# Patient Record
Sex: MALE | Race: BLACK OR AFRICAN AMERICAN | Employment: OTHER | URBAN - METROPOLITAN AREA
[De-identification: names, ages, dates, MRNs, and addresses within clinical notes are randomized per-mention and may not be internally consistent; named-entity substitution may affect disease eponyms.]

---

## 2017-01-05 ENCOUNTER — TELEPHONE ANTICOAG (OUTPATIENT)
Dept: CARDIOLOGY CLINIC | Age: 62
End: 2017-01-05

## 2017-01-05 DIAGNOSIS — I48.0 PAROXYSMAL ATRIAL FIBRILLATION (HCC): ICD-10-CM

## 2017-01-05 LAB — INR, EXTERNAL: 2.6

## 2017-01-12 LAB — INR, EXTERNAL: 2.28

## 2017-01-13 ENCOUNTER — TELEPHONE (OUTPATIENT)
Dept: CARDIOLOGY CLINIC | Age: 62
End: 2017-01-13

## 2017-01-13 ENCOUNTER — TELEPHONE ANTICOAG (OUTPATIENT)
Dept: CARDIOLOGY CLINIC | Age: 62
End: 2017-01-13

## 2017-01-13 DIAGNOSIS — I48.0 PAROXYSMAL ATRIAL FIBRILLATION (HCC): Primary | ICD-10-CM

## 2017-01-13 DIAGNOSIS — I48.0 PAROXYSMAL ATRIAL FIBRILLATION (HCC): ICD-10-CM

## 2017-01-18 DIAGNOSIS — I50.32 CHRONIC DIASTOLIC HEART FAILURE (HCC): Primary | ICD-10-CM

## 2017-01-19 RX ORDER — FUROSEMIDE 40 MG/1
40 TABLET ORAL 2 TIMES DAILY
Qty: 60 TAB | Refills: 6 | Status: SHIPPED | OUTPATIENT
Start: 2017-01-19 | End: 2017-12-26 | Stop reason: SDUPTHER

## 2017-02-03 ENCOUNTER — TELEPHONE (OUTPATIENT)
Dept: CARDIOLOGY CLINIC | Age: 62
End: 2017-02-03

## 2017-02-03 ENCOUNTER — TELEPHONE ANTICOAG (OUTPATIENT)
Dept: CARDIOLOGY CLINIC | Age: 62
End: 2017-02-03

## 2017-02-03 DIAGNOSIS — I05.9 MITRAL VALVE DISORDER: ICD-10-CM

## 2017-02-03 DIAGNOSIS — I48.0 PAROXYSMAL ATRIAL FIBRILLATION (HCC): Primary | ICD-10-CM

## 2017-02-03 DIAGNOSIS — I48.91 ATRIAL FIBRILLATION, UNSPECIFIED TYPE (HCC): ICD-10-CM

## 2017-02-03 LAB — INR, EXTERNAL: 1.93

## 2017-02-03 NOTE — TELEPHONE ENCOUNTER
Per patient called to inform us he needed a new ongoing standing order    Order will be faxed to [facility]

## 2017-02-03 NOTE — PATIENT INSTRUCTIONS
Instructed patient to continue current dose of coumadin and recheck INR in 3 weeks on 2/24/17. He voices understanding and acceptance of this advice and will call back if any further questions or concerns.

## 2017-02-08 ENCOUNTER — OFFICE VISIT (OUTPATIENT)
Dept: CARDIOLOGY CLINIC | Age: 62
End: 2017-02-08

## 2017-02-08 VITALS
BODY MASS INDEX: 36.31 KG/M2 | WEIGHT: 292 LBS | SYSTOLIC BLOOD PRESSURE: 139 MMHG | DIASTOLIC BLOOD PRESSURE: 69 MMHG | HEIGHT: 75 IN | HEART RATE: 60 BPM

## 2017-02-08 DIAGNOSIS — I10 ESSENTIAL HYPERTENSION, BENIGN: ICD-10-CM

## 2017-02-08 DIAGNOSIS — I48.20 CHRONIC ATRIAL FIBRILLATION (HCC): Primary | ICD-10-CM

## 2017-02-08 DIAGNOSIS — I05.9 MITRAL VALVE DISORDER: ICD-10-CM

## 2017-02-08 DIAGNOSIS — Z79.01 ANTICOAGULANT LONG-TERM USE: ICD-10-CM

## 2017-02-08 DIAGNOSIS — I50.32 CHRONIC DIASTOLIC HEART FAILURE (HCC): ICD-10-CM

## 2017-02-08 DIAGNOSIS — I27.20 PULMONARY HTN (HCC): ICD-10-CM

## 2017-02-08 NOTE — PROGRESS NOTES
1. Have you been to the ER, urgent care clinic since your last visit? Hospitalized since your last visit? No    2. Have you seen or consulted any other health care providers outside of the [address] [place] [address] since your last visit? Include any pap smears or colon screening. Yes Where: PCP Routine     3. Since your last visit, have you had any of the following symptoms?      swelling in legs/arms. 4.  Have you had any blood work, X-rays or cardiac testing? Yes Where: [facility]     Requested: NO     In [facility]: YES    5. Where do you normally have your labs drawn? [facility]    6. Do you need any refills today?    NO

## 2017-02-08 NOTE — MR AVS SNAPSHOT
Visit Information Date & Time Provider Department Dept. Phone Encounter #  
 2/8/2017  9:00 AM Libby Black MD [facility] [address] [place] [address] [id number] Follow-up Instructions Return in about 6 months (around 8/8/2017). Your Appointments 8/23/2017  9:00 AM  
ESTABLISHED PATIENT with Libby Black MD  
[facility]) Appt Note: 6 months [address] [facility], [address] [place] [address]  
[address], [address] [place] [address] Upcoming Health Maintenance Date Due Hepatitis C Screening 1955 Pneumococcal 19-64 Medium Risk (1 of 1 - PPSV23) 5/18/1972 DTaP/Tdap/Td series (1 - Tdap) 5/18/1974 FOBT Q 1 YEAR AGE 50-75 5/18/2003 ZOSTER VACCINE AGE 60> 5/18/2013 INFLUENZA AGE 9 TO ADULT 8/1/2016 Allergies as of 2/8/2017  Review Complete On: 2/8/2017 By: Libby Black MD  
 No Known Allergies Current Immunizations  Never Reviewed No immunizations on file. Not reviewed this visit You Were Diagnosed With   
  
 Codes Comments Chronic atrial fibrillation (HCC)    -  Primary ICD-10-CM: R04.9 ICD-9-CM: 427.31 stable 
on anticoag Mitral valve disorder     ICD-10-CM: I05.9 ICD-9-CM: 424.0 mr stable 
asymptomatic Chronic diastolic heart failure (HCC)     ICD-10-CM: I50.32 
ICD-9-CM: 428.32 stable 
occasional edema Essential hypertension, benign     ICD-10-CM: I10 
ICD-9-CM: 401.1 controlled Pulmonary HTN ([address]     ICD-10-CM: I27.2 ICD-9-CM: 416.8 stable Vitals BP Pulse Height(growth percentile) Weight(growth percentile) BMI Smoking Status 139/69 60 6' 3\" (1.905 m) 292 lb (132.5 kg) 36.5 kg/m2 Never Smoker Vitals History BMI and BSA Data Body Mass Index Body Surface Area  
 36.5 kg/m 2 2.65 m 2 Preferred Pharmacy Pharmacy Name Phone [facility]  [facility], [address] [place] [address] [phone] Your Updated Medication List  
  
   
This list is accurate as of: 2/8/17  9:32 AM.  Always use your most recent med list.  
  
  
  
  
 COREG 25 mg tablet Generic drug:  carvedilol Take 25 mg by mouth two (2) times daily (with meals). COUMADIN 5 mg tablet Generic drug:  warfarin Take 5 mg by mouth daily. furosemide 40 mg tablet Commonly known as:  LASIX Take 1 Tab by mouth two (2) times a day. LANOXIN 0.125 mg tablet Generic drug:  digoxin Take 0.125 mcg by mouth daily. nisoldipine SR 17 mg Tb24 tablet Commonly known as:  Peder Dickey Take 1 Tab by mouth daily. ramipril 10 mg capsule Commonly known as:  ALTACE Take 10 mg by mouth daily. Follow-up Instructions Return in about 6 months (around 8/8/2017). Introducing [facility]! Leta Robbins introduces [employer] patient portal. Now you can access parts of your medical record, email your doctor's office, and request medication refills online. 1. In your internet browser, go to [email or web address] 2. Click on the First Time User? Click Here link in the Sign In box. You will see the New Member Sign Up page. 3. Enter your Songfor Access Code exactly as it appears below. You will not need to use this code after youve completed the sign-up process. If you do not sign up before the expiration date, you must request a new code. · Song[employer] Access Code: [email or web address] Expires: 3/20/2017  9:24 AM 
 
4. Enter the last four digits of your Social Security Number (xxxx) and Date of Birth (mm/dd/yyyy) as indicated and click Submit. You will be taken to the next sign-up page. 5. Create a [employer] ID. This will be your Songfor login ID and cannot be changed, so think of one that is secure and easy to remember. 6. Create a [employer] password. You can change your password at any time. 7. Enter your Password Reset Question and Answer. This can be used at a later time if you forget your password. 8. Enter your e-mail address. You will receive e-mail notification when new information is available in [address] 9. Click Sign Up. You can now view and download portions of your medical record. 10. Click the Download Summary menu link to download a portable copy of your medical information. If you have questions, please visit the Frequently Asked Questions section of the [employer] website. Remember, [employer] is NOT to be used for urgent needs. For medical emergencies, dial 911. Now available from your iPhone and Android! Please provide this summary of care documentation to your next provider. Your primary care clinician is listed as Shira Anderson. If you have any questions after today's visit, please call [phone].

## 2017-02-08 NOTE — PROGRESS NOTES
HISTORY OF PRESENT ILLNESS  Jorge Howard is a 59 y.o. male. HPI Comments: Patient with a fib,chf,pulmonary htn,mr. On follow up patient denies any chest pains,sob, palpitation or other significant symptoms. Valvular Heart Disease   The history is provided by the patient. This is a chronic problem. The problem occurs constantly. The problem has not changed since onset. Pertinent negatives include no chest pain, no abdominal pain, no headaches and no shortness of breath. CHF   The history is provided by the patient. This is a chronic problem. The problem occurs constantly. The problem has not changed since onset. Pertinent negatives include no chest pain, no abdominal pain, no headaches and no shortness of breath. Palpitations    The history is provided by the patient. This is a chronic problem. The problem has not changed since onset. Associated symptoms include lower extremity edema. Pertinent negatives include no fever, no chest pain, no claudication, no orthopnea, no PND, no abdominal pain, no nausea, no vomiting, no headaches, no dizziness, no weakness, no cough, no hemoptysis, no shortness of breath and no sputum production. His past medical history is significant for hypertension. Hypertension   Pertinent negatives include no chest pain, no abdominal pain, no headaches and no shortness of breath. Leg Swelling   The history is provided by the patient. This is a chronic problem. The problem occurs daily. The problem has not changed since onset. Pertinent negatives include no chest pain, no abdominal pain, no headaches and no shortness of breath. Nothing aggravates the symptoms. Review of Systems   Constitutional: Negative for chills and fever. HENT: Negative for nosebleeds. Eyes: Negative for blurred vision and double vision. Respiratory: Negative for cough, hemoptysis, sputum production, shortness of breath and wheezing. Cardiovascular: Positive for leg swelling.  Negative for chest pain, palpitations, orthopnea, claudication and PND. Gastrointestinal: Negative for abdominal pain, heartburn, nausea and vomiting. Musculoskeletal: Negative for myalgias. Skin: Negative for rash. Neurological: Negative for dizziness, weakness and headaches. Endo/Heme/Allergies: Does not bruise/bleed easily. Family History   Problem Relation Age of Onset    Diabetes Neg Hx     Hypertension Neg Hx        Past Medical History   Diagnosis Date    Atrial fibrillation (HCC)     Chronic diastolic heart failure (HCC)     Congestive heart failure (HCC)     Essential hypertension, benign     Hypertension     Mitral valve disorders     Obesity, unspecified     Other chronic pulmonary heart diseases     Other ill-defined conditions(799.89)      Afib       Past Surgical History   Procedure Laterality Date    Hx tonsillectomy      Hx adenoidectomy         No Known Allergies    Current Outpatient Prescriptions   Medication Sig    furosemide (LASIX) 40 mg tablet Take 1 Tab by mouth two (2) times a day.  nisoldipine SR (SULAR) 17 mg Tb24 tablet Take 1 Tab by mouth daily.  warfarin (COUMADIN) 5 mg tablet Take 5 mg by mouth daily.  carvedilol (COREG) 25 mg tablet Take 25 mg by mouth two (2) times daily (with meals).  ramipril (ALTACE) 10 mg capsule Take 10 mg by mouth daily.  digoxin (LANOXIN) 0.125 mg tablet Take 0.125 mcg by mouth daily. No current facility-administered medications for this visit. Visit Vitals    /69    Pulse 60    Ht 6' 3\" (1.905 m)    Wt 132.5 kg (292 lb)    BMI 36.5 kg/m2         Physical Exam   Constitutional: He is oriented to person, place, and time. He appears well-developed and well-nourished. HENT:   Head: Normocephalic and atraumatic. Eyes: Conjunctivae are normal.   Neck: Neck supple. No JVD present. No tracheal deviation present. No thyromegaly present. Cardiovascular: Normal rate and normal heart sounds.   An irregularly irregular rhythm present. Exam reveals no gallop and no friction rub. No murmur heard. Pulmonary/Chest: Breath sounds normal. No respiratory distress. He has no wheezes. He has no rales. He exhibits no tenderness. Abdominal: Soft. There is no tenderness. Musculoskeletal: He exhibits edema. Neurological: He is alert and oriented to person, place, and time. Skin: Skin is warm and dry. Psychiatric: He has a normal mood and affect. Mr. Patsy Crockett has a reminder for a \"due or due soon\" health maintenance. I have asked that he contact his primary care provider for follow-up on this health maintenance. CARDIOLOGY STUDIES 7/24/2013 5/18/2011 12/19/2007 5/9/2006 4/16/2002 7/15/1996   EKG Result - - - - - 7-96   Myocardial Perfusion Scan Result - - - - nl scan -   Echocardiogram - Complete Result normal ef,enlarged la,mild mod mr,mild tr,pap 38 EF 55%,mild MR & TR,PAP 51 Mild to Mod. MR, Normal EF - - -   Doppler US Vascular Result - - - Vascular - -     SUMMARY:echo:5/2015  Left ventricle: Systolic function was normal. Ejection fraction was  estimated in the range of 55 % to 60 %. There were no regional wall motion  abnormalities. Left atrium: The atrium was mildly dilated. Mitral valve: There was mild annular calcification. There was mild diffuse  thickening of the anterior and posterior leaflets. There was mild  regurgitation. Mean transmitral gradient was 1.6 mmHg. Tricuspid valve: Tricuspid regurgitation peak velocity: 3.1 m/sec. Pulmonary artery systolic pressure: 41 mmHg. Assessment       ICD-10-CM ICD-9-CM    1. Chronic atrial fibrillation (HCC) I48.2 427.31     stable  on anticoag   2. Mitral valve disorder I05.9 424.0     mr stable  asymptomatic   3. Chronic diastolic heart failure (HCC) I50.32 428.32     stable  occasional edema   4. Essential hypertension, benign I10 401.1     controlled   5. Pulmonary HTN (HCC) I27.2 416.8     stable   6.  Anticoagulant long-term use Z79.01 V58.61 on coumadin for af       There are no discontinued medications. No orders of the defined types were placed in this encounter. Follow-up Disposition:  Return in about 6 months (around 8/8/2017).

## 2017-02-08 NOTE — LETTER
Mahi Fails 1955 2/8/2017 Dear Samantha Stovall MD 
 
I had the pleasure of evaluating  Mr. Gunner Romeo in office today. Below are the relevant portions of my assessment and plan of care. ICD-10-CM ICD-9-CM 1. Chronic atrial fibrillation (HCC) I48.2 427.31   
 stable 
on anticoag 2. Mitral valve disorder I05.9 424.0   
 mr stable 
asymptomatic 3. Chronic diastolic heart failure (HCC) I50.32 428.32   
 stable 
occasional edema 4. Essential hypertension, benign I10 401.1   
 controlled 5. Pulmonary HTN (HCC) I27.2 416.8   
 stable Current Outpatient Prescriptions Medication Sig Dispense Refill  furosemide (LASIX) 40 mg tablet Take 1 Tab by mouth two (2) times a day. 60 Tab 6  
 nisoldipine SR (SULAR) 17 mg Tb24 tablet Take 1 Tab by mouth daily. 30 Tab 6  warfarin (COUMADIN) 5 mg tablet Take 5 mg by mouth daily.  carvedilol (COREG) 25 mg tablet Take 25 mg by mouth two (2) times daily (with meals).  ramipril (ALTACE) 10 mg capsule Take 10 mg by mouth daily.  digoxin (LANOXIN) 0.125 mg tablet Take 0.125 mcg by mouth daily. No orders of the defined types were placed in this encounter. If you have questions, please do not hesitate to call me. I look forward to following Mr. Gunner Romeo along with you. Sincerely, Michael Howe MD

## 2017-03-03 ENCOUNTER — TELEPHONE ANTICOAG (OUTPATIENT)
Dept: CARDIOLOGY CLINIC | Age: 62
End: 2017-03-03

## 2017-03-03 DIAGNOSIS — I48.0 PAROXYSMAL ATRIAL FIBRILLATION (HCC): ICD-10-CM

## 2017-03-03 LAB — INR, EXTERNAL: 2

## 2017-04-05 LAB — INR, EXTERNAL: 1.7

## 2017-04-06 ENCOUNTER — TELEPHONE ANTICOAG (OUTPATIENT)
Dept: CARDIOLOGY CLINIC | Age: 62
End: 2017-04-06

## 2017-04-06 DIAGNOSIS — I48.0 PAROXYSMAL ATRIAL FIBRILLATION (HCC): ICD-10-CM

## 2017-04-06 NOTE — PATIENT INSTRUCTIONS
Called and advised patient to take 10 mg coumadin today the resume reg dose. Repeat inr 4/10/17, patient states understanding.

## 2017-05-04 DIAGNOSIS — I05.9 MITRAL VALVE DISORDER: ICD-10-CM

## 2017-05-04 DIAGNOSIS — I48.0 PAROXYSMAL ATRIAL FIBRILLATION (HCC): Primary | ICD-10-CM

## 2017-05-04 LAB
HCT VFR BLD AUTO: 42.7 % (ref 39.3–51.6)
HGB BLD-MCNC: 14.4 G/DL (ref 13.1–17.2)
INR PPP: 1.69 (ref 0.89–1.29)
PROTHROMBIN TIME: 17.8 SEC (ref 9–13)

## 2017-05-05 ENCOUNTER — TELEPHONE (OUTPATIENT)
Dept: CARDIOLOGY CLINIC | Age: 62
End: 2017-05-05

## 2017-05-05 ENCOUNTER — TELEPHONE ANTICOAG (OUTPATIENT)
Dept: CARDIOLOGY CLINIC | Age: 62
End: 2017-05-05

## 2017-05-05 DIAGNOSIS — I50.32 CHRONIC DIASTOLIC HEART FAILURE (HCC): ICD-10-CM

## 2017-05-05 DIAGNOSIS — I48.91 ATRIAL FIBRILLATION, UNSPECIFIED TYPE (HCC): ICD-10-CM

## 2017-05-05 DIAGNOSIS — I50.32 DIASTOLIC CHF, CHRONIC (HCC): ICD-10-CM

## 2017-05-05 DIAGNOSIS — I48.20 CHRONIC ATRIAL FIBRILLATION (HCC): ICD-10-CM

## 2017-05-05 LAB — INR, EXTERNAL: 1.7

## 2017-05-05 RX ORDER — DIGOXIN 125 MCG
0.12 TABLET ORAL DAILY
Qty: 30 TAB | Refills: 3 | Status: SHIPPED | OUTPATIENT
Start: 2017-05-05 | End: 2017-07-31 | Stop reason: SDUPTHER

## 2017-05-05 RX ORDER — NISOLDIPINE 17 MG/1
17 TABLET, FILM COATED, EXTENDED RELEASE ORAL DAILY
Qty: 30 TAB | Refills: 3 | Status: SHIPPED | OUTPATIENT
Start: 2017-05-05 | End: 2017-07-31 | Stop reason: SDUPTHER

## 2017-05-05 RX ORDER — CARVEDILOL 25 MG/1
25 TABLET ORAL 2 TIMES DAILY WITH MEALS
Qty: 30 TAB | Refills: 3 | Status: SHIPPED | OUTPATIENT
Start: 2017-05-05 | End: 2017-05-08 | Stop reason: SDUPTHER

## 2017-05-05 RX ORDER — RAMIPRIL 10 MG/1
10 CAPSULE ORAL DAILY
Qty: 30 CAP | Refills: 3 | Status: SHIPPED | OUTPATIENT
Start: 2017-05-05 | End: 2017-05-08 | Stop reason: SDUPTHER

## 2017-05-05 NOTE — TELEPHONE ENCOUNTER
Requested Prescriptions     Pending Prescriptions Disp Refills    carvedilol (COREG) 25 mg tablet 30 Tab 3     Sig: Take 1 Tab by mouth two (2) times daily (with meals).  digoxin (LANOXIN) 0.125 mg tablet 30 Tab 3     Sig: Take 1 Tab by mouth daily.  ramipril (ALTACE) 10 mg capsule 30 Cap 3     Sig: Take 1 Cap by mouth daily.  nisoldipine SR (SULAR) 17 mg Tb24 tablet 30 Tab 3     Sig: Take 1 Tab by mouth daily.

## 2017-05-05 NOTE — PATIENT INSTRUCTIONS
Called and spoke with patient to take take 10 mg tonight and resume current dose of coumadin and recheck INR on Monday. He voices understanding and acceptance of this advice and will call back if any further questions or concerns.

## 2017-05-08 ENCOUNTER — TELEPHONE ANTICOAG (OUTPATIENT)
Dept: CARDIOLOGY CLINIC | Age: 62
End: 2017-05-08

## 2017-05-08 DIAGNOSIS — I48.20 CHRONIC ATRIAL FIBRILLATION (HCC): ICD-10-CM

## 2017-05-08 DIAGNOSIS — I48.91 ATRIAL FIBRILLATION, UNSPECIFIED TYPE (HCC): ICD-10-CM

## 2017-05-08 DIAGNOSIS — I50.32 DIASTOLIC CHF, CHRONIC (HCC): ICD-10-CM

## 2017-05-08 DIAGNOSIS — I50.32 CHRONIC DIASTOLIC HEART FAILURE (HCC): ICD-10-CM

## 2017-05-08 LAB — INR, EXTERNAL: 1.9

## 2017-05-08 RX ORDER — CARVEDILOL 25 MG/1
25 TABLET ORAL 2 TIMES DAILY WITH MEALS
Qty: 60 TAB | Refills: 3 | Status: SHIPPED | OUTPATIENT
Start: 2017-05-08 | End: 2017-07-31 | Stop reason: SDUPTHER

## 2017-05-08 RX ORDER — RAMIPRIL 10 MG/1
10 CAPSULE ORAL DAILY
Qty: 30 CAP | Refills: 3 | Status: SHIPPED | OUTPATIENT
Start: 2017-05-08 | End: 2017-07-31 | Stop reason: SDUPTHER

## 2017-05-08 NOTE — PROGRESS NOTES
Description          5/8/17  7.5 mg daily recheck Friday stat        This has been fully explained to the patient, who indicates understanding.

## 2017-05-12 ENCOUNTER — TELEPHONE ANTICOAG (OUTPATIENT)
Dept: CARDIOLOGY CLINIC | Age: 62
End: 2017-05-12

## 2017-05-12 DIAGNOSIS — I48.20 CHRONIC ATRIAL FIBRILLATION (HCC): ICD-10-CM

## 2017-05-12 LAB — INR, EXTERNAL: 2.5

## 2017-05-19 ENCOUNTER — TELEPHONE ANTICOAG (OUTPATIENT)
Dept: CARDIOLOGY CLINIC | Age: 62
End: 2017-05-19

## 2017-05-19 DIAGNOSIS — I48.20 CHRONIC ATRIAL FIBRILLATION (HCC): ICD-10-CM

## 2017-05-19 LAB — INR, EXTERNAL: 2.8

## 2017-05-30 RX ORDER — WARFARIN SODIUM 5 MG/1
5 TABLET ORAL DAILY
Qty: 45 TAB | Refills: 6 | Status: SHIPPED | OUTPATIENT
Start: 2017-05-30 | End: 2017-06-01 | Stop reason: SDUPTHER

## 2017-06-01 RX ORDER — WARFARIN SODIUM 5 MG/1
5 TABLET ORAL DAILY
Qty: 45 TAB | Refills: 6 | Status: SHIPPED | OUTPATIENT
Start: 2017-06-01 | End: 2017-06-26 | Stop reason: SDUPTHER

## 2017-06-02 ENCOUNTER — TELEPHONE ANTICOAG (OUTPATIENT)
Dept: CARDIOLOGY CLINIC | Age: 62
End: 2017-06-02

## 2017-06-02 DIAGNOSIS — I48.20 CHRONIC ATRIAL FIBRILLATION (HCC): ICD-10-CM

## 2017-06-02 LAB — INR, EXTERNAL: 2.9

## 2017-06-02 NOTE — PROGRESS NOTES
Description          6/2 continue current dose recheck 2 weeks         This has been fully explained to the patient, who indicates understanding.

## 2017-06-16 LAB — INR, EXTERNAL: 2.79

## 2017-06-19 ENCOUNTER — TELEPHONE ANTICOAG (OUTPATIENT)
Dept: CARDIOLOGY CLINIC | Age: 62
End: 2017-06-19

## 2017-06-19 DIAGNOSIS — I48.20 CHRONIC ATRIAL FIBRILLATION (HCC): ICD-10-CM

## 2017-06-27 RX ORDER — WARFARIN 7.5 MG/1
7.5 TABLET ORAL DAILY
Qty: 30 TAB | Refills: 6 | Status: SHIPPED | OUTPATIENT
Start: 2017-06-27 | End: 2018-02-05 | Stop reason: SDUPTHER

## 2017-07-05 ENCOUNTER — TELEPHONE ANTICOAG (OUTPATIENT)
Dept: CARDIOLOGY CLINIC | Age: 62
End: 2017-07-05

## 2017-07-05 DIAGNOSIS — I48.20 CHRONIC ATRIAL FIBRILLATION (HCC): ICD-10-CM

## 2017-07-05 LAB — INR, EXTERNAL: 2.8

## 2017-07-25 LAB — INR, EXTERNAL: 2.51

## 2017-07-26 ENCOUNTER — TELEPHONE ANTICOAG (OUTPATIENT)
Dept: CARDIOLOGY CLINIC | Age: 62
End: 2017-07-26

## 2017-07-26 DIAGNOSIS — I48.20 CHRONIC ATRIAL FIBRILLATION (HCC): ICD-10-CM

## 2017-07-31 ENCOUNTER — TELEPHONE (OUTPATIENT)
Dept: CARDIOLOGY CLINIC | Age: 62
End: 2017-07-31

## 2017-07-31 DIAGNOSIS — I50.32 DIASTOLIC CHF, CHRONIC (HCC): ICD-10-CM

## 2017-07-31 DIAGNOSIS — I48.91 ATRIAL FIBRILLATION, UNSPECIFIED TYPE (HCC): ICD-10-CM

## 2017-07-31 DIAGNOSIS — I50.32 CHRONIC DIASTOLIC HEART FAILURE (HCC): ICD-10-CM

## 2017-07-31 DIAGNOSIS — I10 HYPERTENSION, ESSENTIAL: Primary | ICD-10-CM

## 2017-07-31 RX ORDER — NISOLDIPINE 17 MG/1
17 TABLET, FILM COATED, EXTENDED RELEASE ORAL DAILY
Qty: 30 TAB | Refills: 3 | Status: SHIPPED | OUTPATIENT
Start: 2017-07-31 | End: 2017-11-20 | Stop reason: SDUPTHER

## 2017-07-31 RX ORDER — DIGOXIN 125 MCG
0.12 TABLET ORAL DAILY
Qty: 30 TAB | Refills: 3 | Status: SHIPPED | OUTPATIENT
Start: 2017-07-31 | End: 2017-11-20 | Stop reason: SDUPTHER

## 2017-07-31 RX ORDER — RAMIPRIL 10 MG/1
10 CAPSULE ORAL DAILY
Qty: 30 CAP | Refills: 3 | Status: SHIPPED | OUTPATIENT
Start: 2017-07-31 | End: 2017-08-23 | Stop reason: SDUPTHER

## 2017-07-31 RX ORDER — CARVEDILOL 25 MG/1
25 TABLET ORAL 2 TIMES DAILY WITH MEALS
Qty: 60 TAB | Refills: 6 | Status: SHIPPED | OUTPATIENT
Start: 2017-07-31 | End: 2018-02-05 | Stop reason: SDUPTHER

## 2017-08-01 DIAGNOSIS — I50.32 DIASTOLIC CHF, CHRONIC (HCC): ICD-10-CM

## 2017-08-01 DIAGNOSIS — I48.91 ATRIAL FIBRILLATION, UNSPECIFIED TYPE (HCC): ICD-10-CM

## 2017-08-01 DIAGNOSIS — I50.32 CHRONIC DIASTOLIC HEART FAILURE (HCC): ICD-10-CM

## 2017-08-01 NOTE — TELEPHONE ENCOUNTER
Pharmacy called and stated when the patient came to  his Altace, which according to the notes is once daily, he stated that is incorrect and that it should be twice daily. Could not find any documentation in the chart that states twice daily. Which way should he be taking the Altace ?

## 2017-08-02 DIAGNOSIS — I10 HYPERTENSION, ESSENTIAL: ICD-10-CM

## 2017-08-02 RX ORDER — RAMIPRIL 10 MG/1
10 CAPSULE ORAL 2 TIMES DAILY
Qty: 180 CAP | Refills: 2 | Status: SHIPPED | OUTPATIENT
Start: 2017-08-02 | End: 2017-11-20 | Stop reason: SDUPTHER

## 2017-08-02 NOTE — TELEPHONE ENCOUNTER
Talk to patient about bringing medication to next visit, He voices understanding and acceptance of this advice and will call back if any further questions or concerns. Requested Prescriptions     Pending Prescriptions Disp Refills    ramipril (ALTACE) 10 mg capsule 180 Cap 2     Sig: Take 1 Cap by mouth two (2) times a day.

## 2017-08-14 LAB — INR, EXTERNAL: 2.38

## 2017-08-16 ENCOUNTER — TELEPHONE ANTICOAG (OUTPATIENT)
Dept: CARDIOLOGY CLINIC | Age: 62
End: 2017-08-16

## 2017-08-16 DIAGNOSIS — I48.20 CHRONIC ATRIAL FIBRILLATION (HCC): ICD-10-CM

## 2017-08-23 ENCOUNTER — OFFICE VISIT (OUTPATIENT)
Dept: CARDIOLOGY CLINIC | Age: 62
End: 2017-08-23

## 2017-08-23 VITALS
WEIGHT: 291 LBS | HEIGHT: 75 IN | SYSTOLIC BLOOD PRESSURE: 130 MMHG | BODY MASS INDEX: 36.18 KG/M2 | HEART RATE: 56 BPM | DIASTOLIC BLOOD PRESSURE: 61 MMHG

## 2017-08-23 DIAGNOSIS — I50.32 CHRONIC DIASTOLIC HEART FAILURE (HCC): ICD-10-CM

## 2017-08-23 DIAGNOSIS — I10 ESSENTIAL HYPERTENSION, BENIGN: ICD-10-CM

## 2017-08-23 DIAGNOSIS — Z79.01 ANTICOAGULANT LONG-TERM USE: ICD-10-CM

## 2017-08-23 DIAGNOSIS — I05.9 MITRAL VALVE DISORDER: ICD-10-CM

## 2017-08-23 DIAGNOSIS — I48.20 CHRONIC ATRIAL FIBRILLATION (HCC): Primary | ICD-10-CM

## 2017-08-23 NOTE — MR AVS SNAPSHOT
Visit Information Date & Time Provider Department Dept. Phone Encounter #  
 8/23/2017  9:00 AM Harriet Burk MD [facility] [address] [place] [address] [id number] Follow-up Instructions Return in about 6 months (around 2/23/2018). Your Appointments 2/21/2018  9:00 AM  
Office Visit with Harriet Burk MD  
[facility]) Appt Note: [address] [facility], [address], [address] [place] [address] Upcoming Health Maintenance Date Due Hepatitis C Screening 1955 Pneumococcal 19-64 Medium Risk (1 of 1 - PPSV23) 5/18/1972 DTaP/Tdap/Td series (1 - Tdap) 5/18/1974 FOBT Q 1 YEAR AGE 50-75 5/18/2003 ZOSTER VACCINE AGE 60> 3/18/2013 INFLUENZA AGE 9 TO ADULT 8/1/2017 Allergies as of 8/23/2017  Review Complete On: 8/23/2017 By: Harriet Burk MD  
 No Known Allergies Current Immunizations  Never Reviewed No immunizations on file. Not reviewed this visit You Were Diagnosed With   
  
 Codes Comments Chronic atrial fibrillation (HCC)    -  Primary ICD-10-CM: V56.9 ICD-9-CM: 427.31 stable 
rate controlled Mitral valve disorder     ICD-10-CM: I05.9 ICD-9-CM: 394.9 mr 
stable 
asymptomatic Chronic diastolic heart failure (HCC)     ICD-10-CM: I50.32 
ICD-9-CM: 428.32 stable Essential hypertension, benign     ICD-10-CM: I10 
ICD-9-CM: 401.1 controlled Anticoagulant long-term use     ICD-10-CM: Z79.01 
ICD-9-CM: V58.61 stable Vitals BP Pulse Height(growth percentile) Weight(growth percentile) BMI Smoking Status 130/61 (!) 56 6' 3\" (1.905 m) 291 lb (132 kg) 36.37 kg/m2 Never Smoker Vitals History BMI and BSA Data Body Mass Index Body Surface Area  
 36.37 kg/m 2 2.64 m 2 Preferred Pharmacy Pharmacy Name Phone [facility] [place], [address] [place] [address],[address] [place] [address] [phone] Your Updated Medication List  
  
   
This list is accurate as of: 8/23/17  9:21 AM.  Always use your most recent med list.  
  
  
  
  
 carvedilol 25 mg tablet Commonly known as:  Honorio Eladio Take 1 Tab by mouth two (2) times daily (with meals). digoxin 0.125 mg tablet Commonly known as:  LANOXIN Take 1 Tab by mouth daily. furosemide 40 mg tablet Commonly known as:  LASIX Take 1 Tab by mouth two (2) times a day. nisoldipine SR 17 mg Tb24 tablet Commonly known as:  Wellington Spatz Take 1 Tab by mouth daily. ramipril 10 mg capsule Commonly known as:  ALTACE Take 1 Cap by mouth two (2) times a day. warfarin 7.5 mg tablet Commonly known as:  COUMADIN Take 1 Tab by mouth daily. Follow-up Instructions Return in about 6 months (around 2/23/2018). Introducing [facility]! Jackie Vargas introduces [employer] patient portal. Now you can access parts of your medical record, email your doctor's office, and request medication refills online. 1. In your internet browser, go to [email or web address] 2. Click on the First Time User? Click Here link in the Sign In box. You will see the New Member Sign Up page. 3. Enter your [employer] Access Code exactly as it appears below. You will not need to use this code after youve completed the sign-up process. If you do not sign up before the expiration date, you must request a new code. · [employer] Access Code: [email or web address] Expires: 10/3/2017  2:53 PM 
 
4. Enter the last four digits of your Social Security Number (xxxx) and Date of Birth (mm/dd/yyyy) as indicated and click Submit. You will be taken to the next sign-up page. 5. Create a BBO[employer]t ID. This will be your [employer]iya login ID and cannot be changed, so think of one that is secure and easy to remember. 6. Create a BBO[employer]t password. You can change your password at any time. 7. Enter your Password Reset Question and Answer. This can be used at a later time if you forget your password. 8. Enter your e-mail address. You will receive e-mail notification when new information is available in [address] 9. Click Sign Up. You can now view and download portions of your medical record. 10. Click the Download Summary menu link to download a portable copy of your medical information. If you have questions, please visit the Frequently Asked Questions section of the [employer] website. Remember, [employer] is NOT to be used for urgent needs. For medical emergencies, dial 911. Now available from your iPhone and Android! Please provide this summary of care documentation to your next provider. Your primary care clinician is listed as Patricia Nolasco. If you have any questions after today's visit, please call [phone].

## 2017-08-23 NOTE — PROGRESS NOTES
HISTORY OF PRESENT ILLNESS  Claudia Verdin is a 57 y.o. male. HPI Comments: Patient with a fib,chf,pulmonary htn,mr. On follow up patient denies any chest pains,sob, palpitation or other significant symptoms. Valvular Heart Disease   The history is provided by the patient. This is a chronic problem. The problem occurs constantly. The problem has not changed since onset. Pertinent negatives include no chest pain, no abdominal pain, no headaches and no shortness of breath. CHF   The history is provided by the patient. This is a chronic problem. The problem occurs constantly. The problem has not changed since onset. Pertinent negatives include no chest pain, no abdominal pain, no headaches and no shortness of breath. Palpitations    The history is provided by the patient. This is a chronic problem. The problem has not changed since onset. Associated symptoms include lower extremity edema. Pertinent negatives include no fever, no chest pain, no claudication, no orthopnea, no PND, no abdominal pain, no nausea, no vomiting, no headaches, no dizziness, no weakness, no cough, no hemoptysis, no shortness of breath and no sputum production. His past medical history is significant for hypertension. Hypertension   The history is provided by the patient. This is a chronic problem. The problem occurs constantly. The problem has not changed since onset. Pertinent negatives include no chest pain, no abdominal pain, no headaches and no shortness of breath. Leg Swelling   The history is provided by the patient. This is a chronic problem. The problem occurs daily. The problem has not changed since onset. Pertinent negatives include no chest pain, no abdominal pain, no headaches and no shortness of breath. Nothing aggravates the symptoms. Review of Systems   Constitutional: Negative for chills and fever. HENT: Negative for nosebleeds. Eyes: Negative for blurred vision and double vision.    Respiratory: Negative for cough, hemoptysis, sputum production, shortness of breath and wheezing. Cardiovascular: Positive for leg swelling. Negative for chest pain, palpitations, orthopnea, claudication and PND. Gastrointestinal: Negative for abdominal pain, heartburn, nausea and vomiting. Musculoskeletal: Negative for myalgias. Skin: Negative for rash. Neurological: Negative for dizziness, weakness and headaches. Endo/Heme/Allergies: Does not bruise/bleed easily. Family History   Problem Relation Age of Onset    Diabetes Neg Hx     Hypertension Neg Hx        Past Medical History:   Diagnosis Date    Atrial fibrillation (HCC)     Chronic diastolic heart failure (HCC)     Congestive heart failure (HCC)     Essential hypertension, benign     Hypertension     Mitral valve disorders     Obesity, unspecified     Other chronic pulmonary heart diseases     Other ill-defined conditions     Afib       Past Surgical History:   Procedure Laterality Date    HX ADENOIDECTOMY      HX TONSILLECTOMY         No Known Allergies    Current Outpatient Prescriptions   Medication Sig    ramipril (ALTACE) 10 mg capsule Take 1 Cap by mouth two (2) times a day.  carvedilol (COREG) 25 mg tablet Take 1 Tab by mouth two (2) times daily (with meals).  digoxin (LANOXIN) 0.125 mg tablet Take 1 Tab by mouth daily.  nisoldipine SR (SULAR) 17 mg Tb24 tablet Take 1 Tab by mouth daily.  warfarin (COUMADIN) 7.5 mg tablet Take 1 Tab by mouth daily.  furosemide (LASIX) 40 mg tablet Take 1 Tab by mouth two (2) times a day. No current facility-administered medications for this visit. Visit Vitals    /61    Pulse (!) 56    Ht 6' 3\" (1.905 m)    Wt 132 kg (291 lb)    BMI 36.37 kg/m2         Physical Exam   Constitutional: He is oriented to person, place, and time. He appears well-developed and well-nourished. HENT:   Head: Normocephalic and atraumatic. Eyes: Conjunctivae are normal.   Neck: Neck supple.  No JVD present. No tracheal deviation present. No thyromegaly present. Cardiovascular: Normal rate and normal heart sounds. An irregularly irregular rhythm present. Exam reveals no gallop and no friction rub. No murmur heard. Pulmonary/Chest: Breath sounds normal. No respiratory distress. He has no wheezes. He has no rales. He exhibits no tenderness. Abdominal: Soft. There is no tenderness. Musculoskeletal: He exhibits edema. Neurological: He is alert and oriented to person, place, and time. Skin: Skin is warm and dry. Psychiatric: He has a normal mood and affect. Mr. Josselyn Garrido has a reminder for a \"due or due soon\" health maintenance. I have asked that he contact his primary care provider for follow-up on this health maintenance. CARDIOLOGY STUDIES 7/24/2013 5/18/2011 12/19/2007 5/9/2006 4/16/2002 7/15/1996   EKG Result - - - - - 7-96   Myocardial Perfusion Scan Result - - - - nl scan -   Echocardiogram - Complete Result normal ef,enlarged la,mild mod mr,mild tr,pap 38 EF 55%,mild MR & TR,PAP 51 Mild to Mod. MR, Normal EF - - -   Doppler US Vascular Result - - - Vascular - -   Some recent data might be hidden     SUMMARY:echo:5/2015  Left ventricle: Systolic function was normal. Ejection fraction was  estimated in the range of 55 % to 60 %. There were no regional wall motion  abnormalities. Left atrium: The atrium was mildly dilated. Mitral valve: There was mild annular calcification. There was mild diffuse  thickening of the anterior and posterior leaflets. There was mild  regurgitation. Mean transmitral gradient was 1.6 mmHg. Tricuspid valve: Tricuspid regurgitation peak velocity: 3.1 m/sec. Pulmonary artery systolic pressure: 41 mmHg. I Have personally reviewed recent relevant labs available and discussed with patient  8/2017-dig,bmp  Assessment       ICD-10-CM ICD-9-CM    1. Chronic atrial fibrillation (HCC) I48.2 427.31     stable  rate controlled   2.  Mitral valve disorder I05.9 394.9     mr  stable  asymptomatic   3. Chronic diastolic heart failure (HCC) I50.32 428.32     stable   4. Essential hypertension, benign I10 401.1     controlled   5. Anticoagulant long-term use Z79.01 V58.61     stable         No orders of the defined types were placed in this encounter. Follow-up Disposition:  Return in about 6 months (around 2/23/2018).

## 2017-09-05 ENCOUNTER — OFFICE VISIT (OUTPATIENT)
Dept: FAMILY MEDICINE CLINIC | Age: 62
End: 2017-09-05

## 2017-09-05 VITALS
RESPIRATION RATE: 18 BRPM | DIASTOLIC BLOOD PRESSURE: 80 MMHG | WEIGHT: 290.2 LBS | HEART RATE: 63 BPM | OXYGEN SATURATION: 97 % | HEIGHT: 75 IN | SYSTOLIC BLOOD PRESSURE: 145 MMHG | TEMPERATURE: 98 F | BODY MASS INDEX: 36.08 KG/M2

## 2017-09-05 DIAGNOSIS — Z13.0 SCREENING FOR ENDOCRINE, METABOLIC AND IMMUNITY DISORDER: ICD-10-CM

## 2017-09-05 DIAGNOSIS — I48.20 CHRONIC ATRIAL FIBRILLATION (HCC): ICD-10-CM

## 2017-09-05 DIAGNOSIS — Z13.220 ENCOUNTER FOR LIPID SCREENING FOR CARDIOVASCULAR DISEASE: ICD-10-CM

## 2017-09-05 DIAGNOSIS — Z13.1 SCREENING FOR DIABETES MELLITUS: ICD-10-CM

## 2017-09-05 DIAGNOSIS — Z13.29 SCREENING FOR ENDOCRINE, METABOLIC AND IMMUNITY DISORDER: ICD-10-CM

## 2017-09-05 DIAGNOSIS — Z13.228 SCREENING FOR ENDOCRINE, METABOLIC AND IMMUNITY DISORDER: ICD-10-CM

## 2017-09-05 DIAGNOSIS — R20.2 TINGLING IN EXTREMITIES: ICD-10-CM

## 2017-09-05 DIAGNOSIS — I50.32 CHRONIC DIASTOLIC HEART FAILURE (HCC): ICD-10-CM

## 2017-09-05 DIAGNOSIS — Z12.5 SCREENING FOR PROSTATE CANCER: ICD-10-CM

## 2017-09-05 DIAGNOSIS — I10 ESSENTIAL HYPERTENSION: Primary | ICD-10-CM

## 2017-09-05 DIAGNOSIS — Z13.6 ENCOUNTER FOR LIPID SCREENING FOR CARDIOVASCULAR DISEASE: ICD-10-CM

## 2017-09-05 NOTE — PROGRESS NOTES
HISTORY OF PRESENT ILLNESS  Travon Sadler is a 57 y.o. male. 9/5/2017  10:21 AM    Chief Complaint   Patient presents with    Establish Care     HTN, CHF    Tingling     IN BOTH LEGS ON AND OFF SINCE MAY       HPI: Here today as a new patient, will be establishing care with Dr Mando Huitron in the future. Used to follow with Dr Joshua Fletcher for PCP. No routine labs noted. Follows with cardiology. HTN/Afib/CHF: Following with cardiology- taking medications as prescribed. On ACE, BB, Digoxin, CCB, diuretic, and Coumadin. Anticoagulation monitored by cardiology. ECHO 5/2015 EJF 55-60% with mitral valve thickening and mild regurgitation, as well as tricuspid regurgitation. Complains of bilateral shin/calf tingling that has been happening intermittently since May 2017- no pain. He denies any patterns- not worse with movement or rest. Goes away on its own- lasts about 20 mins. Does not take anything to help with symptoms. Does report a history of a pinched nerve in his low back in 2012 that caused radiation into the left leg. This is different than that. No urinary or bowel incontinence, no saddle paresthesias, and no back pan. Review of Systems   Constitutional: Negative for chills, fever and malaise/fatigue. Eyes: Negative for double vision, photophobia and pain. Respiratory: Negative for cough, shortness of breath and wheezing. Cardiovascular: Negative for chest pain, palpitations and leg swelling. Gastrointestinal: Negative for abdominal pain, constipation, diarrhea, nausea and vomiting. Genitourinary: Negative for dysuria, frequency and urgency. Musculoskeletal: Negative for back pain, joint pain and myalgias. Neurological: Positive for tingling and sensory change. Negative for dizziness, weakness and headaches.         PHQ Screening   PHQ over the last two weeks 2/8/2017   Little interest or pleasure in doing things Not at all   Feeling down, depressed or hopeless Not at all   Total Score PHQ 2 0 History  Past Medical History:   Diagnosis Date    Atrial fibrillation (HCC)     Chronic diastolic heart failure (HCC)     Hypertension     Mitral valve disorders        Past Surgical History:   Procedure Laterality Date    HX ADENOIDECTOMY      HX TONSILLECTOMY         Social History     Social History    Marital status: SINGLE     Spouse name: N/A    Number of children: N/A    Years of education: N/A     Occupational History    Not on file. Social History Main Topics    Smoking status: Never Smoker    Smokeless tobacco: Never Used    Alcohol use No    Drug use: No    Sexual activity: No     Other Topics Concern    Not on file     Social History Narrative       Family History   Problem Relation Age of Onset    Kidney Disease Mother     No Known Problems Sister     No Known Problems Brother     Diabetes Neg Hx     Hypertension Neg Hx        No Known Allergies    Current Outpatient Prescriptions   Medication Sig Dispense Refill    ramipril (ALTACE) 10 mg capsule Take 1 Cap by mouth two (2) times a day. 180 Cap 2    carvedilol (COREG) 25 mg tablet Take 1 Tab by mouth two (2) times daily (with meals). 60 Tab 6    digoxin (LANOXIN) 0.125 mg tablet Take 1 Tab by mouth daily. 30 Tab 3    nisoldipine SR (SULAR) 17 mg Tb24 tablet Take 1 Tab by mouth daily. 30 Tab 3    warfarin (COUMADIN) 7.5 mg tablet Take 1 Tab by mouth daily. 30 Tab 6    furosemide (LASIX) 40 mg tablet Take 1 Tab by mouth two (2) times a day. 60 Tab 6         Advance Care Planning:   Patient was offered the opportunity to discuss advance care planning NO   Does patient have an Advance Directive:  NO   If no, did you provide information on C[facility] Connections? Patient Care Team:  Patient Care Team:  Prudencio Jean MD as PCP - [facility])        LABS:  None new to review    RADIOLOGY:  None new to review      Physical Exam   Constitutional: He is oriented to person, place, and time.  He appears well-developed and well-nourished. No distress. Neck: Normal range of motion. Neck supple. Cardiovascular: Normal rate, regular rhythm and normal heart sounds. No murmur heard. Pulmonary/Chest: Effort normal and breath sounds normal. No respiratory distress. Abdominal: Soft. Bowel sounds are normal. There is no tenderness. Musculoskeletal: He exhibits no edema. Lumbar back: He exhibits normal range of motion, no tenderness and no pain. -bilateral leg and foot strength 5/5   Neurological: He is alert and oriented to person, place, and time. He exhibits normal muscle tone. Coordination normal.   Skin: Skin is warm and dry. Vitals:    09/05/17 1011 09/05/17 1016   BP: 148/82 145/80   Pulse: 63    Resp: 18 18   Temp: 98 °F (36.7 °C)    TempSrc: Oral    SpO2: 95% 97%   Weight: 290 lb 3.2 oz (131.6 kg)    Height: 6' 3\" (1.905 m)    PainSc:   0 - No pain      BP Readings from Last 3 Encounters:   09/05/17 145/80   08/23/17 130/61   02/08/17 139/69       ASSESSMENT and PLAN  Diagnoses and all orders for this visit:    Essential hypertension  *Slightly elevated BP today- controlled during recent cardiology visit. Continue to monitor. Chronic diastolic heart failure (HCC)  *Continue with cardiology. Chronic atrial fibrillation (HCC)  *Continue with cardiology- anticoagulation monitoring done by cardiology- appears that he is due tomorrow- advised him to call to get appointment. Tingling in extremities  *Discussed with patient about possible causes and work up for leg tingling. Advised on EMG, lumbar spine imaging, and labs- he opts to start with labs. He declines pharmacological management reporting symptoms as not that bothersome. Encounter for lipid screening for cardiovascular disease  -     LIPID PANEL; Future    Screening for diabetes mellitus  -     METABOLIC PANEL, COMPREHENSIVE; Future    Screening for endocrine, metabolic and immunity disorder  -     CBC W/O DIFF;  Future  - METABOLIC PANEL, COMPREHENSIVE; Future  -     TSH 3RD GENERATION; Future  -     URINALYSIS W/MICROSCOPIC; Future    Screening for prostate cancer  -     PSA SCREENING (SCREENING); Future      *Plan of care reviewed with patient. Patient in agreement with plan and expresses understanding. All questions answered and patient encouraged to call or RTO if further questions or concerns. Follow-up Disposition:  Return in about 6 months (around 3/5/2018) for chronic disease routine care- 30 min.

## 2017-09-05 NOTE — MR AVS SNAPSHOT
Visit Information Date & Time Provider Department Dept. Phone Encounter #  
 9/5/2017 10:00 AM [facility] [phone] [id number] Follow-up Instructions Return in about 6 months (around 3/5/2018) for chronic disease routine care- 30 min. Your Appointments 2/21/2018  9:00 AM  
Office Visit with Ham Dalton MD  
[facility]) Appt Note: [address] [facility], [address], [address] [place] [address] Upcoming Health Maintenance Date Due Hepatitis C Screening 1955 Pneumococcal 19-64 Medium Risk (1 of 1 - PPSV23) 5/18/1972 DTaP/Tdap/Td series (1 - Tdap) 5/18/1974 FOBT Q 1 YEAR AGE 50-75 5/18/2003 ZOSTER VACCINE AGE 60> 3/18/2013 Allergies as of 9/5/2017  Review Complete On: 9/5/2017 By: Nilam Park NP No Known Allergies Current Immunizations  Never Reviewed No immunizations on file. Not reviewed this visit You Were Diagnosed With   
  
 Codes Comments Essential hypertension    -  Primary ICD-10-CM: I10 
ICD-9-CM: 401.9 Chronic diastolic heart failure (HCC)     ICD-10-CM: I50.32 
ICD-9-CM: 428.32 Chronic atrial fibrillation (HCC)     ICD-10-CM: J06.7 ICD-9-CM: 427.31 Encounter for lipid screening for cardiovascular disease     ICD-10-CM: Z13.220, Z13.6 ICD-9-CM: V77.91, V81.2 Screening for diabetes mellitus     ICD-10-CM: Z13.1 ICD-9-CM: V77.1 Screening for endocrine, metabolic and immunity disorder     ICD-10-CM: Z13.29, Z13.228, Z13.0 ICD-9-CM: V77.99 Screening for prostate cancer     ICD-10-CM: Z12.5 ICD-9-CM: V76.44 Vitals BP Pulse Temp Resp Height(growth percentile) Weight(growth percentile) 145/80 (BP 1 Location: Right arm, BP Patient Position: Sitting) 63 98 °F (36.7 °C) (Oral) 18 6' 3\" (1.905 m) 290 lb 3.2 oz (131.6 kg) SpO2 BMI Smoking Status 97% 36.27 kg/m2 Never Smoker Vitals History BMI and BSA Data Body Mass Index Body Surface Area  
 36.27 kg/m 2 2.64 m 2 Preferred Pharmacy Pharmacy Name [facility] [address] [place] [address],[address] [place] [address] [phone] Your Updated Medication List  
  
   
This list is accurate as of: 9/5/17 10:47 AM.  Always use your most recent med list.  
  
  
  
  
 carvedilol 25 mg tablet Commonly known as:  Mar Jaime Take 1 Tab by mouth two (2) times daily (with meals). digoxin 0.125 mg tablet Commonly known as:  LANOXIN Take 1 Tab by mouth daily. furosemide 40 mg tablet Commonly known as:  LASIX Take 1 Tab by mouth two (2) times a day. nisoldipine SR 17 mg Tb24 tablet Commonly known as:  Annitta Desanctis Take 1 Tab by mouth daily. ramipril 10 mg capsule Commonly known as:  ALTACE Take 1 Cap by mouth two (2) times a day. warfarin 7.5 mg tablet Commonly known as:  COUMADIN Take 1 Tab by mouth daily. Follow-up Instructions Return in about 6 months (around 3/5/2018) for chronic disease routine care- 30 min. To-Do List   
 09/05/2017 Lab:  PSA SCREENING (SCREENING) Around 09/06/2017 Lab:  CBC W/O DIFF Around 09/06/2017 Lab:  LIPID PANEL Around 09/06/2017 Lab:  METABOLIC PANEL, COMPREHENSIVE Around 09/06/2017 Lab:  TSH 3RD GENERATION Around 09/06/2017 Lab:  URINALYSIS W/MICROSCOPIC Introducing [facility]! Elia Hudson introduces V[employer] patient portal. Now you can access parts of your medical record, email your doctor's office, and request medication refills online. 1. In your internet browser, go to https://[employer]. [employer] 2. Click on the First Time User? Click Here link in the Sign In box. You will see the New Member Sign Up page. 3. Enter your [employer] Access Code exactly as it appears below. You will not need to use this code after youve completed the sign-up process. If you do not sign up before the expiration date, you must request a new code. · [employer] Access Code: [email or web address] Expires: 10/3/2017  2:53 PM 
 
4. Enter the last four digits of your Social Security Number (xxxx) and Date of Birth (mm/dd/yyyy) as indicated and click Submit. You will be taken to the next sign-up page. 5. Create a [employer] ID. This will be your CloudOpt login ID and cannot be changed, so think of one that is secure and easy to remember. 6. Create a [employer] password. You can change your password at any time. 7. Enter your Password Reset Question and Answer. This can be used at a later time if you forget your password. 8. Enter your e-mail address. You will receive e-mail notification when new information is available in [address] 9. Click Sign Up. You can now view and download portions of your medical record. 10. Click the Download Summary menu link to download a portable copy of your medical information. If you have questions, please visit the Frequently Asked Questions section of the [employer] website. Remember, CloudOpt is NOT to be used for urgent needs. For medical emergencies, dial 911. Now available from your iPhone and Android! Please provide this summary of care documentation to your next provider. Your primary care clinician is listed as Homer Bazzi. If you have any questions after today's visit, please call [phone].

## 2017-09-06 ENCOUNTER — HOSPITAL ENCOUNTER (OUTPATIENT)
Dept: LAB | Age: 62
Discharge: HOME | End: 2017-09-06
Payer: COMMERCIAL

## 2017-09-06 DIAGNOSIS — Z13.220 ENCOUNTER FOR LIPID SCREENING FOR CARDIOVASCULAR DISEASE: ICD-10-CM

## 2017-09-06 DIAGNOSIS — Z13.228 SCREENING FOR ENDOCRINE, METABOLIC AND IMMUNITY DISORDER: ICD-10-CM

## 2017-09-06 DIAGNOSIS — Z13.29 SCREENING FOR ENDOCRINE, METABOLIC AND IMMUNITY DISORDER: ICD-10-CM

## 2017-09-06 DIAGNOSIS — Z13.1 SCREENING FOR DIABETES MELLITUS: ICD-10-CM

## 2017-09-06 DIAGNOSIS — Z12.5 SCREENING FOR PROSTATE CANCER: ICD-10-CM

## 2017-09-06 DIAGNOSIS — Z13.6 ENCOUNTER FOR LIPID SCREENING FOR CARDIOVASCULAR DISEASE: ICD-10-CM

## 2017-09-06 DIAGNOSIS — Z13.0 SCREENING FOR ENDOCRINE, METABOLIC AND IMMUNITY DISORDER: ICD-10-CM

## 2017-09-06 LAB
ALBUMIN SERPL-MCNC: 3.9 G/DL (ref 3.4–5)
ALBUMIN/GLOB SERPL: 0.8 {RATIO} (ref 0.8–1.7)
ALP SERPL-CCNC: 55 U/L (ref 45–117)
ALT SERPL-CCNC: 21 U/L (ref 16–61)
ANION GAP SERPL CALC-SCNC: 8 MMOL/L (ref 3–18)
AST SERPL-CCNC: 17 U/L (ref 15–37)
BILIRUB SERPL-MCNC: 0.6 MG/DL (ref 0.2–1)
BUN SERPL-MCNC: 14 MG/DL (ref 7–18)
BUN/CREAT SERPL: 12 (ref 12–20)
CALCIUM SERPL-MCNC: 9.1 MG/DL (ref 8.5–10.1)
CHLORIDE SERPL-SCNC: 102 MMOL/L (ref 100–108)
CHOLEST SERPL-MCNC: 172 MG/DL
CO2 SERPL-SCNC: 29 MMOL/L (ref 21–32)
CREAT SERPL-MCNC: 1.16 MG/DL (ref 0.6–1.3)
ERYTHROCYTE [DISTWIDTH] IN BLOOD BY AUTOMATED COUNT: 14.4 % (ref 11.6–14.5)
GLOBULIN SER CALC-MCNC: 5.1 G/DL (ref 2–4)
GLUCOSE SERPL-MCNC: 103 MG/DL (ref 74–99)
HCT VFR BLD AUTO: 43.5 % (ref 36–48)
HDLC SERPL-MCNC: 45 MG/DL (ref 40–60)
HDLC SERPL: 3.8 {RATIO} (ref 0–5)
HGB BLD-MCNC: 15 G/DL (ref 13–16)
LDLC SERPL CALC-MCNC: 112.2 MG/DL (ref 0–100)
LIPID PROFILE,FLP: ABNORMAL
MCH RBC QN AUTO: 30.5 PG (ref 24–34)
MCHC RBC AUTO-ENTMCNC: 34.5 G/DL (ref 31–37)
MCV RBC AUTO: 88.6 FL (ref 74–97)
PLATELET # BLD AUTO: 209 K/UL (ref 135–420)
PMV BLD AUTO: 11.3 FL (ref 9.2–11.8)
POTASSIUM SERPL-SCNC: 4 MMOL/L (ref 3.5–5.5)
PROT SERPL-MCNC: 9 G/DL (ref 6.4–8.2)
PSA SERPL-MCNC: 0.8 NG/ML (ref 0–4)
RBC # BLD AUTO: 4.91 M/UL (ref 4.7–5.5)
SODIUM SERPL-SCNC: 139 MMOL/L (ref 136–145)
TRIGL SERPL-MCNC: 74 MG/DL (ref ?–150)
TSH SERPL DL<=0.05 MIU/L-ACNC: 1.4 UIU/ML (ref 0.36–3.74)
VLDLC SERPL CALC-MCNC: 14.8 MG/DL
WBC # BLD AUTO: 4.3 K/UL (ref 4.6–13.2)

## 2017-09-06 PROCEDURE — 80053 COMPREHEN METABOLIC PANEL: CPT | Performed by: NURSE PRACTITIONER

## 2017-09-06 PROCEDURE — 36415 COLL VENOUS BLD VENIPUNCTURE: CPT | Performed by: NURSE PRACTITIONER

## 2017-09-06 PROCEDURE — 85027 COMPLETE CBC AUTOMATED: CPT | Performed by: NURSE PRACTITIONER

## 2017-09-06 PROCEDURE — 84153 ASSAY OF PSA TOTAL: CPT | Performed by: NURSE PRACTITIONER

## 2017-09-06 PROCEDURE — 84443 ASSAY THYROID STIM HORMONE: CPT | Performed by: NURSE PRACTITIONER

## 2017-09-06 PROCEDURE — 80061 LIPID PANEL: CPT | Performed by: NURSE PRACTITIONER

## 2017-09-11 LAB — INR, EXTERNAL: 2.94

## 2017-09-12 ENCOUNTER — TELEPHONE ANTICOAG (OUTPATIENT)
Dept: CARDIOLOGY CLINIC | Age: 62
End: 2017-09-12

## 2017-09-12 DIAGNOSIS — I48.20 CHRONIC ATRIAL FIBRILLATION (HCC): ICD-10-CM

## 2017-10-06 ENCOUNTER — TELEPHONE ANTICOAG (OUTPATIENT)
Dept: CARDIOLOGY CLINIC | Age: 62
End: 2017-10-06

## 2017-10-06 DIAGNOSIS — I48.20 CHRONIC ATRIAL FIBRILLATION (HCC): ICD-10-CM

## 2017-10-06 LAB — INR, EXTERNAL: 2.8

## 2017-11-07 ENCOUNTER — TELEPHONE (OUTPATIENT)
Dept: CARDIOLOGY CLINIC | Age: 62
End: 2017-11-07

## 2017-11-10 ENCOUNTER — TELEPHONE ANTICOAG (OUTPATIENT)
Dept: CARDIOLOGY CLINIC | Age: 62
End: 2017-11-10

## 2017-11-10 DIAGNOSIS — I48.0 PAROXYSMAL ATRIAL FIBRILLATION (HCC): ICD-10-CM

## 2017-11-10 LAB — INR, EXTERNAL: 3.2

## 2017-11-20 DIAGNOSIS — I50.32 CHRONIC DIASTOLIC HEART FAILURE (HCC): ICD-10-CM

## 2017-11-20 DIAGNOSIS — I50.32 DIASTOLIC CHF, CHRONIC (HCC): ICD-10-CM

## 2017-11-20 DIAGNOSIS — I48.91 ATRIAL FIBRILLATION, UNSPECIFIED TYPE (HCC): ICD-10-CM

## 2017-11-20 RX ORDER — NISOLDIPINE 17 MG/1
17 TABLET, FILM COATED, EXTENDED RELEASE ORAL DAILY
Qty: 30 TAB | Refills: 3 | Status: CANCELLED | OUTPATIENT
Start: 2017-11-20

## 2017-11-20 RX ORDER — DIGOXIN 125 MCG
0.12 TABLET ORAL DAILY
Qty: 30 TAB | Refills: 6 | Status: SHIPPED | OUTPATIENT
Start: 2017-11-20 | End: 2017-12-11 | Stop reason: SDUPTHER

## 2017-11-20 RX ORDER — RAMIPRIL 10 MG/1
10 CAPSULE ORAL 2 TIMES DAILY
Qty: 180 CAP | Refills: 2 | Status: SHIPPED | OUTPATIENT
Start: 2017-11-20 | End: 2018-07-20 | Stop reason: SDUPTHER

## 2017-11-20 RX ORDER — NISOLDIPINE 17 MG/1
17 TABLET, FILM COATED, EXTENDED RELEASE ORAL DAILY
Qty: 30 TAB | Refills: 6 | Status: SHIPPED | OUTPATIENT
Start: 2017-11-20 | End: 2018-07-20 | Stop reason: SDUPTHER

## 2017-11-20 RX ORDER — DIGOXIN 125 MCG
0.12 TABLET ORAL DAILY
Qty: 30 TAB | Refills: 3 | Status: CANCELLED | OUTPATIENT
Start: 2017-11-20

## 2017-11-20 RX ORDER — RAMIPRIL 10 MG/1
10 CAPSULE ORAL 2 TIMES DAILY
Qty: 180 CAP | Refills: 2 | Status: CANCELLED | OUTPATIENT
Start: 2017-11-20

## 2017-11-20 NOTE — TELEPHONE ENCOUNTER
Call made to the pt using two identifiers, name and . Noted his refill request. Pt mad aware that the providers here has not been managing these medication. Pt advised to call Cardiology, who is managing these medications. Pt verbalized understanding.

## 2017-11-20 NOTE — TELEPHONE ENCOUNTER
Requested Prescriptions     Pending Prescriptions Disp Refills    ramipril (ALTACE) 10 mg capsule 180 Cap 2     Sig: Take 1 Cap by mouth two (2) times a day.  nisoldipine SR (SULAR) 17 mg Tb24 tablet 30 Tab 3     Sig: Take 1 Tab by mouth daily.  digoxin (LANOXIN) 0.125 mg tablet 30 Tab 3     Sig: Take 1 Tab by mouth daily.

## 2017-12-11 DIAGNOSIS — I50.32 DIASTOLIC CHF, CHRONIC (HCC): ICD-10-CM

## 2017-12-11 DIAGNOSIS — I48.91 ATRIAL FIBRILLATION, UNSPECIFIED TYPE (HCC): ICD-10-CM

## 2017-12-11 DIAGNOSIS — I50.32 CHRONIC DIASTOLIC HEART FAILURE (HCC): ICD-10-CM

## 2017-12-11 RX ORDER — DIGOXIN 125 MCG
0.12 TABLET ORAL DAILY
Qty: 30 TAB | Refills: 6 | Status: SHIPPED | OUTPATIENT
Start: 2017-12-11 | End: 2018-03-21

## 2017-12-12 LAB — INR, EXTERNAL: 3.76

## 2017-12-13 ENCOUNTER — TELEPHONE ANTICOAG (OUTPATIENT)
Dept: CARDIOLOGY CLINIC | Age: 62
End: 2017-12-13

## 2017-12-13 DIAGNOSIS — I48.0 PAROXYSMAL ATRIAL FIBRILLATION (HCC): ICD-10-CM

## 2017-12-26 ENCOUNTER — OFFICE VISIT (OUTPATIENT)
Dept: FAMILY MEDICINE CLINIC | Age: 62
End: 2017-12-26

## 2017-12-26 VITALS
SYSTOLIC BLOOD PRESSURE: 166 MMHG | HEIGHT: 75 IN | RESPIRATION RATE: 18 BRPM | OXYGEN SATURATION: 97 % | DIASTOLIC BLOOD PRESSURE: 66 MMHG | HEART RATE: 65 BPM | WEIGHT: 311 LBS | BODY MASS INDEX: 38.67 KG/M2 | TEMPERATURE: 98.5 F

## 2017-12-26 DIAGNOSIS — R20.2 TINGLING SENSATION: ICD-10-CM

## 2017-12-26 DIAGNOSIS — M79.89 LEG SWELLING: Primary | ICD-10-CM

## 2017-12-26 DIAGNOSIS — I50.32 CHRONIC DIASTOLIC HEART FAILURE (HCC): ICD-10-CM

## 2017-12-26 RX ORDER — FUROSEMIDE 40 MG/1
40 TABLET ORAL 2 TIMES DAILY
Qty: 60 TAB | Refills: 2 | Status: SHIPPED | OUTPATIENT
Start: 2017-12-26 | End: 2018-04-09 | Stop reason: SDUPTHER

## 2017-12-26 RX ORDER — SPIRONOLACTONE 50 MG/1
50 TABLET, FILM COATED ORAL DAILY
Qty: 30 TAB | Refills: 1 | Status: SHIPPED | OUTPATIENT
Start: 2017-12-26 | End: 2018-09-19

## 2017-12-26 NOTE — PROGRESS NOTES
Leg Swelling (pt. c/o lower extremity swelling (knees, legs, ankle and feet) )        HPI: Elva Spurling is a 57 y.o. male [address]  Here with hx of Afib, HTN and chronic diastolic HF reports worsening bilateral leg swelling, R>L over the last 1 month. He has been using his furosemide 80mg once daily and has even increased it to 120 mg daily with minimal improvement in his leg swelling. He feels that furosemide has not been as effective for him as brand Lasix which he used in the past.  He denies any CP, palpitations, SOB, GUERRA. He denies any increase in salt intake in the last several weeks despite it being the holiday season. Review of his weight shows a 21 pound weight gain since his visit dated 9/5/2017. He also reports a tingling sensation (vibration) of the lower legs since he was first seen in this office in September. He laboratory testing and had normal CBC, TSH, CMP. This sensation comes and goes. It does wake him from sleep at times. Past Medical History:   Diagnosis Date    Atrial fibrillation (HCC)     Chronic diastolic heart failure (HCC)     Hypertension     Mitral valve disorders(424.0)        Current Outpatient Prescriptions   Medication Sig    digoxin (LANOXIN) 0.125 mg tablet Take 1 Tab by mouth daily.  ramipril (ALTACE) 10 mg capsule Take 1 Cap by mouth two (2) times a day.  nisoldipine SR (SULAR) 17 mg Tb24 tablet Take 1 Tab by mouth daily.  carvedilol (COREG) 25 mg tablet Take 1 Tab by mouth two (2) times daily (with meals).  furosemide (LASIX) 40 mg tablet Take 1 Tab by mouth two (2) times a day.  warfarin (COUMADIN) 7.5 mg tablet Take 1 Tab by mouth daily. No current facility-administered medications for this visit.         No Known Allergies    Past Surgical History:   Procedure Laterality Date    HX ADENOIDECTOMY      HX TONSILLECTOMY         Family History   Problem Relation Age of Onset    Kidney Disease Mother     No Known Problems Sister     No Known Problems Brother     Diabetes Neg Hx     Hypertension Neg Hx        Social History     Social History    Marital status: SINGLE     Spouse name: N/A    Number of children: N/A    Years of education: N/A     Occupational History    Not on file. Social History Main Topics    Smoking status: Never Smoker    Smokeless tobacco: Never Used    Alcohol use No    Drug use: No    Sexual activity: No     Other Topics Concern    Not on file     Social History Narrative       Gen- No weight loss, No Malaise, No fatigue  Eyes- No dipoplia, blurry vision  CVS- No Chest pain, no palpitations  Resp- No Cough, SOB, GUERRA, Wheezing  Neuro- No headaches  Skin- No easy bruising, No rashes      Visit Vitals    /66 (BP 1 Location: Left arm, BP Patient Position: Sitting)    Pulse 65    Temp 98.5 °F (36.9 °C) (Oral)    Resp 18    Ht 6' 3\" (1.905 m)    Wt 311 lb (141.1 kg)    SpO2 97%    BMI 38.87 kg/m2       GEN- NAD, AAOx3  CVS- Irregularly, irregular, +S1, +S2, No Murmurs, No JVD  PULM- CTABL, No W/R/R  EXT- 3+b/L pitting edema of both lower legs R > L  NEURO-Normal Gait  PSYCH- Euthymic, normal afffect        Assesment:    ICD-10-CM ICD-9-CM    1. Leg swelling M79.89 729.81 spironolactone (ALDACTONE) 50 mg tablet      furosemide (LASIX) 40 mg tablet   2. Chronic diastolic heart failure (HCC) I50.32 428.32 furosemide (LASIX) 40 mg tablet   3. Tingling sensation R20.2 782.0 VITAMIN T15      METABOLIC PANEL, BASIC      MAGNESIUM     For leg swelling- use Lasix 80mg daily, it would be ok to see if the swelling improves. Use Aldactone 50mg daily if not improving. Call and let me know how its going. Obtain labwork for tingling sensation if not improved after swelling improves.     Follow up in 2 weeks for a BP check      I have discussed the diagnosis with the patient and the intended management  The patient has received an after-visit summary and questions were answered concerning future plans. I have discussed medication usage, side effects and warnings with the patient as well. I have reviewed the plan of care with the patient, accepted their input and they are in agreement with the treatment goals. Follow-up Disposition:  Return in about 2 months (around 2/26/2018) for hypertension.       Carmelita George MD                .

## 2017-12-26 NOTE — PATIENT INSTRUCTIONS
For leg swelling- use Lasix 80mg daily, it would be ok to see if the swelling improves. Use Aldactone 50mg daily if not improving. Call and let me know how its going. Obtain labwork for tingling sensation if not improved after swelling improves.     Follow up in 2 weeks for a BP check

## 2018-01-16 DIAGNOSIS — I48.20 CHRONIC ATRIAL FIBRILLATION (HCC): Primary | ICD-10-CM

## 2018-01-17 ENCOUNTER — TELEPHONE ANTICOAG (OUTPATIENT)
Dept: CARDIOLOGY CLINIC | Age: 63
End: 2018-01-17

## 2018-01-17 DIAGNOSIS — I48.0 PAROXYSMAL ATRIAL FIBRILLATION (HCC): ICD-10-CM

## 2018-01-17 LAB — INR, EXTERNAL: 3

## 2018-01-17 NOTE — PATIENT INSTRUCTIONS
Called patient instructed patient to continue current dose of coumadin. Recheck INR in 2 weeks on 1/30/18. If any questions call office.

## 2018-02-05 DIAGNOSIS — I48.91 ATRIAL FIBRILLATION, UNSPECIFIED TYPE (HCC): ICD-10-CM

## 2018-02-05 DIAGNOSIS — I50.32 DIASTOLIC CHF, CHRONIC (HCC): ICD-10-CM

## 2018-02-05 DIAGNOSIS — I50.32 CHRONIC DIASTOLIC HEART FAILURE (HCC): ICD-10-CM

## 2018-02-05 RX ORDER — CARVEDILOL 25 MG/1
25 TABLET ORAL 2 TIMES DAILY WITH MEALS
Qty: 60 TAB | Refills: 6 | Status: SHIPPED | OUTPATIENT
Start: 2018-02-05 | End: 2018-02-26 | Stop reason: SDUPTHER

## 2018-02-05 RX ORDER — WARFARIN 7.5 MG/1
7.5 TABLET ORAL DAILY
Qty: 30 TAB | Refills: 6 | Status: SHIPPED | OUTPATIENT
Start: 2018-02-05 | End: 2018-10-03 | Stop reason: SDUPTHER

## 2018-02-07 ENCOUNTER — TELEPHONE (OUTPATIENT)
Dept: CARDIOLOGY CLINIC | Age: 63
End: 2018-02-07

## 2018-02-14 LAB — INR, EXTERNAL: 3.87

## 2018-02-15 ENCOUNTER — TELEPHONE ANTICOAG (OUTPATIENT)
Dept: CARDIOLOGY CLINIC | Age: 63
End: 2018-02-15

## 2018-02-15 DIAGNOSIS — I48.20 CHRONIC ATRIAL FIBRILLATION (HCC): ICD-10-CM

## 2018-02-15 NOTE — PATIENT INSTRUCTIONS
Spoke with patient concerning INR, instructed patient to hold coumadin tonight and then resume current dose of coumadin and recheck in 1 week. He voices understanding and acceptance of this advice and will call back if any further questions or concerns.

## 2018-02-16 ENCOUNTER — OFFICE VISIT (OUTPATIENT)
Dept: FAMILY MEDICINE CLINIC | Age: 63
End: 2018-02-16

## 2018-02-16 VITALS
SYSTOLIC BLOOD PRESSURE: 157 MMHG | OXYGEN SATURATION: 98 % | WEIGHT: 302 LBS | HEIGHT: 75 IN | DIASTOLIC BLOOD PRESSURE: 88 MMHG | BODY MASS INDEX: 37.55 KG/M2 | TEMPERATURE: 97.8 F | RESPIRATION RATE: 18 BRPM | HEART RATE: 70 BPM

## 2018-02-16 DIAGNOSIS — M79.89 RIGHT LEG SWELLING: Primary | ICD-10-CM

## 2018-02-16 NOTE — PATIENT INSTRUCTIONS
Leg and Ankle Edema: Care Instructions  Your Care Instructions  Swelling in the legs, ankles, and feet is called edema. It is common after you sit or stand for a while. Long plane flights or car rides often cause swelling in the legs and feet. You may also have swelling if you have to stand for long periods of time at your job. Problems with the veins in the legs (varicose veins) and changes in hormones can also cause swelling. Sometimes the swelling in the ankles and feet is caused by a more serious problem, such as heart failure, infection, blood clots, or liver or kidney disease. Follow-up care is a key part of your treatment and safety. Be sure to make and go to all appointments, and call your doctor if you are having problems. It's also a good idea to know your test results and keep a list of the medicines you take. How can you care for yourself at home? · If your doctor gave you medicine, take it as prescribed. Call your doctor if you think you are having a problem with your medicine. · Whenever you are resting, raise your legs up. Try to keep the swollen area higher than the level of your heart. · Take breaks from standing or sitting in one position. ¨ Walk around to increase the blood flow in your lower legs. ¨ Move your feet and ankles often while you stand, or tighten and relax your leg muscles. · Wear support stockings. Put them on in the morning, before swelling gets worse. · Eat a balanced diet. Lose weight if you need to. · Limit the amount of salt (sodium) in your diet. Salt holds fluid in the body and may increase swelling. When should you call for help? Call 911 anytime you think you may need emergency care. For example, call if:  ? · You have symptoms of a blood clot in your lung (called a pulmonary embolism). These may include:  ¨ Sudden chest pain. ¨ Trouble breathing. ¨ Coughing up blood.    ?Call your doctor now or seek immediate medical care if:  ? · You have signs of a blood clot, such as:  ¨ Pain in your calf, back of the knee, thigh, or groin. ¨ Redness and swelling in your leg or groin. ? · You have symptoms of infection, such as:  ¨ Increased pain, swelling, warmth, or redness. ¨ Red streaks or pus. ¨ A fever. ? Watch closely for changes in your health, and be sure to contact your doctor if:  ? · Your swelling is getting worse. ? · You have new or worsening pain in your legs. ? · You do not get better as expected. Where can you learn more? Go to http://olivier-cliff.info/. Enter A643 in the search box to learn more about \"Leg and Ankle Edema: Care Instructions. \"  Current as of: March 20, 2017  Content Version: 11.4  © 1357-3308 [email or web address]. Care instructions adapted under license by [employer] (which disclaims liability or warranty for this information). If you have questions about a medical condition or this instruction, always ask your healthcare professional. [place] [address] any warranty or liability for your use of this information.

## 2018-02-16 NOTE — PROGRESS NOTES
Today's Date:  2018   Patient:  Lia Nicole  Patient :  1955    Subjective:     Chief Complaint   Patient presents with   Gonzales Mis Swelling       Lia Nicole is a 57 y.o. male who presents for follow up Bilateral Leg Swelling, right greater than left. This is a chronic problem that is getting worse according to him. Has a history of Chronic Diastolic HF. Was seen here this past December by Dr. Milind Johnson who added Spironolactone to the Lasix he was taking. States that he took the new medication for few days then stopped but does not explain why he stopped. States that he is still having the same problem. Also states that the color of the skin of his right lower leg has become darker in the past two weeks but denies erythema prior to that. Denies pain but states that when swelling is bad \"as if it will pop\", he has difficulty walking and raising his leg sometimes help a little. States that he has been unable to wear compression stocking because of the swelling. Has his follow up appointment with Cardiology toward the end of this month. Has no other complaints at this time. Current Outpatient Meds and Allergies     Current Outpatient Prescriptions on File Prior to Visit   Medication Sig Dispense Refill    carvedilol (COREG) 25 mg tablet Take 1 Tab by mouth two (2) times daily (with meals). 60 Tab 6    warfarin (COUMADIN) 7.5 mg tablet Take 1 Tab by mouth daily. 30 Tab 6    spironolactone (ALDACTONE) 50 mg tablet Take 1 Tab by mouth daily. 30 Tab 1    furosemide (LASIX) 40 mg tablet Take 1 Tab by mouth two (2) times a day. 60 Tab 2    digoxin (LANOXIN) 0.125 mg tablet Take 1 Tab by mouth daily. 30 Tab 6    ramipril (ALTACE) 10 mg capsule Take 1 Cap by mouth two (2) times a day. 180 Cap 2    nisoldipine SR (SULAR) 17 mg Tb24 tablet Take 1 Tab by mouth daily. 30 Tab 6     No current facility-administered medications on file prior to visit.       These medications have been reviewed and reconciled with the patient during today's visit. No Known Allergies      ROS:     CONST:   Denies fatigue, weight change, appetite change   NEURO:   Denies headaches, vision changes, dizziness, loss of consciousness  CV:      Denies chest pain, palpitations, orthopnea, PND  PULM:  Denies SOB, wheezing, cough, hemoptysis  MS:      Bilateral leg swelling; Denies muscle/joint pain,   SKIN:        Right lower leg skin changes    Objective:     VS:    Visit Vitals    /88 (BP 1 Location: Right arm, BP Patient Position: Sitting)    Pulse 70    Temp 97.8 °F (36.6 °C) (Oral)    Resp 18    Ht 6' 3\" (1.905 m)    Wt 302 lb (137 kg)    SpO2 98%    BMI 37.75 kg/m2       General:   Well-nourished, well-groomed, pleasant, alert, in no acute distress. Cardiovasc:   Regular rate and rhythm, no murmurs, no rubs, no gallops,   Pulmonary:   Clear breath sounds bilaterally, good air movement, no wheezing, no rales, no rhonchi, normal respiratory effort  Extremities:   4 + edema to right lower leg and 2-3+ edema to left lower leg; large area of hyperpigmentation on right anterior lower leg with an area of indentation. No active wound noted;  no tenderness with palpation of calves, warm and well-perfused' pulse palpable but decreased. Measure of both calf: right (52 cm) and left (46.5 cm). Neuro:   Alert, conversant, appropriate, following commands, no focal deficits. Pertinent diagnostic procedures include:  Telephone Anticoag on 02/15/2018   Component Date Value Ref Range Status    INR, External 02/14/2018 3.87   Final   Telephone Anticoag on 01/17/2018   Component Date Value Ref Range Status    INR, External 01/16/2018 3.0   Corrected   Telephone Anticoag on 12/13/2017   Component Date Value Ref Range Status    INR, External 12/12/2017 3.76   Final       Assessment/Plan:       1.  Right leg swelling  - [facility]  Advised to take Spironolactone as ordered and Follow up with Cardiology and Vascular Disease. I have discussed the diagnosis with the patient and the intended plan as seen in the above orders. The patient has received an after-visit summary along with patient information handout. I have discussed medication side effects and warnings with the patient as well. Pt verbalized understanding. Follow-up Disposition:  Return in about 2 months (around 4/16/2018).   RENATA Bhakta  2/16/2018, 12:58 PM

## 2018-02-16 NOTE — MR AVS SNAPSHOT
[address] [facility] [id number] [phone] Patient: Glynn Bang MRN: [id number] Visit Information Date & Time Provider Department Dept. Phone Encounter #  
 2/16/2018  9:00 AM [address] [facility], [address] [place] [address] [phone] [id number] Follow-up Instructions Return in about 2 months (around 4/16/2018). Your Appointments 2/21/2018  9:00 AM  
Office Visit with Rosalia Williamson MD  
[facility]) Appt Note: [address] [facility], [address] [facility] [place] [address] [address], [address] [address]  
  
    
 3/5/2018 10:30 AM  
ROUTINE CARE with [address] [place] [address], [facility] ([facility]) Appt Note: chronic disease f/u -30 minutes [address] [facility] [place] [address]  
[facility] [id number] Upcoming Health Maintenance Date Due Hepatitis C Screening 1955 Pneumococcal 19-64 Medium Risk (1 of 1 - PPSV23) 5/18/1972 DTaP/Tdap/Td series (1 - Tdap) 5/18/1974 FOBT Q 1 YEAR AGE 50-75 5/18/2003 ZOSTER VACCINE AGE 60> 3/18/2013 Allergies as of 2/16/2018  Review Complete On: 2/16/2018 By: Ivory Blake LPN No Known Allergies Current Immunizations  Never Reviewed No immunizations on file. Not reviewed this visit You Were Diagnosed With   
  
 Codes Comments Right leg swelling    -  Primary ICD-10-CM: M79.89 ICD-9-CM: 729.81 Vitals BP Pulse Temp Resp Height(growth percentile) Weight(growth percentile) 157/88 (BP 1 Location: Right arm, BP Patient Position: Sitting) 70 97.8 °F (36.6 °C) (Oral) 18 6' 3\" (1.905 m) 302 lb (137 kg) SpO2 BMI Smoking Status 98% 37.75 kg/m2 Never Smoker BMI and BSA Data Body Mass Index Body Surface Area  37.75 kg/m 2 2.69 m 2  
  
 Preferred Pharmacy Pharmacy Name Phone [facility] [address] [place] [address] [phone] Your Updated Medication List  
  
   
This list is accurate as of: 2/16/18  9:43 AM.  Always use your most recent med list.  
  
  
  
  
 carvedilol 25 mg tablet Commonly known as:  Clerance Barley Take 1 Tab by mouth two (2) times daily (with meals). digoxin 0.125 mg tablet Commonly known as:  LANOXIN Take 1 Tab by mouth daily. furosemide 40 mg tablet Commonly known as:  LASIX Take 1 Tab by mouth two (2) times a day. nisoldipine SR 17 mg Tb24 tablet Commonly known as:  Kermit Ruddle Take 1 Tab by mouth daily. ramipril 10 mg capsule Commonly known as:  ALTACE Take 1 Cap by mouth two (2) times a day. spironolactone 50 mg tablet Commonly known as:  ALDACTONE Take 1 Tab by mouth daily. warfarin 7.5 mg tablet Commonly known as:  COUMADIN Take 1 Tab by mouth daily. We Performed the Following REFERRAL TO VASCULAR CENTER [NXE686 Custom] Comments:  
 Please evaluate for swelling in the Right Calf and recent Hyperpigmentation of the lower leg. Follow-up Instructions Return in about 2 months (around 4/16/2018). Referral Information Referral ID Referred By Referred To  
  
 [id number] Sharon Bo MD   
   [address] [place] [address]   
   [facility], [address] Phone: [phone] Fax: [phone] Visits Status Start Date End Date 1 New Request 2/16/18 2/16/19 If your referral has a status of pending review or denied, additional information will be sent to support the outcome of this decision. Patient Instructions Leg and Ankle Edema: Care Instructions Your Care Instructions Swelling in the legs, ankles, and feet is called edema.  It is common after you sit or stand for a while. Long plane flights or car rides often cause swelling in the legs and feet. You may also have swelling if you have to stand for long periods of time at your job. Problems with the veins in the legs (varicose veins) and changes in hormones can also cause swelling. Sometimes the swelling in the ankles and feet is caused by a more serious problem, such as heart failure, infection, blood clots, or liver or kidney disease. Follow-up care is a key part of your treatment and safety. Be sure to make and go to all appointments, and call your doctor if you are having problems. It's also a good idea to know your test results and keep a list of the medicines you take. How can you care for yourself at home? · If your doctor gave you medicine, take it as prescribed. Call your doctor if you think you are having a problem with your medicine. · Whenever you are resting, raise your legs up. Try to keep the swollen area higher than the level of your heart. · Take breaks from standing or sitting in one position. ¨ Walk around to increase the blood flow in your lower legs. ¨ Move your feet and ankles often while you stand, or tighten and relax your leg muscles. · Wear support stockings. Put them on in the morning, before swelling gets worse. · Eat a balanced diet. Lose weight if you need to. · Limit the amount of salt (sodium) in your diet. Salt holds fluid in the body and may increase swelling. When should you call for help? Call 911 anytime you think you may need emergency care. For example, call if: 
? · You have symptoms of a blood clot in your lung (called a pulmonary embolism). These may include: 
¨ Sudden chest pain. ¨ Trouble breathing. ¨ Coughing up blood. ?Call your doctor now or seek immediate medical care if: 
? · You have signs of a blood clot, such as: 
¨ Pain in your calf, back of the knee, thigh, or groin. ¨ Redness and swelling in your leg or groin. ? · You have symptoms of infection, such as: 
¨ Increased pain, swelling, warmth, or redness. ¨ Red streaks or pus. ¨ A fever. ? Watch closely for changes in your health, and be sure to contact your doctor if: 
? · Your swelling is getting worse. ? · You have new or worsening pain in your legs. ? · You do not get better as expected. Where can you learn more? Go to http://olivier-cliff.info/. Enter Y793 in the search box to learn more about \"Leg and Ankle Edema: Care Instructions. \" Current as of: March 20, 2017 Content Version: 11.4 © 9227-4697 [employer]. Care instructions adapted under license by [employer] (which disclaims liability or warranty for this information). If you have questions about a medical condition or this instruction, always ask your healthcare professional. [address] any warranty or liability for your use of this information. Please provide this summary of care documentation to your next provider. Your primary care clinician is listed as Lian Stevenson. If you have any questions after today's visit, please call [phone].

## 2018-02-21 ENCOUNTER — OFFICE VISIT (OUTPATIENT)
Dept: CARDIOLOGY CLINIC | Age: 63
End: 2018-02-21

## 2018-02-21 VITALS
WEIGHT: 303 LBS | HEIGHT: 75 IN | DIASTOLIC BLOOD PRESSURE: 58 MMHG | HEART RATE: 62 BPM | SYSTOLIC BLOOD PRESSURE: 132 MMHG | BODY MASS INDEX: 37.67 KG/M2

## 2018-02-21 DIAGNOSIS — I48.20 CHRONIC ATRIAL FIBRILLATION (HCC): Primary | ICD-10-CM

## 2018-02-21 DIAGNOSIS — Z79.01 ANTICOAGULANT LONG-TERM USE: ICD-10-CM

## 2018-02-21 DIAGNOSIS — I50.33 DIASTOLIC CHF, ACUTE ON CHRONIC (HCC): ICD-10-CM

## 2018-02-21 DIAGNOSIS — I10 ESSENTIAL HYPERTENSION: ICD-10-CM

## 2018-02-21 DIAGNOSIS — I05.9 MITRAL VALVE DISORDER: ICD-10-CM

## 2018-02-21 DIAGNOSIS — I27.20 PULMONARY HTN (HCC): ICD-10-CM

## 2018-02-21 RX ORDER — METOLAZONE 10 MG/1
10 TABLET ORAL DAILY
Qty: 30 TAB | Refills: 1 | Status: SHIPPED | OUTPATIENT
Start: 2018-02-21 | End: 2018-09-19

## 2018-02-21 NOTE — MR AVS SNAPSHOT
[address] [facility] [address] 
 
 
 [address] [facility], [address] [facility] [address] 
[phone] Patient: Angi Ayala MRN: [id number] Visit Information Date & Time Provider Department Dept. Phone Encounter #  
 2/21/2018  9:00 AM Eben Ross MD [facility] [address] [place] [address] [id number] Follow-up Instructions Return in about 4 weeks (around 3/21/2018). Your Appointments 3/6/2018 10:30 AM  
Office Visit with Alcus Soulier, PA [facility] ([address] [facility]) Appt Note: iov [address] [facility] [address] [facility]  
[phone] [address] [place] [address],[place] [address]  
  
    
 3/14/2018 12:45 PM  
PROCEDURE with [place] [facility] ([address] [facility]) Appt Note: pappas [address] [facility], [address] [facility] [address]  
[address], [address] [address]  
  
    
 3/21/2018  9:15 AM  
ESTABLISHED PATIENT with Eben Ross MD  
[facility]) Appt Note: post echo/labs [address] [facility], [address] [facility] [address]  
[address], [address] 4/16/2018  9:00 AM  
FOLLOW UP EXAM with RENATA Bhakta [facility] ([address] [facility]) Appt Note: 2 month f/u  
 [address] [facility] [address]  
[facility] [address] Upcoming Health Maintenance Date Due Hepatitis C Screening 1955 Pneumococcal 19-64 Medium Risk (1 of 1 - PPSV23) 5/18/1972 DTaP/Tdap/Td series (1 - Tdap) 5/18/1974 FOBT Q 1 YEAR AGE 50-75 5/18/2003 ZOSTER VACCINE AGE 60> 3/18/2013 Allergies as of 2/21/2018  Review Complete On: 2/21/2018 By: Eben Ross MD  
 No Known Allergies Current Immunizations  Never Reviewed No immunizations on file. Not reviewed this visit You Were Diagnosed With   
  
 Codes Comments Chronic atrial fibrillation (HCC)    -  Primary ICD-10-CM: A52.4 ICD-9-CM: 427.31 stable Mitral valve disorder     ICD-10-CM: I05.9 ICD-9-CM: 394.9 monitor 
mild Essential hypertension     ICD-10-CM: I10 
ICD-9-CM: 401.9 controlled Diastolic CHF, acute on chronic (HCC)     ICD-10-CM: I50.33 ICD-9-CM: 428.33, 428.0 increase edema 
sob Anticoagulant long-term use     ICD-10-CM: Z79.01 
ICD-9-CM: V58.61 on coumadin Pulmonary HTN     ICD-10-CM: I27.20 ICD-9-CM: 416.8 Vitals BP Pulse Height(growth percentile) Weight(growth percentile) BMI Smoking Status 132/58 62 6' 3\" (1.905 m) 303 lb (137.4 kg) 37.87 kg/m2 Never Smoker Vitals History BMI and BSA Data Body Mass Index Body Surface Area  
 37.87 kg/m 2 2.7 m 2 Preferred Pharmacy Pharmacy Name [facility], [address] [place] [address],[address] [place] [address] [phone] Your Updated Medication List  
  
   
This list is accurate as of 2/21/18  9:09 AM.  Always use your most recent med list.  
  
  
  
  
 carvedilol 25 mg tablet Commonly known as:  [place] Sloop Take 1 Tab by mouth two (2) times daily (with meals). digoxin 0.125 mg tablet Commonly known as:  LANOXIN Take 1 Tab by mouth daily. furosemide 40 mg tablet Commonly known as:  LASIX Take 1 Tab by mouth two (2) times a day. metOLazone 10 mg tablet Commonly known as:  Sugar Reins Take 1 Tab by mouth daily. nisoldipine SR 17 mg Tb24 tablet Commonly known as:  Genie Senior Take 1 Tab by mouth daily. ramipril 10 mg capsule Commonly known as:  ALTACE Take 1 Cap by mouth two (2) times a day. spironolactone 50 mg tablet Commonly known as:  ALDACTONE Take 1 Tab by mouth daily. warfarin 7.5 mg tablet Commonly known as:  COUMADIN Take 1 Tab by mouth daily. Prescriptions Sent to Pharmacy Refills  
 metOLazone (ZAROXOLYN) 10 mg tablet 1 Sig: Take 1 Tab by mouth daily. Class: Normal  
 Pharmacy: [facility] [address] #: [phone] Route: Oral  
  
Follow-up Instructions Return in about 4 weeks (around 3/21/2018). To-Do List   
 02/24/2018 Cardiac Services:  2D ECHO COMPLETE ADULT (TTE)   
  
 02/28/2018 Lab:  METABOLIC PANEL, BASIC Please provide this summary of care documentation to your next provider. Your primary care clinician is listed as Capo Carter. If you have any questions after today's visit, please call [phone].

## 2018-02-21 NOTE — LETTER
Obed Von 1955 2/21/2018 Dear Hasmukh Alvarez MD 
 
I had the pleasure of evaluating  Mr. Barbie Mazariegos in office today. Below are the relevant portions of my assessment and plan of care. ICD-10-CM ICD-9-CM 1. Chronic atrial fibrillation (HCC) I48.2 427.31 2D ECHO COMPLETE ADULT (TTE) METABOLIC PANEL, BASIC  
 stable 2. Mitral valve disorder I05.9 394.9   
 monitor 
mild 3. Essential hypertension I10 401.9   
 controlled 4. Diastolic CHF, acute on chronic (HCC) I50.33 428.33   
  428.0   
 increase edema 
sob 5. Anticoagulant long-term use Z79.01 V58.61   
 on coumadin 6. Pulmonary HTN I27.20 416.8 Current Outpatient Prescriptions Medication Sig Dispense Refill  metOLazone (ZAROXOLYN) 10 mg tablet Take 1 Tab by mouth daily. 30 Tab 1  carvedilol (COREG) 25 mg tablet Take 1 Tab by mouth two (2) times daily (with meals). 60 Tab 6  warfarin (COUMADIN) 7.5 mg tablet Take 1 Tab by mouth daily. 30 Tab 6  
 spironolactone (ALDACTONE) 50 mg tablet Take 1 Tab by mouth daily. 30 Tab 1  
 furosemide (LASIX) 40 mg tablet Take 1 Tab by mouth two (2) times a day. 60 Tab 2  
 digoxin (LANOXIN) 0.125 mg tablet Take 1 Tab by mouth daily. 30 Tab 6  
 ramipril (ALTACE) 10 mg capsule Take 1 Cap by mouth two (2) times a day. 180 Cap 2  
 nisoldipine SR (SULAR) 17 mg Tb24 tablet Take 1 Tab by mouth daily. 30 Tab 6 Orders Placed This Encounter  METABOLIC PANEL, BASIC Standing Status:   Future Standing Expiration Date:   3/23/2018  2D ECHO COMPLETE ADULT (TTE) Standing Status:   Future Standing Expiration Date:   8/20/2018 Order Specific Question:   Reason for Exam: Answer:   see diagnosis  metOLazone (ZAROXOLYN) 10 mg tablet Sig: Take 1 Tab by mouth daily. Dispense:  30 Tab Refill:  1 If you have questions, please do not hesitate to call me. I look forward to following Mr. Barbie Mazariegos along with you.  
 
Sincerely, 
 Giselle Alcazar MD

## 2018-02-21 NOTE — PROGRESS NOTES
HISTORY OF PRESENT ILLNESS  Margarita Adams is a 57 y.o. male. HPI Comments: Patient with a fib,chf,pulmonary htn,mr. On follow up patient denies any chest pains,sob, palpitation or other significant symptoms. Valvular Heart Disease   The history is provided by the patient. This is a chronic problem. The problem occurs constantly. The problem has not changed since onset. Pertinent negatives include no chest pain, no abdominal pain, no headaches and no shortness of breath. CHF   The history is provided by the patient. This is a chronic problem. The problem occurs constantly. The problem has not changed since onset. Pertinent negatives include no chest pain, no abdominal pain, no headaches and no shortness of breath. Palpitations    The history is provided by the patient. This is a chronic problem. The problem has not changed since onset. Associated symptoms include lower extremity edema. Pertinent negatives include no fever, no chest pain, no claudication, no orthopnea, no PND, no abdominal pain, no nausea, no vomiting, no headaches, no dizziness, no weakness, no cough, no hemoptysis, no shortness of breath and no sputum production. His past medical history is significant for hypertension. Hypertension   The history is provided by the patient. This is a chronic problem. The problem occurs constantly. The problem has not changed since onset. Pertinent negatives include no chest pain, no abdominal pain, no headaches and no shortness of breath. Leg Swelling   The history is provided by the patient. This is a chronic problem. The problem occurs daily. The problem has not changed since onset. Pertinent negatives include no chest pain, no abdominal pain, no headaches and no shortness of breath. Nothing aggravates the symptoms. Review of Systems   Constitutional: Negative for chills and fever. HENT: Negative for nosebleeds. Eyes: Negative for blurred vision and double vision.    Respiratory: Negative for cough, hemoptysis, sputum production, shortness of breath and wheezing. Cardiovascular: Positive for leg swelling. Negative for chest pain, palpitations, orthopnea, claudication and PND. Gastrointestinal: Negative for abdominal pain, heartburn, nausea and vomiting. Musculoskeletal: Negative for myalgias. Skin: Negative for rash. Neurological: Negative for dizziness, weakness and headaches. Endo/Heme/Allergies: Does not bruise/bleed easily. Family History   Problem Relation Age of Onset    Kidney Disease Mother     No Known Problems Sister     No Known Problems Brother     Diabetes Neg Hx     Hypertension Neg Hx        Past Medical History:   Diagnosis Date    Atrial fibrillation (HCC)     Chronic diastolic heart failure (HCC)     Hypertension     Mitral valve disorders(424.0)        Past Surgical History:   Procedure Laterality Date    HX ADENOIDECTOMY      HX TONSILLECTOMY         No Known Allergies    Current Outpatient Prescriptions   Medication Sig    metOLazone (ZAROXOLYN) 10 mg tablet Take 1 Tab by mouth daily.  carvedilol (COREG) 25 mg tablet Take 1 Tab by mouth two (2) times daily (with meals).  warfarin (COUMADIN) 7.5 mg tablet Take 1 Tab by mouth daily.  spironolactone (ALDACTONE) 50 mg tablet Take 1 Tab by mouth daily.  furosemide (LASIX) 40 mg tablet Take 1 Tab by mouth two (2) times a day.  digoxin (LANOXIN) 0.125 mg tablet Take 1 Tab by mouth daily.  ramipril (ALTACE) 10 mg capsule Take 1 Cap by mouth two (2) times a day.  nisoldipine SR (SULAR) 17 mg Tb24 tablet Take 1 Tab by mouth daily. No current facility-administered medications for this visit. Visit Vitals    /58    Pulse 62    Ht 6' 3\" (1.905 m)    Wt 137.4 kg (303 lb)    BMI 37.87 kg/m2         Physical Exam   Constitutional: He is oriented to person, place, and time. He appears well-developed and well-nourished. HENT:   Head: Normocephalic and atraumatic.    Eyes: Conjunctivae are normal.   Neck: Neck supple. No JVD present. No tracheal deviation present. No thyromegaly present. Cardiovascular: Normal rate and normal heart sounds. An irregularly irregular rhythm present. Exam reveals no gallop and no friction rub. No murmur heard. Pulmonary/Chest: Breath sounds normal. No respiratory distress. He has no wheezes. He has no rales. He exhibits no tenderness. Abdominal: Soft. There is no tenderness. Musculoskeletal: He exhibits edema. Neurological: He is alert and oriented to person, place, and time. Skin: Skin is warm and dry. Psychiatric: He has a normal mood and affect. Mr. Nasim Bridges has a reminder for a \"due or due soon\" health maintenance. I have asked that he contact his primary care provider for follow-up on this health maintenance. CARDIOLOGY STUDIES 7/24/2013 5/18/2011 12/19/2007 5/9/2006 4/16/2002 7/15/1996   EKG Result - - - - - 7-96   Myocardial Perfusion Scan Result - - - - nl scan -   Echocardiogram - Complete Result normal ef,enlarged la,mild mod mr,mild tr,pap 38 EF 55%,mild MR & TR,PAP 51 Mild to Mod. MR, Normal EF - - -   Doppler US Vascular Result - - - Vascular - -   Some recent data might be hidden     SUMMARY:echo:5/2015  Left ventricle: Systolic function was normal. Ejection fraction was  estimated in the range of 55 % to 60 %. There were no regional wall motion  abnormalities. Left atrium: The atrium was mildly dilated. Mitral valve: There was mild annular calcification. There was mild diffuse  thickening of the anterior and posterior leaflets. There was mild  regurgitation. Mean transmitral gradient was 1.6 mmHg. Tricuspid valve: Tricuspid regurgitation peak velocity: 3.1 m/sec. Pulmonary artery systolic pressure: 41 mmHg. I Have personally reviewed recent relevant labs available and discussed with patient  8/2017-dig,bmp  Assessment       ICD-10-CM ICD-9-CM    1.  Chronic atrial fibrillation (HCC) I48.2 427.31 2D ECHO COMPLETE ADULT (TTE)      METABOLIC PANEL, BASIC    stable   2. Mitral valve disorder I05.9 394.9     monitor  mild    3. Essential hypertension I10 401.9     controlled   4. Diastolic CHF, acute on chronic (HCC) I50.33 428.33      428.0     increase edema  sob   5. Anticoagulant long-term use Z79.01 V58.61     on coumadin   6. Pulmonary HTN I27.20 416.8          Orders Placed This Encounter    METABOLIC PANEL, BASIC     Standing Status:   Future     Standing Expiration Date:   3/23/2018    2D ECHO COMPLETE ADULT (TTE)     Standing Status:   Future     Standing Expiration Date:   8/20/2018     Order Specific Question:   Reason for Exam:     Answer:   see diagnosis    metOLazone (ZAROXOLYN) 10 mg tablet     Sig: Take 1 Tab by mouth daily. Dispense:  30 Tab     Refill:  1       Follow-up Disposition:  Return in about 4 weeks (around 3/21/2018).

## 2018-02-26 DIAGNOSIS — I48.91 ATRIAL FIBRILLATION, UNSPECIFIED TYPE (HCC): ICD-10-CM

## 2018-02-26 DIAGNOSIS — I50.32 CHRONIC DIASTOLIC HEART FAILURE (HCC): ICD-10-CM

## 2018-02-26 DIAGNOSIS — I50.32 DIASTOLIC CHF, CHRONIC (HCC): ICD-10-CM

## 2018-02-26 RX ORDER — CARVEDILOL 25 MG/1
25 TABLET ORAL 2 TIMES DAILY WITH MEALS
Qty: 60 TAB | Refills: 6 | Status: SHIPPED | OUTPATIENT
Start: 2018-02-26 | End: 2018-03-21

## 2018-03-02 ENCOUNTER — TELEPHONE ANTICOAG (OUTPATIENT)
Dept: CARDIOLOGY CLINIC | Age: 63
End: 2018-03-02

## 2018-03-02 DIAGNOSIS — I48.0 PAROXYSMAL ATRIAL FIBRILLATION (HCC): ICD-10-CM

## 2018-03-02 LAB — INR, EXTERNAL: 2.4

## 2018-03-06 ENCOUNTER — OFFICE VISIT (OUTPATIENT)
Dept: VASCULAR SURGERY | Age: 63
End: 2018-03-06

## 2018-03-06 VITALS
HEART RATE: 88 BPM | BODY MASS INDEX: 37.67 KG/M2 | HEIGHT: 75 IN | DIASTOLIC BLOOD PRESSURE: 72 MMHG | SYSTOLIC BLOOD PRESSURE: 130 MMHG | RESPIRATION RATE: 20 BRPM | WEIGHT: 303 LBS

## 2018-03-06 DIAGNOSIS — R60.0 BILATERAL LEG EDEMA: Primary | ICD-10-CM

## 2018-03-06 DIAGNOSIS — I87.2 VENOUS (PERIPHERAL) INSUFFICIENCY: ICD-10-CM

## 2018-03-06 NOTE — PROGRESS NOTES
Carmelo Wahl Sr. Chief Complaint   Patient presents with    New Patient    Swelling       History and Physical    Mr. Adela Diallo is here today at the request of his primary care physician for evaluation of bilateral leg swelling, most significant on the right. He acknowledges that he has had on and off swelling for a number of years, but is usually been able to control it with compression stockings. He had a recent increase in his leg swelling that was quite severe, even describing that he felt like his skin could pop. He was unable to wear his compression stockings. Incidentally, since this appointment request was made, he has also seen his cardiologist.  He does have chronic diastolic heart failure, atrial fibrillation, and had been on Lasix, but Zaroxolyn was recently added, and he states that he is recognized a remarkable increase in the swelling since starting the medication. He does have another follow-up with cardiology soon. He has had a prior DVT study on file, which was negative. No other knowledge of DVT. No varicose veins, but does have some skin changes that would be consistent with perhaps some mild venous insufficiency.     Past Medical History:   Diagnosis Date    Atrial fibrillation (HCC)     Chronic diastolic heart failure (HCC)     Hypertension     Mitral valve disorders(424.0)      Patient Active Problem List   Diagnosis Code    Mitral valve disorder I05.9    Benign hypertensive heart disease without heart failure I11.9    Chronic diastolic heart failure (HCC) I50.32    Pulmonary HTN I27.20    Obesity E66.9    Atrial fibrillation (HCC) I48.91    Essential hypertension, benign I10    Chronic atrial fibrillation (HCC) I48.2    Anticoagulant long-term use Z79.01     Past Surgical History:   Procedure Laterality Date    HX ADENOIDECTOMY      HX TONSILLECTOMY       Current Outpatient Prescriptions   Medication Sig Dispense Refill    carvedilol (COREG) 25 mg tablet Take 1 Tab by mouth two (2) times daily (with meals). 60 Tab 6    metOLazone (ZAROXOLYN) 10 mg tablet Take 1 Tab by mouth daily. (Patient taking differently: Take 5 mg by mouth daily.) 30 Tab 1    warfarin (COUMADIN) 7.5 mg tablet Take 1 Tab by mouth daily. 30 Tab 6    spironolactone (ALDACTONE) 50 mg tablet Take 1 Tab by mouth daily. 30 Tab 1    furosemide (LASIX) 40 mg tablet Take 1 Tab by mouth two (2) times a day. 60 Tab 2    digoxin (LANOXIN) 0.125 mg tablet Take 1 Tab by mouth daily. 30 Tab 6    ramipril (ALTACE) 10 mg capsule Take 1 Cap by mouth two (2) times a day. 180 Cap 2    nisoldipine SR (SULAR) 17 mg Tb24 tablet Take 1 Tab by mouth daily. 30 Tab 6     No Known Allergies  Social History     Social History    Marital status: SINGLE     Spouse name: N/A    Number of children: N/A    Years of education: N/A     Occupational History    Not on file. Social History Main Topics    Smoking status: Never Smoker    Smokeless tobacco: Never Used    Alcohol use No    Drug use: No    Sexual activity: No     Other Topics Concern    Not on file     Social History Narrative      Family History   Problem Relation Age of Onset    Kidney Disease Mother     No Known Problems Sister     No Known Problems Brother     Diabetes Neg Hx     Hypertension Neg Hx        Review of Systems    Review of Systems - History obtained from the patient  General ROS: negative  Psychological ROS: negative  Ophthalmic ROS: positive for - uses glasses  Respiratory ROS: negative  Cardiovascular ROS: positive for - edema  Gastrointestinal ROS: negative  Musculoskeletal ROS: negative  Neurological ROS: negative  Dermatological ROS: negative  Vascular ROS: per HPI        Physical Exam:    Visit Vitals    /72 (BP 1 Location: Left arm, BP Patient Position: Sitting)    Pulse 88    Resp 20    Ht 6' 3\" (1.905 m)    Wt 303 lb (137.4 kg)    BMI 37.87 kg/m2      General:  Alert, cooperative, no distress.    Head: Normocephalic, without obvious abnormality, atraumatic. Eyes:    Conjunctivae/corneas clear. Pupils equal, round, reactive to light. Extraocular movements intact. Extremities: 2 + right leg edema, 1+ left leg edema. No varicosities   Pulses: 1+ with no signs of arterial insufficiency   Skin: Mild stasis hyperpigmentation. No ulcers, no cellulitis           Impression and Plan:  1. Bilateral leg edema    2. Venous (peripheral) insufficiency      Orders Placed This Encounter    DUPLEX LOWER EXT VENOUS BILAT AMB (Reflux)       I explained that I think a lot of his swelling symptoms are probably multifactorial.  He is already had a response with diuretic therapy. A focus on symptom control discussion, to incorporate continued efforts of compression, elevation, range of motion exercises, and skin care. Since he is not able to wear his current compression stockings, we provided him with tubi  and I gave him the names of some alternate over-the-counter compression socks that hopefully he can try to transition to soon. To be thorough though with a vascular workup, I did explain venous insufficiency and venous reflux. We will plan on getting a dedicated study to ensure no underlying vascular problems that would be beneficial to discuss alternate treatments, so this will be scheduled and I will call him with those results    PAT Lomeli    Portions of this note have been created using voice recognition software.

## 2018-03-06 NOTE — PROGRESS NOTES
1. Have you been to an emergency room or urgent care clinic since your last visit? Hospitalized since your last visit? If yes, where, when, and reason for visit? No  2. Have you seen or consulted any other health care providers outside of the [facility] since your last visit including any procedures, health maintenance items. If yes, where, when and reason for visit?    No

## 2018-03-06 NOTE — MR AVS SNAPSHOT
[facility] 
 
 
 [place] [address] [place] [address] [address] [facility] [address] 
[phone] Patient: Alli Kelly Sr. MRN: [id number] DOUGLAS: Visit Information Date & Time Provider Department Dept. Phone Encounter #  
 3/6/2018 10:30 AM Yoandy Yen, 82 formerly [facility] [phone] [id number] Your Appointments 3/12/2018 12:45 PM  
PROCEDURE with [facility] IMAGING 2 [facility] ([address]) Appt Note: REFLUX KALANI KNAAK WILL CALL PT WITH RESULTS  
 [address] [facility] [address]  
[phone] [address] [place] [address],Suite [facility]  
  
    
 3/14/2018 12:45 PM  
PROCEDURE with [facility] E[facility] ([address]) Appt Note: pappas [address] [facility], [address] [facility] [address]  
[address], [address] [address]  
  
    
 3/21/2018  9:15 AM  
ESTABLISHED PATIENT with Roldan Salcedo MD  
[facility]) Appt Note: post echo/labs [address] [facility], [address] [facility] [address]  
[address], [address] 2018  9:00 AM  
FOLLOW UP EXAM with RENATA Mcnally [facility] ([address]) Appt Note: 2 month f/u  
 [address] [facility] [address]  
[facility] [address] Upcoming Health Maintenance Date Due Hepatitis C Screening 1955 Pneumococcal 19-64 Medium Risk (1 of 1 - PPSV23) 1972 DTaP/Tdap/Td series (1 - Tdap) 1974 FOBT Q 1 YEAR AGE 50-75 2003 ZOSTER VACCINE AGE 60> 3/18/2013 Allergies as of 3/6/2018  Review Complete On: 3/6/2018 By: Alejandrina Ivy LPN No Known Allergies Current Immunizations  Never Reviewed No immunizations on file. Not reviewed this visit You Were Diagnosed With   
  
 Codes Comments Bilateral leg edema    -  Primary ICD-10-CM: R60.0 ICD-9-CM: 117. 3 Venous (peripheral) insufficiency     ICD-10-CM: I87.2 ICD-9-CM: 459.81 Vitals BP Pulse Resp Height(growth percentile) Weight(growth percentile) BMI  
 130/72 (BP 1 Location: Left arm, BP Patient Position: Sitting) 88 20 6' 3\" (1.905 m) 303 lb (137.4 kg) 37.87 kg/m2 Smoking Status Never Smoker Vitals History BMI and BSA Data Body Mass Index Body Surface Area  
 37.87 kg/m 2 2.7 m 2 Preferred Pharmacy Pharmacy Name [facility] [address] [place] [address] [phone] Your Updated Medication List  
  
   
This list is accurate as of 3/6/18 10:43 AM.  Always use your most recent med list.  
  
  
  
  
 carvedilol 25 mg tablet Commonly known as:  Miguel Si Take 1 Tab by mouth two (2) times daily (with meals). digoxin 0.125 mg tablet Commonly known as:  LANOXIN Take 1 Tab by mouth daily. furosemide 40 mg tablet Commonly known as:  LASIX Take 1 Tab by mouth two (2) times a day. metOLazone 10 mg tablet Commonly known as:  Roxi Sprang Take 1 Tab by mouth daily. nisoldipine SR 17 mg Tb24 tablet Commonly known as:  Rilla Caroli Take 1 Tab by mouth daily. ramipril 10 mg capsule Commonly known as:  ALTACE Take 1 Cap by mouth two (2) times a day. spironolactone 50 mg tablet Commonly known as:  ALDACTONE Take 1 Tab by mouth daily. warfarin 7.5 mg tablet Commonly known as:  COUMADIN Take 1 Tab by mouth daily. To-Do List   
 03/20/2018 Imaging:  DUPLEX LOWER EXT VENOUS BILAT AMB Patient Instructions For management of leg swelling/pain, incorporate these 4 \"E's\" into your daily routine: 
 
 
· Elastic: Wear compression stockings during the day to help relieve symptoms. Talk to your doctor about which ones to get and where to get them. · Elevate: Prop up your legs at or above the level of your heart when possible. This helps keep the blood from pooling in your lower legs and improves blood flow to the rest of your body. I generally encourage 5 minute intervals every 2 hours, or 15-20 minutes every 3-4 hours. · Avoid sitting and standing for long periods. This puts added stress on your veins. · Exercise: Get regular exercise, and control your weight. Walk, bicycle, or swim to improve blood flow in your legs. Even do simple range of motion exercises including foot/ankle circles, flexion/extension. · Emollient: as you experience swelling, the skin often becomes dry. Keep legs moisturized daily. Good products include Gold Bond, Aveeno, Eucerin, Neutrogena. You may also use petroleum base such as vaseline or bag balm. Some patients choose oils such as olive, E, lanolin. Cocoa Butter is another favorite. Look for labels such as cream or ointment, as lotions do not provide as good hydration. For times of itching, may use hydrocortisone cream (cortaid). Please provide this summary of care documentation to your next provider. Your primary care clinician is listed as Sandrita [place]. If you have any questions after today's visit, please call [phone].

## 2018-03-12 ENCOUNTER — OFFICE VISIT (OUTPATIENT)
Dept: VASCULAR SURGERY | Age: 63
End: 2018-03-12

## 2018-03-12 DIAGNOSIS — R60.0 BILATERAL LEG EDEMA: ICD-10-CM

## 2018-03-12 DIAGNOSIS — I87.2 VENOUS (PERIPHERAL) INSUFFICIENCY: ICD-10-CM

## 2018-03-12 NOTE — PROCEDURES
[facility]  *** FINAL REPORT ***    Name: Deion Lee  MRN: [id number]       Outpatient  : 18 May 1955  HIS Order #: [id number]  [address] [facility] Visit #: [id number]  Date: 12 Mar 2018    TYPE OF TEST: Peripheral Venous Testing    REASON FOR TEST  Limb swelling    Right Leg:-  Deep venous thrombosis:           No  Superficial venous thrombosis:    No  Deep venous insufficiency:        No  Superficial venous insufficiency: No    Left Leg:-  Deep venous thrombosis:           No  Superficial venous thrombosis:    No  Deep venous insufficiency:        No  Superficial venous insufficiency: No      INTERPRETATION/FINDINGS  Duplex images were obtained using 2-D gray scale, color flow and  spectral doppler analysis. 1. No evidence of lower extremity deep venous thrombosis or venous  insufficiency in the common femoral, femoral, profunda, popliteal,  posterior tibial or peroneal veins bilaterally. Pulsatile signals  noted bilaterally consistent with increased central venous pressures. 2. No evidence of superficial venous insufficiency identified in the  great or small saphenous veins at the junction in the reverse  trendelenburg position. No reflux identified in the remaining levels  assessed of the GSV or SSV's bilaterally. 3. Competent left lower leg  noted measuring 2.2 mm. 4. Multiphasic  doppler signals noted in the tibial vessels  bilaterally. ADDITIONAL COMMENTS    I have personally reviewed the data relevant to the interpretation of  this  study. TECHNOLOGIST: Juan J Ingram RVT, BS  Signed: 2018 04:44 PM    PHYSICIAN: Fay Feliciano D.O.   Signed: 2018 12:28 PM

## 2018-03-14 ENCOUNTER — TELEPHONE (OUTPATIENT)
Dept: CARDIOLOGY CLINIC | Age: 63
End: 2018-03-14

## 2018-03-14 ENCOUNTER — CLINICAL SUPPORT (OUTPATIENT)
Dept: CARDIOLOGY CLINIC | Age: 63
End: 2018-03-14

## 2018-03-14 DIAGNOSIS — I48.20 CHRONIC ATRIAL FIBRILLATION (HCC): ICD-10-CM

## 2018-03-14 DIAGNOSIS — I48.91 ATRIAL FIBRILLATION, UNSPECIFIED TYPE (HCC): Primary | ICD-10-CM

## 2018-03-14 NOTE — TELEPHONE ENCOUNTER
Christiana Farrow, NP ordered a Digoxin level on patient stat     Patient is scheduled for EKG and BP check 3/15/18    Patient is to hold Digoxin and carvedilol until after EKG/BP  and blood work. This has been fully explained to the patient, who indicates understanding.

## 2018-03-15 ENCOUNTER — OFFICE VISIT (OUTPATIENT)
Dept: CARDIOLOGY CLINIC | Age: 63
End: 2018-03-15

## 2018-03-15 VITALS
DIASTOLIC BLOOD PRESSURE: 59 MMHG | HEIGHT: 75 IN | SYSTOLIC BLOOD PRESSURE: 127 MMHG | BODY MASS INDEX: 35.31 KG/M2 | WEIGHT: 284 LBS | HEART RATE: 45 BPM

## 2018-03-15 DIAGNOSIS — I50.32 CHRONIC DIASTOLIC HEART FAILURE (HCC): ICD-10-CM

## 2018-03-15 DIAGNOSIS — R00.1 BRADYCARDIA: Primary | ICD-10-CM

## 2018-03-15 DIAGNOSIS — I48.0 PAROXYSMAL ATRIAL FIBRILLATION (HCC): ICD-10-CM

## 2018-03-15 DIAGNOSIS — I10 ESSENTIAL HYPERTENSION, BENIGN: ICD-10-CM

## 2018-03-15 NOTE — LETTER
Jaclyn  . 
1955 3/15/2018 Dear Lali Hdz MD 
 
I had the pleasure of evaluating  Mr. Kemi Harper in office today. Below are the relevant portions of my assessment and plan of care. ICD-10-CM ICD-9-CM 1. Bradycardia R00.1 427.89   
 3/18 michele- coreg/dig held; 2. Paroxysmal atrial fibrillation (HCC) I48.0 427.31 AMB POC EKG ROUTINE W/ 12 LEADS, INTER & REP  
 3/18 michele- coreg/dig held;  
Permanent ,on coumadin 3. Chronic diastolic heart failure (HCC) I50.32 428.32   
 3/18 NYHA2;   
4. Essential hypertension, benign I10 401.1   
 controlled Current Outpatient Prescriptions Medication Sig Dispense Refill  carvedilol (COREG) 25 mg tablet Take 1 Tab by mouth two (2) times daily (with meals). 60 Tab 6  
 metOLazone (ZAROXOLYN) 10 mg tablet Take 1 Tab by mouth daily. (Patient taking differently: Take 5 mg by mouth daily.) 30 Tab 1  
 warfarin (COUMADIN) 7.5 mg tablet Take 1 Tab by mouth daily. 30 Tab 6  
 spironolactone (ALDACTONE) 50 mg tablet Take 1 Tab by mouth daily. 30 Tab 1  
 furosemide (LASIX) 40 mg tablet Take 1 Tab by mouth two (2) times a day. 60 Tab 2  
 digoxin (LANOXIN) 0.125 mg tablet Take 1 Tab by mouth daily. 30 Tab 6  
 ramipril (ALTACE) 10 mg capsule Take 1 Cap by mouth two (2) times a day. 180 Cap 2  
 nisoldipine SR (SULAR) 17 mg Tb24 tablet Take 1 Tab by mouth daily. 30 Tab 6 Orders Placed This Encounter  AMB POC EKG ROUTINE W/ 12 LEADS, INTER & REP Order Specific Question:   Reason for Exam: Answer:   low heart rate If you have questions, please do not hesitate to call me. I look forward to following Mr. Kemi Harper along with you. Sincerely, Vanessa Nair MD

## 2018-03-15 NOTE — PATIENT INSTRUCTIONS
There are no discontinued medications. Orders Placed This Encounter    AMB POC EKG ROUTINE W/ 12 LEADS, INTER & REP     Order Specific Question:   Reason for Exam:     Answer:   low heart rate     Come to ER if severely weak or dizzy     Bradycardia: Care Instructions  Your Care Instructions    Bradycardia is a slow heart rate. If your heart beats too slowly, it can't supply your body with enough blood. This can make you weak or dizzy. Or it may make you pass out. Sometimes medicine can cause this problem. If this happens, your doctor may have you adjust one of your medicines. If a medicine is not the problem, your doctor may recommend a pacemaker. It is important to treat bradycardia so that you don't get more serious health problems. Your doctor will want to see you on a routine schedule to make sure that your heartbeat is normal.  Follow-up care is a key part of your treatment and safety. Be sure to make and go to all appointments, and call your doctor if you are having problems. It's also a good idea to know your test results and keep a list of the medicines you take. How can you care for yourself at home? · Take your medicines exactly as prescribed. Call your doctor if you think you are having a problem with your medicine. If your bradycardia is caused by another disease, your doctor will try to treat the disease. If it is caused by heart medicines, he or she will adjust your medicines. · Make lifestyle changes to improve your heart health. ¨ Get regular exercise. Try for 30 minutes on most days of the week. If you do not have other heart problems, you likely do not have limits on the type or level of activity that you can do. You may want to walk, swim, bike, or do other activities. Ask your doctor what level of exercise is safe for you. ¨ To control your cholesterol, avoid foods with a lot of fat, saturated fat, or sodium. Try to eat more fiber.  And if your doctor says it's okay, get some exercise on most days. ¨ Do not smoke. Smoking can make your heart condition worse. If you need help quitting, talk to your doctor about stop-smoking programs and medicines. These can increase your chances of quitting for good. ¨ Limit alcohol to 2 drinks a day for men and 1 drink a day for women. Too much alcohol can cause health problems. Pacemaker  If you have a pacemaker, you will get more specific information about it. Be sure to:  · Check your pulse as your doctor tells you. · Have your pacemaker checked as often as your doctor recommends. You may be able to do this over the phone or computer. · Avoid strong magnetic or electrical fields. These include wand metal detectors used in airports, MRIs, welding equipment, and generators. · You will be checked several times right after you get your pacemaker and when it is time to have the battery changed. Batteries last for 5 to 15 years. · You can talk on a cell phone. But keep it 6 inches away from your pacemaker. · Microwaves, TVs, radios, and kitchen and bathroom appliances won't harm you. When should you call for help? Call 911 anytime you think you may need emergency care. For example, call if:  ? · You have symptoms of sudden heart failure. These may include:  ¨ Severe trouble breathing. ¨ A fast or irregular heartbeat. ¨ Coughing up pink, foamy mucus. ¨ You passed out. ? · You have symptoms of a stroke. These may include:  ¨ Sudden numbness, tingling, weakness, or loss of movement in your face, arm, or leg, especially on only one side of your body. ¨ Sudden vision changes. ¨ Sudden trouble speaking. ¨ Sudden confusion or trouble understanding simple statements. ¨ Sudden problems with walking or balance. ¨ A sudden, severe headache that is different from past headaches. ?Call your doctor now or seek immediate medical care if:  ? · You have new or changed symptoms of heart failure, such as:  ¨ New or increased shortness of breath.   ¨ New or worse swelling in your legs, ankles, or feet. ¨ Sudden weight gain, such as more than 2 to 3 pounds in a day or 5 pounds in a week. (Your doctor may suggest a different range of weight gain.)  ¨ Feeling dizzy or lightheaded or like you may faint. ¨ Feeling so tired or weak that you cannot do your usual activities. ¨ Not sleeping well. Shortness of breath wakes you at night. You need extra pillows to prop yourself up to breathe easier. ? Watch closely for changes in your health, and be sure to contact your doctor if:  ? · You do not get better as expected. Where can you learn more? Go to http://olivier-cliff.info/. Enter E118 in the search box to learn more about \"Bradycardia: Care Instructions. \"  Current as of: September 21, 2016  Content Version: 11.4  © 9376-1207 [employer] Care instructions adapted under license by [employer] (which disclaims liability or warranty for this information). If you have questions about a medical condition or this instruction, always ask your healthcare professional. [place] [address] any warranty or liability for your use of this information.

## 2018-03-15 NOTE — PROGRESS NOTES
HISTORY OF PRESENT ILLNESS  Qamar Arredondo Sr. is a 57 y.o. male. HPI Comments: 3/18 michele during echo, so called for ekg and visit. Had blurry vision yesterday but better today as coreg and dig are held. Dig level 1.5( 5 hrs after PO dose)    Patient with a fib,chf,pulmonary htn,mr. Slow Heart Rate   The history is provided by the medical records (noted michele during echo, so called for ekg and visit). Pertinent negatives include no chest pain, no abdominal pain, no headaches and no shortness of breath. Valvular Heart Disease   The history is provided by the patient. This is a chronic problem. The problem occurs constantly. The problem has not changed since onset. Pertinent negatives include no chest pain, no abdominal pain, no headaches and no shortness of breath. CHF   The history is provided by the patient. This is a chronic problem. The problem occurs constantly. The problem has not changed since onset. Pertinent negatives include no chest pain, no abdominal pain, no headaches and no shortness of breath. Palpitations    The history is provided by the patient (AFib). This is a chronic problem. The problem has not changed since onset. Associated symptoms include lower extremity edema. Pertinent negatives include no fever, no chest pain, no claudication, no orthopnea, no PND, no abdominal pain, no nausea, no vomiting, no headaches, no dizziness, no weakness, no cough, no hemoptysis, no shortness of breath and no sputum production. His past medical history is significant for hypertension. Hypertension   The history is provided by the patient. This is a chronic problem. The problem occurs constantly. The problem has not changed since onset. Pertinent negatives include no chest pain, no abdominal pain, no headaches and no shortness of breath. Leg Swelling   The history is provided by the patient. This is a chronic problem. The problem occurs daily. The problem has been resolved.  Pertinent negatives include no chest pain, no abdominal pain, no headaches and no shortness of breath. Nothing aggravates the symptoms. Review of Systems   Constitutional: Negative for chills and fever. HENT: Negative for nosebleeds. Eyes: Negative for blurred vision and double vision. Respiratory: Negative for cough, hemoptysis, sputum production, shortness of breath and wheezing. Cardiovascular: Negative for chest pain, palpitations, orthopnea, claudication, leg swelling and PND. Gastrointestinal: Negative for abdominal pain, heartburn, nausea and vomiting. Musculoskeletal: Negative for myalgias. Skin: Negative for rash. Neurological: Negative for dizziness, weakness and headaches. Endo/Heme/Allergies: Does not bruise/bleed easily. Family History   Problem Relation Age of Onset    Kidney Disease Mother     No Known Problems Sister     No Known Problems Brother     Diabetes Neg Hx     Hypertension Neg Hx        Past Medical History:   Diagnosis Date    Atrial fibrillation (HCC)     Chronic diastolic heart failure (HCC)     Hypertension     Mitral valve disorders(424.0)        Past Surgical History:   Procedure Laterality Date    HX ADENOIDECTOMY      HX TONSILLECTOMY         No Known Allergies    Current Outpatient Prescriptions   Medication Sig    carvedilol (COREG) 25 mg tablet Take 1 Tab by mouth two (2) times daily (with meals).  metOLazone (ZAROXOLYN) 10 mg tablet Take 1 Tab by mouth daily. (Patient taking differently: Take 5 mg by mouth daily.)    warfarin (COUMADIN) 7.5 mg tablet Take 1 Tab by mouth daily.  spironolactone (ALDACTONE) 50 mg tablet Take 1 Tab by mouth daily.  furosemide (LASIX) 40 mg tablet Take 1 Tab by mouth two (2) times a day.  digoxin (LANOXIN) 0.125 mg tablet Take 1 Tab by mouth daily.  ramipril (ALTACE) 10 mg capsule Take 1 Cap by mouth two (2) times a day.  nisoldipine SR (SULAR) 17 mg Tb24 tablet Take 1 Tab by mouth daily.      No current facility-administered medications for this visit. Visit Vitals    /59    Pulse (!) 45    Ht 6' 3\" (1.905 m)    Wt 284 lb (128.8 kg)    BMI 35.5 kg/m2         Physical Exam   Constitutional: He is oriented to person, place, and time. He appears well-developed and well-nourished. HENT:   Head: Normocephalic and atraumatic. Eyes: Conjunctivae are normal.   Neck: Neck supple. No JVD present. No tracheal deviation present. No thyromegaly present. Cardiovascular: Normal rate and normal heart sounds. An irregularly irregular rhythm present. Exam reveals no gallop and no friction rub. No murmur heard. Pulmonary/Chest: Breath sounds normal. No respiratory distress. He has no wheezes. He has no rales. He exhibits no tenderness. Abdominal: Soft. There is no tenderness. Musculoskeletal: He exhibits edema. Neurological: He is alert and oriented to person, place, and time. Skin: Skin is warm and dry. Psychiatric: He has a normal mood and affect. Mr. Yeimy Bentley has a reminder for a \"due or due soon\" health maintenance. I have asked that he contact his primary care provider for follow-up on this health maintenance. CARDIOLOGY STUDIES 7/24/2013 5/18/2011 12/19/2007 5/9/2006 4/16/2002 7/15/1996   EKG Result - - - - - 7-96   Myocardial Perfusion Scan Result - - - - nl scan -   Echocardiogram - Complete Result normal ef,enlarged la,mild mod mr,mild tr,pap 38 EF 55%,mild MR & TR,PAP 51 Mild to Mod. MR, Normal EF - - -   Doppler US Vascular Result - - - Vascular - -   Some recent data might be hidden     SUMMARY:echo:5/2015  Left ventricle: Systolic function was normal. Ejection fraction was  estimated in the range of 55 % to 60 %. There were no regional wall motion  abnormalities. Left atrium: The atrium was mildly dilated. Mitral valve: There was mild annular calcification. There was mild diffuse  thickening of the anterior and posterior leaflets. There was mild  regurgitation.  Mean transmitral gradient was 1.6 mmHg. Tricuspid valve: Tricuspid regurgitation peak velocity: 3.1 m/sec. Pulmonary artery systolic pressure: 41 mmHg. I Have personally reviewed recent relevant labs available and discussed with patient  8/2017-dig,bmp  Assessment       Diagnoses and all orders for this visit:    1. Bradycardia  Comments:  3/18 michele- coreg/dig held;       2. [facility]ox[facility] ([facility]  Comments:  3/18 michele- coreg/dig held;   Permanent ,on coumadin  Orders:  -     AMB POC EKG ROUTINE W/ 12 LEADS, INTER & REP    3. Chronic diastolic heart failure ([facility]  Comments:  3/18 NYHA2;     4. Essential hypertension, benign  Comments:  controlled          Pertinent laboratory and test data reviewed and discussed with patient. See patient instructions also for other medical advice given    There are no discontinued medications. Follow-up Disposition:  Return in about 1 week (around 3/22/2018), or if symptoms worsen or fail to improve, for with ekg, with Dr Vidhya Gomez.

## 2018-03-15 NOTE — PROGRESS NOTES
1. Have you been to the ER, urgent care clinic since your last visit? Hospitalized since your last visit? No     2. Have you seen or consulted any other health care providers outside of the [address] [place] [address] since your last visit? Include any pap smears or colon screening. No     3. Since your last visit, have you had any of the following symptoms? Swelling     4. Have you had any blood work, X-rays or cardiac testing? Echo 3/14   Digoxin levels 3/14 in ce every Results 1.5      Patient did not bring meds or list but states everything is the same    [address] [place] [address], Np wanted patient to have Digoxin levels tested, bp and ekg done today  Due to low heart rate at time of echo.

## 2018-03-15 NOTE — MR AVS SNAPSHOT
[address] [facility] [address] 
 
 
 [address], [address] [facility] [address] 
[phone] Patient: Christel Vargas Sr. MRN: [id number] CLV:[birth year] Visit Information Date & Time Provider Department Dept. Phone Encounter #  
 3/15/2018 10:45 AM Shazia Wheat MD [facility] [address] [place] [address] [id number] Follow-up Instructions Return in about 1 week (around 3/22/2018), or if symptoms worsen or fail to improve, for with ekg, with Dr Michelle Galan. Your Appointments 3/21/2018  9:15 AM  
ESTABLISHED PATIENT with Katherine Valle MD  
[facility]) Appt Note: post echo/labs [address], [address] [facility] [address]  
[address] [facility], [address] [place] [address] 4/16/2018  9:00 AM  
FOLLOW UP EXAM with RENATA Hall (Milena Rafael) Appt Note: 2 month f/u  
 [address] [facility] [address]  
[facility] [address] Upcoming Health Maintenance Date Due Hepatitis C Screening 1955 Pneumococcal 19-64 Medium Risk (1 of 1 - PPSV23) 5/18/1972 DTaP/Tdap/Td series (1 - Tdap) 5/18/1974 FOBT Q 1 YEAR AGE 50-75 5/18/2003 ZOSTER VACCINE AGE 60> 3/18/2013 Allergies as of 3/15/2018  Review Complete On: 3/15/2018 By: Shazia Wheat MD  
 No Known Allergies Current Immunizations  Never Reviewed No immunizations on file. Not reviewed this visit You Were Diagnosed With   
  
 Codes Comments Bradycardia    -  Primary ICD-10-CM: R00.1 ICD-9-CM: 427.89 3/18 michele- coreg/dig held;  
  
 Paroxysmal atrial fibrillation (HCC)     ICD-10-CM: I48.0 ICD-9-CM: 427.31 3/18 michele- coreg/dig held;  
Permanent ,on coumadin  Chronic diastolic heart failure ([facility]     ICD-10-CM: I50.32 
ICD-9-CM: 428.32 3/18 Marilee Patel;   
 Essential hypertension, benign     ICD-10-CM: I10 
ICD-9-CM: 401.1 controlled Vitals BP Pulse Height(growth percentile) Weight(growth percentile) BMI Smoking Status 127/59 (!) 45 6' 3\" (1.905 m) 284 lb (128.8 kg) 35.5 kg/m2 Never Smoker Vitals History BMI and BSA Data Body Mass Index Body Surface Area 35.5 kg/m 2 2.61 m 2 Preferred Pharmacy Pharmacy Name [facility] [address] [place] [address],[address] [place] [address] [phone] Your Updated Medication List  
  
   
This list is accurate as of 3/15/18 11:05 AM.  Always use your most recent med list.  
  
  
  
  
 carvedilol 25 mg tablet Commonly known as:  Delonte Win Take 1 Tab by mouth two (2) times daily (with meals). digoxin 0.125 mg tablet Commonly known as:  LANOXIN Take 1 Tab by mouth daily. furosemide 40 mg tablet Commonly known as:  LASIX Take 1 Tab by mouth two (2) times a day. metOLazone 10 mg tablet Commonly known as:  Ming Lanette Take 1 Tab by mouth daily. nisoldipine SR 17 mg Tb24 tablet Commonly known as:  Ismael Mass Take 1 Tab by mouth daily. ramipril 10 mg capsule Commonly known as:  ALTACE Take 1 Cap by mouth two (2) times a day. spironolactone 50 mg tablet Commonly known as:  ALDACTONE Take 1 Tab by mouth daily. warfarin 7.5 mg tablet Commonly known as:  COUMADIN Take 1 Tab by mouth daily. We Performed the Following AMB POC EKG ROUTINE W/ 12 LEADS, INTER & REP [03113 CPT(R)] Follow-up Instructions Return in about 1 week (around 3/22/2018), or if symptoms worsen or fail to improve, for with ekg, with Dr Franko Butterfield. Patient Instructions There are no discontinued medications. Orders Placed This Encounter  AMB POC EKG ROUTINE W/ 12 LEADS, INTER & REP Order Specific Question:   Reason for Exam: Answer:   low heart rate Come to ER if severely weak or dizzy Bradycardia: Care Instructions Your Care Instructions Bradycardia is a slow heart rate. If your heart beats too slowly, it can't supply your body with enough blood. This can make you weak or dizzy. Or it may make you pass out. Sometimes medicine can cause this problem. If this happens, your doctor may have you adjust one of your medicines. If a medicine is not the problem, your doctor may recommend a pacemaker. It is important to treat bradycardia so that you don't get more serious health problems. Your doctor will want to see you on a routine schedule to make sure that your heartbeat is normal. 
Follow-up care is a key part of your treatment and safety. Be sure to make and go to all appointments, and call your doctor if you are having problems. It's also a good idea to know your test results and keep a list of the medicines you take. How can you care for yourself at home? · Take your medicines exactly as prescribed. Call your doctor if you think you are having a problem with your medicine. If your bradycardia is caused by another disease, your doctor will try to treat the disease. If it is caused by heart medicines, he or she will adjust your medicines. · Make lifestyle changes to improve your heart health. ¨ Get regular exercise. Try for 30 minutes on most days of the week. If you do not have other heart problems, you likely do not have limits on the type or level of activity that you can do. You may want to walk, swim, bike, or do other activities. Ask your doctor what level of exercise is safe for you. ¨ To control your cholesterol, avoid foods with a lot of fat, saturated fat, or sodium. Try to eat more fiber. And if your doctor says it's okay, get some exercise on most days. ¨ Do not smoke. Smoking can make your heart condition worse. If you need help quitting, talk to your doctor about stop-smoking programs and medicines. These can increase your chances of quitting for good. ¨ Limit alcohol to 2 drinks a day for men and 1 drink a day for women. Too much alcohol can cause health problems. Pacemaker If you have a pacemaker, you will get more specific information about it. Be sure to: · Check your pulse as your doctor tells you. · Have your pacemaker checked as often as your doctor recommends. You may be able to do this over the phone or computer. · Avoid strong magnetic or electrical fields. These include wand metal detectors used in airports, MRIs, welding equipment, and generators. · You will be checked several times right after you get your pacemaker and when it is time to have the battery changed. Batteries last for 5 to 15 years. · You can talk on a cell phone. But keep it 6 inches away from your pacemaker. · Microwaves, TVs, radios, and kitchen and bathroom appliances won't harm you. When should you call for help? Call 911 anytime you think you may need emergency care. For example, call if: 
? · You have symptoms of sudden heart failure. These may include: ¨ Severe trouble breathing. ¨ A fast or irregular heartbeat. ¨ Coughing up pink, foamy mucus. ¨ You passed out. ? · You have symptoms of a stroke. These may include: 
¨ Sudden numbness, tingling, weakness, or loss of movement in your face, arm, or leg, especially on only one side of your body. ¨ Sudden vision changes. ¨ Sudden trouble speaking. ¨ Sudden confusion or trouble understanding simple statements. ¨ Sudden problems with walking or balance. ¨ A sudden, severe headache that is different from past headaches. ?Call your doctor now or seek immediate medical care if: 
? · You have new or changed symptoms of heart failure, such as: ¨ New or increased shortness of breath. ¨ New or worse swelling in your legs, ankles, or feet. ¨ Sudden weight gain, such as more than 2 to 3 pounds in a day or 5 pounds in a week. (Your doctor may suggest a different range of weight gain.) ¨ Feeling dizzy or lightheaded or like you may faint. ¨ Feeling so tired or weak that you cannot do your usual activities. ¨ Not sleeping well. Shortness of breath wakes you at night. You need extra pillows to prop yourself up to breathe easier. ? Watch closely for changes in your health, and be sure to contact your doctor if: 
? · You do not get better as expected. Where can you learn more? Go to http://olivier-cliff.info/. Enter R272 in the search box to learn more about \"Bradycardia: Care Instructions. \" Current as of: September 21, 2016 Content Version: 11.4 © 2666-3929 [employer]. Care instructions adapted under license by [employer] (which disclaims liability or warranty for this information). If you have questions about a medical condition or this instruction, always ask your healthcare professional. [place] [address] any warranty or liability for your use of this information. Please provide this summary of care documentation to your next provider. Your primary care clinician is listed as Shekhar Mcmahon. If you have any questions after today's visit, please call [phone].

## 2018-03-21 ENCOUNTER — OFFICE VISIT (OUTPATIENT)
Dept: CARDIOLOGY CLINIC | Age: 63
End: 2018-03-21

## 2018-03-21 VITALS
HEART RATE: 67 BPM | SYSTOLIC BLOOD PRESSURE: 122 MMHG | BODY MASS INDEX: 34.82 KG/M2 | HEIGHT: 75 IN | WEIGHT: 280 LBS | DIASTOLIC BLOOD PRESSURE: 63 MMHG

## 2018-03-21 DIAGNOSIS — I27.20 PULMONARY HTN (HCC): ICD-10-CM

## 2018-03-21 DIAGNOSIS — I50.32 CHRONIC DIASTOLIC HEART FAILURE (HCC): ICD-10-CM

## 2018-03-21 DIAGNOSIS — Z79.01 ANTICOAGULANT LONG-TERM USE: ICD-10-CM

## 2018-03-21 DIAGNOSIS — I05.9 MITRAL VALVE DISORDER: ICD-10-CM

## 2018-03-21 DIAGNOSIS — R00.1 BRADYCARDIA: ICD-10-CM

## 2018-03-21 DIAGNOSIS — I48.20 CHRONIC ATRIAL FIBRILLATION (HCC): Primary | ICD-10-CM

## 2018-03-21 NOTE — LETTER
Carmelo Wahl Sr. 
[birth year] 
 
3/21/2018 Dear Shekhar Mcmahon MD 
 
I had the pleasure of evaluating  Mr. Adela Diallo in office today. Below are the relevant portions of my assessment and plan of care. ICD-10-CM ICD-9-CM 1. Chronic atrial fibrillation (HCC) I48.2 427.31   
 coreg and dig held for bradycardia 2. Mitral valve disorder I05.9 394.9   
 mild mr 
stable 3. Pulmonary HTN I27.20 416.8   
 moderate  
asymptomatic 4. Anticoagulant long-term use Z79.01 V58.61   
5. Bradycardia R00.1 427.89   
 resolved 6. Chronic diastolic heart failure (HCC) I50.32 428.32   
 edema much improved Current Outpatient Prescriptions Medication Sig Dispense Refill  metOLazone (ZAROXOLYN) 10 mg tablet Take 1 Tab by mouth daily. 30 Tab 1  
 warfarin (COUMADIN) 7.5 mg tablet Take 1 Tab by mouth daily. 30 Tab 6  
 spironolactone (ALDACTONE) 50 mg tablet Take 1 Tab by mouth daily. 30 Tab 1  
 furosemide (LASIX) 40 mg tablet Take 1 Tab by mouth two (2) times a day. 60 Tab 2  
 ramipril (ALTACE) 10 mg capsule Take 1 Cap by mouth two (2) times a day. 180 Cap 2  
 nisoldipine SR (SULAR) 17 mg Tb24 tablet Take 1 Tab by mouth daily. 30 Tab 6 No orders of the defined types were placed in this encounter. If you have questions, please do not hesitate to call me. I look forward to following Mr. Adela Diallo along with you. Sincerely, Dahiana Peña MD

## 2018-03-21 NOTE — PROGRESS NOTES
HISTORY OF PRESENT ILLNESS  Stephon Lee Sr. is a 57 y.o. male. HPI Comments: Patient with a fib,chf,pulmonary htn,mr. On follow up patient denies any chest pains,sob, palpitation or other significant symptoms. Valvular Heart Disease   The history is provided by the patient. This is a chronic problem. The problem occurs constantly. The problem has not changed since onset. Pertinent negatives include no chest pain, no abdominal pain, no headaches and no shortness of breath. CHF   The history is provided by the patient. This is a chronic problem. The problem occurs constantly. The problem has not changed since onset. Pertinent negatives include no chest pain, no abdominal pain, no headaches and no shortness of breath. Palpitations    The history is provided by the patient. This is a chronic problem. The problem has not changed since onset. Associated symptoms include lower extremity edema. Pertinent negatives include no fever, no chest pain, no claudication, no orthopnea, no PND, no abdominal pain, no nausea, no vomiting, no headaches, no dizziness, no weakness, no cough, no hemoptysis, no shortness of breath and no sputum production. His past medical history is significant for hypertension. Hypertension   The history is provided by the patient. This is a chronic problem. The problem occurs constantly. The problem has not changed since onset. Pertinent negatives include no chest pain, no abdominal pain, no headaches and no shortness of breath. Leg Swelling   The history is provided by the patient. This is a chronic problem. The problem occurs daily. The problem has not changed since onset. Pertinent negatives include no chest pain, no abdominal pain, no headaches and no shortness of breath. Nothing aggravates the symptoms. Review of Systems   Constitutional: Negative for chills and fever. HENT: Negative for nosebleeds. Eyes: Negative for blurred vision and double vision.    Respiratory: Negative for cough, hemoptysis, sputum production, shortness of breath and wheezing. Cardiovascular: Negative for chest pain, palpitations, orthopnea, claudication, leg swelling and PND. Gastrointestinal: Negative for abdominal pain, heartburn, nausea and vomiting. Musculoskeletal: Negative for myalgias. Skin: Negative for rash. Neurological: Negative for dizziness, weakness and headaches. Endo/Heme/Allergies: Does not bruise/bleed easily. Family History   Problem Relation Age of Onset    Kidney Disease Mother     No Known Problems Sister     No Known Problems Brother     Diabetes Neg Hx     Hypertension Neg Hx        Past Medical History:   Diagnosis Date    Atrial fibrillation (HCC)     Chronic diastolic heart failure (HCC)     Hypertension     Mitral valve disorders(424.0)        Past Surgical History:   Procedure Laterality Date    HX ADENOIDECTOMY      HX TONSILLECTOMY         No Known Allergies    Current Outpatient Prescriptions   Medication Sig    metOLazone (ZAROXOLYN) 10 mg tablet Take 1 Tab by mouth daily.  warfarin (COUMADIN) 7.5 mg tablet Take 1 Tab by mouth daily.  spironolactone (ALDACTONE) 50 mg tablet Take 1 Tab by mouth daily.  furosemide (LASIX) 40 mg tablet Take 1 Tab by mouth two (2) times a day.  ramipril (ALTACE) 10 mg capsule Take 1 Cap by mouth two (2) times a day.  nisoldipine SR (SULAR) 17 mg Tb24 tablet Take 1 Tab by mouth daily. No current facility-administered medications for this visit. Visit Vitals    /63    Pulse 67    Ht 6' 3\" (1.905 m)    Wt 127 kg (280 lb)    BMI 35 kg/m2         Physical Exam   Constitutional: He is oriented to person, place, and time. He appears well-developed and well-nourished. HENT:   Head: Normocephalic and atraumatic. Eyes: Conjunctivae are normal.   Neck: Neck supple. No JVD present. No tracheal deviation present. No thyromegaly present.    Cardiovascular: Normal rate and normal heart sounds. An irregularly irregular rhythm present. Exam reveals no gallop and no friction rub. No murmur heard. Pulmonary/Chest: Breath sounds normal. No respiratory distress. He has no wheezes. He has no rales. He exhibits no tenderness. Abdominal: Soft. There is no tenderness. Musculoskeletal: He exhibits no edema. Neurological: He is alert and oriented to person, place, and time. Skin: Skin is warm and dry. Psychiatric: He has a normal mood and affect. Mr. Israel Rodney has a reminder for a \"due or due soon\" health maintenance. I have asked that he contact his primary care provider for follow-up on this health maintenance. CARDIOLOGY STUDIES 7/24/2013 5/18/2011 12/19/2007 5/9/2006 4/16/2002 7/15/1996   EKG Result - - - - - 7-96   Myocardial Perfusion Scan Result - - - - nl scan -   Echocardiogram - Complete Result normal ef,enlarged la,mild mod mr,mild tr,pap 38 EF 55%,mild MR & TR,PAP 51 Mild to Mod. MR, Normal EF - - -   Doppler US Vascular Result - - - Vascular - -   Some recent data might be hidden     SUMMARY:echo:5/2015  Left ventricle: Systolic function was normal. Ejection fraction was  estimated in the range of 55 % to 60 %. There were no regional wall motion  abnormalities. Left atrium: The atrium was mildly dilated. Mitral valve: There was mild annular calcification. There was mild diffuse  thickening of the anterior and posterior leaflets. There was mild  regurgitation. Mean transmitral gradient was 1.6 mmHg. Tricuspid valve: Tricuspid regurgitation peak velocity: 3.1 m/sec. Pulmonary artery systolic pressure: 41 mmHg. I Have personally reviewed recent relevant labs available and discussed with patient  8/2017-dig,bmp    SUMMARY:3/2018-echo  Left ventricle: Patient was in an irregular rhythm throughout the entire  exam. Systolic function was normal. Ejection fraction was estimated in the  range of 55 % to 60 %.  No obvious wall motion abnormalities identified in  the views obtained. There was mild concentric hypertrophy. Right ventricle: The ventricle was dilated. Left atrium: The atrium was mildly dilated. Right atrium: The atrium was dilated. Mitral valve: There was mild annular calcification. There was mild  regurgitation. Tricuspid valve: There was mild regurgitation. Pulmonary artery systolic  pressure: 59 mmHg. There was moderate pulmonary hypertension. Assessment       ICD-10-CM ICD-9-CM    1. Chronic atrial fibrillation (HCC) I48.2 427.31     coreg and dig held for bradycardia   2. Mitral valve disorder I05.9 394.9     mild mr  stable   3. Pulmonary HTN I27.20 416.8     moderate   asymptomatic   4. Anticoagulant long-term use Z79.01 V58.61    5. Bradycardia R00.1 427.89     resolved   6. Chronic diastolic heart failure (HCC) I50.32 428.32     edema much improved     3/2018  Off dig and coreg due to bradycardia-dig level 1.5 after daily dose at that time  Edema better-use zaroxolyn as needed    No orders of the defined types were placed in this encounter. Follow-up Disposition:  Return in about 6 months (around 9/21/2018).

## 2018-03-21 NOTE — MR AVS SNAPSHOT
[address] [facility] [address] 
 
 
 [address] [facility], [address] [facility] [address] 
[phone] Patient: Qamar Arredondo Sr. MRN: [id number] HYZ:2/67/9459 Visit Information Date & Time Provider Department Dept. Phone Encounter #  
 3/21/2018  9:15 AM Syeda Dueñas MD [facility] [address] [place] [address] [id number] Follow-up Instructions Return in about 6 months (around 9/21/2018). Your Appointments 4/16/2018  9:00 AM  
FOLLOW UP EXAM with RENATA Hoover [employer] ([address]) Appt Note: 2 month f/u  
 [address] [facility] [address]  
[facility] [id number] 9/19/2018  8:45 AM  
Office Visit with Syeda Dueñas MD  
[facility]) Appt Note: 6 months [address] [facility] [address], [address] [facility] [address] [address] [facility] [place], [address] [place] [address] Upcoming Health Maintenance Date Due Hepatitis C Screening 1955 Pneumococcal 19-64 Medium Risk (1 of 1 - PPSV23) 5/18/1972 DTaP/Tdap/Td series (1 - Tdap) 5/18/1974 FOBT Q 1 YEAR AGE 50-75 5/18/2003 ZOSTER VACCINE AGE 60> 3/18/2013 Allergies as of 3/21/2018  Review Complete On: 3/21/2018 By: Syeda Dueñas MD  
 No Known Allergies Current Immunizations  Never Reviewed No immunizations on file. Not reviewed this visit You Were Diagnosed With   
  
 Codes Comments Chronic atrial fibrillation (HCC)    -  Primary ICD-10-CM: N61.2 ICD-9-CM: 427.31 coreg and dig held for bradycardia Mitral valve disorder     ICD-10-CM: I05.9 ICD-9-CM: 394.9 mild mr 
stable Pulmonary HTN     ICD-10-CM: I27.20 ICD-9-CM: 416.8 moderate  
asymptomatic Anticoagulant long-term use     ICD-10-CM: Z79.01 
ICD-9-CM: V58.61 Bradycardia     ICD-10-CM: R00.1 ICD-9-CM: 427.89 resolved Chronic diastolic heart failure (HCC)     ICD-10-CM: I50.32 
ICD-9-CM: 428.32 edema much improved Vitals BP Pulse Height(growth percentile) Weight(growth percentile) BMI Smoking Status 122/63 67 6' 3\" (1.905 m) 280 lb (127 kg) 35 kg/m2 Former Smoker Vitals History BMI and BSA Data Body Mass Index Body Surface Area 35 kg/m 2 2.59 m 2 Preferred Pharmacy Pharmacy Name [facility] [address] [place] [address], [address] [place] [address],[address] [place] [address] [phone] Your Updated Medication List  
  
   
This list is accurate as of 3/21/18  9:42 AM.  Always use your most recent med list.  
  
  
  
  
 furosemide 40 mg tablet Commonly known as:  LASIX Take 1 Tab by mouth two (2) times a day. metOLazone 10 mg tablet Commonly known as:  Faylene Pranay Take 1 Tab by mouth daily. nisoldipine SR 17 mg Tb24 tablet Commonly known as:  Sung Antu Take 1 Tab by mouth daily. ramipril 10 mg capsule Commonly known as:  ALTACE Take 1 Cap by mouth two (2) times a day. spironolactone 50 mg tablet Commonly known as:  ALDACTONE Take 1 Tab by mouth daily. warfarin 7.5 mg tablet Commonly known as:  COUMADIN Take 1 Tab by mouth daily. Follow-up Instructions Return in about 6 months (around 9/21/2018). Please provide this summary of care documentation to your next provider. Your primary care clinician is listed as Dread Agarwal. If you have any questions after today's visit, please call [phone].

## 2018-03-21 NOTE — PROGRESS NOTES
Patient didn't bring medications, verbally reviewed    1. Have you been to the ER, urgent care clinic since your last visit? Hospitalized since your last visit? No    2. Have you seen or consulted any other health care providers outside of the [address] [place] [address] since your last visit? Include any pap smears or colon screening.  No

## 2018-03-23 ENCOUNTER — DOCUMENTATION ONLY (OUTPATIENT)
Dept: VASCULAR SURGERY | Age: 63
End: 2018-03-23

## 2018-03-23 NOTE — PROGRESS NOTES
I have attempted to call patient 2 times a left voicemail both times. I had a noted to call him with results of his reflux study. I did leave a brief message that the study was normal.  Though I did not elaborate as on the message, normal study means no DVT and no evidence of reflux to explain his lower extremity edema. In light of him calling back and not available, or following up with other providers, this is noted for review, that goals of therapy would be compression, elevation, and medical management.   If symptoms persist, we could follow-up with him for evaluation of lymphedema therapy or lymphedema pump if his swelling is persistent without resolution from medical therapy and with otherwise no venous origin

## 2018-04-09 DIAGNOSIS — I50.32 CHRONIC DIASTOLIC HEART FAILURE (HCC): ICD-10-CM

## 2018-04-09 DIAGNOSIS — M79.89 LEG SWELLING: ICD-10-CM

## 2018-04-09 RX ORDER — FUROSEMIDE 40 MG/1
40 TABLET ORAL 2 TIMES DAILY
Qty: 60 TAB | Refills: 5 | Status: SHIPPED | OUTPATIENT
Start: 2018-04-09 | End: 2019-02-28 | Stop reason: SDUPTHER

## 2018-04-16 ENCOUNTER — OFFICE VISIT (OUTPATIENT)
Dept: FAMILY MEDICINE CLINIC | Age: 63
End: 2018-04-16

## 2018-04-16 VITALS
WEIGHT: 288 LBS | RESPIRATION RATE: 18 BRPM | DIASTOLIC BLOOD PRESSURE: 76 MMHG | BODY MASS INDEX: 35.81 KG/M2 | OXYGEN SATURATION: 99 % | TEMPERATURE: 98 F | HEART RATE: 102 BPM | HEIGHT: 75 IN | SYSTOLIC BLOOD PRESSURE: 141 MMHG

## 2018-04-16 DIAGNOSIS — I10 ESSENTIAL HYPERTENSION, BENIGN: Primary | ICD-10-CM

## 2018-04-16 DIAGNOSIS — R60.0 LEG EDEMA: ICD-10-CM

## 2018-04-16 DIAGNOSIS — E66.01 SEVERE OBESITY (BMI 35.0-39.9) WITH COMORBIDITY (HCC): ICD-10-CM

## 2018-04-16 NOTE — PROGRESS NOTES
Today's Date:  2018   Patient:  Ksenia Hernandes Sr.  Patient :  1955    Subjective:     Chief Complaint   Patient presents with    Follow-up     pt presents for follow up for swelling in foot pt states \" that foot swelling is better \"       Felicitas Angelo. is a 57 y.o. male who presents for follow up Chronic Diseases. BP today is not at goal today >130/80. Patient report compliance with medication and denies side effects. Daily exercise and diet are as follows: patient reports walking   States that edema has subsided. Denies SOB, chest pain. Current Outpatient Meds and Allergies     Current Outpatient Prescriptions on File Prior to Visit   Medication Sig Dispense Refill    furosemide (LASIX) 40 mg tablet Take 1 Tab by mouth two (2) times a day. 60 Tab 05    warfarin (COUMADIN) 7.5 mg tablet Take 1 Tab by mouth daily. 30 Tab 6    ramipril (ALTACE) 10 mg capsule Take 1 Cap by mouth two (2) times a day. 180 Cap 2    nisoldipine SR (SULAR) 17 mg Tb24 tablet Take 1 Tab by mouth daily. 30 Tab 6    metOLazone (ZAROXOLYN) 10 mg tablet Take 1 Tab by mouth daily. 30 Tab 1    spironolactone (ALDACTONE) 50 mg tablet Take 1 Tab by mouth daily. 30 Tab 1     No current facility-administered medications on file prior to visit. These medications have been reviewed and reconciled with the patient during today's visit.       No Known Allergies      ROS:     CONST:   Denies fatigue, weight change, appetite change   NEURO:   Denies headaches, vision changes, dizziness, loss of consciousness  CV:      Denies chest pain, palpitations, orthopnea, PND  PULM:  Denies SOB, wheezing, cough, hemoptysis  GI:             Denies nausea, vomiting, abdominal pain, greasy stools, blood in stool, diarrhea, constipation  :       Denies dysuria, hematuria, change in urine  MS:      Denies muscle/joint pain, joint swelling  SKIN:        Denies rashes, skin changes  PSYCH: No agitation, confusion/disorientation, suicidal or homicidal ideation. Objective:     VS:    Visit Vitals    /76    Pulse (!) 102    Temp 98 °F (36.7 °C) (Oral)    Resp 18    Ht 6' 3\" (1.905 m)    Wt 288 lb (130.6 kg)    SpO2 99%    BMI 36 kg/m2       General:   Well-nourished, well-groomed, pleasant, alert, in no acute distress. Cardiovasc:   Regular rate and rhythm, no murmurs, no rubs, no gallops,   Pulmonary:   Clear breath sounds bilaterally, good air movement, no wheezing, no rales, no rhonchi, normal respiratory effort  Abdomen:   Abdomen soft, nontender, nondistended, NABS,  Extremities:   No edema, no tenderness with palpation of calves, warm and well-perfused  Neuro:   Alert, conversant, appropriate, following commands, no focal deficits. Psychiatric: Oriented to time, place and person. Normal mood, no agitation or anxiety. Normal affect. Pleasant and cooperative. Pertinent diagnostic procedures include:  Telephone Anticoag on 03/02/2018   Component Date Value Ref Range Status    INR, External 03/02/2018 2.4   Final   Telephone Anticoag on 02/15/2018   Component Date Value Ref Range Status    INR, External 02/14/2018 3.87   Final   Telephone Anticoag on 01/17/2018   Component Date Value Ref Range Status    INR, External 01/16/2018 3.0   Corrected       Assessment/Plan:       1. Essential hypertension, benign    2. Leg edema    3. Severe obesity (BMI 35.0-39. 9) with comorbidity ([address]  - I have reviewed/discussed the above normal BMI with the patient. I have recommended the following interventions: dietary management education, guidance, and counseling, encourage exercise, monitor weight and prescribed dietary intake . .      I have discussed the diagnosis with the patient and the intended plan as seen in the above orders. The patient has received an after-visit summary along with patient information handout. I have discussed medication side effects and warnings with the patient as well.  Pt verbalized understanding. Follow-up Disposition:  Return in about 3 months (around 2018), or if symptoms worsen or fail to improve.   RENATA Gates  2018, 9:10 AM

## 2018-04-16 NOTE — MR AVS SNAPSHOT
[address] [facility] [id number] [phone] Patient: Denis Zuñiga Sr. MRN: [id number] MDE:0/82/0205 Visit Information Date & Time Provider Department Dept. Phone Encounter #  
 4/16/2018  9:00 AM [address] [phone] [id number] Follow-up Instructions Return in about 3 months (around 7/16/2018), or if symptoms worsen or fail to improve. Your Appointments 9/19/2018  8:45 AM  
Office Visit with Coleman White MD  
[facility]) Appt Note: 6 months [address] [facility], [address] [facility] [address]  
[address] [place] [address] Upcoming Health Maintenance Date Due Hepatitis C Screening 1955 Pneumococcal 19-64 Medium Risk (1 of 1 - PPSV23) 5/18/1972 DTaP/Tdap/Td series (1 - Tdap) 5/18/1974 FOBT Q 1 YEAR AGE 50-75 5/18/2003 ZOSTER VACCINE AGE 60> 3/18/2013 Allergies as of 4/16/2018  Review Complete On: 4/16/2018 By: Doris Judd No Known Allergies Current Immunizations  Never Reviewed No immunizations on file. Not reviewed this visit You Were Diagnosed With   
  
 Codes Comments Chronic diastolic heart failure (HCC)    -  Primary ICD-10-CM: I50.32 
ICD-9-CM: 428.32 Paroxysmal atrial fibrillation (HCC)     ICD-10-CM: I48.0 ICD-9-CM: 427.31 Severe obesity (BMI 35.0-39. 9) with comorbidity ([facility] [address]     ICD-10-CM: E66.01 
ICD-9-CM: 278.01 Essential hypertension, benign     ICD-10-CM: I10 
ICD-9-CM: 401.1 Leg edema     ICD-10-CM: R60.0 ICD-9-CM: 811. 3 Vitals BP Pulse Temp Resp Height(growth percentile) Weight(growth percentile) 141/76 (!) 102 98 °F (36.7 °C) (Oral) 18 6' 3\" (1.905 m) 288 lb (130.6 kg) SpO2 BMI Smoking Status 99% 36 kg/m2 Former Smoker Vitals History BMI and BSA Data Body Mass Index Body Surface Area  
 36 kg/m 2 2.63 m 2 Preferred Pharmacy Pharmacy Name [facility]  Estefania Velasquez, [facility] [address] [place] [address] [phone] Your Updated Medication List  
  
   
This list is accurate as of 4/16/18  9:15 AM.  Always use your most recent med list.  
  
  
  
  
 furosemide 40 mg tablet Commonly known as:  LASIX Take 1 Tab by mouth two (2) times a day. metOLazone 10 mg tablet Commonly known as:  Shay Baller Take 1 Tab by mouth daily. nisoldipine SR 17 mg Tb24 tablet Commonly known as:  Harriet Churchton Take 1 Tab by mouth daily. ramipril 10 mg capsule Commonly known as:  ALTACE Take 1 Cap by mouth two (2) times a day. spironolactone 50 mg tablet Commonly known as:  ALDACTONE Take 1 Tab by mouth daily. warfarin 7.5 mg tablet Commonly known as:  COUMADIN Take 1 Tab by mouth daily. Follow-up Instructions Return in about 3 months (around 7/16/2018), or if symptoms worsen or fail to improve. Patient Instructions Low Sodium Diet (2,000 Milligram): Care Instructions Your Care Instructions Too much sodium causes your body to hold on to extra water. This can raise your blood pressure and force your heart and kidneys to work harder. In very serious cases, this could cause you to be put in the hospital. It might even be life-threatening. By limiting sodium, you will feel better and lower your risk of serious problems. The most common source of sodium is salt. People get most of the salt in their diet from canned, prepared, and packaged foods. Fast food and restaurant meals also are very high in sodium. Your doctor will probably limit your sodium to less than 2,000 milligrams (mg) a day. This limit counts all the sodium in prepared and packaged foods and any salt you add to your food. Follow-up care is a key part of your treatment and safety.  Be sure to make and go to all appointments, and call your doctor if you are having problems. It's also a good idea to know your test results and keep a list of the medicines you take. How can you care for yourself at home? Read food labels · Read labels on cans and food packages. The labels tell you how much sodium is in each serving. Make sure that you look at the serving size. If you eat more than the serving size, you have eaten more sodium. · Food labels also tell you the Percent Daily Value for sodium. Choose products with low Percent Daily Values for sodium. · Be aware that sodium can come in forms other than salt, including monosodium glutamate (MSG), sodium citrate, and sodium bicarbonate (baking soda). MSG is often added to Asian food. When you eat out, you can sometimes ask for food without MSG or added salt. Buy low-sodium foods · Buy foods that are labeled \"unsalted\" (no salt added), \"sodium-free\" (less than 5 mg of sodium per serving), or \"low-sodium\" (less than 140 mg of sodium per serving). Foods labeled \"reduced-sodium\" and \"light sodium\" may still have too much sodium. Be sure to read the label to see how much sodium you are getting. · Buy fresh vegetables, or frozen vegetables without added sauces. Buy low-sodium versions of canned vegetables, soups, and other canned goods. Prepare low-sodium meals · Cut back on the amount of salt you use in cooking. This will help you adjust to the taste. Do not add salt after cooking. One teaspoon of salt has about 2,300 mg of sodium. · Take the salt shaker off the table. · Flavor your food with garlic, lemon juice, onion, vinegar, herbs, and spices. Do not use soy sauce, lite soy sauce, steak sauce, onion salt, garlic salt, celery salt, mustard, or ketchup on your food. · Use low-sodium salad dressings, sauces, and ketchup. Or make your own salad dressings and sauces without adding salt. · Use less salt (or none) when recipes call for it.  You can often use half the salt a recipe calls for without losing flavor. Other foods such as rice, pasta, and grains do not need added salt. · Rinse canned vegetables, and cook them in fresh water. This removes some-but not all-of the salt. · Avoid water that is naturally high in sodium or that has been treated with water softeners, which add sodium. Call your local water company to find out the sodium content of your water supply. If you buy bottled water, read the label and choose a sodium-free brand. Avoid high-sodium foods · Avoid eating: ¨ Smoked, cured, salted, and canned meat, fish, and poultry. ¨ Ham, newman, hot dogs, and luncheon meats. ¨ Regular, hard, and processed cheese and regular peanut butter. ¨ Crackers with salted tops, and other salted snack foods such as pretzels, chips, and salted popcorn. ¨ Frozen prepared meals, unless labeled low-sodium. ¨ Canned and dried soups, broths, and bouillon, unless labeled sodium-free or low-sodium. ¨ Canned vegetables, unless labeled sodium-free or low-sodium. ¨ Lázaro Antonio fries, pizza, tacos, and other fast foods. ¨ Pickles, olives, ketchup, and other condiments, especially soy sauce, unless labeled sodium-free or low-sodium. Where can you learn more? Go to http://olivier-cliff.info/. Enter L775 in the search box to learn more about \"Low Sodium Diet (2,000 Milligram): Care Instructions. \" Current as of: May 12, 2017 Content Version: 11.4 © 9250-0613 [employer]. Care instructions adapted under license by [employer] (which disclaims liability or warranty for this information). If you have questions about a medical condition or this instruction, always ask your healthcare professional. [address] any warranty or liability for your use of this information. Please provide this summary of care documentation to your next provider. Your primary care clinician is listed as Karina Wasserman. If you have any questions after today's visit, please call [phone].

## 2018-04-16 NOTE — PATIENT INSTRUCTIONS

## 2018-04-19 ENCOUNTER — TELEPHONE ANTICOAG (OUTPATIENT)
Dept: CARDIOLOGY CLINIC | Age: 63
End: 2018-04-19

## 2018-04-19 DIAGNOSIS — I48.0 PAROXYSMAL ATRIAL FIBRILLATION (HCC): ICD-10-CM

## 2018-04-19 LAB — INR, EXTERNAL: 1.5

## 2018-04-26 ENCOUNTER — TELEPHONE ANTICOAG (OUTPATIENT)
Dept: CARDIOLOGY CLINIC | Age: 63
End: 2018-04-26

## 2018-04-26 DIAGNOSIS — I48.0 PAROXYSMAL ATRIAL FIBRILLATION (HCC): ICD-10-CM

## 2018-04-26 LAB — INR, EXTERNAL: 2.6

## 2018-05-11 ENCOUNTER — TELEPHONE ANTICOAG (OUTPATIENT)
Dept: CARDIOLOGY CLINIC | Age: 63
End: 2018-05-11

## 2018-05-11 DIAGNOSIS — I48.0 PAROXYSMAL ATRIAL FIBRILLATION (HCC): ICD-10-CM

## 2018-05-11 LAB — INR, EXTERNAL: 2.4

## 2018-06-06 ENCOUNTER — TELEPHONE ANTICOAG (OUTPATIENT)
Dept: CARDIOLOGY CLINIC | Age: 63
End: 2018-06-06

## 2018-06-06 DIAGNOSIS — I48.0 PAROXYSMAL ATRIAL FIBRILLATION (HCC): ICD-10-CM

## 2018-06-06 LAB — INR, EXTERNAL: 2.7

## 2018-07-09 ENCOUNTER — TELEPHONE ANTICOAG (OUTPATIENT)
Dept: CARDIOLOGY CLINIC | Age: 63
End: 2018-07-09

## 2018-07-09 DIAGNOSIS — I48.0 PAROXYSMAL ATRIAL FIBRILLATION (HCC): ICD-10-CM

## 2018-07-09 LAB — INR, EXTERNAL: 2.6

## 2018-07-20 DIAGNOSIS — I48.91 ATRIAL FIBRILLATION, UNSPECIFIED TYPE (HCC): ICD-10-CM

## 2018-07-20 DIAGNOSIS — I50.32 CHRONIC DIASTOLIC HEART FAILURE (HCC): ICD-10-CM

## 2018-07-20 DIAGNOSIS — I50.32 DIASTOLIC CHF, CHRONIC (HCC): ICD-10-CM

## 2018-07-20 RX ORDER — NISOLDIPINE 17 MG/1
17 TABLET, FILM COATED, EXTENDED RELEASE ORAL DAILY
Qty: 90 TAB | Refills: 2 | Status: SHIPPED | OUTPATIENT
Start: 2018-07-20 | End: 2019-02-28 | Stop reason: SDUPTHER

## 2018-07-20 RX ORDER — RAMIPRIL 10 MG/1
10 CAPSULE ORAL 2 TIMES DAILY
Qty: 180 CAP | Refills: 2 | Status: SHIPPED | OUTPATIENT
Start: 2018-07-20 | End: 2019-02-28 | Stop reason: SDUPTHER

## 2018-08-07 ENCOUNTER — TELEPHONE ANTICOAG (OUTPATIENT)
Dept: CARDIOLOGY CLINIC | Age: 63
End: 2018-08-07

## 2018-08-07 DIAGNOSIS — I48.0 PAROXYSMAL ATRIAL FIBRILLATION (HCC): ICD-10-CM

## 2018-08-07 LAB — INR, EXTERNAL: 2.76

## 2018-08-07 NOTE — PATIENT INSTRUCTIONS
Called and given instructions to patient and He voices understanding and acceptance of this advice and will call back if any further questions or concerns.

## 2018-09-04 LAB
INR PPP: 3.38 (ref 0.89–1.29)
PROTHROMBIN TIME: 32.9 SEC (ref 9–13)

## 2018-09-05 ENCOUNTER — TELEPHONE ANTICOAG (OUTPATIENT)
Dept: CARDIOLOGY CLINIC | Age: 63
End: 2018-09-05

## 2018-09-05 DIAGNOSIS — I48.20 CHRONIC ATRIAL FIBRILLATION (HCC): ICD-10-CM

## 2018-09-05 LAB — INR, EXTERNAL: 3.38

## 2018-09-19 ENCOUNTER — OFFICE VISIT (OUTPATIENT)
Dept: CARDIOLOGY CLINIC | Age: 63
End: 2018-09-19

## 2018-09-19 VITALS
SYSTOLIC BLOOD PRESSURE: 134 MMHG | HEIGHT: 75 IN | HEART RATE: 75 BPM | WEIGHT: 296 LBS | BODY MASS INDEX: 36.8 KG/M2 | DIASTOLIC BLOOD PRESSURE: 76 MMHG

## 2018-09-19 DIAGNOSIS — I50.32 CHRONIC DIASTOLIC HEART FAILURE (HCC): ICD-10-CM

## 2018-09-19 DIAGNOSIS — I05.9 MITRAL VALVE DISORDER: ICD-10-CM

## 2018-09-19 DIAGNOSIS — Z79.01 ANTICOAGULANT LONG-TERM USE: ICD-10-CM

## 2018-09-19 DIAGNOSIS — I48.20 CHRONIC ATRIAL FIBRILLATION (HCC): Primary | ICD-10-CM

## 2018-09-19 DIAGNOSIS — I11.9 BENIGN HYPERTENSIVE HEART DISEASE WITHOUT HEART FAILURE: ICD-10-CM

## 2018-09-19 DIAGNOSIS — I27.20 PULMONARY HTN (HCC): ICD-10-CM

## 2018-09-19 NOTE — PROGRESS NOTES
1. Have you been to the ER, urgent care clinic since your last visit? Hospitalized since your last visit? No 
 
2. Have you seen or consulted any other health care providers outside of the [address] [place] [address] since your last visit? Include any pap smears or colon screening.  No

## 2018-09-19 NOTE — PROGRESS NOTES
HISTORY OF PRESENT ILLNESS Shay Ho Sr. is a 70 y.o. male. HPI Comments: Patient with a fib,chf,pulmonary htn,mr. On follow up patient denies any chest pains,sob, palpitation or other significant symptoms. Valvular Heart Disease The history is provided by the patient. This is a chronic problem. The problem occurs constantly. The problem has not changed since onset. Pertinent negatives include no chest pain, no abdominal pain, no headaches and no shortness of breath. CHF The history is provided by the patient. This is a chronic problem. The problem occurs constantly. The problem has not changed since onset. Pertinent negatives include no chest pain, no abdominal pain, no headaches and no shortness of breath. Palpitations The history is provided by the patient. This is a chronic problem. The problem has not changed since onset. Associated symptoms include lower extremity edema. Pertinent negatives include no fever, no chest pain, no claudication, no orthopnea, no PND, no abdominal pain, no nausea, no vomiting, no headaches, no dizziness, no weakness, no cough, no hemoptysis, no shortness of breath and no sputum production. His past medical history is significant for hypertension. Hypertension The history is provided by the patient. This is a chronic problem. The problem occurs constantly. The problem has not changed since onset. Pertinent negatives include no chest pain, no abdominal pain, no headaches and no shortness of breath. Leg Swelling The history is provided by the patient. This is a chronic problem. The problem occurs daily. The problem has not changed since onset. Pertinent negatives include no chest pain, no abdominal pain, no headaches and no shortness of breath. Nothing aggravates the symptoms. Review of Systems Constitutional: Negative for chills and fever. HENT: Negative for nosebleeds. Eyes: Negative for blurred vision and double vision. Respiratory: Negative for cough, hemoptysis, sputum production, shortness of breath and wheezing. Cardiovascular: Negative for chest pain, palpitations, orthopnea, claudication, leg swelling and PND. Gastrointestinal: Negative for abdominal pain, heartburn, nausea and vomiting. Musculoskeletal: Negative for myalgias. Skin: Negative for rash. Neurological: Negative for dizziness, weakness and headaches. Endo/Heme/Allergies: Does not bruise/bleed easily. Family History Problem Relation Age of Onset  Kidney Disease Mother  No Known Problems Sister  No Known Problems Brother  Diabetes Neg Hx  Hypertension Neg Hx Past Medical History:  
Diagnosis Date  Atrial fibrillation ([address]  Chronic diastolic heart failure ([address]  Hypertension  Mitral valve disorders(424.0) Past Surgical History:  
Procedure Laterality Date  HX ADENOIDECTOMY  HX TONSILLECTOMY No Known Allergies Current Outpatient Prescriptions Medication Sig  
 ramipril (ALTACE) 10 mg capsule Take 1 Cap by mouth two (2) times a day.  nisoldipine SR (SULAR) 17 mg Tb24 tablet Take 1 Tab by mouth daily.  furosemide (LASIX) 40 mg tablet Take 1 Tab by mouth two (2) times a day.  warfarin (COUMADIN) 7.5 mg tablet Take 1 Tab by mouth daily. No current facility-administered medications for this visit. Visit Vitals  /76  Pulse 75  Ht 6' 3\" (1.905 m)  Wt 134.3 kg (296 lb)  BMI 37 kg/m2 Physical Exam  
Constitutional: He is oriented to person, place, and time. He appears well-developed and well-nourished. HENT:  
Head: Normocephalic and atraumatic. Eyes: Conjunctivae are normal.  
Neck: Neck supple. No JVD present. No tracheal deviation present. No thyromegaly present. Cardiovascular: Normal rate and normal heart sounds. An irregularly irregular rhythm present. Exam reveals no gallop and no friction rub. No murmur heard. Pulmonary/Chest: Breath sounds normal. No respiratory distress. He has no wheezes. He has no rales. He exhibits no tenderness. Abdominal: Soft. There is no tenderness. Musculoskeletal: He exhibits no edema. Neurological: He is alert and oriented to person, place, and time. Skin: Skin is warm and dry. Psychiatric: He has a normal mood and affect. Mr. Golden Barker has a reminder for a \"due or due soon\" health maintenance. I have asked that he contact his primary care provider for follow-up on this health maintenance. CARDIOLOGY STUDIES 7/24/2013 Echocardiogram - Complete Result normal ef,enlarged la,mild mod mr,mild tr,pap 38 Some recent data might be hidden SUMMARY:echo:5/2015 Left ventricle: Systolic function was normal. Ejection fraction was 
estimated in the range of 55 % to 60 %. There were no regional wall motion 
abnormalities. Left atrium: The atrium was mildly dilated. Mitral valve: There was mild annular calcification. There was mild diffuse 
thickening of the anterior and posterior leaflets. There was mild 
regurgitation. Mean transmitral gradient was 1.6 mmHg. Tricuspid valve: Tricuspid regurgitation peak velocity: 3.1 m/sec. Pulmonary artery systolic pressure: 41 mmHg. I Have personally reviewed recent relevant labs available and discussed with patient 8/2017-dig,bmp SUMMARY:3/2018-echo Left ventricle: Patient was in an irregular rhythm throughout the entire 
exam. Systolic function was normal. Ejection fraction was estimated in the 
range of 55 % to 60 %. No obvious wall motion abnormalities identified in 
the views obtained. There was mild concentric hypertrophy. Right ventricle: The ventricle was dilated. Left atrium: The atrium was mildly dilated. Right atrium: The atrium was dilated. Mitral valve: There was mild annular calcification. There was mild 
regurgitation. Tricuspid valve: There was mild regurgitation. Pulmonary artery systolic pressure: 59 mmHg. There was moderate pulmonary hypertension. Assessment ICD-10-CM ICD-9-CM 1. Chronic atrial fibrillation (HCC) I48.2 427.31 Stable. Rate controlled. Continue anticoagulation 2. Mitral valve disorder I05.9 394.9 Mild mitral regurgitation. Clinically stable. Monitor 3. Benign hypertensive heart disease without heart failure I11.9 402.10 Stable 4. Chronic diastolic heart failure (HCC) I50.32 428.32 Stable. Class II  
5. Anticoagulant long-term use Z79.01 V58.61 On Coumadin for A. fib 6. Pulmonary HTN ([facility]) I27.20 416.8 Moderate pulmonary hypertension. Multifactorial  
 
3/2018 Off dig and coreg due to bradycardia-dig level 1.5 after daily dose at that time Edema better-use zaroxolyn as needed No orders of the defined types were placed in this encounter. Follow-up Disposition: 
Return in about 6 months (around 3/19/2019).

## 2018-09-19 NOTE — MR AVS SNAPSHOT
[address] [facility] [address] 
 
 
 [address] [facility], [address] [facility] [address] 
[phone] Patient: Ayala Strong Sr. MRN: [id number] RLN:2/74/2004 Visit Information Date & Time Provider Department Dept. Phone Encounter #  
 9/19/2018  8:45 AM Luis Corral MD [facility] [address] [place] [address] [id number] Follow-up Instructions Return in about 6 months (around 3/19/2019). Your Appointments 3/20/2019  8:45 AM  
Office Visit with Luis Corral MD  
[facility]) Appt Note: 6 months [address] [facility], [address] [facility] [address]84 [facility], [address] [place] [address] Upcoming Health Maintenance Date Due Hepatitis C Screening 1955 DTaP/Tdap/Td series (1 - Tdap) 5/18/1974 FOBT Q 1 YEAR AGE 50-75 5/18/2003 ZOSTER VACCINE AGE 60> 3/18/2013 GLAUCOMA SCREENING Q2Y 5/18/2018 Pneumococcal 65+ Low/Medium Risk (1 of 2 - PCV13) 5/18/2018 Influenza Age 5 to Adult 8/1/2018 Allergies as of 9/19/2018  Review Complete On: 9/19/2018 By: Silva Hatfield' No Known Allergies Current Immunizations  Never Reviewed No immunizations on file. Not reviewed this visit You Were Diagnosed With   
  
 Codes Comments Chronic atrial fibrillation (HCC)    -  Primary ICD-10-CM: X67.7 ICD-9-CM: 427.31 Stable. Rate controlled. Continue anticoagulation Mitral valve disorder     ICD-10-CM: I05.9 ICD-9-CM: 394.9 Mild mitral regurgitation. Clinically stable. Monitor Benign hypertensive heart disease without heart failure     ICD-10-CM: I11.9 ICD-9-CM: 402.10 Stable Chronic diastolic heart failure (HCC)     ICD-10-CM: I50.32 
ICD-9-CM: 428.32 Stable. Class II Anticoagulant long-term use     ICD-10-CM: Z79.01 
ICD-9-CM: V58.61 On Coumadin for A. fib Pulmonary HTN ([address]     ICD-10-CM: I27.20 ICD-9-CM: 416.8 Moderate pulmonary hypertension. Multifactorial  
  
Vitals BP Pulse Height(growth percentile) Weight(growth percentile) BMI Smoking Status 134/76 75 6' 3\" (1.905 m) 296 lb (134.3 kg) 37 kg/m2 Former Smoker Vitals History BMI and BSA Data Body Mass Index Body Surface Area  
 37 kg/m 2 2.67 m 2 Preferred Pharmacy Pharmacy Name [facility] [place] [address],[address] [place] [address] [phone] Your Updated Medication List  
  
   
This list is accurate as of 9/19/18  9:01 AM.  Always use your most recent med list.  
  
  
  
  
 furosemide 40 mg tablet Commonly known as:  LASIX Take 1 Tab by mouth two (2) times a day. nisoldipine SR 17 mg Tb24 tablet Commonly known as:  Loann Genera Take 1 Tab by mouth daily. ramipril 10 mg capsule Commonly known as:  ALTACE Take 1 Cap by mouth two (2) times a day. warfarin 7.5 mg tablet Commonly known as:  COUMADIN Take 1 Tab by mouth daily. Follow-up Instructions Return in about 6 months (around 3/19/2019). Introducing [facility]! Aurelia Orozco introduces AskU patient portal. Now you can access parts of your medical record, email your doctor's office, and request medication refills online. 1. In your internet browser, go to [email or web address]. [email or web address] 2. Click on the First Time User? Click Here link in the Sign In box. You will see the New Member Sign Up page. 3. Enter your AskU Access Code exactly as it appears below. You will not need to use this code after youve completed the sign-up process. If you do not sign up before the expiration date, you must request a new code. · AskU Access Code: [email or web address] Expires: 12/18/2018  8:39 AM 
 
4. Enter the last four digits of your Social Security Number (xxxx) and Date of Birth (mm/dd/yyyy) as indicated and click Submit. You will be taken to the next sign-up page. 5. Create a GIVINGtrax ID. This will be your GIVINGtrax login ID and cannot be changed, so think of one that is secure and easy to remember. 6. Create a GIVINGtrax password. You can change your password at any time. 7. Enter your Password Reset Question and Answer. This can be used at a later time if you forget your password. 8. Enter your e-mail address. You will receive e-mail notification when new information is available in [address] 9. Click Sign Up. You can now view and download portions of your medical record. 10. Click the Download Summary menu link to download a portable copy of your medical information. If you have questions, please visit the Frequently Asked Questions section of the GIVINGtrax website. Remember, GIVINGtrax is NOT to be used for urgent needs. For medical emergencies, dial 911. Now available from your iPhone and Android! Please provide this summary of care documentation to your next provider. Your primary care clinician is listed as Jose Roberto Briggs. If you have any questions after today's visit, please call [phone].

## 2018-09-19 NOTE — LETTER
Charity Casianola Sr. 
1955 9/19/2018 Dear Hannah Anthony MD 
 
I had the pleasure of evaluating  Mr. Tenzin Lozano in office today. Below are the relevant portions of my assessment and plan of care. ICD-10-CM ICD-9-CM 1. Chronic atrial fibrillation (HCC) I48.2 427.31 Stable. Rate controlled. Continue anticoagulation 2. Mitral valve disorder I05.9 394.9 Mild mitral regurgitation. Clinically stable. Monitor 3. Benign hypertensive heart disease without heart failure I11.9 402.10 Stable 4. Chronic diastolic heart failure (HCC) I50.32 428.32 Stable. Class II  
5. Anticoagulant long-term use Z79.01 V58.61 On Coumadin for A. fib 6. Pulmonary HTN (HCC) I27.20 416.8 Moderate pulmonary hypertension. Multifactorial  
 
 
Current Outpatient Prescriptions Medication Sig Dispense Refill  ramipril (ALTACE) 10 mg capsule Take 1 Cap by mouth two (2) times a day. 180 Cap 2  
 nisoldipine SR (SULAR) 17 mg Tb24 tablet Take 1 Tab by mouth daily. 90 Tab 2  
 furosemide (LASIX) 40 mg tablet Take 1 Tab by mouth two (2) times a day. 60 Tab 05  warfarin (COUMADIN) 7.5 mg tablet Take 1 Tab by mouth daily. 30 Tab 6 No orders of the defined types were placed in this encounter. If you have questions, please do not hesitate to call me. I look forward to following Mr. Tenzin Lozano along with you. Sincerely, Saige Lai MD

## 2018-10-04 RX ORDER — WARFARIN SODIUM 7.5 MG/1
7.5 TABLET ORAL DAILY
Qty: 30 TAB | Refills: 5 | Status: SHIPPED | OUTPATIENT
Start: 2018-10-04 | End: 2019-02-28 | Stop reason: SDUPTHER

## 2018-10-04 RX ORDER — WARFARIN SODIUM 7.5 MG/1
TABLET ORAL
Qty: 30 TAB | Refills: 5 | Status: SHIPPED | OUTPATIENT
Start: 2018-10-04 | End: 2018-10-04 | Stop reason: SDUPTHER

## 2018-10-08 ENCOUNTER — OFFICE VISIT (OUTPATIENT)
Dept: INTERNAL MEDICINE CLINIC | Age: 63
End: 2018-10-08

## 2018-10-08 ENCOUNTER — HOSPITAL ENCOUNTER (OUTPATIENT)
Dept: LAB | Age: 63
Discharge: HOME | End: 2018-10-08
Payer: MEDICARE

## 2018-10-08 VITALS
TEMPERATURE: 97.9 F | RESPIRATION RATE: 16 BRPM | HEART RATE: 109 BPM | DIASTOLIC BLOOD PRESSURE: 72 MMHG | WEIGHT: 301 LBS | OXYGEN SATURATION: 96 % | SYSTOLIC BLOOD PRESSURE: 142 MMHG | BODY MASS INDEX: 37.42 KG/M2 | HEIGHT: 75 IN

## 2018-10-08 DIAGNOSIS — Z11.59 ENCOUNTER FOR HEPATITIS C SCREENING TEST FOR LOW RISK PATIENT: ICD-10-CM

## 2018-10-08 DIAGNOSIS — I10 ESSENTIAL HYPERTENSION, BENIGN: ICD-10-CM

## 2018-10-08 DIAGNOSIS — I48.20 CHRONIC ATRIAL FIBRILLATION (HCC): ICD-10-CM

## 2018-10-08 DIAGNOSIS — I10 ESSENTIAL HYPERTENSION, BENIGN: Primary | ICD-10-CM

## 2018-10-08 DIAGNOSIS — G89.29 CHRONIC MIDLINE LOW BACK PAIN, WITH SCIATICA PRESENCE UNSPECIFIED: ICD-10-CM

## 2018-10-08 DIAGNOSIS — Z13.5 GLAUCOMA SCREENING: ICD-10-CM

## 2018-10-08 DIAGNOSIS — Z12.5 PROSTATE CANCER SCREENING: ICD-10-CM

## 2018-10-08 DIAGNOSIS — E66.9 CLASS 2 OBESITY WITH BODY MASS INDEX (BMI) OF 37.0 TO 37.9 IN ADULT, UNSPECIFIED OBESITY TYPE, UNSPECIFIED WHETHER SERIOUS COMORBIDITY PRESENT: ICD-10-CM

## 2018-10-08 DIAGNOSIS — M54.5 CHRONIC MIDLINE LOW BACK PAIN, WITH SCIATICA PRESENCE UNSPECIFIED: ICD-10-CM

## 2018-10-08 DIAGNOSIS — Z12.11 COLON CANCER SCREENING: ICD-10-CM

## 2018-10-08 LAB
ALBUMIN SERPL-MCNC: 3.9 G/DL (ref 3.4–5)
ALBUMIN/GLOB SERPL: 0.8 {RATIO} (ref 0.8–1.7)
ALP SERPL-CCNC: 49 U/L (ref 45–117)
ALT SERPL-CCNC: 29 U/L (ref 16–61)
ANION GAP SERPL CALC-SCNC: 8 MMOL/L (ref 3–18)
AST SERPL-CCNC: 22 U/L (ref 15–37)
BASOPHILS # BLD: 0 K/UL (ref 0–0.1)
BASOPHILS NFR BLD: 1 % (ref 0–2)
BILIRUB SERPL-MCNC: 0.5 MG/DL (ref 0.2–1)
BUN SERPL-MCNC: 11 MG/DL (ref 7–18)
BUN/CREAT SERPL: 8 (ref 12–20)
CALCIUM SERPL-MCNC: 9.2 MG/DL (ref 8.5–10.1)
CHLORIDE SERPL-SCNC: 101 MMOL/L (ref 100–108)
CHOLEST SERPL-MCNC: 192 MG/DL
CO2 SERPL-SCNC: 31 MMOL/L (ref 21–32)
CREAT SERPL-MCNC: 1.35 MG/DL (ref 0.6–1.3)
DIFFERENTIAL METHOD BLD: ABNORMAL
EOSINOPHIL # BLD: 0.1 K/UL (ref 0–0.4)
EOSINOPHIL NFR BLD: 2 % (ref 0–5)
ERYTHROCYTE [DISTWIDTH] IN BLOOD BY AUTOMATED COUNT: 13.4 % (ref 11.6–14.5)
GLOBULIN SER CALC-MCNC: 5.2 G/DL (ref 2–4)
GLUCOSE SERPL-MCNC: 108 MG/DL (ref 74–99)
HCT VFR BLD AUTO: 47.8 % (ref 36–48)
HDLC SERPL-MCNC: 52 MG/DL (ref 40–60)
HDLC SERPL: 3.7 {RATIO} (ref 0–5)
HGB BLD-MCNC: 15.8 G/DL (ref 13–16)
LDLC SERPL CALC-MCNC: 126.6 MG/DL (ref 0–100)
LIPID PROFILE,FLP: ABNORMAL
LYMPHOCYTES # BLD: 2.6 K/UL (ref 0.9–3.6)
LYMPHOCYTES NFR BLD: 42 % (ref 21–52)
MCH RBC QN AUTO: 30.4 PG (ref 24–34)
MCHC RBC AUTO-ENTMCNC: 33.1 G/DL (ref 31–37)
MCV RBC AUTO: 91.9 FL (ref 74–97)
MONOCYTES # BLD: 0.4 K/UL (ref 0.05–1.2)
MONOCYTES NFR BLD: 6 % (ref 3–10)
NEUTS SEG # BLD: 3.1 K/UL (ref 1.8–8)
NEUTS SEG NFR BLD: 49 % (ref 40–73)
PLATELET # BLD AUTO: 253 K/UL (ref 135–420)
PMV BLD AUTO: 11.9 FL (ref 9.2–11.8)
POTASSIUM SERPL-SCNC: 4.2 MMOL/L (ref 3.5–5.5)
PROT SERPL-MCNC: 9.1 G/DL (ref 6.4–8.2)
PSA SERPL-MCNC: 2.2 NG/ML (ref 0–4)
RBC # BLD AUTO: 5.2 M/UL (ref 4.7–5.5)
SODIUM SERPL-SCNC: 140 MMOL/L (ref 136–145)
TRIGL SERPL-MCNC: 67 MG/DL (ref ?–150)
TSH SERPL DL<=0.05 MIU/L-ACNC: 1.1 UIU/ML (ref 0.36–3.74)
VLDLC SERPL CALC-MCNC: 13.4 MG/DL
WBC # BLD AUTO: 6.2 K/UL (ref 4.6–13.2)

## 2018-10-08 PROCEDURE — 80061 LIPID PANEL: CPT | Performed by: NURSE PRACTITIONER

## 2018-10-08 PROCEDURE — 36415 COLL VENOUS BLD VENIPUNCTURE: CPT | Performed by: NURSE PRACTITIONER

## 2018-10-08 PROCEDURE — 80053 COMPREHEN METABOLIC PANEL: CPT | Performed by: NURSE PRACTITIONER

## 2018-10-08 PROCEDURE — 85025 COMPLETE CBC W/AUTO DIFF WBC: CPT | Performed by: NURSE PRACTITIONER

## 2018-10-08 PROCEDURE — 84153 ASSAY OF PSA TOTAL: CPT | Performed by: NURSE PRACTITIONER

## 2018-10-08 PROCEDURE — 86803 HEPATITIS C AB TEST: CPT | Performed by: NURSE PRACTITIONER

## 2018-10-08 PROCEDURE — 84443 ASSAY THYROID STIM HORMONE: CPT | Performed by: NURSE PRACTITIONER

## 2018-10-08 NOTE — MR AVS SNAPSHOT
Quiana Martinez 
 
 
 [address] [facility], [address] [facility] [address] 
[phone] Patient: Nicolas Collado Sr. MRN: [id number]:[id number]72 Visit Information Date & Time Provider Department Dept. Phone Encounter #  
 10/8/2018 12:00 PM Bret Valadez NP [facility] INTERNAL MEDICINE OF [address] [id number] Your Appointments 3/20/2019  8:45 AM  
Office Visit with Elvira Hillman MD  
[facility]) Appt Note: 6 months [address] [facility], [address] [facility] [address]  
[address], [address] [place] [address] Road [address]  
  
    
 4/8/2019  8:30 AM  
Follow Up with Bret Valadez NP  
[facility] INTERNAL MEDICINE OF [place] ([facility]) Appt Note: 6 mo f/u  
 [address] [facility], [address] [facility] [address]  
  
   
 [address] [facility], [address] [facility] [address] Upcoming Health Maintenance Date Due Hepatitis C Screening 1955 Shingrix Vaccine Age 50> (1 of 2) 5/18/2003 FOBT Q 1 YEAR AGE 50-75 5/18/2003 GLAUCOMA SCREENING Q2Y 5/18/2018 MEDICARE YEARLY EXAM 9/19/2018 Pneumococcal 65+ Low/Medium Risk (1 of 2 - PCV13) 10/8/2018* DTaP/Tdap/Td series (1 - Tdap) 10/8/2018* Influenza Age 5 to Adult 10/8/2018* *Topic was postponed. The date shown is not the original due date. Allergies as of 10/8/2018  Review Complete On: 10/8/2018 By: Bret Valadez NP No Known Allergies Current Immunizations  Never Reviewed No immunizations on file. Not reviewed this visit You Were Diagnosed With   
  
 Codes Comments Essential hypertension, benign    -  Primary ICD-10-CM: I10 
ICD-9-CM: 401.1 Encounter for hepatitis C screening test for low risk patient     ICD-10-CM: Z11.59 
ICD-9-CM: V73.89 Colon cancer screening     ICD-10-CM: Z12.11 ICD-9-CM: V76.51   
 Prostate cancer screening     ICD-10-CM: Z12.5 ICD-9-CM: V76.44 Chronic atrial fibrillation (HCC)     ICD-10-CM: O38.1 ICD-9-CM: 427.31 Class 2 obesity with body mass index (BMI) of 37.0 to 37.9 in adult, unspecified obesity type, unspecified whether serious comorbidity present     ICD-10-CM: E66.9, F69.79 ICD-9-CM: 278.00, V85.37 Chronic midline low back pain, with sciatica presence unspecified     ICD-10-CM: M54.5, G89.29 ICD-9-CM: 724.2, 338.29 Glaucoma screening     ICD-10-CM: Z13.5 ICD-9-CM: V80.1 Vitals BP Pulse Temp Resp Height(growth percentile) 142/72 (BP 1 Location: Left arm, BP Patient Position: Sitting) (!) 109 97.9 °F (36.6 °C) (Tympanic) 16 6' 3\" (1.905 m) Weight(growth percentile) SpO2 BMI Smoking Status 301 lb (136.5 kg) 96% 37.62 kg/m2 Former Smoker BMI and BSA Data Body Mass Index Body Surface Area  
 37.62 kg/m 2 2.69 m 2 Preferred Pharmacy Pharmacy Name [facility] [facility], [address] [place] [address],[address] [place] [address] [phone] Your Updated Medication List  
  
   
This list is accurate as of 10/8/18  2:08 PM.  Always use your most recent med list.  
  
  
  
  
 COUMADIN 7.5 mg tablet Generic drug:  warfarin Take 1 Tab by mouth daily. furosemide 40 mg tablet Commonly known as:  LASIX Take 1 Tab by mouth two (2) times a day. nisoldipine SR 17 mg Tb24 tablet Commonly known as:  Shi [place] Take 1 Tab by mouth daily. ramipril 10 mg capsule Commonly known as:  ALTACE Take 1 Cap by mouth two (2) times a day. We Performed the Following REFERRAL TO GASTROENTEROLOGY [NIN86 Custom] Comments:  
 Routine colonoscopy REFERRAL TO OPHTHALMOLOGY [REF57 Custom] REFERRAL TO ORTHOPEDICS [JSY038 Custom] To-Do List   
 10/08/2018 Imaging:  MRI LUMB SPINE WO CONT Referral Information Referral ID Referred By Referred To [id number] Loco Guillen MD   
   [address] [place] [address] [address] [employer] [employer], [address] Phone: [phone] Fax: [phone] Visits Status Start Date End Date 1 New Request 10/8/18 10/8/19 If your referral has a status of pending review or denied, additional information will be sent to support the outcome of this decision. Referral ID Referred By Referred To  
 [id number] Raul CLAY Not Available Visits Status Start Date End Date 1 New Request 10/8/18 10/8/19 If your referral has a status of pending review or denied, additional information will be sent to support the outcome of this decision. Referral ID Referred By Referred To  
 [id number] Myrna [facility] Darell Wallis MD  
   [address] [facility] [address] Phone: [phone] Fax: [phone] Visits Status Start Date End Date 1 New Request 10/8/18 10/8/19 If your referral has a status of pending review or denied, additional information will be sent to support the outcome of this decision. Referral ID Referred By Referred To  
 [id number] Darrick Woo MD  
   [address] [place] [address] [address] [place] [address] Street Phone: [phone] Fax: [phone] Visits Status Start Date End Date 1 New Request 10/8/18 10/8/19 If your referral has a status of pending review or denied, additional information will be sent to support the outcome of this decision. Patient Instructions Body Mass Index: Care Instructions Your Care Instructions Body mass index (BMI) can help you see if your weight is raising your risk for health problems. It uses a formula to compare how much you weigh with how tall you are. · A BMI lower than 18.5 is considered underweight. · A BMI between 18.5 and 24.9 is considered healthy. · A BMI between 25 and 29.9 is considered overweight. A BMI of 30 or higher is considered obese. If your BMI is in the normal range, it means that you have a lower risk for weight-related health problems. If your BMI is in the overweight or obese range, you may be at increased risk for weight-related health problems, such as high blood pressure, heart disease, stroke, arthritis or joint pain, and diabetes. If your BMI is in the underweight range, you may be at increased risk for health problems such as fatigue, lower protection (immunity) against illness, muscle loss, bone loss, hair loss, and hormone problems. BMI is just one measure of your risk for weight-related health problems. You may be at higher risk for health problems if you are not active, you eat an unhealthy diet, or you drink too much alcohol or use tobacco products. Follow-up care is a key part of your treatment and safety. Be sure to make and go to all appointments, and call your doctor if you are having problems. It's also a good idea to know your test results and keep a list of the medicines you take. How can you care for yourself at home? · Practice healthy eating habits. This includes eating plenty of fruits, vegetables, whole grains, lean protein, and low-fat dairy. · If your doctor recommends it, get more exercise. Walking is a good choice. Bit by bit, increase the amount you walk every day. Try for at least 30 minutes on most days of the week. · Do not smoke. Smoking can increase your risk for health problems. If you need help quitting, talk to your doctor about stop-smoking programs and medicines. These can increase your chances of quitting for good. · Limit alcohol to 2 drinks a day for men and 1 drink a day for women. Too much alcohol can cause health problems. If you have a BMI higher than 25 · Your doctor may do other tests to check your risk for weight-related health problems.  This may include measuring the distance around your waist. A waist measurement of more than 40 inches in men or 35 inches in women can increase the risk of weight-related health problems. · Talk with your doctor about steps you can take to stay healthy or improve your health. You may need to make lifestyle changes to lose weight and stay healthy, such as changing your diet and getting regular exercise. If you have a BMI lower than 18.5 · Your doctor may do other tests to check your risk for health problems. · Talk with your doctor about steps you can take to stay healthy or improve your health. You may need to make lifestyle changes to gain or maintain weight and stay healthy, such as getting more healthy foods in your diet and doing exercises to build muscle. Where can you learn more? Go to http://olivier-cliff.info/. Enter S176 in the search box to learn more about \"Body Mass Index: Care Instructions. \" Current as of: October 13, 2016 Content Version: 11.4 © 3999-9793 [employer]. Care instructions adapted under license by [employer] (which disclaims liability or warranty for this information). If you have questions about a medical condition or this instruction, always ask your healthcare professional. [place] [address] any warranty or liability for your use of this information. Introducing [facility]! [facility] introduces [employer] patient portal. Now you can access parts of your medical record, email your doctor's office, and request medication refills online. 1. In your internet browser, go to [email or web address]. [employer] 2. Click on the First Time User? Click Here link in the Sign In box. You will see the New Member Sign Up page. 3. Enter your [employer] Access Code exactly as it appears below. You will not need to use this code after youve completed the sign-up process. If you do not sign up before the expiration date, you must request a new code. · Ad[employer] Access Code: [email or web address] Expires: 12/18/2018  8:39 AM 
 
4. Enter the last four digits of your Social Security Number (xxxx) and Date of Birth (mm/dd/yyyy) as indicated and click Submit. You will be taken to the next sign-up page. 5. Create a Ad[employer]xx ID. This will be your [employer] login ID and cannot be changed, so think of one that is secure and easy to remember. 6. Create a [employer] password. You can change your password at any time. 7. Enter your Password Reset Question and Answer. This can be used at a later time if you forget your password. 8. Enter your e-mail address. You will receive e-mail notification when new information is available in [address] 9. Click Sign Up. You can now view and download portions of your medical record. 10. Click the Download Summary menu link to download a portable copy of your medical information. If you have questions, please visit the Frequently Asked Questions section of the [employer] website. Remember, [employer] is NOT to be used for urgent needs. For medical emergencies, dial 911. Now available from your iPhone and Android! Please provide this summary of care documentation to your next provider. Your primary care clinician is listed as YANDEL CROSS. If you have any questions after today's visit, please call [phone].

## 2018-10-08 NOTE — PATIENT INSTRUCTIONS
Body Mass Index: Care Instructions  Your Care Instructions    Body mass index (BMI) can help you see if your weight is raising your risk for health problems. It uses a formula to compare how much you weigh with how tall you are. · A BMI lower than 18.5 is considered underweight. · A BMI between 18.5 and 24.9 is considered healthy. · A BMI between 25 and 29.9 is considered overweight. A BMI of 30 or higher is considered obese. If your BMI is in the normal range, it means that you have a lower risk for weight-related health problems. If your BMI is in the overweight or obese range, you may be at increased risk for weight-related health problems, such as high blood pressure, heart disease, stroke, arthritis or joint pain, and diabetes. If your BMI is in the underweight range, you may be at increased risk for health problems such as fatigue, lower protection (immunity) against illness, muscle loss, bone loss, hair loss, and hormone problems. BMI is just one measure of your risk for weight-related health problems. You may be at higher risk for health problems if you are not active, you eat an unhealthy diet, or you drink too much alcohol or use tobacco products. Follow-up care is a key part of your treatment and safety. Be sure to make and go to all appointments, and call your doctor if you are having problems. It's also a good idea to know your test results and keep a list of the medicines you take. How can you care for yourself at home? · Practice healthy eating habits. This includes eating plenty of fruits, vegetables, whole grains, lean protein, and low-fat dairy. · If your doctor recommends it, get more exercise. Walking is a good choice. Bit by bit, increase the amount you walk every day. Try for at least 30 minutes on most days of the week. · Do not smoke. Smoking can increase your risk for health problems. If you need help quitting, talk to your doctor about stop-smoking programs and medicines. These can increase your chances of quitting for good. · Limit alcohol to 2 drinks a day for men and 1 drink a day for women. Too much alcohol can cause health problems. If you have a BMI higher than 25  · Your doctor may do other tests to check your risk for weight-related health problems. This may include measuring the distance around your waist. A waist measurement of more than 40 inches in men or 35 inches in women can increase the risk of weight-related health problems. · Talk with your doctor about steps you can take to stay healthy or improve your health. You may need to make lifestyle changes to lose weight and stay healthy, such as changing your diet and getting regular exercise. If you have a BMI lower than 18.5  · Your doctor may do other tests to check your risk for health problems. · Talk with your doctor about steps you can take to stay healthy or improve your health. You may need to make lifestyle changes to gain or maintain weight and stay healthy, such as getting more healthy foods in your diet and doing exercises to build muscle. Where can you learn more? Go to http://olivier-cliff.info/. Enter S176 in the search box to learn more about \"Body Mass Index: Care Instructions. \"  Current as of: October 13, 2016  Content Version: 11.4  © 9538-1619 Healthwise, Incorporated. Care instructions adapted under license by [employer] (which disclaims liability or warranty for this information). If you have questions about a medical condition or this instruction, always ask your healthcare professional. [address] any warranty or liability for your use of this information.

## 2018-10-08 NOTE — PROGRESS NOTES
Haley More Sr. is a 70 y.o. male presenting today for New Patient  . HPI:  Haley More Sr. presents to the office today to establish care with the practice. .  Patient has a past medical history for mitral valve disorder, chronic diastolic heart failure,and hypertension. Patient is followed by cardiologist for his atrial fibrillation, mitral, and chronic heart failure. He denies any chest pain, palpitation, dyspnea, abdominal pain, nausea or vomiting, headache or dizziness. Patient reports he has a chronic history of mid lumbar pain 2012. Patient has had one episode the pain radiated down left lower extremity. He has never been seen orthopedic request a referral.    Review of Systems   Constitutional: Negative for chills and fever. HENT: Negative for congestion. Respiratory: Negative for cough, sputum production and shortness of breath. Cardiovascular: Negative for chest pain, palpitations and leg swelling. Musculoskeletal: Positive for back pain. Neurological: Negative for dizziness and headaches. No Known Allergies    Current Outpatient Prescriptions   Medication Sig Dispense Refill    COUMADIN 7.5 mg tablet Take 1 Tab by mouth daily. 30 Tab 5    ramipril (ALTACE) 10 mg capsule Take 1 Cap by mouth two (2) times a day. 180 Cap 2    nisoldipine SR (SULAR) 17 mg Tb24 tablet Take 1 Tab by mouth daily. 90 Tab 2    furosemide (LASIX) 40 mg tablet Take 1 Tab by mouth two (2) times a day. 61 Tab 05       Past Medical History:   Diagnosis Date    Atrial fibrillation (HCC)     Chronic diastolic heart failure (HCC)     Hypertension     Mitral valve disorders(424.0)        Past Surgical History:   Procedure Laterality Date    HX ADENOIDECTOMY      HX TONSILLECTOMY         Social History     Social History    Marital status: SINGLE     Spouse name: N/A    Number of children: N/A    Years of education: N/A     Occupational History    Not on file.      Social History Main Topics    Smoking status: Former Smoker     Quit date: 3/21/1981    Smokeless tobacco: Never Used    Alcohol use No    Drug use: No    Sexual activity: No     Other Topics Concern    Not on file     Social History Narrative       Patient does not have an advanced directive on file    Vitals:    10/08/18 1245   BP: 142/72   Pulse: (!) 109   Resp: 16   Temp: 97.9 °F (36.6 °C)   TempSrc: Tympanic   SpO2: 96%   Weight: 301 lb (136.5 kg)   Height: 6' 3\" (1.905 m)   PainSc:   3   PainLoc: Back       Physical Exam   Constitutional: No distress. HENT:   Right Ear: External ear normal.   Left Ear: External ear normal.   Mouth/Throat: No oropharyngeal exudate. Eyes: Pupils are equal, round, and reactive to light. Neck: Normal range of motion. Neck supple. Cardiovascular: Normal rate and normal heart sounds. An irregularly irregular rhythm present. Pulmonary/Chest: Effort normal and breath sounds normal. No respiratory distress. Abdominal: Soft. Bowel sounds are normal.   Musculoskeletal: He exhibits no edema or tenderness. Neurological: He is alert. No cranial nerve deficit. Nursing note and vitals reviewed. Telephone Anticoag on 09/05/2018   Component Date Value Ref Range Status    INR, External 09/04/2018 3.38   Final   Orders Only on 09/04/2018   Component Date Value Ref Range Status    Prothrombin time 09/04/2018 32.9* 9.0 - 13.0 sec Final    INR 09/04/2018 3.38* 0.89 - 1.29 Final    Comment: Prothrombin Time (PT)  Suggested INR therapeutic range for Vitamin K antagonist therapy:  Standard Dose  (Moderate intensity therapeutic range): 2.0 - 3.0  (Higher intensity therapeutic range): 2.5 - 3.5     Telephone Anticoag on 08/07/2018   Component Date Value Ref Range Status    INR, External 08/07/2018 2.76   Final   Telephone Anticoag on 07/09/2018   Component Date Value Ref Range Status    INR, External 07/09/2018 2.6   Final       .No results found for any visits on 10/08/18.     Assessment / Plan:      ICD-10-CM ICD-9-CM    1. Essential hypertension, benign L40 844.0 METABOLIC PANEL, COMPREHENSIVE      TSH 3RD GENERATION      CBC WITH AUTOMATED DIFF      LIPID PANEL   2. Encounter for hepatitis C screening test for low risk patient Z11.59 V73.89 HEPATITIS C AB   3. Colon cancer screening Z12.11 V76.51 REFERRAL TO GASTROENTEROLOGY   4. Prostate cancer screening Z12.5 V76.44 PSA SCREENING (SCREENING)   5. Chronic atrial fibrillation (HCC) I48.2 427.31    6. Class 2 obesity with body mass index (BMI) of 37.0 to 37.9 in adult, unspecified obesity type, unspecified whether serious comorbidity present E66.9 278.00     Z68.37 V85.37    7. Chronic midline low back pain, with sciatica presence unspecified M54.5 724.2 MRI LUMB SPINE WO CONT    G89.29 338.29 REFERRAL TO ORTHOPEDICS   8. Glaucoma screening Z13.5 V80.1 REFERRAL TO OPHTHALMOLOGY     Hypertension-continue regular scheduled medicine. No change in treatment plan. Atrial fibrillation-patient takes Coumadin 7.5 mg daily. Is followed by Dr. Gavin Cotton. Fasting labs ordered today  Hepatitis C screening done today  PSA screening done today  Referral to gastroenterology today for colonoscopy  Order MRI of the spine patient has chronic history of lower back pain  Referral to orthopedic Dr. Mckenna Leader  Ophthalmology glaucoma screening  Follow-up in 6. Physical activity and dietary test with patient regarding obesity. Follow-up Disposition:  Return if symptoms worsen or fail to improve. I asked the patient if he  had any questions and answered his  questions. The patient stated that he understands the treatment plan and agrees with the treatment plan      Discussed the patient's BMI with him. The BMI follow up plan is as follows:     dietary management education, guidance, and counseling  encourage exercise  monitor weight  prescribed dietary intake    An After Visit Summary was printed and given to the patient.

## 2018-10-09 LAB
HCV AB SER IA-ACNC: 0.1 INDEX
HCV AB SERPL QL IA: NEGATIVE
HCV COMMENT,HCGAC: NORMAL

## 2018-10-15 ENCOUNTER — HOSPITAL ENCOUNTER (OUTPATIENT)
Age: 63
Discharge: HOME | End: 2018-10-15
Attending: NURSE PRACTITIONER
Payer: MEDICARE

## 2018-10-15 DIAGNOSIS — G89.29 CHRONIC MIDLINE LOW BACK PAIN, WITH SCIATICA PRESENCE UNSPECIFIED: ICD-10-CM

## 2018-10-15 DIAGNOSIS — M54.5 CHRONIC MIDLINE LOW BACK PAIN, WITH SCIATICA PRESENCE UNSPECIFIED: ICD-10-CM

## 2018-10-15 PROCEDURE — 72148 MRI LUMBAR SPINE W/O DYE: CPT

## 2018-11-26 ENCOUNTER — OFFICE VISIT (OUTPATIENT)
Dept: ORTHOPEDIC SURGERY | Age: 63
End: 2018-11-26

## 2018-11-26 VITALS
SYSTOLIC BLOOD PRESSURE: 152 MMHG | DIASTOLIC BLOOD PRESSURE: 89 MMHG | BODY MASS INDEX: 37.67 KG/M2 | HEART RATE: 107 BPM | RESPIRATION RATE: 20 BRPM | WEIGHT: 303 LBS | OXYGEN SATURATION: 98 % | HEIGHT: 75 IN | TEMPERATURE: 97.6 F

## 2018-11-26 DIAGNOSIS — M47.816 LUMBAR SPONDYLOSIS: Primary | ICD-10-CM

## 2018-11-26 RX ORDER — DULOXETIN HYDROCHLORIDE 30 MG/1
30 CAPSULE, DELAYED RELEASE ORAL
Qty: 30 CAP | Refills: 1 | Status: SHIPPED | OUTPATIENT
Start: 2018-11-26 | End: 2019-03-20

## 2018-11-26 NOTE — PROGRESS NOTES
[address] 
[address], [address] Phone: [phone] Fax: [phone] Power Mitchell : 1955 PCP: Jessica Leung NP 
 
 
NEW PATIENT 
 
 
ASSESSMENT AND PLAN Diagnoses and all orders for this visit: 1. Lumbar spondylosis 1. Advised to stay active as tolerated. 2. No indications for surgery or spinal injections at this time. 3. Referral to PT 4. Trial of Cymbalta 5. Given information on general back care Follow-up Disposition: 
Return in about 6 weeks (around 2019). CHIEF COMPLAINT Pam Sunshine Sr. is seen today in consultation at the request of ALISA Milton for complaints of low back pain. HISTORY OF PRESENT ILLNESS Pam Sunshine Sr. is a 70 y.o. male. Today pt c/o low back pain since . Pt denies any specific incident or injury that caused their pain. Recent MRI reviewed. Pt reports his pain began to get worse in . He states he had sciatic pain in  with throbbing, but none now. He reports his pain is daily. He reports a 30 minutes standing tolerance. He states he can walk upright with no increased pain. Pt reports doing HEP intermittently. He notes rising to a sit from laying down causes him pain. He denies difficulty rising from a seat. Pt notes lifting objects aggravates his pain. He states his pain is relieved with sitting and bending. Location of pain: low back Does pain radiate into extremities: no 
Does patient have weakness: no  
Pt denies saddle paresthesias. Medications pt is on: Coumadin. Tylenol PRN some benefit. Pt denies recent ED visits or hospitalizations. Denies persistent fevers, chills, weight changes, neurogenic bowel or bladder symptoms. Pt denies hx of SI, HI, depression. Treatments patient has tried: 
Physical therapy:Yes for back with benefit  Non-opioid medications: Yes Spinal injections: No 
 Spinal surgery- No.  
 
 
 reviewed. PMHx of heart failure, a-fib. Pt is retired from ship building. No flowsheet data found. MRI Results (most recent): 
Results from Hospital Encounter encounter on 10/15/18 MRI LUMB SPINE WO CONT Narrative MR Lumbar spine without contrast 
 
HISTORY: chronic lumbar pain ; back pain and leg pain since 2012. No injury. COMPARISON: Plain films 2006 Technique: Multi-sequence multiplanar T1, T2, STIR imaging with and without fat 
saturation obtained centered on the lumbar spine. FINDINGS:  
 
Alignment: Intact lordosis Vertebral body height: Normal 
Marrow signal: No concerning signal. A couple small rounded areas that are more T1 bright, focal fat versus hemangiomas. Most apparent within L4. Disc spaces: Moderately severe narrowing and desiccation of L4-5. Milder 
narrowing and desiccation of L3-4 and L5-S1. Conus: Terminates at upper L1. 
-There is generally narrow caliber of lumbar canal particularly in the region of 
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 Axial imaging correlation: 
 
T12-L1: Patent canal and foramina. L1-2: Mild bilateral facet arthropathy. Patent canal and foramina. L2-3: Mild bilateral facet arthropathy. Patent canal and foramina. L3-4: Mild broad-based disc bulge. More prominent bilateral facet arthropathy 
with trace inflammation. Mild concentric spinal stenosis. Adequately patent 
foramina. L4-5: Broad-based disc osteophyte complex. Bilateral facet arthropathy with mild 
inflammation. Mild concentric spinal stenosis. Abutment of the crossing nerves 
without overt impingement. Adequately patent foramina. L5-S1: Mild disc bulge. Slight midline prominence. Bilateral facet arthropathy. Patent canal and foramina. Other structures: Unremarkable. Impression IMPRESSION: 
 
1. Mild to moderate multilevel degenerative findings along the lumbar spine. 
-Findings are most notable at L4-5 -No high-grade spinal or foraminal stenosis Thank you for enabling us to participate in the care of this patient. PAST MEDICAL HISTORY Past Medical History:  
Diagnosis Date  Atrial fibrillation ([address]  Chronic diastolic heart failure ([address]  Hypertension  Mitral valve disorders(424.0) Past Surgical History:  
Procedure Laterality Date  HX ADENOIDECTOMY  HX TONSILLECTOMY MEDICATIONS Current Outpatient Medications Medication Sig Dispense Refill  COUMADIN 7.5 mg tablet Take 1 Tab by mouth daily. 30 Tab 5  
 ramipril (ALTACE) 10 mg capsule Take 1 Cap by mouth two (2) times a day. 180 Cap 2  
 nisoldipine SR (SULAR) 17 mg Tb24 tablet Take 1 Tab by mouth daily. 90 Tab 2  
 furosemide (LASIX) 40 mg tablet Take 1 Tab by mouth two (2) times a day. 60 Tab 05 ALLERGIES No Known Allergies SOCIAL HISTORY Social History Socioeconomic History  Marital status: SINGLE Spouse name: Not on file  Number of children: Not on file  Years of education: Not on file  Highest education level: Not on file Social Needs  Financial resource strain: Not on file  Food insecurity - worry: Not on file  Food insecurity - inability: Not on file  Transportation needs - medical: Not on file  Transportation needs - non-medical: Not on file Occupational History  Not on file Tobacco Use  Smoking status: Former Smoker Last attempt to quit: 3/21/1981 Years since quittin.7  Smokeless tobacco: Never Used Substance and Sexual Activity  Alcohol use: No  
 Drug use: No  
 Sexual activity: No  
Other Topics Concern  Not on file Social History Narrative  Not on file FAMILY HISTORY Family History Problem Relation Age of Onset  Kidney Disease Mother  No Known Problems Sister  No Known Problems Brother  Diabetes Neg Hx  Hypertension Neg Hx REVIEW OF SYSTEMS Review of Systems Constitutional: Negative for chills, fever and weight loss. Respiratory: Negative for shortness of breath. Cardiovascular: Negative for chest pain. Gastrointestinal: Negative for constipation. Negative for fecal incontinence Genitourinary: Negative for dysuria. Negative for urinary incontinence Musculoskeletal:  
     Per HPI Skin: Negative for rash. Neurological: Negative for dizziness, tingling, tremors, focal weakness and headaches. Endo/Heme/Allergies: Does not bruise/bleed easily. Psychiatric/Behavioral: The patient does not have insomnia. PHYSICAL EXAMINATION There were no vitals taken for this visit. Accompanied by self. Constitutional:  Well developed, well nourished, in no acute distress. Psychiatric: Affect and mood are appropriate. Integumentary: No rashes or abrasions noted on exposed areas. Cardiovascular/Peripheral Vascular: Intact l pulses. No peripheral edema is noted. Lymphatic:  No evidence of lymphedema. No cervical lymphadenopathy. SPINE/MUSCULOSKELETAL EXAM 
 
 
Lumbar spine: No rash, ecchymosis, or gross obliquity. No fasciculations. No focal atrophy is noted. Good lumbar ROM. Tenderness to palpation none. No tenderness to palpation at the sciatic notch. SI joints non-tender. Trochanters non tender. Sensation grossly intact to light touch. MOTOR:   
 
 Hip Flex  Quads Hamstrings Ankle DF EHL Ankle PF Right +4/5 +4/5 +4/5 +4/5 +4/5 +4/5 Left +4/5 +4/5 +4/5 +4/5 +4/5 +4/5 Straight Leg raise negative. Moderate difficulty with tandem gait. Ambulation without assistive device. FWB. Written by Jessica Rai, as dictated by Roger Butterfield MD. 
 
I, Dr. Roger Butterfield MD, confirm that all documentation is accurate. Mr. Natan Hanson may have a reminder for a \"due or due soon\" health maintenance. I have asked that he contact his primary care provider for follow-up on this health maintenance.

## 2018-11-26 NOTE — PATIENT INSTRUCTIONS
Learning About Relief for Back Pain What is back tension and strain? Back strain happens when you overstretch, or pull, a muscle in your back. You may hurt your back in an accident or when you exercise or lift something. Most back pain will get better with rest and time. You can take care of yourself at home to help your back heal. 
What can you do first to relieve back pain? When you first feel back pain, try these steps: 
· Walk. Take a short walk (10 to 20 minutes) on a level surface (no slopes, hills, or stairs) every 2 to 3 hours. Walk only distances you can manage without pain, especially leg pain. · Relax. Find a comfortable position for rest. Some people are comfortable on the floor or a medium-firm bed with a small pillow under their head and another under their knees. Some people prefer to lie on their side with a pillow between their knees. Don't stay in one position for too long. · Try heat or ice. Try using a heating pad on a low or medium setting, or take a warm shower, for 15 to 20 minutes every 2 to 3 hours. Or you can buy single-use heat wraps that last up to 8 hours. You can also try an ice pack for 10 to 15 minutes every 2 to 3 hours. You can use an ice pack or a bag of frozen vegetables wrapped in a thin towel. There is not strong evidence that either heat or ice will help, but you can try them to see if they help. You may also want to try switching between heat and cold. · Take pain medicine exactly as directed. ? If the doctor gave you a prescription medicine for pain, take it as prescribed. ? If you are not taking a prescription pain medicine, ask your doctor if you can take an over-the-counter medicine. What else can you do? · Stretch and exercise. Exercises that increase flexibility may relieve your pain and make it easier for your muscles to keep your spine in a good, neutral position. And don't forget to keep walking. · Do self-massage. You can use self-massage to unwind after work or school or to energize yourself in the morning. You can easily massage your feet, hands, or neck. Self-massage works best if you are in comfortable clothes and are sitting or lying in a comfortable position. Use oil or lotion to massage bare skin. · Reduce stress. Back pain can lead to a vicious [place]: Distress about the pain tenses the muscles in your back, which in turn causes more pain. Learn how to relax your mind and your muscles to lower your stress. Where can you learn more? Go to http://olivier-cliff.info/. Enter C722 in the search box to learn more about \"Learning About Relief for Back Pain. \" Current as of: November 29, 2017 Content Version: 11.8 © 8332-1456 Healthwise, Incorporated. Care instructions adapted under license by [employer] (which disclaims liability or warranty for this information). If you have questions about a medical condition or this instruction, always ask your healthcare professional. [address] any warranty or liability for your use of this information.

## 2018-11-26 NOTE — PROGRESS NOTES
Verbal order entered per Dr. Louis Sos as documented on blue sheet:Referral to PT. Cymbalta 30mg take 1 tab po with dinner. Disp 30 with 1 refill

## 2018-11-29 ENCOUNTER — HOSPITAL ENCOUNTER (OUTPATIENT)
Dept: PHYSICAL THERAPY | Age: 63
Discharge: HOME | End: 2018-11-29
Payer: MEDICARE

## 2018-11-29 LAB — INR, EXTERNAL: 2

## 2018-11-29 PROCEDURE — 97162 PT EVAL MOD COMPLEX 30 MIN: CPT

## 2018-11-29 PROCEDURE — G8978 MOBILITY CURRENT STATUS: HCPCS

## 2018-11-29 PROCEDURE — 97110 THERAPEUTIC EXERCISES: CPT

## 2018-11-29 PROCEDURE — 97140 MANUAL THERAPY 1/> REGIONS: CPT

## 2018-11-29 PROCEDURE — G8979 MOBILITY GOAL STATUS: HCPCS

## 2018-11-29 NOTE — PROGRESS NOTES
[address] [facility] [address]  [address] [place], [address] 
[phone] fax Plan of Care/ Statement of Necessity for Physical Therapy Services Patient name: Criselda Perdomo Sr. Start of Care: 2018 Referral source: Caroline Ruiz MD : 1955 Medical Diagnosis: Low back pain [M54.5] Spondylosis without myelopathy or radiculopathy, lumbar region [M47.816] Payor: Aimee Olmos / Plan: [address] [place] [address] HMO / Product Type: Managed Care Medicare /  Onset Date: Chronic; since  Treatment Diagnosis: low back pain Prior Hospitalization: see medical history Provider#: [id number] Medications: Verified on Patient summary List  
 Comorbidities: arthritis, BMI >30, CHF, high blood pressure Prior Level of Function: Independent with all ADLs The Plan of Care and following information is based on the information from the initial evaluation. Assessment/ key information: Pt is a 70year old male presenting with chronic low back pain. Pt states this pain has been present since , and is located in the lumbar and thoracic spine with no radiating symptoms. Pt presents with increased myofascial tightness, limited ROM, excessive lumbar lordosis, hypomobility through the lumbar and thoracic spine assessed through P/A glides, and decreased strength limiting pts functional mobility. Pt will benefit from skilled PT in order to address these deficits. Evaluation Complexity History MEDIUM  Complexity : 1-2 comorbidities / personal factors will impact the outcome/ POC ; Examination MEDIUM Complexity : 3 Standardized tests and measures addressing body structure, function, activity limitation and / or participation in recreation  ;Presentation MEDIUM Complexity : Evolving with changing characteristics  ; Clinical Decision Making MEDIUM Complexity : FOTO score of 26-74 Overall Complexity Rating: MEDIUM 
 Problem List: pain affecting function, decrease ROM, decrease strength, decrease ADL/ functional abilitiies, decrease activity tolerance and decrease flexibility/ joint mobility Treatment Plan may include any combination of the following: Therapeutic exercise, Therapeutic activities, Physical agent/modality, Manual therapy, Patient education and Functional mobility training Patient / Family readiness to learn indicated by: asking questions, trying to perform skills and interest 
Persons(s) to be included in education: patient (P) Barriers to Learning/Limitations: None Patient Goal (s): To decrease pain and feel better Patient Self Reported Health Status: good Rehabilitation Potential: good Short Term Goals: To be accomplished in 2 weeks: 1. Pt will be independent and compliant with HEP in order to improve functional mobility. 2. Pt will decrease pain to 0/10 in order to improve functional mobility. Long Term Goals: To be accomplished in 4 weeks: 1. Pt will improve FOTO to 69 in order to demonstrate improvements in functional mobility. 2. Pt will be able to perform heavy activities around house with no difficulty in order to improve functional independence. 3. Pt will be able to participate in recreational activities without difficulty in order to return to OF. Frequency / Duration: Patient to be seen 2 times per week for 4 weeks. Patient/ Caregiver education and instruction: Diagnosis, prognosis, activity modification and exercises 
 [x]  Plan of care has been reviewed with PTA 
 
G-Codes (GP) Mobility  Current  CJ= 20-39%   Goal  CJ= 20-39% The severity rating is based on clinical judgment and the FOTO score. Certification Period: 11/29/19-12/29/18 Osmar Young PT 11/29/2018 3:00 PM 
 
________________________________________________________________________ I certify that the above Therapy Services are being furnished while the patient is under my care. I agree with the treatment plan and certify that this therapy is necessary. [de-identified] Signature:____________________  Date:____________Time: _________ Please sign and return to [address] [place], [address] 
[phone] fax

## 2018-11-29 NOTE — PROGRESS NOTES
PT DAILY TREATMENT NOTE 10-18 Patient Name: Artemio Carrasquillo Sr. 
JDYA: : 1955 [x]  Patient  Verified Payor: Faye Akbar / Plan: [address] [place] [address] HMO / Product Type: Managed Care Medicare / In time:2:07  Out time:2:51 Total Treatment Time (min): 44 Visit #: 1 of 8 Medicare/BCBS Only Total Timed Codes (min):  24 1:1 Treatment Time:  44 Treatment Area: Low back pain [M54.5] Spondylosis without myelopathy or radiculopathy, lumbar region [M47.816] SUBJECTIVE Pain Level (0-10 scale): 2 Any medication changes, allergies to medications, adverse drug reactions, diagnosis change, or new procedure performed?: [x] No    [] Yes (see summary sheet for update) Subjective functional status/changes:   [] No changes reported Pt reports OBJECTIVE 20 min [x]Eval                  []Re-Eval  
 
 
14 min Therapeutic Exercise:  [x] See flow sheet :  
Rationale: increase ROM and increase strength to improve the patients ability to return to PLOF. 10 min Manual Therapy:  STM to QL Rationale: decrease pain, increase ROM and increase tissue extensibility to improve patients ability to perform functional task. With 
 [] TE 
 [] TA 
 [] neuro 
 [] other: Patient Education: [x] Review HEP [] Progressed/Changed HEP based on:  
[] positioning   [] body mechanics   [] transfers   [] heat/ice application   
[] other:   
 
Other Objective/Functional Measures: See IE Pain Level (0-10 scale) post treatment: 1 ASSESSMENT/Changes in Function: Pt presents with chronic low back pain which is limiting his functional mobility. Pt states he is having difficulty when picking items up from a low surface. Pt presents with decreased ROM, hypomobility throughout the thoracic and lumbar spine assessed with P/A glides, myofascial restrictions, increased lumbar lordotic curvature, and decreased strength. Patient will continue to benefit from skilled PT services to modify and progress therapeutic interventions, address functional mobility deficits, address ROM deficits, address strength deficits, analyze and address soft tissue restrictions, analyze and cue movement patterns, analyze and modify body mechanics/ergonomics and assess and modify postural abnormalities to attain remaining goals. [x]  See Plan of Care 
[]  See progress note/recertification 
[]  See Discharge Summary Progress towards goals / Updated goals: 
Short Term Goals: To be accomplished in 2 weeks: 1. Pt will be independent and compliant with HEP in order to improve functional mobility. 2. Pt will decrease pain to 0/10 in order to improve functional mobility. Long Term Goals: To be accomplished in 4 weeks: 1. Pt will improve FOTO to 69 in order to demonstrate improvements in functional mobility. 2. Pt will be able to perform heavy activities around house with no difficulty in order to improve functional independence. 3. Pt will be able to participate in recreational activities without difficulty in order to return to PLO[facility]. PLAN 
[]  Upgrade activities as tolerated     [x]  Continue plan of care 
[]  Update interventions per flow sheet      
[]  Discharge due to:_ 
[]  Other:_ Brandie Gregg, PT 11/29/2018  3:08 PM 
 
Future Appointments Date Time Provider Jass Khan 12/3/2018  3:00 PM Temitope Opal, PTA [facility]  
12/5/2018  3:00 PM Temitope Opal, PTA [facility]  
12/11/2018  3:00 PM Temitope Opal, PTA [facility]  
12/13/2018  3:00 PM Layo Sánchez, PT [facility]  
12/18/2018  3:00 PM Temitope Opal, PTA [facility]  
12/20/2018  3:00 PM Layo Sánchez, PT [facility]  
12/26/2018  3:00 PM Temitope Opal, PTA [facility]  
1/14/2019  3:45 PM Elizabeth Acosta  [address]  
 3/20/2019  8:45 AM Maurizio Robbins MD [facility]  
4/8/2019  8:30 AM Rajan Park  [address] [place]

## 2018-11-30 ENCOUNTER — TELEPHONE ANTICOAG (OUTPATIENT)
Dept: CARDIOLOGY CLINIC | Age: 63
End: 2018-11-30

## 2018-12-03 ENCOUNTER — HOSPITAL ENCOUNTER (OUTPATIENT)
Dept: PHYSICAL THERAPY | Age: 63
Discharge: HOME | End: 2018-12-03
Payer: MEDICARE

## 2018-12-03 PROCEDURE — 97110 THERAPEUTIC EXERCISES: CPT

## 2018-12-03 NOTE — PROGRESS NOTES
PT DAILY TREATMENT NOTE 10-18 Patient Name: Lam Ramirez. 
HJNP: : 1955 [x]  Patient  Verified Payor: Renée Famarturo / Plan: [address] [place] [address] HMO / Product Type: Managed Care Medicare / In time:3:00  Out time:3:49 Total Treatment Time (min): 49 Visit #: 2 of 8 Medicare/BCBS Only Total Timed Codes (min):  49 1:1 Treatment Time:  49  
 
 
Treatment Area: Low back pain [M54.5] Spondylosis without myelopathy or radiculopathy, lumbar region [M47.816] SUBJECTIVE Pain Level (0-10 scale): 1/10 Any medication changes, allergies to medications, adverse drug reactions, diagnosis change, or new procedure performed?: [x] No    [] Yes (see summary sheet for update) Subjective functional status/changes:   [] No changes reported \"Not as tight as it was. \" OBJECTIVE 49 min Therapeutic Exercise:  [x] See flow sheet :  
Rationale: increase ROM, increase strength and increase proprioception to improve the patients ability to perform functional activities. With 
 [x] TE 
 [] TA 
 [] neuro 
 [] other: Patient Education: [x] Review HEP [] Progressed/Changed HEP based on:  
[] positioning   [] body mechanics   [] transfers   [] heat/ice application   
[] other:   
 
Other Objective/Functional Measures: Initiated exercises per flow sheet. Education on proper form/mechanics and core recruitment. Pain Level (0-10 scale) post treatment: 1/10 ASSESSMENT/Changes in Function: First F/U visit. Difficulty performing UE/LE alternating with trapeze bar. Pt described symptoms as \"tight\" in low back. Patient will continue to benefit from skilled PT services to modify and progress therapeutic interventions, address functional mobility deficits, address ROM deficits, address strength deficits, analyze and address soft tissue restrictions and analyze and modify body mechanics/ergonomics to attain remaining goals. [x]  See Plan of Care []  See progress note/recertification 
[]  See Discharge Summary Progress towards goals / Updated goals: 
Short Term Goals: To be accomplished in 2 weeks: 1. Pt will be independent and compliant with HEP in order to improve functional mobility. - Pt reports HEP compliance. 12/3/2018 2. Pt will decrease pain to 0/10 in order to improve functional mobility. Long Term Goals: To be accomplished in 4 weeks: 1. Pt will improve FOTO to 69 in order to demonstrate improvements in functional mobility. 2. Pt will be able to perform heavy activities around house with no difficulty in order to improve functional independence. 3. Pt will be able to participate in recreational activities without difficulty in order to return to [facility]. PLAN 
[]  Upgrade activities as tolerated     [x]  Continue plan of care 
[]  Update interventions per flow sheet      
[]  Discharge due to:_ 
[]  Other:_   
 
Nubia Smart, MEENAKSHI 12/3/2018  2:49 PM 
 
Future Appointments Date Time Provider Jass Gutierrezi 12/3/2018  3:00 PM Temitope Opal, PTA [facility]  
12/5/2018  3:00 PM Temitope Opal, PTA [facility]  
12/11/2018  3:00 PM Temitope Opal, PTA [facility]  
12/13/2018  3:00 PM Eloctavio Ralphs, PT [facility]  
12/18/2018  3:00 PM Temitope Opal, PTA [facility]  
12/20/2018  3:00 PM Layo Frys, PT [facility]  
12/26/2018  3:00 PM Temitope Opal, PTA [facility]  
1/14/2019  3:45 PM Elizabeth Acosta  [address]  
3/20/2019  8:45 AM Conchita Booth MD [facility]  
4/8/2019  8:30 AM Haylee Mirza  [address] [place]

## 2018-12-05 ENCOUNTER — HOSPITAL ENCOUNTER (OUTPATIENT)
Dept: PHYSICAL THERAPY | Age: 63
Discharge: HOME | End: 2018-12-05
Payer: MEDICARE

## 2018-12-05 PROCEDURE — 97110 THERAPEUTIC EXERCISES: CPT

## 2018-12-05 NOTE — PROGRESS NOTES
PT DAILY TREATMENT NOTE 10-18 Patient Name: Mark Herrera Sr. 
CVLY: : 1955 [x]  Patient  Verified Payor: Warren Misael / Plan: [address] [place] [address] HMO / Product Type: Managed Care Medicare / In time:3:00  Out time:3:48 Total Treatment Time (min): 48 Visit #: 3 of 8 Medicare/BCBS Only Total Timed Codes (min):  48 1:1 Treatment Time:  42 Treatment Area: Low back pain [M54.5] Spondylosis without myelopathy or radiculopathy, lumbar region [M47.816] SUBJECTIVE Pain Level (0-10 scale): 1/10 Any medication changes, allergies to medications, adverse drug reactions, diagnosis change, or new procedure performed?: [x] No    [] Yes (see summary sheet for update) Subjective functional status/changes:   [] No changes reported \"It's there. \" OBJECTIVE 48 min Therapeutic Exercise:  [x] See flow sheet :  
Rationale: increase ROM, increase strength and increase proprioception to improve the patients ability to perform ADL's and functional activities. With 
 [x] TE 
 [] TA 
 [] neuro 
 [] other: Patient Education: [x] Review HEP [] Progressed/Changed HEP based on:  
[] positioning   [] body mechanics   [] transfers   [] heat/ice application   
[] other:   
 
Other Objective/Functional Measures: No changes in there ex. Pain Level (0-10 scale) post treatment: 1/10 ASSESSMENT/Changes in Function: Difficulty balancing during UE/LE combo with trapeze bar. Limitations in mobility noted during exercises, unable to keep mid/low back against plinth during LTR's, maría stretch Left > limitation than Right. Poor TA recruitment during PPT's and trapeze bar series.  
 
Patient will continue to benefit from skilled PT services to modify and progress therapeutic interventions, address functional mobility deficits, address ROM deficits, address strength deficits, analyze and address soft tissue restrictions and analyze and modify body mechanics/ergonomics to attain remaining goals. [x]  See Plan of Care 
[]  See progress note/recertification 
[]  See Discharge Summary Progress towards goals / Updated goals: 
Short Term Goals: To be accomplished in 2 weeks: 
            3. Pt will be independent and compliant with HEP in order to improve functional mobility. - Pt reports HEP compliance. 12/3/2018 
            2. Pt will decrease pain to 0/10 in order to improve functional mobility. Long Term Goals: To be accomplished in 4 weeks: 
            1. Pt will improve FOTO to 69 in order to demonstrate improvements in functional mobility.             2. Pt will be able to perform heavy activities around house with no difficulty in order to improve functional independence.  
            3. Pt will be able to participate in recreational activities without difficulty in order to return to [facility]. PLAN 
[]  Upgrade activities as tolerated     [x]  Continue plan of care 
[]  Update interventions per flow sheet      
[]  Discharge due to:_ 
[]  Other:_   
  
Anabelle Berkowitz PTA 12/5/2018  2:56 PM 
 
Future Appointments Date Time Provider Jass Gutierrezi 12/5/2018  3:00 PM Elebruna Fiore, PTA [facility]  
12/11/2018  3:00 PM Leslie Fiore, PTA [facility]  
12/13/2018  3:00 PM Shikha Coyle, PT [facility]  
12/18/2018  3:00 PM Eleonore Macarena, PTA [facility]  
12/20/2018  3:00 PM Shikha Coyle, PT [facility]  
12/26/2018  3:00 PM Elebruna Fiore, PTA [facility]  
1/14/2019  3:45 PM Eufemia Dorsey  [facility]  
3/20/2019  8:45 AM Maurizio Robbins MD [facility]  
4/8/2019  8:30 AM Rajan Park  [address] [place]

## 2018-12-11 ENCOUNTER — HOSPITAL ENCOUNTER (OUTPATIENT)
Dept: PHYSICAL THERAPY | Age: 63
Discharge: HOME | End: 2018-12-11
Payer: MEDICARE

## 2018-12-11 PROCEDURE — 97110 THERAPEUTIC EXERCISES: CPT

## 2018-12-11 NOTE — PROGRESS NOTES
PT DAILY TREATMENT NOTE 10-18    Patient Name: Rodolfo Huang Sr.  Date:2018  : 1955  [x]  Patient  Verified  Payor: Wenceslao Factor / Plan: [address] [place] [address] HMO / Product Type: Managed Care Medicare /    In time:2:56  Out time:3:43  Total Treatment Time (min): 52  Visit #: 4 of 8    Medicare/BCBS Only   Total Timed Codes (min):  47 1:1 Treatment Time:  47       Treatment Area: Low back pain [M54.5]  Spondylosis without myelopathy or radiculopathy, lumbar region [M47.816]    SUBJECTIVE  Pain Level (0-10 scale): 1/10  Any medication changes, allergies to medications, adverse drug reactions, diagnosis change, or new procedure performed?: [x] No    [] Yes (see summary sheet for update)  Subjective functional status/changes:   [] No changes reported  \"No pain, just a little stiffness. \"    OBJECTIVE    47 min Therapeutic Exercise:  [x] See flow sheet :   Rationale: increase ROM, increase strength and increase proprioception to improve the patients ability to perform functional activities. With   [x] TE   [] TA   [] neuro   [] other: Patient Education: [x] Review HEP    [] Progressed/Changed HEP based on:   [] positioning   [] body mechanics   [] transfers   [] heat/ice application    [] other:      Other Objective/Functional Measures: Added 6\" dynamic step ups forward/lateral 10x each. Added yellow t-band to hip 3-way with trapeze bar. Pain Level (0-10 scale) post treatment: 0/10    ASSESSMENT/Changes in Function: (B) Hips challenged with yellow band resistance. Rectus bulge with PPT's, difficulty maintaining core recruitment with standing activities. Patient will continue to benefit from skilled PT services to modify and progress therapeutic interventions, address functional mobility deficits, address ROM deficits, address strength deficits, analyze and cue movement patterns and analyze and modify body mechanics/ergonomics to attain remaining goals.      [x]  See Plan of Care  [] See progress note/recertification  []  See Discharge Summary         Progress towards goals / Updated goals:  Short Term Goals: To be accomplished in 2 weeks:              8. Pt will be independent and compliant with HEP in order to improve functional mobility. - Pt reports HEP compliance. 12/3/2018              2. Pt will decrease pain to 0/10 in order to improve functional mobility. - Denies pain however gives a 1/10 pain level. 12/11/2018  Long Term Goals: To be accomplished in 4 weeks:              1. Pt will improve FOTO to 69 in order to demonstrate improvements in functional mobility.             2. Pt will be able to perform heavy activities around house with no difficulty in order to improve functional independence.               3. Pt will be able to participate in recreational activities without difficulty in order to return to [facility].     PLAN  []  Upgrade activities as tolerated     [x]  Continue plan of care  []  Update interventions per flow sheet       []  Discharge due to:_  []  Other:_      Christel Guo PTA 12/11/2018  2:57 PM    Future Appointments   Date Time Provider Jass Khan   12/11/2018  3:00 PM Rosa Ocampo, PTA [facility]   12/13/2018  3:00 PM Roman Finney, PT [facility]   12/18/2018  3:00 PM Rosa Ocampo, PTA [facility]   12/20/2018  3:00 PM Roman Finney, PT [facility]   12/26/2018  3:00 PM Rosa Ocampo, PTA [facility]   1/14/2019  3:45 PM Cammie Gregg  [address]    3/20/2019  8:45 AM [facility]   4/8/2019  8:30 AM Adama Rudd, AL[address] [facility]

## 2018-12-13 ENCOUNTER — HOSPITAL ENCOUNTER (OUTPATIENT)
Dept: PHYSICAL THERAPY | Age: 63
Discharge: HOME | End: 2018-12-13
Payer: MEDICARE

## 2018-12-13 PROCEDURE — 97110 THERAPEUTIC EXERCISES: CPT

## 2018-12-18 ENCOUNTER — HOSPITAL ENCOUNTER (OUTPATIENT)
Dept: PHYSICAL THERAPY | Age: 63
Discharge: HOME | End: 2018-12-18
Payer: MEDICARE

## 2018-12-18 PROCEDURE — 97110 THERAPEUTIC EXERCISES: CPT

## 2018-12-18 NOTE — PROGRESS NOTES
PT DAILY TREATMENT NOTE 10-18    Patient Name: Nicolle Miller Sr.  Date:2018  : 1955  [x]  Patient  Verified  Payor: Bess Hatfield / Plan: [address] [place] [address] HMO / Product Type: Managed Care Medicare /    In time:3:00  Out time:3:39  Total Treatment Time (min): 39  Visit #: 6 of 8    Medicare/BCBS Only   Total Timed Codes (min):  39 1:1 Treatment Time:  39     Treatment Area: Low back pain [M54.5]  Spondylosis without myelopathy or radiculopathy, lumbar region [M47.816]    SUBJECTIVE  Pain Level (0-10 scale): 0/10  Any medication changes, allergies to medications, adverse drug reactions, diagnosis change, or new procedure performed?: [x] No    [] Yes (see summary sheet for update)  Subjective functional status/changes:   [] No changes reported  \"Doing okay. \"    OBJECTIVE    39 min Therapeutic Activity:  [x]  See flow sheet :   Rationale: increase ROM, increase strength and increase proprioception  to improve the patients ability to perform functional activities. With   [x] TE   [] TA   [] neuro   [] other: Patient Education: [x] Review HEP    [] Progressed/Changed HEP based on:   [] positioning   [] body mechanics   [] transfers   [] heat/ice application    [] other:      Other Objective/Functional Measures: added marching to PPT. Pain Level (0-10 scale) post treatment: 0/10    ASSESSMENT/Changes in Function: Demonstrates improved ease with exercises. Improved core activation and TA hold. Pt denies pain, describes symptoms as \"tightness and stiff\" however symptoms have improved per pt. Inquired about pt's ability to perform heavy household chores/activities and pt stated \"I don't do any of that, no lifting. \"    Patient will continue to benefit from skilled PT services to modify and progress therapeutic interventions, address functional mobility deficits, address ROM deficits, address strength deficits, analyze and cue movement patterns and analyze and modify body mechanics/ergonomics to attain remaining goals. [x]  See Plan of Care  []  See progress note/recertification  []  See Discharge Summary         Progress towards goals / Updated goals:  Short Term Goals: To be accomplished in 2 weeks:              6. Pt will be independent and compliant with HEP in order to improve functional mobility. - Pt reports HEP compliance. 12/3/2018              2. Pt will decrease pain to 0/10 in order to improve functional mobility. - Denies pain however gives a 1/10 pain level. 12/11/2018  Long Term Goals: To be accomplished in 4 weeks:              1. Pt will improve FOTO to 69 in order to demonstrate improvements in functional mobility. Goal met 12/13/18 FOTO = 74              2. Pt will be able to perform heavy activities around house with no difficulty in order to improve functional independence. - Inquired about pt's ability to perform heavy household chores/activities and pt stated \"I don't do any of that, no lifting. \" 12/18/2018              3. Pt will be able to participate in recreational activities without difficulty in order to return to [facility].     PLAN  []  Upgrade activities as tolerated     [x]  Continue plan of care  []  Update interventions per flow sheet       []  Discharge due to:_  []  Other:_      Maryjane Jarrett, PTA 12/18/2018  3:01 PM    Future Appointments   Date Time Provider Jass Khan   12/20/2018  3:00 [facility]   12/26/2018  3:00 PM Sp [facility]omeli[facility]   1/14/2019  3:45 PM Anderson Mullen  [address]    3/20/2019  8:45 AM Kermit Manzo MD [facility]   4/8/2019  8:30 AM Gopal Ozuna  [address] [place]

## 2018-12-20 ENCOUNTER — HOSPITAL ENCOUNTER (OUTPATIENT)
Dept: PHYSICAL THERAPY | Age: 63
Discharge: HOME | End: 2018-12-20
Payer: MEDICARE

## 2018-12-20 PROCEDURE — 97110 THERAPEUTIC EXERCISES: CPT

## 2018-12-20 PROCEDURE — 97112 NEUROMUSCULAR REEDUCATION: CPT

## 2018-12-20 NOTE — PROGRESS NOTES
PT DISCHARGE DAILY NOTE AND [id number]    Date:2018  Patient name: Lawrence Mancilla Sr. Start of Care: 18   Referral source: Jono Kiran MD : 1955   Medical/Treatment Diagnosis: Low back pain [M54.5]  Spondylosis without myelopathy or radiculopathy, lumbar region [M47.816] Onset Date:Chronic, since      Prior Hospitalization: see medical history Provider#: [id number]   Medications: Verified on Patient Summary List    Comorbidities: arthritis, BMI >30, CHF, high blood pressure   Prior Level of Function:Independent with all ADLs    Visits from Start of Care: 7    Missed Visits: 0    Reporting Period : 19 to 18    Date:2018   : 1955  [x]  Patient  Verified  Payor: Arlander Mixer / Plan: [address] [place] [address] HMO / Product Type: Managed Care Medicare /    In time:3:00  Out time:3:40  Total Treatment Time (min): 40   Visit #: 7 of 8    Medicare/BCBS Only   Total Timed Codes (min):  40 1:1 Treatment Time:  40       SUBJECTIVE  Pain Level (0-10 scale): 0  Any medication changes, allergies to medications, adverse drug reactions, diagnosis change, or new procedure performed?: [x] No    [] Yes (see summary sheet for update)  Subjective functional status/changes:   [] No changes reported  \"I'm feeling good, no pain\"    OBJECTIVE      30 min Therapeutic Exercise:  [x] See flow sheet :   Rationale: increase ROM and increase strength to improve the patients ability to perform ADLs with improved mobility. 10 min Neuromuscular Re-education:  [x]  See flow sheet :   Rationale: increase strength, improve coordination and improve balance  to improve the patients ability to perform activities with improved postural awareness.                With   [x] TE   [] TA   [] neuro   [] other: Patient Education: [x] Review HEP    [] Progressed/Changed HEP based on:   [] positioning   [] body mechanics   [] transfers   [] heat/ice application    [] other:      Other Objective/Functional Measures: B/L glute med strength 4+/5, B/L quad and hamstring strength 5/5     Pain Level (0-10 scale) post treatment: 0    Summary of Care: Patient has demonstrated great progress with physical therapy and has achieved all goals set. Patient showed great improvements in strength, quality of movement and ROM. Patient is no longer experiencing back pain and states he is able to perform his ADLs with improved ease. Goal: Pt will decrease pain to 0/10 in order to improve functional mobility  Status at last note/certification: goal met  Status at discharge: met    Goal: Pt will improve FOTO to 69 in order to demonstrate improvements in functional mobility. Status at last note/certification: goal met  Status at discharge: met    Goal:Pt will be able to participate in recreational activities without difficulty in order to return to PLOF. Status at last note/certification: Progressing   Status at discharge: met    Goal:Pt will be independent and compliant with HEP in order to improve functional mobility. Status at last note/certification: Goal met  Status at discharge: met    ASSESSMENT/Changes in Function: Patient performs all exercises without increase in symptoms and demonstrates proper form requiring minimal cueing for postural alignment. Patient demonstrates improvements in core activation throughout exercises as well improvements in strength and l/s AROM. Patient has met all goals and demonstrates improvements in quality of movement.      Thank you for this referral!      PLAN  [x]Discontinue therapy: [x]Patient has reached or is progressing toward set goals      []Patient is non-compliant or has abdicated      []Due to lack of appreciable progress towards set goals    Nalini Bowling, PT 12/20/2018  3:03 PM

## 2018-12-26 ENCOUNTER — APPOINTMENT (OUTPATIENT)
Dept: PHYSICAL THERAPY | Age: 63
End: 2018-12-26
Payer: MEDICARE

## 2018-12-28 ENCOUNTER — TELEPHONE (OUTPATIENT)
Dept: CARDIOLOGY CLINIC | Age: 63
End: 2018-12-28

## 2018-12-28 DIAGNOSIS — I48.20 CHRONIC ATRIAL FIBRILLATION (HCC): Primary | ICD-10-CM

## 2018-12-28 LAB — INR, EXTERNAL: 2.2

## 2018-12-31 ENCOUNTER — TELEPHONE ANTICOAG (OUTPATIENT)
Dept: CARDIOLOGY CLINIC | Age: 63
End: 2018-12-31

## 2018-12-31 DIAGNOSIS — I48.91 ATRIAL FIBRILLATION, UNSPECIFIED TYPE (HCC): ICD-10-CM

## 2018-12-31 NOTE — PATIENT INSTRUCTIONS
Called and spoke to patient to resume current dose of coumadin and recheck INR on 1/28/19. He voices understanding and acceptance of this advice and will call back if any further questions or concerns.

## 2019-01-24 ENCOUNTER — OFFICE VISIT (OUTPATIENT)
Dept: ORTHOPEDIC SURGERY | Age: 64
End: 2019-01-24

## 2019-01-24 VITALS
SYSTOLIC BLOOD PRESSURE: 161 MMHG | DIASTOLIC BLOOD PRESSURE: 95 MMHG | HEIGHT: 75 IN | WEIGHT: 307 LBS | HEART RATE: 90 BPM | BODY MASS INDEX: 38.17 KG/M2

## 2019-01-24 DIAGNOSIS — M47.816 LUMBAR SPONDYLOSIS: Primary | ICD-10-CM

## 2019-01-24 NOTE — PROGRESS NOTES
Becky Espinal [facility] [address].  [address], [address]  [place], [facility]  Phone: [phone]  Fax: [phone]        Adela Rosario  : 1955  PCP: Tom Rodriguez NP    PROGRESS NOTE      ASSESSMENT AND PLAN    Diagnoses and all orders for this visit:    1. Lumbar spondylosis       1. Advised to continue HEP. 2. Discussed lumbar stenosis  3. Continue Tylenol PRN  4. Given information on preventing injury    Follow-up Disposition:  Return if symptoms worsen or fail to improve. HISTORY OF PRESENT ILLNESS  Liusana Browne Sr. is a 70 y.o. male. Pt presents to the office for a f/u visit for low back pian. Last OV given trial of Cymbalta, referred to PT. Pt reports benefit with physical therapy and HEP. He states he did not begin Cymbalta to see the benefit of physical therapy alone. Pt notes today is a good day and that he has more good days than bad since doing physical therapy. Location of pain: low back  Does pain radiate into extremities: no  Does patient have weakness: no   Pt denies saddle paresthesias. Medications pt is on: Coumadin. Tylenol PRN some benefit. Denies persistent fevers, chills, weight changes, neurogenic bowel or bladder symptoms. Pt denies recent ED visits or hospitalizations. Treatments patient has tried:  Physical therapy:Yes with benefit  Doing HEP: Yes with benefit  Non-opioid medications: Yes  Spinal injections: No  Spinal surgery- No.   Last L MRI 2018: mild to moderate multilevel degenerative changes. No high-grade stenosis.  reviewed. PMHx of heart failure, a-fib. Pt is retired from [employer].     Pain Assessment  2019   Location of Pain Back   Location Modifiers -   Severity of Pain 0   Quality of Pain (No Data)   Quality of Pain Comment stiffness   Duration of Pain -   Frequency of Pain Intermittent   Aggravating Factors -   Aggravating Factors Comment -   Relieving Factors -   Relieving Factors Comment -   Result of Injury -       PAST MEDICAL HISTORY   Past Medical History:   Diagnosis Date    Atrial fibrillation (HCC)     Chronic diastolic heart failure (HCC)     Hypertension     Mitral valve disorders(424.0)        Past Surgical History:   Procedure Laterality Date    HX ADENOIDECTOMY      HX TONSILLECTOMY     . MEDICATIONS    Current Outpatient Medications   Medication Sig Dispense Refill    COUMADIN 7.5 mg tablet Take 1 Tab by mouth daily. 30 Tab 5    ramipril (ALTACE) 10 mg capsule Take 1 Cap by mouth two (2) times a day. 180 Cap 2    nisoldipine SR (SULAR) 17 mg Tb24 tablet Take 1 Tab by mouth daily. 90 Tab 2    furosemide (LASIX) 40 mg tablet Take 1 Tab by mouth two (2) times a day. 60 Tab 05    DULoxetine (CYMBALTA) 30 mg capsule Take 1 Cap by mouth daily (with dinner).  (Patient not taking: Reported on 2019) 30 Cap 1        ALLERGIES  No Known Allergies       SOCIAL HISTORY    Social History     Socioeconomic History    Marital status: SINGLE     Spouse name: Not on file    Number of children: Not on file    Years of education: Not on file    Highest education level: Not on file   Social Needs    Financial resource strain: Not on file    Food insecurity - worry: Not on file    Food insecurity - inability: Not on file    Transportation needs - medical: Not on file   [employer] needs - non-medical: Not on file   Occupational History    Not on file   Tobacco Use    Smoking status: Former Smoker     Last attempt to quit: 3/21/1981     Years since quittin.8    Smokeless tobacco: Never Used   Substance and Sexual Activity    Alcohol use: No    Drug use: No    Sexual activity: No   Other Topics Concern     Service Not Asked    Blood Transfusions Not Asked    Caffeine Concern Not Asked    Occupational Exposure Not Asked    Hobby Hazards Not Asked    Sleep Concern Not Asked    Stress Concern Not Asked    Weight Concern Not Asked    Special Diet Not Asked    Back Care Not Asked    Exercise Not Asked    Bike Helmet Not Asked   [address] [facility] Not Asked    Self-Exams Not Asked   Social History Narrative    Not on file       FAMILY HISTORY  Family History   Problem Relation Age of Onset    Kidney Disease Mother     Diabetes Mother     Cancer Father     No Known Problems Sister     No Known Problems Brother     Hypertension Neg Hx        REVIEW OF SYSTEMS  Review of Systems   Constitutional: Negative for chills, fever and weight loss. Respiratory: Negative for shortness of breath. Cardiovascular: Negative for chest pain. Gastrointestinal: Negative for constipation. Negative for fecal incontinence   Genitourinary: Negative for dysuria. Negative for urinary incontinence   Musculoskeletal:        Per HPI   Skin: Negative for rash. Neurological: Negative for dizziness, tingling, tremors, focal weakness and headaches. Endo/Heme/Allergies: Does not bruise/bleed easily. Psychiatric/Behavioral: The patient does not have insomnia. PHYSICAL EXAMINATION  Visit Vitals  BP (!) 161/95   Pulse 90   Ht 6' 3\" (1.905 m)   Wt 307 lb (139.3 kg)   BMI 38.37 kg/m²         Accompanied by self. Constitutional:  Well developed, well nourished, in no acute distress. Psychiatric: Affect and mood are appropriate. Integumentary: No rashes or abrasions noted on exposed areas. Cardiovascular/Peripheral Vascular: Intact l pulses. 1+ non pitting peripheral edema is noted BLE. Lymphatic:  No evidence of lymphedema. No cervical lymphadenopathy. SPINE/MUSCULOSKELETAL EXAM    Lumbar spine:  No rash, ecchymosis, or gross obliquity. No fasciculations. No focal atrophy is noted. Full ROM lumbar spine. Tenderness to palpation none. No tenderness to palpation at the sciatic notch. SI joints non-tender. Trochanters non tender.           MOTOR:       Hip Flex  Quads Hamstrings Ankle DF EHL Ankle PF   Right +4/5 +4/5 +4/5 +4/5 +4/5 +4/5   Left +4/5 +4/5 +4/5 +4/5 +4/5 +4/5       Straight Leg raise negative. Ambulation without assistive device. FWB. Written by Judson Flor, as dictated by Karoline Talavera MD.    I, Dr. Karoline Talavera MD, confirm that all documentation is accurate. Mr. Berna Trujillo may have a reminder for a \"due or due soon\" health maintenance. I have asked that he contact his primary care provider for follow-up on this health maintenance.

## 2019-01-24 NOTE — PATIENT INSTRUCTIONS
Back Care and Preventing Injuries: Care Instructions  Your Care Instructions    You can hurt your back doing many everyday activities: lifting a heavy box, bending down to garden, exercising at the gym, and even getting out of bed. But you can keep your back strong and healthy by doing some exercises. You also can follow a few tips for sitting, sleeping, and lifting to avoid hurting your back again. Talk to your doctor before you start an exercise program. Ask for help if you want to learn more about keeping your back healthy. Follow-up care is a key part of your treatment and safety. Be sure to make and go to all appointments, and call your doctor if you are having problems. It's also a good idea to know your test results and keep a list of the medicines you take. How can you care for yourself at home? · Stay at a healthy weight to avoid strain on your lower back. · Do not smoke. Smoking increases the risk of osteoporosis, which weakens the spine. If you need help quitting, talk to your doctor about stop-smoking programs and medicines. These can increase your chances of quitting for good. · Make sure you sleep in a position that maintains your back's normal curves and on a mattress that feels comfortable. Sleep on your side with a pillow between your knees, or sleep on your back with a pillow under your knees. These positions can reduce strain on your back. · When you get out of bed, lie on your side and bend both knees. Drop your feet over the edge of the bed as you push up with both arms. Scoot to the edge of the bed. Make sure your feet are in line with your rear end (buttocks), and then stand up. · If you must stand for a long time, put one foot on a stool, ledge, or box. Exercise to strengthen your back and other muscles  · Get at least 30 minutes of exercise on most days of the week. Walking is a good choice.  You also may want to do other activities, such as running, swimming, cycling, or playing tennis or team sports. · Stretch your back muscles. Here are few exercises to try:  ? Lie on your back with your knees bent and your feet flat on the floor. Gently pull one bent knee to your chest. Put that foot back on the floor, and then pull the other knee to your chest. Hold for 15 to 30 seconds. Repeat 2 to 4 times. ? Do pelvic tilts. Lie on your back with your knees bent. Tighten your stomach muscles. Pull your belly button (navel) in and up toward your ribs. You should feel like your back is pressing to the floor and your hips and pelvis are slightly lifting off the floor. Hold for 6 seconds while breathing smoothly. · Keep your core muscles strong. The muscles of your back, belly (abdomen), and buttocks support your spine. ? Pull in your belly, and imagine pulling your navel toward your spine. Hold this for 6 seconds, then relax. Remember to keep breathing normally as you tense your muscles. ? Do curl-ups. Always do them with your knees bent. Keep your low back on the floor, and curl your shoulders toward your knees using a smooth, slow motion. Keep your arms folded across your chest. If this bothers your neck, try putting your hands behind your neck (not your head), with your elbows spread apart. ? Lie on your back with your knees bent and your feet flat on the floor. Tighten your belly muscles, and then push with your feet and raise your buttocks up a few inches. Hold this position 6 seconds as you continue to breathe normally, then lower yourself slowly to the floor. Repeat 8 to 12 times. ? If you like group exercise, try Pilates or yoga. These classes have poses that strengthen the core muscles. Protect your back when you sit  · Place a small pillow, a rolled-up towel, or a lumbar roll in the curve of your back if you need extra support. · Sit in a chair that is low enough to let you place both feet flat on the floor with both knees nearly level with your hips.  If your chair or desk is too high, use a foot rest to raise your knees. · When driving, keep your knees nearly level with your hips. Sit straight, and drive with both hands on the steering wheel. Your arms should be in a slightly bent position. · Try a kneeling chair, which helps tilt your hips forward. This takes pressure off your lower back. · Try sitting on an exercise ball. It can rock from side to side, which helps keep your back loose. Lift properly  · Squat down, bending at the hips and knees only. If you need to, put one knee to the floor and extend your other knee in front of you, bent at a right angle (half kneeling). · Press your chest straight forward. This helps keep your upper back straight while keeping a slight arch in your low back. · Hold the load as close to your body as possible, at the level of your navel. · Use your feet to change direction, taking small steps. · Lead with your hips as you change direction. Keep your shoulders in line with your hips as you move. Do not twist your body. · Set down your load carefully, squatting with your knees and hips only. When should you call for help? Watch closely for changes in your health, and be sure to contact your doctor if you have any problems. Where can you learn more? Go to http://olivier-cliff.info/. Enter S810 in the search box to learn more about \"Back Care and Preventing Injuries: Care Instructions. \"  Current as of: September 20, 2018  Content Version: 11.9  © 3149-8808 [employer]. Care instructions adapted under license by [employer] (which disclaims liability or warranty for this information). If you have questions about a medical condition or this instruction, always ask your healthcare professional. [place] [address] any warranty or liability for your use of this information.

## 2019-01-30 ENCOUNTER — TELEPHONE ANTICOAG (OUTPATIENT)
Dept: CARDIOLOGY CLINIC | Age: 64
End: 2019-01-30

## 2019-01-30 DIAGNOSIS — I48.0 PAROXYSMAL ATRIAL FIBRILLATION (HCC): ICD-10-CM

## 2019-01-30 LAB — INR, EXTERNAL: 2.2

## 2019-02-28 DIAGNOSIS — I48.91 ATRIAL FIBRILLATION, UNSPECIFIED TYPE (HCC): ICD-10-CM

## 2019-02-28 DIAGNOSIS — M79.89 LEG SWELLING: ICD-10-CM

## 2019-02-28 DIAGNOSIS — I50.32 CHRONIC DIASTOLIC HEART FAILURE (HCC): ICD-10-CM

## 2019-02-28 DIAGNOSIS — I50.32 DIASTOLIC CHF, CHRONIC (HCC): ICD-10-CM

## 2019-02-28 RX ORDER — RAMIPRIL 10 MG/1
10 CAPSULE ORAL 2 TIMES DAILY
Qty: 180 CAP | Refills: 1 | Status: SHIPPED | OUTPATIENT
Start: 2019-02-28 | End: 2019-04-16 | Stop reason: CLARIF

## 2019-02-28 RX ORDER — FUROSEMIDE 40 MG/1
40 TABLET ORAL 2 TIMES DAILY
Qty: 180 TAB | Refills: 1 | Status: SHIPPED | OUTPATIENT
Start: 2019-02-28 | End: 2020-03-04 | Stop reason: SDUPTHER

## 2019-02-28 RX ORDER — WARFARIN SODIUM 7.5 MG/1
7.5 TABLET ORAL DAILY
Qty: 30 TAB | Refills: 5 | Status: SHIPPED | OUTPATIENT
Start: 2019-02-28 | End: 2019-09-25 | Stop reason: SDUPTHER

## 2019-02-28 RX ORDER — NISOLDIPINE 17 MG/1
17 TABLET, FILM COATED, EXTENDED RELEASE ORAL DAILY
Qty: 30 TAB | Refills: 5 | Status: SHIPPED | OUTPATIENT
Start: 2019-02-28 | End: 2019-10-01 | Stop reason: SDUPTHER

## 2019-03-01 ENCOUNTER — TELEPHONE ANTICOAG (OUTPATIENT)
Dept: CARDIOLOGY CLINIC | Age: 64
End: 2019-03-01

## 2019-03-01 DIAGNOSIS — I48.0 PAROXYSMAL ATRIAL FIBRILLATION (HCC): ICD-10-CM

## 2019-03-01 LAB — INR, EXTERNAL: 2.3

## 2019-03-20 ENCOUNTER — OFFICE VISIT (OUTPATIENT)
Dept: CARDIOLOGY CLINIC | Age: 64
End: 2019-03-20

## 2019-03-20 VITALS
DIASTOLIC BLOOD PRESSURE: 94 MMHG | SYSTOLIC BLOOD PRESSURE: 164 MMHG | BODY MASS INDEX: 38.54 KG/M2 | WEIGHT: 310 LBS | HEIGHT: 75 IN | HEART RATE: 84 BPM

## 2019-03-20 DIAGNOSIS — I11.9 BENIGN HYPERTENSIVE HEART DISEASE WITHOUT HEART FAILURE: ICD-10-CM

## 2019-03-20 DIAGNOSIS — I48.20 CHRONIC ATRIAL FIBRILLATION (HCC): Primary | ICD-10-CM

## 2019-03-20 DIAGNOSIS — I27.20 PULMONARY HTN (HCC): ICD-10-CM

## 2019-03-20 DIAGNOSIS — I05.9 MITRAL VALVE DISORDER: ICD-10-CM

## 2019-03-20 DIAGNOSIS — Z79.01 ANTICOAGULANT LONG-TERM USE: ICD-10-CM

## 2019-03-20 DIAGNOSIS — I50.32 CHRONIC DIASTOLIC HEART FAILURE (HCC): ICD-10-CM

## 2019-03-20 RX ORDER — CARVEDILOL 12.5 MG/1
12.5 TABLET ORAL 2 TIMES DAILY WITH MEALS
Qty: 180 TAB | Refills: 3 | Status: SHIPPED | OUTPATIENT
Start: 2019-03-20 | End: 2020-01-02

## 2019-03-20 NOTE — PROGRESS NOTES
HISTORY OF PRESENT ILLNESS Golden Claude Sr. is a 70 y.o. male. Patient with a fib,chf,pulmonary htn,mr. On follow up patient denies any chest pains,sob, palpitation or other significant symptoms. Valvular Heart Disease The history is provided by the patient. This is a chronic problem. The problem occurs constantly. The problem has not changed since onset. Pertinent negatives include no chest pain, no abdominal pain, no headaches and no shortness of breath. CHF The history is provided by the patient. This is a chronic problem. The problem occurs constantly. The problem has not changed since onset. Pertinent negatives include no chest pain, no abdominal pain, no headaches and no shortness of breath. Palpitations The history is provided by the patient. This is a chronic problem. The problem has not changed since onset. Associated symptoms include lower extremity edema. Pertinent negatives include no fever, no chest pain, no claudication, no orthopnea, no PND, no abdominal pain, no nausea, no vomiting, no headaches, no dizziness, no weakness, no cough, no hemoptysis, no shortness of breath and no sputum production. His past medical history is significant for hypertension. Hypertension The history is provided by the patient. This is a chronic problem. The problem occurs constantly. The problem has not changed since onset. Pertinent negatives include no chest pain, no abdominal pain, no headaches and no shortness of breath. Leg Swelling The history is provided by the patient. This is a chronic problem. The problem occurs daily. The problem has not changed since onset. Pertinent negatives include no chest pain, no abdominal pain, no headaches and no shortness of breath. Nothing aggravates the symptoms. Review of Systems Constitutional: Negative for chills and fever. HENT: Negative for nosebleeds. Eyes: Negative for blurred vision and double vision. Respiratory: Negative for cough, hemoptysis, sputum production, shortness of breath and wheezing. Cardiovascular: Negative for chest pain, palpitations, orthopnea, claudication, leg swelling and PND. Gastrointestinal: Negative for abdominal pain, heartburn, nausea and vomiting. Musculoskeletal: Negative for myalgias. Skin: Negative for rash. Neurological: Negative for dizziness, weakness and headaches. Endo/Heme/Allergies: Does not bruise/bleed easily. Family History Problem Relation Age of Onset  Kidney Disease Mother  Diabetes Mother  Cancer Father  No Known Problems Sister  No Known Problems Brother  Hypertension Neg Hx Past Medical History:  
Diagnosis Date  Atrial fibrillation ([address]  Chronic diastolic heart failure ([address]  Hypertension  Mitral valve disorders(424.0) Past Surgical History:  
Procedure Laterality Date  HX ADENOIDECTOMY  HX TONSILLECTOMY No Known Allergies Current Outpatient Medications Medication Sig  carvedilol (COREG) 12.5 mg tablet Take 1 Tab by mouth two (2) times daily (with meals).  ramipril (ALTACE) 10 mg capsule Take 1 Cap by mouth two (2) times a day.  furosemide (LASIX) 40 mg tablet Take 1 Tab by mouth two (2) times a day.  nisoldipine SR (SULAR) 17 mg Tb24 tablet Take 1 Tab by mouth daily.  COUMADIN 7.5 mg tablet Take 1 Tab by mouth daily. No current facility-administered medications for this visit. Visit Vitals BP (!) 164/94 Pulse 84 Ht 6' 3\" (1.905 m) Wt 140.6 kg (310 lb) BMI 38.75 kg/m² Physical Exam  
Constitutional: He is oriented to person, place, and time. He appears well-developed and well-nourished. HENT:  
Head: Normocephalic and atraumatic. Eyes: Conjunctivae are normal.  
Neck: Neck supple. No JVD present. No tracheal deviation present. No thyromegaly present. Cardiovascular: Normal rate and normal heart sounds.  An irregularly irregular rhythm present. Exam reveals no gallop and no friction rub. No murmur heard. Pulmonary/Chest: Breath sounds normal. No respiratory distress. He has no wheezes. He has no rales. He exhibits no tenderness. Abdominal: Soft. There is no tenderness. Musculoskeletal: He exhibits no edema. Neurological: He is alert and oriented to person, place, and time. Skin: Skin is warm and dry. Psychiatric: He has a normal mood and affect. Mr. Ahsan Lindsey has a reminder for a \"due or due soon\" health maintenance. I have asked that he contact his primary care provider for follow-up on this health maintenance. CARDIOLOGY STUDIES 7/24/2013 Echocardiogram - Complete Result normal ef,enlarged la,mild mod mr,mild tr,pap 38 Some recent data might be hidden SUMMARY:echo:5/2015 Left ventricle: Systolic function was normal. Ejection fraction was 
estimated in the range of 55 % to 60 %. There were no regional wall motion 
abnormalities. Left atrium: The atrium was mildly dilated. Mitral valve: There was mild annular calcification. There was mild diffuse 
thickening of the anterior and posterior leaflets. There was mild 
regurgitation. Mean transmitral gradient was 1.6 mmHg. Tricuspid valve: Tricuspid regurgitation peak velocity: 3.1 m/sec. Pulmonary artery systolic pressure: 41 mmHg. I Have personally reviewed recent relevant labs available and discussed with patient 8/2017-dig,bmp SUMMARY:3/2018-echo Left ventricle: Patient was in an irregular rhythm throughout the entire 
exam. Systolic function was normal. Ejection fraction was estimated in the 
range of 55 % to 60 %. No obvious wall motion abnormalities identified in 
the views obtained. There was mild concentric hypertrophy. Right ventricle: The ventricle was dilated. Left atrium: The atrium was mildly dilated. Right atrium: The atrium was dilated. Mitral valve: There was mild annular calcification. There was mild regurgitation. Tricuspid valve: There was mild regurgitation. Pulmonary artery systolic 
pressure: 59 mmHg. There was moderate pulmonary hypertension. Assessment ICD-10-CM ICD-9-CM 1. Chronic atrial fibrillation ([facility]) I48.2 427.31 Stable rate controlled continue medication 2. Chronic diastolic heart failure ([facility]) I50.32 428.32 Stable class II continue monitoring and treatment 3. Mitral valve disorder I05.9 394.9 Mild mitral regurgitation continue treatment 4. Benign hypertensive heart disease without heart failure I11.9 402.10 Elevated blood pressure. Will restart Coreg at half dose. Monitor for heart rate 5. Anticoagulant long-term use Z79.01 V58.61 For A. fib continue treatment 6. Pulmonary HTN ([facility]) I27.20 416.8 Moderate pulmonary hypertension multifactorial group 2 and 3  
 
3/2018 Off dig and coreg due to bradycardia-dig level 1.5 after daily dose at that time Edema better-use zaroxolyn as needed 3/2019 Mildly elevated blood pressure. Will restart Coreg at 12.5 mg twice a day to was discontinued in past due to bradycardia associated with digoxin and Coreg use. Currently off digoxin. Also LDL of 126 and 10/2018. Continue with diet and weight loss Orders Placed This Encounter  carvedilol (COREG) 12.5 mg tablet Sig: Take 1 Tab by mouth two (2) times daily (with meals). Dispense:  180 Tab Refill:  3 Follow-up Disposition: Not on File

## 2019-03-20 NOTE — PROGRESS NOTES
1. Have you been to the ER, urgent care clinic since your last visit? Hospitalized since your last visit? 
 
 no 
 
2. Have you seen or consulted any other health care providers outside of the [address] [place] [address] since your last visit? Include any pap smears or colon screening. No  
 
3. Since your last visit, have you had any of the following symptoms?  no

## 2019-03-29 LAB
INR PPP: 2.45 (ref 0.89–1.29)
PROTHROMBIN TIME: 24.2 SEC (ref 9–13)

## 2019-04-01 ENCOUNTER — TELEPHONE ANTICOAG (OUTPATIENT)
Dept: CARDIOLOGY CLINIC | Age: 64
End: 2019-04-01

## 2019-04-01 DIAGNOSIS — I48.0 PAROXYSMAL ATRIAL FIBRILLATION (HCC): ICD-10-CM

## 2019-04-01 LAB — INR, EXTERNAL: 2.4

## 2019-04-02 NOTE — PROGRESS NOTES
Spoken with patient in concern to coumadin dosing. Per NP patient is to take 7.5 mg daily , with an INR recheck on Tuesday, April 30, 2019 . Patient verbalized understanding with teach back. Patient reminded if any questions or concerns to call the office.

## 2019-04-05 ENCOUNTER — DOCUMENTATION ONLY (OUTPATIENT)
Dept: FAMILY MEDICINE CLINIC | Facility: CLINIC | Age: 64
End: 2019-04-05

## 2019-04-05 NOTE — PROGRESS NOTES
Pharmacist Note: Immunization Update    Patient: Ayala Strong Sr. (64 y.o., 1955)     Patient's immunization history was updated to reflect information contained in the [place] and/or outside immunization/pharmacy records were reconciled within [facility]. Health Maintenance schedule updated when appropriate.     Current immunizations now reflect:       Immunization History   Administered Date(s) Administered    Td 06/22/2010       Gita Mark, PharmD, BCACP

## 2019-04-16 ENCOUNTER — HOSPITAL ENCOUNTER (OUTPATIENT)
Dept: LAB | Age: 64
Discharge: HOME | End: 2019-04-16
Payer: MEDICARE

## 2019-04-16 ENCOUNTER — OFFICE VISIT (OUTPATIENT)
Dept: FAMILY MEDICINE CLINIC | Facility: CLINIC | Age: 64
End: 2019-04-16

## 2019-04-16 VITALS
DIASTOLIC BLOOD PRESSURE: 88 MMHG | WEIGHT: 309 LBS | BODY MASS INDEX: 38.42 KG/M2 | HEIGHT: 75 IN | OXYGEN SATURATION: 94 % | TEMPERATURE: 97.5 F | HEART RATE: 90 BPM | SYSTOLIC BLOOD PRESSURE: 152 MMHG | RESPIRATION RATE: 16 BRPM

## 2019-04-16 DIAGNOSIS — Z12.11 SCREENING FOR MALIGNANT NEOPLASM OF COLON: ICD-10-CM

## 2019-04-16 DIAGNOSIS — I10 ESSENTIAL HYPERTENSION, BENIGN: ICD-10-CM

## 2019-04-16 DIAGNOSIS — I10 ESSENTIAL HYPERTENSION, BENIGN: Primary | ICD-10-CM

## 2019-04-16 DIAGNOSIS — E66.01 SEVERE OBESITY (BMI 35.0-39.9) WITH COMORBIDITY (HCC): ICD-10-CM

## 2019-04-16 DIAGNOSIS — E78.5 HYPERLIPIDEMIA LDL GOAL <100: ICD-10-CM

## 2019-04-16 LAB
ANION GAP SERPL CALC-SCNC: 6 MMOL/L (ref 3–18)
BUN SERPL-MCNC: 11 MG/DL (ref 7–18)
BUN/CREAT SERPL: 9 (ref 12–20)
CALCIUM SERPL-MCNC: 9.2 MG/DL (ref 8.5–10.1)
CHLORIDE SERPL-SCNC: 104 MMOL/L (ref 100–108)
CO2 SERPL-SCNC: 30 MMOL/L (ref 21–32)
CREAT SERPL-MCNC: 1.25 MG/DL (ref 0.6–1.3)
GLUCOSE SERPL-MCNC: 130 MG/DL (ref 74–99)
POTASSIUM SERPL-SCNC: 4 MMOL/L (ref 3.5–5.5)
SODIUM SERPL-SCNC: 140 MMOL/L (ref 136–145)

## 2019-04-16 PROCEDURE — 36415 COLL VENOUS BLD VENIPUNCTURE: CPT

## 2019-04-16 PROCEDURE — 80048 BASIC METABOLIC PNL TOTAL CA: CPT

## 2019-04-16 RX ORDER — ATORVASTATIN CALCIUM 20 MG/1
20 TABLET, FILM COATED ORAL DAILY
Qty: 30 TAB | Refills: 3 | Status: SHIPPED | OUTPATIENT
Start: 2019-04-16 | End: 2019-07-08 | Stop reason: SDUPTHER

## 2019-04-16 RX ORDER — BENAZEPRIL HYDROCHLORIDE 40 MG/1
40 TABLET ORAL DAILY
Qty: 30 TAB | Refills: 2 | Status: SHIPPED | OUTPATIENT
Start: 2019-04-16 | End: 2019-07-08 | Stop reason: SDUPTHER

## 2019-04-16 NOTE — PROGRESS NOTES
Ayala Strong Sr. is a 70 y.o. male presenting today for Well Male (6 month follow up)  . HPI:  Ayala Strong Sr. presents to the office today for attention follow-up care. Patient presents today with his blood pressure elevated 152/88. Patient denies any chest pain, palpitation or lower extremity edema. Patient also has a history of atrial fibrillation but he is followed by Dr. Nusrat Hermosillo and was last seen on March 29, 2019. Patient had labs done in October 2018 his cholesterol was mildly elevated. His LDL was greater than 127. During patient's last office visit he was referred to Dr. Musa Golden for chronic lumbar pain. Patient reports today that his back pain is improved and he continues to do lumbar exercises as instructed by physical therapist.  He is complaining of back pain at a 1 on a scale of 0-10. Review of Systems   Respiratory: Negative for cough, sputum production and shortness of breath. Cardiovascular: Negative for chest pain, palpitations and leg swelling. Gastrointestinal: Negative for diarrhea, nausea and vomiting. Musculoskeletal: Positive for back pain. Neurological: Negative for dizziness and headaches. No Known Allergies    Current Outpatient Medications   Medication Sig Dispense Refill    atorvastatin (LIPITOR) 20 mg tablet Take 1 Tab by mouth daily. 30 Tab 3    benazepril (LOTENSIN) 40 mg tablet Take 1 Tab by mouth daily. STOP Ramipril 30 Tab 2    carvedilol (COREG) 12.5 mg tablet Take 1 Tab by mouth two (2) times daily (with meals). 180 Tab 3    furosemide (LASIX) 40 mg tablet Take 1 Tab by mouth two (2) times a day. 180 Tab 01    nisoldipine SR (SULAR) 17 mg Tb24 tablet Take 1 Tab by mouth daily. 30 Tab 05    COUMADIN 7.5 mg tablet Take 1 Tab by mouth daily.  30 Tab 5       Past Medical History:   Diagnosis Date    Atrial fibrillation (HCC)     Chronic diastolic heart failure (HCC)     Hypertension     Mitral valve disorders(424.0)        Past Surgical History:   Procedure Laterality Date    HX ADENOIDECTOMY      HX TONSILLECTOMY         Social History     Socioeconomic History    Marital status: SINGLE     Spouse name: Not on file    Number of children: Not on file    Years of education: Not on file    Highest education level: Not on file   Occupational History    Not on file   Social Needs    Financial resource strain: Not on file    Food insecurity:     Worry: Not on file     Inability: Not on file    Transportation needs:     Medical: Not on file     Non-medical: Not on file   Tobacco Use    Smoking status: Former Smoker     Last attempt to quit: 3/21/1981     Years since quittin.0    Smokeless tobacco: Never Used   Substance and Sexual Activity    Alcohol use: No    Drug use: No    Sexual activity: Never   Lifestyle    Physical activity:     Days per week: Not on file     Minutes per session: Not on file    Stress: Not on file   Relationships    Social connections:     Talks on phone: Not on file     Gets together: Not on file     Attends Bahai service: Not on file     Active member of club or organization: Not on file     Attends meetings of clubs or organizations: Not on file     Relationship status: Not on file    Intimate partner violence:     Fear of current or ex partner: Not on file     Emotionally abused: Not on file     Physically abused: Not on file     Forced sexual activity: Not on file   Other Topics Concern   [address] Service Not Asked    Blood Transfusions Not Asked    Caffeine Concern Not Asked    Occupational Exposure Not Asked   [place] Hazards Not Asked    Sleep Concern Not Asked    Stress Concern Not Asked    Weight Concern Not Asked    Special Diet Not Asked    Back Care Not Asked    Exercise Not Asked    Bike Helmet Not Asked   [place] Not Asked    Self-Exams Not Asked   Social History Narrative    Not on file       Patient does not have an advanced directive on file    Vitals: 04/16/19 0952   BP: 152/88   Pulse: 90   Resp: 16   Temp: 97.5 °F (36.4 °C)   TempSrc: Tympanic   SpO2: 94%   Weight: 309 lb (140.2 kg)   Height: 6' 3\" (1.905 m)   PainSc:   1   PainLoc: Back       Physical Exam   Constitutional: He is oriented to person, place, and time. No distress. Cardiovascular: Normal rate, regular rhythm and normal heart sounds. Pulmonary/Chest: Effort normal and breath sounds normal.   Abdominal: Soft. Bowel sounds are normal.   Musculoskeletal: Normal range of motion. Lymphadenopathy:     He has no cervical adenopathy. Neurological: He is alert and oriented to person, place, and time. Nursing note and vitals reviewed. Telephone Anticoag on 04/01/2019   Component Date Value Ref Range Status    INR, External 03/29/2019 2.4   Final   Office Visit on 03/20/2019   Component Date Value Ref Range Status    Prothrombin time 03/29/2019 24.2* 9.0 - 13.0 sec Final    INR 03/29/2019 2.45* 0.89 - 1.29 Final    Comment: Prothrombin Time (PT)  Suggested INR therapeutic range for Vitamin K antagonist therapy:  Standard Dose  (Moderate intensity therapeutic range): 2.0 - 3.0  (Higher intensity therapeutic range): 2.5 - 3.5     Telephone Anticoag on 03/01/2019   Component Date Value Ref Range Status    INR, External 03/01/2019 2.3   Final   Telephone Anticoag on 01/30/2019   Component Date Value Ref Range Status    INR, External 01/30/2019 2.2   Final       .No results found for any visits on 04/16/19. Assessment / Plan:      ICD-10-CM ICD-9-CM    1. Essential hypertension, benign I10 401.1 atorvastatin (LIPITOR) 20 mg tablet      METABOLIC PANEL, BASIC      benazepril (LOTENSIN) 40 mg tablet   2. Severe obesity (BMI 35.0-39. 9) with comorbidity ([address] E66.01 278.01    3. Screening for malignant neoplasm of colon Z12.11 V76.51 REFERRAL TO GASTROENTEROLOGY   4.  Hyperlipidemia LDL goal <100 E78.5 272.4 atorvastatin (LIPITOR) 20 mg tablet      Fasting labs in 4 months  Referral GI_ colonoscopy      Follow-up and Dispositions    · Return in about 4 months (around 2019). I asked the patient if he  had any questions and answered his  questions. The patient stated that he understands the treatment plan and agrees with the treatment plan      This is an Initial Medicare Annual Wellness Exam (AWV) (Performed 12 months after IPPE or effective date of Medicare Part B enrollment, Once in a lifetime)    I have reviewed the patient's medical history in detail and updated the computerized patient record. History     Past Medical History:   Diagnosis Date    Atrial fibrillation (HCC)     Chronic diastolic heart failure (HCC)     Hypertension     Mitral valve disorders(424.0)       Past Surgical History:   Procedure Laterality Date    HX ADENOIDECTOMY      HX TONSILLECTOMY       Current Outpatient Medications   Medication Sig Dispense Refill    atorvastatin (LIPITOR) 20 mg tablet Take 1 Tab by mouth daily. 30 Tab 3    benazepril (LOTENSIN) 40 mg tablet Take 1 Tab by mouth daily. STOP Ramipril 30 Tab 2    carvedilol (COREG) 12.5 mg tablet Take 1 Tab by mouth two (2) times daily (with meals). 180 Tab 3    furosemide (LASIX) 40 mg tablet Take 1 Tab by mouth two (2) times a day. 180 Tab 01    nisoldipine SR (SULAR) 17 mg Tb24 tablet Take 1 Tab by mouth daily. 30 Tab 05    COUMADIN 7.5 mg tablet Take 1 Tab by mouth daily.  27 Tab 5     No Known Allergies  Family History   Problem Relation Age of Onset    Kidney Disease Mother     Diabetes Mother     Cancer Father     No Known Problems Sister     No Known Problems Brother     Hypertension Neg Hx      Social History     Tobacco Use    Smoking status: Former Smoker     Last attempt to quit: 3/21/1981     Years since quittin.0    Smokeless tobacco: Never Used   Substance Use Topics    Alcohol use: No     Patient Active Problem List   Diagnosis Code    Mitral valve disorder I05.9    Benign hypertensive heart disease without heart failure I11.9    Chronic diastolic heart failure (HCC) I50.32    Pulmonary HTN (HCC) I27.20    Obesity E66.9    Atrial fibrillation (HCC) I48.91    Essential hypertension, benign I10    Chronic atrial fibrillation (HCC) I48.2    Anticoagulant long-term use Z79.01    Severe obesity (BMI 35.0-39. 9) with comorbidity ([address] E66.01       Depression Risk Factor Screening:     3 most recent PHQ Screens 3/20/2019   Little interest or pleasure in doing things Not at all   Feeling down, depressed, irritable, or hopeless Not at all   Total Score PHQ 2 0     Alcohol Risk Factor Screening: You do not drink alcohol or very rarely. Functional Ability and Level of Safety:     Hearing Loss  Hearing is good. Activities of Daily Living  The home contains: no safety equipment. Patient does total self care    Fall Risk  Fall Risk Assessment, last 12 mths 3/20/2019   Able to walk? Yes   Fall in past 12 months? No       Abuse Screen  Patient is not abused    Cognitive Screening   Evaluation of Cognitive Function:  Has your family/caregiver stated any concerns about your memory: no  Normal    Patient Care Team   Patient Care Team:  Luis A Brian NP as PCP - General (Nurse Practitioner)  PAT Sierra (Physician Assistant)    Assessment/Plan   Education and counseling provided:  Are appropriate based on today's review and evaluation    Diagnoses and all orders for this visit:    1. Essential hypertension, benign  -     atorvastatin (LIPITOR) 20 mg tablet; Take 1 Tab by mouth daily.  -     METABOLIC PANEL, BASIC; Future  -     benazepril (LOTENSIN) 40 mg tablet; Take 1 Tab by mouth daily. STOP Ramipril    2. Severe obesity (BMI 35.0-39. 9) with comorbidity ([address]    3. Screening for malignant neoplasm of colon  -     REFERRAL TO GASTROENTEROLOGY    4. Hyperlipidemia LDL goal <100  -     atorvastatin (LIPITOR) 20 mg tablet; Take 1 Tab by mouth daily.          Health Maintenance Due   Topic Date Due    Shingrix Vaccine Age 50> (1 of 2) 05/18/2003    FOBT Q 1 YEAR AGE 50-75  05/18/2003    DTaP/Tdap/Td series (1 - Tdap) 06/23/2010    Pneumococcal 65+ years (1 of 2 - PCV13) 05/18/2018    MEDICARE YEARLY EXAM  09/19/2018     Discussed the patient's BMI with him. The BMI follow up plan is as follows:     dietary management education, guidance, and counseling  encourage exercise  monitor weight  prescribed dietary intake    An After Visit Summary was printed and given to the patient.

## 2019-04-16 NOTE — PATIENT INSTRUCTIONS
Body Mass Index: Care Instructions  Your Care Instructions    Body mass index (BMI) can help you see if your weight is raising your risk for health problems. It uses a formula to compare how much you weigh with how tall you are. · A BMI lower than 18.5 is considered underweight. · A BMI between 18.5 and 24.9 is considered healthy. · A BMI between 25 and 29.9 is considered overweight. A BMI of 30 or higher is considered obese. If your BMI is in the normal range, it means that you have a lower risk for weight-related health problems. If your BMI is in the overweight or obese range, you may be at increased risk for weight-related health problems, such as high blood pressure, heart disease, stroke, arthritis or joint pain, and diabetes. If your BMI is in the underweight range, you may be at increased risk for health problems such as fatigue, lower protection (immunity) against illness, muscle loss, bone loss, hair loss, and hormone problems. BMI is just one measure of your risk for weight-related health problems. You may be at higher risk for health problems if you are not active, you eat an unhealthy diet, or you drink too much alcohol or use tobacco products. Follow-up care is a key part of your treatment and safety. Be sure to make and go to all appointments, and call your doctor if you are having problems. It's also a good idea to know your test results and keep a list of the medicines you take. How can you care for yourself at home? · Practice healthy eating habits. This includes eating plenty of fruits, vegetables, whole grains, lean protein, and low-fat dairy. · If your doctor recommends it, get more exercise. Walking is a good choice. Bit by bit, increase the amount you walk every day. Try for at least 30 minutes on most days of the week. · Do not smoke. Smoking can increase your risk for health problems. If you need help quitting, talk to your doctor about stop-smoking programs and medicines. These can increase your chances of quitting for good. · Limit alcohol to 2 drinks a day for men and 1 drink a day for women. Too much alcohol can cause health problems. If you have a BMI higher than 25  · Your doctor may do other tests to check your risk for weight-related health problems. This may include measuring the distance around your waist. A waist measurement of more than 40 inches in men or 35 inches in women can increase the risk of weight-related health problems. · Talk with your doctor about steps you can take to stay healthy or improve your health. You may need to make lifestyle changes to lose weight and stay healthy, such as changing your diet and getting regular exercise. If you have a BMI lower than 18.5  · Your doctor may do other tests to check your risk for health problems. · Talk with your doctor about steps you can take to stay healthy or improve your health. You may need to make lifestyle changes to gain or maintain weight and stay healthy, such as getting more healthy foods in your diet and doing exercises to build muscle. Where can you learn more? Go to http://olivier-cliff.info/. Enter S176 in the search box to learn more about \"Body Mass Index: Care Instructions. \"  Current as of: October 13, 2016  Content Version: 11.4  © 1850-0786 [employer]. Care instructions adapted under license by [employer] (which disclaims liability or warranty for this information). If you have questions about a medical condition or this instruction, always ask your healthcare professional. [address] any warranty or liability for your use of this information.

## 2019-04-30 ENCOUNTER — TELEPHONE ANTICOAG (OUTPATIENT)
Dept: CARDIOLOGY CLINIC | Age: 64
End: 2019-04-30

## 2019-04-30 DIAGNOSIS — I48.0 PAROXYSMAL ATRIAL FIBRILLATION (HCC): ICD-10-CM

## 2019-04-30 LAB — INR, EXTERNAL: 3.1

## 2019-05-01 ENCOUNTER — TELEPHONE (OUTPATIENT)
Dept: FAMILY MEDICINE CLINIC | Facility: CLINIC | Age: 64
End: 2019-05-01

## 2019-05-01 NOTE — TELEPHONE ENCOUNTER
----- Message from Juliette Hale NP sent at 5/1/2019  1:40 AM EDT -----  Please notify patient that lab results were reviewed and the results showed elevated fasting lab work. Please verify the time the lab was done and if the patient was fasting. If the patient was fasting please have patient schedule a follow-up visit for a hemoglobin A1c.

## 2019-05-01 NOTE — PROGRESS NOTES
Please notify patient that lab results were reviewed and the results showed elevated fasting lab work. Please verify the time the lab was done and if the patient was fasting. If the patient was fasting please have patient schedule a follow-up visit for a hemoglobin A1c.

## 2019-05-17 ENCOUNTER — TELEPHONE ANTICOAG (OUTPATIENT)
Dept: CARDIOLOGY CLINIC | Age: 64
End: 2019-05-17

## 2019-05-17 DIAGNOSIS — I48.0 PAROXYSMAL ATRIAL FIBRILLATION (HCC): ICD-10-CM

## 2019-05-17 LAB — INR, EXTERNAL: 3.5

## 2019-05-17 NOTE — PROGRESS NOTES
Mr. Dell Win is here today for anticoagulation monitoring for the diagnosis of Atrial Fibrillation. His INR goal is 2.0-3.0 and his current Coumadin dose is 7.5 daily. Today's findings include an INR of 3.5     Considering Mr. Ibarra's past history, todays findings, and per the coumadin policy/protocol, Mr. Austen Kraus was instructed to take Coumadin as follows,  Hold for 1 day then 7.5 mg daily. He was also instructed to schedule an appointment in 1 weeks prior to leaving for an INR check. A full discussion of the nature of anticoagulants has been carried out. A full discussion of the need for frequent and regular monitoring, precise dosage adjustment and compliance was stressed. Side effects of potential bleeding were discussed and Mr. Austen Kraus was instructed to call [phone] if there are any signs of abnormal bleeding. Mr. Austen Kraus was instructed to avoid any OTC items containing aspirin or ibuprofen and prior to starting any new OTC products to consult with his physician or pharmacist to ensure no drug interactions are present. Mr. Austen Kraus was instructed to avoid any major changes in his general diet and to avoid alcohol consumption. .    Mr. Austen Kraus was provided a literature booklet, \"Treatment with Warfarin (Coumadin)\", that includes topics on understanding coumadin therapy, drug interaction considerations, vitamin K and coumadin use, interactions with foods and supplements containing vitamin K, and the use of herbal products. Mr. Austen Kraus verbalized his understanding of all instructions and will call the office with any questions, concerns, or signs of abnormal bleeding or blood clot.

## 2019-06-18 ENCOUNTER — TELEPHONE ANTICOAG (OUTPATIENT)
Dept: CARDIOLOGY CLINIC | Age: 64
End: 2019-06-18

## 2019-06-18 DIAGNOSIS — I48.0 PAROXYSMAL ATRIAL FIBRILLATION (HCC): ICD-10-CM

## 2019-06-18 LAB — INR, EXTERNAL: 2.8 (ref 2–3)

## 2019-06-18 NOTE — PATIENT INSTRUCTIONS
Patient was called and given instructions to resume current dose of coumadin and recheck INR on 7/16/19. He voices understanding and acceptance of this advice and will call back if any further questions or concerns.

## 2019-06-25 ENCOUNTER — OFFICE VISIT (OUTPATIENT)
Dept: SURGERY | Age: 64
End: 2019-06-25

## 2019-06-25 VITALS
HEART RATE: 94 BPM | BODY MASS INDEX: 37.55 KG/M2 | HEIGHT: 75 IN | OXYGEN SATURATION: 95 % | WEIGHT: 302 LBS | RESPIRATION RATE: 18 BRPM

## 2019-06-25 DIAGNOSIS — Z12.11 COLON CANCER SCREENING: Primary | ICD-10-CM

## 2019-06-25 NOTE — PROGRESS NOTES
Review of Systems   Constitutional: Positive for diaphoresis. Negative for chills, fever, malaise/fatigue and weight loss. Normal per patient   HENT: Negative. Eyes: Negative. Respiratory: Positive for cough, shortness of breath and wheezing. Negative for hemoptysis and sputum production. Cardiovascular: Positive for leg swelling. Negative for chest pain, palpitations, orthopnea, claudication and PND. Gastrointestinal: Negative. Genitourinary: Negative. Musculoskeletal: Positive for back pain. Negative for falls, joint pain, myalgias and neck pain. Skin: Negative. Neurological: Negative. Endo/Heme/Allergies: Negative for environmental allergies and polydipsia. Bruises/bleeds easily. Psychiatric/Behavioral: Negative. Colon Screen    Patient: Dino Wolf Sr. MRN: [id number]  SSN: [id number]    YOB: 1955  Age: 75 y.o. Sex: male        Subjective:   Dino Wolf Sr. was referred by his PCP, Shawn Hargrove NP. Patient referred for colonoscopy for   Screening colonoscopy. Patient denies rectal pain or bleeding. Abdominal surgeries as described below, specifically appendectomy. Family history as described below, specifically none. Patient has never had a colonoscopy.     No Known Allergies    Past Medical History:   Diagnosis Date    Atrial fibrillation (HCC)     Chronic diastolic heart failure (HCC)     Hypertension     Mitral valve disorders(424.0)      Past Surgical History:   Procedure Laterality Date    HX ADENOIDECTOMY      HX OTHER SURGICAL  1965    infection drained from under chin    HX TONSILLECTOMY        Family History   Problem Relation Age of Onset    Kidney Disease Mother     Diabetes Mother     Cancer Father     No Known Problems Sister     No Known Problems Brother     Hypertension Neg Hx      Social History     Tobacco Use    Smoking status: Former Smoker     Last attempt to quit: 3/21/1981     Years since quittin.3    Smokeless tobacco: Never Used   Substance Use Topics    Alcohol use: No      Prior to Admission medications    Medication Sig Start Date End Date Taking? Authorizing Provider   atorvastatin (LIPITOR) 20 mg tablet Take 1 Tab by mouth daily. 19  Yes Ethel Varela NP   benazepril (LOTENSIN) 40 mg tablet Take 1 Tab by mouth daily. STOP Ramipril 19  Yes Ethel Varela NP   carvedilol (COREG) 12.5 mg tablet Take 1 Tab by mouth two (2) times daily (with meals). 3/20/19  Yes Thomas Oliveira MD   furosemide (LASIX) 40 mg tablet Take 1 Tab by mouth two (2) times a day. 19  Yes Myra Mercer NP   nisoldipine SR (SULAR) 17 mg Tb24 tablet Take 1 Tab by mouth daily. 19  Yes Myra Mercer NP   COUMADIN 7.5 mg tablet Take 1 Tab by mouth daily. 19  Yes Antonina Mercer April, NP          Review of Systems:      Risks colonoscopy described- colon injury, missed lesion, anesthesia problems, bleeding       Melissa Morris, LISA  July 3, 3616  59:63 AM

## 2019-07-08 DIAGNOSIS — I10 ESSENTIAL HYPERTENSION, BENIGN: ICD-10-CM

## 2019-07-08 DIAGNOSIS — E78.5 HYPERLIPIDEMIA LDL GOAL <100: ICD-10-CM

## 2019-07-08 RX ORDER — ATORVASTATIN CALCIUM 20 MG/1
TABLET, FILM COATED ORAL
Qty: 30 TAB | Refills: 2 | Status: SHIPPED | OUTPATIENT
Start: 2019-07-08 | End: 2019-08-08 | Stop reason: SDUPTHER

## 2019-07-08 RX ORDER — BENAZEPRIL HYDROCHLORIDE 40 MG/1
40 TABLET ORAL DAILY
Qty: 90 TAB | Refills: 1 | Status: SHIPPED | OUTPATIENT
Start: 2019-07-08 | End: 2020-01-03 | Stop reason: SDUPTHER

## 2019-07-17 ENCOUNTER — TELEPHONE ANTICOAG (OUTPATIENT)
Dept: CARDIOLOGY CLINIC | Age: 64
End: 2019-07-17

## 2019-07-17 DIAGNOSIS — I48.0 PAROXYSMAL ATRIAL FIBRILLATION (HCC): ICD-10-CM

## 2019-07-17 LAB — INR, EXTERNAL: 3.7

## 2019-08-06 ENCOUNTER — LAB ONLY (OUTPATIENT)
Dept: FAMILY MEDICINE CLINIC | Facility: CLINIC | Age: 64
End: 2019-08-06

## 2019-08-06 ENCOUNTER — HOSPITAL ENCOUNTER (OUTPATIENT)
Dept: LAB | Age: 64
Discharge: HOME | End: 2019-08-06
Payer: MEDICARE

## 2019-08-06 DIAGNOSIS — I48.20 CHRONIC ATRIAL FIBRILLATION (HCC): ICD-10-CM

## 2019-08-06 DIAGNOSIS — I10 ESSENTIAL HYPERTENSION, BENIGN: ICD-10-CM

## 2019-08-06 DIAGNOSIS — E78.5 HYPERLIPIDEMIA LDL GOAL <100: ICD-10-CM

## 2019-08-06 DIAGNOSIS — I10 ESSENTIAL HYPERTENSION, BENIGN: Primary | ICD-10-CM

## 2019-08-06 LAB
BASOPHILS # BLD: 0 K/UL (ref 0–0.1)
BASOPHILS NFR BLD: 1 % (ref 0–2)
DIFFERENTIAL METHOD BLD: ABNORMAL
EOSINOPHIL # BLD: 0.1 K/UL (ref 0–0.4)
EOSINOPHIL NFR BLD: 3 % (ref 0–5)
ERYTHROCYTE [DISTWIDTH] IN BLOOD BY AUTOMATED COUNT: 13.8 % (ref 11.6–14.5)
HCT VFR BLD AUTO: 47.1 % (ref 36–48)
HGB BLD-MCNC: 15.7 G/DL (ref 13–16)
INR PPP: 2.3 (ref 0.8–1.2)
LYMPHOCYTES # BLD: 2.3 K/UL (ref 0.9–3.6)
LYMPHOCYTES NFR BLD: 47 % (ref 21–52)
MCH RBC QN AUTO: 30.8 PG (ref 24–34)
MCHC RBC AUTO-ENTMCNC: 33.3 G/DL (ref 31–37)
MCV RBC AUTO: 92.5 FL (ref 74–97)
MONOCYTES # BLD: 0.4 K/UL (ref 0.05–1.2)
MONOCYTES NFR BLD: 9 % (ref 3–10)
NEUTS SEG # BLD: 1.9 K/UL (ref 1.8–8)
NEUTS SEG NFR BLD: 40 % (ref 40–73)
PLATELET # BLD AUTO: 183 K/UL (ref 135–420)
PMV BLD AUTO: 12.1 FL (ref 9.2–11.8)
PROTHROMBIN TIME: 25.4 SEC (ref 11.5–15.2)
RBC # BLD AUTO: 5.09 M/UL (ref 4.7–5.5)
WBC # BLD AUTO: 4.8 K/UL (ref 4.6–13.2)

## 2019-08-06 PROCEDURE — 85025 COMPLETE CBC W/AUTO DIFF WBC: CPT

## 2019-08-06 PROCEDURE — 36415 COLL VENOUS BLD VENIPUNCTURE: CPT

## 2019-08-06 PROCEDURE — 85610 PROTHROMBIN TIME: CPT

## 2019-08-06 PROCEDURE — 80061 LIPID PANEL: CPT

## 2019-08-06 PROCEDURE — 80053 COMPREHEN METABOLIC PANEL: CPT

## 2019-08-07 LAB
ALBUMIN SERPL-MCNC: 3.7 G/DL (ref 3.4–5)
ALBUMIN/GLOB SERPL: 0.8 {RATIO} (ref 0.8–1.7)
ALP SERPL-CCNC: 57 U/L (ref 45–117)
ALT SERPL-CCNC: 28 U/L (ref 16–61)
ANION GAP SERPL CALC-SCNC: 8 MMOL/L (ref 3–18)
AST SERPL-CCNC: 22 U/L (ref 10–38)
BILIRUB SERPL-MCNC: 1 MG/DL (ref 0.2–1)
BUN SERPL-MCNC: 11 MG/DL (ref 7–18)
BUN/CREAT SERPL: 9 (ref 12–20)
CALCIUM SERPL-MCNC: 8.9 MG/DL (ref 8.5–10.1)
CHLORIDE SERPL-SCNC: 103 MMOL/L (ref 100–111)
CHOLEST SERPL-MCNC: 105 MG/DL
CO2 SERPL-SCNC: 28 MMOL/L (ref 21–32)
CREAT SERPL-MCNC: 1.29 MG/DL (ref 0.6–1.3)
GLOBULIN SER CALC-MCNC: 4.4 G/DL (ref 2–4)
GLUCOSE SERPL-MCNC: 122 MG/DL (ref 74–99)
HDLC SERPL-MCNC: 45 MG/DL (ref 40–60)
HDLC SERPL: 2.3 {RATIO} (ref 0–5)
LDLC SERPL CALC-MCNC: 48.2 MG/DL (ref 0–100)
LIPID PROFILE,FLP: NORMAL
POTASSIUM SERPL-SCNC: 3.7 MMOL/L (ref 3.5–5.5)
PROT SERPL-MCNC: 8.1 G/DL (ref 6.4–8.2)
SODIUM SERPL-SCNC: 139 MMOL/L (ref 136–145)
TRIGL SERPL-MCNC: 59 MG/DL (ref ?–150)
VLDLC SERPL CALC-MCNC: 11.8 MG/DL

## 2019-08-08 DIAGNOSIS — E78.5 HYPERLIPIDEMIA LDL GOAL <100: ICD-10-CM

## 2019-08-08 DIAGNOSIS — I10 ESSENTIAL HYPERTENSION, BENIGN: ICD-10-CM

## 2019-08-08 RX ORDER — ATORVASTATIN CALCIUM 20 MG/1
20 TABLET, FILM COATED ORAL DAILY
Qty: 90 TAB | Refills: 3 | Status: SHIPPED | OUTPATIENT
Start: 2019-08-08 | End: 2020-09-04 | Stop reason: SDUPTHER

## 2019-08-13 ENCOUNTER — OFFICE VISIT (OUTPATIENT)
Dept: FAMILY MEDICINE CLINIC | Facility: CLINIC | Age: 64
End: 2019-08-13

## 2019-08-13 VITALS
TEMPERATURE: 97.1 F | SYSTOLIC BLOOD PRESSURE: 138 MMHG | HEART RATE: 82 BPM | WEIGHT: 299 LBS | OXYGEN SATURATION: 95 % | RESPIRATION RATE: 14 BRPM | BODY MASS INDEX: 37.18 KG/M2 | DIASTOLIC BLOOD PRESSURE: 80 MMHG | HEIGHT: 75 IN

## 2019-08-13 DIAGNOSIS — I10 ESSENTIAL HYPERTENSION, BENIGN: ICD-10-CM

## 2019-08-13 DIAGNOSIS — Z12.5 PROSTATE CANCER SCREENING: Primary | ICD-10-CM

## 2019-08-13 DIAGNOSIS — I48.0 PAROXYSMAL ATRIAL FIBRILLATION (HCC): ICD-10-CM

## 2019-08-13 DIAGNOSIS — E66.9 CLASS 2 OBESITY WITH BODY MASS INDEX (BMI) OF 37.0 TO 37.9 IN ADULT, UNSPECIFIED OBESITY TYPE, UNSPECIFIED WHETHER SERIOUS COMORBIDITY PRESENT: ICD-10-CM

## 2019-08-13 NOTE — PROGRESS NOTES
OFFICE NOTE    Ale Miner Sr. is a 75 y.o. male presenting today for office visit. 8/19/2019  9:30 AM    Chief Complaint   Patient presents with    Well Male     F/U    Results     lab     HPI: Ian Culver presents to the practice today stating he feels well. He has a history of hypertension his blood pressure is controlled today at 138/80. He presents today without any complaints of chest pain, palpitation or lower extremity edema. Patient has a history of chronic atrial fibrillation and takes Coumadin daily. He reports that his PT/INR is monitored and checked by Dr. Sharonda Kumar, cardiologist.  Patient notes he is wearing compression lower extremity edema. Patient had fasting labs prior to today's visit and labs were reviewed with patient. He is negative for any cough, wheezing or congestion. Review of Systems   Constitutional: Negative for fatigue. HENT: Negative for congestion. Respiratory: Negative for chest tightness and shortness of breath. Cardiovascular: Positive for leg swelling (trace. patient is wearing compression stocking). Negative for chest pain. Genitourinary: Negative for difficulty urinating and frequency. Musculoskeletal: Negative for myalgias. Skin: Negative for color change. Neurological: Negative for dizziness, light-headedness and headaches.          PHQ Screening   3 most recent PHQ Screens 8/13/2019   Little interest or pleasure in doing things Not at all   Feeling down, depressed, irritable, or hopeless Not at all   Total Score PHQ 2 0         History  Past Medical History:   Diagnosis Date    Atrial fibrillation (HCC)     Chronic diastolic heart failure ([address]     Hypertension     Mitral valve disorders(424.0)        Past Surgical History:   Procedure Laterality Date    HX ADENOIDECTOMY      HX OTHER SURGICAL  1965    infection drained from under chin    HX TONSILLECTOMY         Social History     Socioeconomic History    Marital status: SINGLE Spouse name: Not on file    Number of children: Not on file    Years of education: Not on file    Highest education level: Not on file   Occupational History    Not on file   Social Needs    Financial resource strain: Not on file    Food insecurity:     Worry: Not on file     Inability: Not on file    Transportation needs:     Medical: Not on file     Non-medical: Not on file   Tobacco Use    Smoking status: Former Smoker     Last attempt to quit: 3/21/1981     Years since quittin.4    Smokeless tobacco: Never Used   Substance and Sexual Activity    Alcohol use: No    Drug use: No    Sexual activity: Never   Lifestyle    Physical activity:     Days per week: Not on file     Minutes per session: Not on file    Stress: Not on file   Relationships    Social connections:     Talks on phone: Not on file     Gets together: Not on file     Attends [employer] service: Not on file     Active member of club or organization: Not on file     Attends meetings of clubs or organizations: Not on file     Relationship status: Not on file    Intimate partner violence:     Fear of current or ex partner: Not on file     Emotionally abused: Not on file     Physically abused: Not on file     Forced sexual activity: Not on file   Other Topics Concern   [address] Service Not Asked    Blood Transfusions Not Asked    Caffeine Concern Not Asked    Occupational Exposure Not Asked   [employer] Hazards Not Asked    Sleep Concern Not Asked    Stress Concern Not Asked    Weight Concern Not Asked    Special Diet Not Asked    Back Care Not Asked    Exercise Not Asked    Bike Helmet Not Asked    Seat Belt Not Asked    Self-Exams Not Asked   Social History Narrative    Not on file       No Known Allergies    Current Outpatient Medications   Medication Sig Dispense Refill    atorvastatin (LIPITOR) 20 mg tablet Take 1 Tab by mouth daily. 90 Tab 3    benazepril (LOTENSIN) 40 mg tablet Take 1 Tab by mouth daily.  STOP Ramipril 90 Tab 1    carvedilol (COREG) 12.5 mg tablet Take 1 Tab by mouth two (2) times daily (with meals). 180 Tab 3    furosemide (LASIX) 40 mg tablet Take 1 Tab by mouth two (2) times a day. 180 Tab 01    nisoldipine SR (SULAR) 17 mg Tb24 tablet Take 1 Tab by mouth daily. 30 Tab 05    COUMADIN 7.5 mg tablet Take 1 Tab by mouth daily. 30 Tab 5         Patient Care Team:  Patient Care Team:  Aden Ingram NP as PCP - General (Nurse Practitioner)  Garland Tesfaye, [id number] (Physician Assistant)  Cristal Lizarraga MD as Surgeon (Colon and Rectal Surgery)  Inocencio Ratliff MD as Physician (Cardiology)    LABS:      RADIOLOGY:  None new to review    Physical Exam   Cardiovascular: Normal rate. An irregularly irregular rhythm present. Nursing note and vitals reviewed. Vitals:    08/13/19 0939 08/13/19 1011   BP: 138/90 138/80   Pulse: 82    Resp: 14    Temp: 97.1 °F (36.2 °C)    TempSrc: Tympanic    SpO2: 95%    Weight: 299 lb (135.6 kg)    Height: 6' 3\" (1.905 m)    PainSc:   0 - No pain        Assessment and Plan    1. Prostate cancer screening  Prostate cancer screening ordered for next lab draw. Last PSA level total, 2018 and it was 2.2.  2. Paroxysmal atrial fibrillation (HCC)  Chronic history of atrial fibrillation. Takes Coumadin daily  3. Essential hypertension, benign  Blood pressure today is controlled at 138/80. No change to current treatment plan. 4. Class 2 obesity with body mass index (BMI) of 37.0 to 37.9 in adult, unspecified obesity type, unspecified whether serious comorbidity present  Patient had a 3 pound weight loss from prior visit. Lifestyle modification, diet and exercise discussed. Follow-up in 4 months    *Plan of care reviewed with patient. Patient in agreement with plan and expresses understanding. All questions answered and patient encouraged to call or RTO if further questions or concerns.        Follow-up and Dispositions    · Return in about 3 months (around 11/13/2019), or if symptoms worsen or fail to improve.

## 2019-08-13 NOTE — PROGRESS NOTES
Danney Leventhal presents today for   Chief Complaint   Patient presents with    Well Male     F/U       Danney Leventhal. preferred language for health care discussion is english. Is someone accompanying this pt? no    Is the patient using any DME equipment during [address]? no    Depression Screening:  3 most recent PHQ Screens 8/13/2019   Little interest or pleasure in doing things Not at all   Feeling down, depressed, irritable, or hopeless Not at all   Total Score PHQ 2 0       Learning Assessment:  Learning Assessment 6/25/2019   PRIMARY LEARNER Patient   BARRIERS PRIMARY LEARNER NONE   PRIMARY LANGUAGE ENGLISH   LEARNER PREFERENCE PRIMARY DEMONSTRATION   ANSWERED BY patient   RELATIONSHIP SELF       Abuse Screening:  Abuse Screening Questionnaire 4/16/2018   Do you ever feel afraid of your partner? N   Are you in a relationship with someone who physically or mentally threatens you? N   Is it safe for you to go home? Y       Fall Risk  Fall Risk Assessment, last 12 mths 8/13/2019   Able to walk? Yes   Fall in past 12 months? No       Health Maintenance reviewed and discussed and ordered per Provider. Health Maintenance Due   Topic Date Due    Shingrix Vaccine Age 50> (1 of 2) 05/18/2003    FOBT Q 1 YEAR AGE 50-75  05/18/2003    DTaP/Tdap/Td series (1 - Tdap) 06/23/2010    Pneumococcal 65+ years (1 of 2 - PCV13) 05/18/2018    Influenza Age 9 to Adult  08/01/2019   . Bess Deras Sr. is updated on all     Pt currently taking Antiplatelet therapy? Yes coumadin    Coordination of Care:  1. Have you been to the ER, urgent care clinic since your last visit? no Hospitalized since your last visit? no    2. Have you seen or consulted any other health care providers outside of the [address] [place] [address] since your last visit? no Include any pap smears or colon screening.  no

## 2019-08-20 ENCOUNTER — TELEPHONE ANTICOAG (OUTPATIENT)
Dept: CARDIOLOGY CLINIC | Age: 64
End: 2019-08-20

## 2019-08-20 DIAGNOSIS — I48.0 PAROXYSMAL ATRIAL FIBRILLATION (HCC): ICD-10-CM

## 2019-08-21 LAB — INR, EXTERNAL: 2.9 (ref 2–3)

## 2019-08-21 NOTE — PROGRESS NOTES
Mr. Freddy Veloz is here today for anticoagulation monitoring for the diagnosis of Atrial Fibrillation. His INR goal is 2.0-3.0 and his current Coumadin dose is 7.5 mg. Today's findings include an INR of 2.9     Considering Mr. Ibarra's past history, todays findings, and per the coumadin policy/protocol, Mr. Jaimie Young was instructed to take Coumadin as follows,  Take 7.5 mg daily. He was also instructed to come back in 3 weeks for an INR check. A full discussion of the nature of anticoagulants has been carried out. A full discussion of the need for frequent and regular monitoring, precise dosage adjustment and compliance was stressed. Side effects of potential bleeding were discussed and Mr. Jaimie Young was instructed to call [phone] if there are any signs of abnormal bleeding. Mr. Jaimie Young was instructed to avoid any OTC items containing aspirin or ibuprofen and prior to starting any new OTC products to consult with his physician or pharmacist to ensure no drug interactions are present. Mr. Jaimie Young was instructed to avoid any major changes in his general diet and to avoid alcohol consumption. .      Mr. Jaimie Young verbalized his understanding of all instructions and will call the office with any questions, concerns, or signs of abnormal bleeding or blood clot.

## 2019-08-21 NOTE — PATIENT INSTRUCTIONS
This has been fully explained to the patient to take 7.5 mg daily and recheck on 09/09/19, who indicates understanding.

## 2019-09-11 DIAGNOSIS — E78.5 HYPERLIPIDEMIA LDL GOAL <100: ICD-10-CM

## 2019-09-11 DIAGNOSIS — I10 ESSENTIAL HYPERTENSION, BENIGN: ICD-10-CM

## 2019-09-11 RX ORDER — ATORVASTATIN CALCIUM 20 MG/1
TABLET, FILM COATED ORAL
Qty: 30 TAB | Refills: 3 | Status: SHIPPED | OUTPATIENT
Start: 2019-09-11 | End: 2019-09-18 | Stop reason: SDUPTHER

## 2019-09-13 ENCOUNTER — TELEPHONE ANTICOAG (OUTPATIENT)
Dept: CARDIOLOGY CLINIC | Age: 64
End: 2019-09-13

## 2019-09-13 DIAGNOSIS — I48.0 PAROXYSMAL ATRIAL FIBRILLATION (HCC): ICD-10-CM

## 2019-09-13 LAB — INR, EXTERNAL: 2.9

## 2019-09-18 ENCOUNTER — OFFICE VISIT (OUTPATIENT)
Dept: CARDIOLOGY CLINIC | Age: 64
End: 2019-09-18

## 2019-09-18 VITALS
WEIGHT: 306 LBS | HEIGHT: 75 IN | SYSTOLIC BLOOD PRESSURE: 147 MMHG | DIASTOLIC BLOOD PRESSURE: 77 MMHG | HEART RATE: 78 BPM | BODY MASS INDEX: 38.05 KG/M2

## 2019-09-18 DIAGNOSIS — I48.20 CHRONIC ATRIAL FIBRILLATION (HCC): Primary | ICD-10-CM

## 2019-09-18 DIAGNOSIS — Z79.01 ANTICOAGULANT LONG-TERM USE: ICD-10-CM

## 2019-09-18 DIAGNOSIS — I10 ESSENTIAL HYPERTENSION, BENIGN: ICD-10-CM

## 2019-09-18 DIAGNOSIS — E78.5 HYPERLIPIDEMIA LDL GOAL <100: ICD-10-CM

## 2019-09-18 DIAGNOSIS — I50.32 CHRONIC DIASTOLIC HEART FAILURE (HCC): ICD-10-CM

## 2019-09-18 NOTE — PROGRESS NOTES
HISTORY OF PRESENT ILLNESS  Lana Wan Sr. is a 75 y.o. male. Patient with a fib,chf,pulmonary htn,mr. On follow up patient denies any chest pains,sob, palpitation or other significant symptoms. Valvular Heart Disease   The history is provided by the patient. This is a chronic problem. The problem occurs constantly. The problem has not changed since onset. Pertinent negatives include no chest pain, no abdominal pain, no headaches and no shortness of breath. CHF   The history is provided by the patient. This is a chronic problem. The problem occurs constantly. The problem has not changed since onset. Pertinent negatives include no chest pain, no abdominal pain, no headaches and no shortness of breath. Palpitations    The history is provided by the patient. This is a chronic problem. The problem has not changed since onset. Associated symptoms include lower extremity edema. Pertinent negatives include no fever, no chest pain, no claudication, no orthopnea, no PND, no abdominal pain, no nausea, no vomiting, no headaches, no dizziness, no weakness, no cough, no hemoptysis, no shortness of breath and no sputum production. His past medical history is significant for hypertension. Hypertension   The history is provided by the patient. This is a chronic problem. The problem occurs constantly. The problem has not changed since onset. Pertinent negatives include no chest pain, no abdominal pain, no headaches and no shortness of breath. Leg Swelling   The history is provided by the patient. This is a chronic problem. The problem occurs daily. The problem has not changed since onset. Pertinent negatives include no chest pain, no abdominal pain, no headaches and no shortness of breath. Nothing aggravates the symptoms. Review of Systems   Constitutional: Negative for chills and fever. HENT: Negative for nosebleeds. Eyes: Negative for blurred vision and double vision.    Respiratory: Negative for cough, hemoptysis, sputum production, shortness of breath and wheezing. Cardiovascular: Negative for chest pain, palpitations, orthopnea, claudication, leg swelling and PND. Gastrointestinal: Negative for abdominal pain, heartburn, nausea and vomiting. Musculoskeletal: Negative for myalgias. Skin: Negative for rash. Neurological: Negative for dizziness, weakness and headaches. Endo/Heme/Allergies: Does not bruise/bleed easily. Family History   Problem Relation Age of Onset    Kidney Disease Mother     Diabetes Mother     Cancer Father     No Known Problems Sister     No Known Problems Brother     Hypertension Neg Hx        Past Medical History:   Diagnosis Date    Atrial fibrillation ([address]     Chronic diastolic heart failure ([address]     Hypertension     Mitral valve disorders(424.0)        Past Surgical History:   Procedure Laterality Date    HX ADENOIDECTOMY      HX OTHER SURGICAL  1965    infection drained from under chin    HX TONSILLECTOMY         No Known Allergies    Current Outpatient Medications   Medication Sig    atorvastatin (LIPITOR) 20 mg tablet Take 1 Tab by mouth daily.  benazepril (LOTENSIN) 40 mg tablet Take 1 Tab by mouth daily. STOP Ramipril    carvedilol (COREG) 12.5 mg tablet Take 1 Tab by mouth two (2) times daily (with meals).  furosemide (LASIX) 40 mg tablet Take 1 Tab by mouth two (2) times a day.  nisoldipine SR (SULAR) 17 mg Tb24 tablet Take 1 Tab by mouth daily.  COUMADIN 7.5 mg tablet Take 1 Tab by mouth daily. No current facility-administered medications for this visit. Visit Vitals  /77   Pulse 78   Ht 6' 3\" (1.905 m)   Wt 138.8 kg (306 lb)   BMI 38.25 kg/m²         Physical Exam   Constitutional: He is oriented to person, place, and time. He appears well-developed and well-nourished. HENT:   Head: Normocephalic and atraumatic. Eyes: Conjunctivae are normal.   Neck: Neck supple. No JVD present. No tracheal deviation present.  No thyromegaly present. Cardiovascular: Normal rate and normal heart sounds. An irregularly irregular rhythm present. Exam reveals no gallop and no friction rub. No murmur heard. Pulmonary/Chest: Breath sounds normal. No respiratory distress. He has no wheezes. He has no rales. He exhibits no tenderness. Abdominal: Soft. There is no tenderness. Musculoskeletal: He exhibits no edema. Neurological: He is alert and oriented to person, place, and time. Skin: Skin is warm and dry. Psychiatric: He has a normal mood and affect. Mr. Acacia Jensen has a reminder for a \"due or due soon\" health maintenance. I have asked that he contact his primary care provider for follow-up on this health maintenance. No flowsheet data found. SUMMARY:echo:5/2015  Left ventricle: Systolic function was normal. Ejection fraction was  estimated in the range of 55 % to 60 %. There were no regional wall motion  abnormalities. Left atrium: The atrium was mildly dilated. Mitral valve: There was mild annular calcification. There was mild diffuse  thickening of the anterior and posterior leaflets. There was mild  regurgitation. Mean transmitral gradient was 1.6 mmHg. Tricuspid valve: Tricuspid regurgitation peak velocity: 3.1 m/sec. Pulmonary artery systolic pressure: 41 mmHg. I Have personally reviewed recent relevant labs available and discussed with patient  8/2017-dig,bmp    SUMMARY:3/2018-echo  Left ventricle: Patient was in an irregular rhythm throughout the entire  exam. Systolic function was normal. Ejection fraction was estimated in the  range of 55 % to 60 %. No obvious wall motion abnormalities identified in  the views obtained. There was mild concentric hypertrophy. Right ventricle: The ventricle was dilated. Left atrium: The atrium was mildly dilated. Right atrium: The atrium was dilated. Mitral valve: There was mild annular calcification. There was mild  regurgitation. Tricuspid valve:  There was mild regurgitation. Pulmonary artery systolic  pressure: 59 mmHg. There was moderate pulmonary hypertension. Assessment       ICD-10-CM ICD-9-CM    1. Chronic atrial fibrillation (HCC) I48.2 427.31     Stable continue anticoagulation and rate control   2. Chronic diastolic heart failure (HCC) I50.32 428.32     Stable continue treatment   3. Essential hypertension, benign I10 401.1     Continue treatment monitor   4. Hyperlipidemia LDL goal <100 E78.5 272.4     Continue treatment lab with PCP   5. Anticoagulant long-term use Z79.01 V58.61     For A. fib     3/2018  Off dig and coreg due to bradycardia-dig level 1.5 after daily dose at that time  Edema better-use zaroxolyn as needed  3/2019  Mildly elevated blood pressure. Will restart Coreg at 12.5 mg twice a day to was discontinued in past due to bradycardia associated with digoxin and Coreg use. Currently off digoxin. Also LDL of 126 and 10/2018. Continue with diet and weight loss  9/2019  Cardiac status stable continue current treatment        No orders of the defined types were placed in this encounter. Follow-up and Dispositions    · Return in about 6 months (around 3/18/2020).

## 2019-09-18 NOTE — PROGRESS NOTES
1. Have you been to the ER, urgent care clinic since your last visit? Hospitalized since your last visit? No    2. Have you seen or consulted any other health care providers outside of the [address] [place] [address] since your last visit? Include any pap smears or colon screening.  No

## 2019-09-25 RX ORDER — WARFARIN SODIUM 7.5 MG/1
7.5 TABLET ORAL DAILY
Qty: 60 TAB | Refills: 5 | Status: SHIPPED | OUTPATIENT
Start: 2019-09-25 | End: 2020-08-10 | Stop reason: DRUGHIGH

## 2019-10-01 DIAGNOSIS — I48.91 ATRIAL FIBRILLATION, UNSPECIFIED TYPE (HCC): ICD-10-CM

## 2019-10-01 DIAGNOSIS — I50.32 CHRONIC DIASTOLIC HEART FAILURE (HCC): ICD-10-CM

## 2019-10-01 DIAGNOSIS — I50.32 DIASTOLIC CHF, CHRONIC (HCC): ICD-10-CM

## 2019-10-02 RX ORDER — NISOLDIPINE 17 MG/1
17 TABLET, FILM COATED, EXTENDED RELEASE ORAL DAILY
Qty: 30 TAB | Refills: 5 | Status: SHIPPED | OUTPATIENT
Start: 2019-10-02 | End: 2020-03-04

## 2019-10-16 ENCOUNTER — TELEPHONE ANTICOAG (OUTPATIENT)
Dept: CARDIOLOGY CLINIC | Age: 64
End: 2019-10-16

## 2019-10-16 DIAGNOSIS — I48.0 PAROXYSMAL ATRIAL FIBRILLATION (HCC): ICD-10-CM

## 2019-10-16 LAB — INR, EXTERNAL: 2.3 (ref 2–3)

## 2019-10-16 NOTE — PATIENT INSTRUCTIONS
Call made to let know to continue to take 7.5 mg daily and recheck 11/13/19, no answer. Message left.

## 2019-11-18 ENCOUNTER — TELEPHONE ANTICOAG (OUTPATIENT)
Dept: CARDIOLOGY CLINIC | Age: 64
End: 2019-11-18

## 2019-11-18 DIAGNOSIS — I48.0 PAROXYSMAL ATRIAL FIBRILLATION (HCC): ICD-10-CM

## 2019-11-18 LAB — INR, EXTERNAL: 2.8

## 2019-11-18 NOTE — PROGRESS NOTES
Mr. Gabby Malone is here today for anticoagulation monitoring for the diagnosis of Atrial Fibrillation. His INR goal is 2.0-3.0 and his current Coumadin dose is 7.5 mg. Today's findings include an INR of 2.8     Considering Mr. Ibarra's past history, todays findings, and per the coumadin policy/protocol, Mr. Vitor Marroquin was instructed to take Coumadin as follows,  Take 7.5 mg daily. He was also instructed to come back in 4 weeks for an INR check. A full discussion of the nature of anticoagulants has been carried out. A full discussion of the need for frequent and regular monitoring, precise dosage adjustment and compliance was stressed. Side effects of potential bleeding were discussed and Mr. Vitor Marroquin was instructed to call [phone] if there are any signs of abnormal bleeding. Mr. Vitor Marroquin was instructed to avoid any OTC items containing aspirin or ibuprofen and prior to starting any new OTC products to consult with his physician or pharmacist to ensure no drug interactions are present. Mr. Vitor Marroquin was instructed to avoid any major changes in his general diet and to avoid alcohol consumption. .      Mr. Vitor Marroquin verbalized his understanding of all instructions and will call the office with any questions, concerns, or signs of abnormal bleeding or blood clot. errors including those of syntax and sound a like substitutions which may escape proof reading.   In such instances, actual meaning can be extrapolated by contextual diversion

## 2019-11-18 NOTE — PATIENT INSTRUCTIONS
This has been fully explained to the patient to continue to take 7.5 mg daily and recheck in 4 weeks on 12-16-19, who indicates understanding.

## 2019-12-04 NOTE — PROGRESS NOTES
Description          5/12  Continue with 7.5 mg daily recheck 1 week         This has been fully explained to the patient, who indicates understanding. 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

## 2019-12-13 ENCOUNTER — HOSPITAL ENCOUNTER (OUTPATIENT)
Dept: LAB | Age: 64
Discharge: HOME | End: 2019-12-13
Payer: MEDICARE

## 2019-12-13 ENCOUNTER — OFFICE VISIT (OUTPATIENT)
Dept: FAMILY MEDICINE CLINIC | Facility: CLINIC | Age: 64
End: 2019-12-13

## 2019-12-13 VITALS
TEMPERATURE: 97.3 F | RESPIRATION RATE: 12 BRPM | WEIGHT: 314 LBS | SYSTOLIC BLOOD PRESSURE: 158 MMHG | OXYGEN SATURATION: 96 % | BODY MASS INDEX: 39.04 KG/M2 | DIASTOLIC BLOOD PRESSURE: 82 MMHG | HEART RATE: 91 BPM | HEIGHT: 75 IN

## 2019-12-13 DIAGNOSIS — E04.9 ENLARGED THYROID: Primary | ICD-10-CM

## 2019-12-13 DIAGNOSIS — Z12.5 PROSTATE CANCER SCREENING: ICD-10-CM

## 2019-12-13 LAB
ALBUMIN SERPL-MCNC: 4.1 G/DL (ref 3.4–5)
ALBUMIN/GLOB SERPL: 0.9 {RATIO} (ref 0.8–1.7)
ALP SERPL-CCNC: 63 U/L (ref 45–117)
ALT SERPL-CCNC: 36 U/L (ref 16–61)
ANION GAP SERPL CALC-SCNC: 5 MMOL/L (ref 3–18)
AST SERPL-CCNC: 20 U/L (ref 10–38)
BILIRUB SERPL-MCNC: 0.8 MG/DL (ref 0.2–1)
BUN SERPL-MCNC: 12 MG/DL (ref 7–18)
BUN/CREAT SERPL: 9 (ref 12–20)
CALCIUM SERPL-MCNC: 9.5 MG/DL (ref 8.5–10.1)
CHLORIDE SERPL-SCNC: 102 MMOL/L (ref 100–111)
CO2 SERPL-SCNC: 31 MMOL/L (ref 21–32)
CREAT SERPL-MCNC: 1.39 MG/DL (ref 0.6–1.3)
GLOBULIN SER CALC-MCNC: 4.7 G/DL (ref 2–4)
GLUCOSE SERPL-MCNC: 142 MG/DL (ref 74–99)
POTASSIUM SERPL-SCNC: 3.7 MMOL/L (ref 3.5–5.5)
PROT SERPL-MCNC: 8.8 G/DL (ref 6.4–8.2)
SODIUM SERPL-SCNC: 138 MMOL/L (ref 136–145)

## 2019-12-13 PROCEDURE — 84153 ASSAY OF PSA TOTAL: CPT

## 2019-12-13 PROCEDURE — 36415 COLL VENOUS BLD VENIPUNCTURE: CPT

## 2019-12-13 PROCEDURE — 80053 COMPREHEN METABOLIC PANEL: CPT

## 2019-12-13 NOTE — PROGRESS NOTES
OFFICE NOTE    Aren Samuels Sr. is a 75 y.o. male presenting today for office visit. 12/23/2019  9:30 AM    Chief Complaint   Patient presents with    Hypertension     follow-up     HPI: Geraldene Hamman presents to the practice today stating he feels well. He has a history of hypertension his blood pressure is mildly elevated 158/82. He presents today without any complaints of chest pain, palpitation or lower extremity edema. Patient has a history of chronic atrial fibrillation and takes Coumadin daily. He reports that his PT/INR is monitored and checked by Dr. Bryce Granados, cardiologist.  Patient notes he is wearing compression stockings secondary to lower extremities. He is negative for any cough, wheezing or congestion. Review of Systems   Constitutional: Negative for fatigue. HENT: Negative for congestion. Respiratory: Negative for chest tightness and shortness of breath. Cardiovascular: Positive for leg swelling (trace. patient is wearing compression stocking). Negative for chest pain. Genitourinary: Negative for difficulty urinating and frequency. Musculoskeletal: Negative for myalgias. Skin: Negative for color change. Neurological: Negative for dizziness, light-headedness and headaches.          PHQ Screening   3 most recent PHQ Screens 12/13/2019   Little interest or pleasure in doing things Not at all   Feeling down, depressed, irritable, or hopeless Not at all   Total Score PHQ 2 0         History  Past Medical History:   Diagnosis Date    Atrial fibrillation (HCC)     Chronic diastolic heart failure ([address]     Hypertension     Mitral valve disorders(424.0)        Past Surgical History:   Procedure Laterality Date    HX ADENOIDECTOMY      HX OTHER SURGICAL  1965    infection drained from under chin    HX TONSILLECTOMY         Social History     Socioeconomic History    Marital status: SINGLE     Spouse name: Not on file    Number of children: Not on file    Years of education: Not on file    Highest education level: Not on file   Occupational History    Not on file   Social Needs    Financial resource strain: Not on file    Food insecurity:     Worry: Not on file     Inability: Not on file    Transportation needs:     Medical: Not on file     Non-medical: Not on file   Tobacco Use    Smoking status: Former Smoker     Last attempt to quit: 3/21/1981     Years since quittin.7    Smokeless tobacco: Never Used   Substance and Sexual Activity    Alcohol use: No    Drug use: No    Sexual activity: Never   Lifestyle    Physical activity:     Days per week: Not on file     Minutes per session: Not on file    Stress: Not on file   Relationships    Social connections:     Talks on phone: Not on file     Gets together: Not on file     Attends Adventism service: Not on file     Active member of club or organization: Not on file     Attends meetings of clubs or organizations: Not on file     Relationship status: Not on file    Intimate partner violence:     Fear of current or ex partner: Not on file     Emotionally abused: Not on file     Physically abused: Not on file     Forced sexual activity: Not on file   Other Topics Concern   [address] Service Not Asked    Blood Transfusions Not Asked    Caffeine Concern Not Asked    Occupational Exposure Not Asked   [place] Hazards Not Asked    Sleep Concern Not Asked    Stress Concern Not Asked    Weight Concern Not Asked    Special Diet Not Asked    Back Care Not Asked    Exercise Not Asked    Bike Helmet Not Asked    Seat Belt Not Asked    Self-Exams Not Asked   Social History Narrative    Not on file       No Known Allergies    Current Outpatient Medications   Medication Sig Dispense Refill    nisoldipine SR (SULAR) 17 mg Tb24 tablet Take 1 Tab by mouth daily. 30 Tab 05    COUMADIN 7.5 mg tablet Take 1 Tab by mouth daily. 60 Tab 5    atorvastatin (LIPITOR) 20 mg tablet Take 1 Tab by mouth daily.  90 Tab 3    benazepril (LOTENSIN) 40 mg tablet Take 1 Tab by mouth daily. STOP Ramipril 90 Tab 1    carvedilol (COREG) 12.5 mg tablet Take 1 Tab by mouth two (2) times daily (with meals). 180 Tab 3    furosemide (LASIX) 40 mg tablet Take 1 Tab by mouth two (2) times a day. 180 Tab 01         Patient Care Team:  Patient Care Team:  Chloe Guzman NP as PCP - General (Nurse Practitioner)  Chloe Guzman NP as PCP - [facility]  PAT Rodriguez (Physician Assistant)  Michael Rosario MD as Surgeon (Colon and Rectal Surgery)  Yony Roldan MD as Physician (Cardiology)    LABS:      RADIOLOGY:  None new to review    Physical Exam   Constitutional: He appears well-developed and well-nourished. Cardiovascular: Normal rate. An irregularly irregular rhythm present. Pulmonary/Chest: Effort normal and breath sounds normal.   Abdominal: Bowel sounds are normal. He exhibits no distension. There is no tenderness. Neurological: He is alert. Skin: Skin is warm. Nursing note and vitals reviewed. Vitals:    12/13/19 0931   BP: 158/82   Pulse: 91   Resp: 12   Temp: 97.3 °F (36.3 °C)   TempSrc: Oral   SpO2: 96%   Weight: 314 lb (142.4 kg)   Height: 6' 3\" (1.905 m)   PainSc:   1       Assessment and Plan    1. Prostate cancer screening  Prostate cancer screening ordered  Last PSA level total, 2018 and it was 2.2.  2. Paroxysmal atrial fibrillation (HCC)  Chronic history of atrial fibrillation. Takes Coumadin daily  3. Essential hypertension, benign  Blood pressure today is controlled at 138/80. No change to current treatment plan. 4. Class 2 obesity with body mass index (BMI) of 37.0 to 37.9 in adult, unspecified obesity type, unspecified whether serious comorbidity present  Patient had a 3 pound weight loss from prior visit. Lifestyle modification, diet and exercise discussed. Follow-up in 6 months    *Plan of care reviewed with patient.  Patient in agreement with plan and expresses understanding. All questions answered and patient encouraged to call or RTO if further questions or concerns. Follow-up and Dispositions    · Return in about 6 months (around 6/13/2020).

## 2019-12-13 NOTE — PROGRESS NOTES
Visit Vitals  /82   Pulse 91   Temp 97.3 °F (36.3 °C) (Oral)   Resp 12   Ht 6' 3\" (1.905 m)   Wt 314 lb (142.4 kg)   SpO2 96%   BMI 39.25 kg/m²     Jose Raul Jain Sr. presents today for   Chief Complaint   Patient presents with    Hypertension     follow-up       Is someone accompanying this pt? no    Is the patient using any DME equipment during OV? no    Depression Screening:  3 most recent PHQ Screens 12/13/2019   Little interest or pleasure in doing things Not at all   Feeling down, depressed, irritable, or hopeless Not at all   Total Score PHQ 2 0       Learning Assessment:  Learning Assessment 6/25/2019   PRIMARY LEARNER Patient   BARRIERS PRIMARY LEARNER NONE   PRIMARY LANGUAGE ENGLISH   LEARNER PREFERENCE PRIMARY DEMONSTRATION   ANSWERED BY patient   RELATIONSHIP SELF       Abuse Screening:  Abuse Screening Questionnaire 4/16/2018   Do you ever feel afraid of your partner? N   Are you in a relationship with someone who physically or mentally threatens you? N   Is it safe for you to go home? Y       Fall Risk  Fall Risk Assessment, last 12 mths 12/13/2019   Able to walk? Yes   Fall in past 12 months? No       Health Maintenance reviewed and discussed and ordered per Provider. Health Maintenance Due   Topic Date Due    DTaP/Tdap/Td series (1 - Tdap) 05/18/1964    Shingrix Vaccine Age 50> (1 of 2) 05/18/2003    FOBT Q 1 YEAR AGE 50-75  05/18/2003           Coordination of Care:  1. Have you been to the ER, urgent care clinic since your last visit? Hospitalized since your last visit? no    2. Have you seen or consulted any other health care providers outside of the [address] [place] [address] since your last visit? Include any pap smears or colon screening.  no

## 2019-12-14 LAB — PSA SERPL-MCNC: 1.2 NG/ML (ref 0–4)

## 2019-12-17 ENCOUNTER — TELEPHONE ANTICOAG (OUTPATIENT)
Dept: CARDIOLOGY CLINIC | Age: 64
End: 2019-12-17

## 2019-12-17 DIAGNOSIS — I48.0 PAROXYSMAL ATRIAL FIBRILLATION (HCC): ICD-10-CM

## 2019-12-17 LAB — INR, EXTERNAL: 3

## 2019-12-18 ENCOUNTER — HOSPITAL ENCOUNTER (OUTPATIENT)
Dept: ULTRASOUND IMAGING | Age: 64
Discharge: HOME | End: 2019-12-18
Attending: NURSE PRACTITIONER
Payer: MEDICARE

## 2019-12-18 DIAGNOSIS — E04.9 ENLARGED THYROID: ICD-10-CM

## 2019-12-18 PROCEDURE — 76536 US EXAM OF HEAD AND NECK: CPT

## 2019-12-20 ENCOUNTER — TELEPHONE (OUTPATIENT)
Dept: FAMILY MEDICINE CLINIC | Facility: CLINIC | Age: 64
End: 2019-12-20

## 2019-12-20 DIAGNOSIS — E04.9 ENLARGED THYROID: Primary | ICD-10-CM

## 2019-12-20 NOTE — PROGRESS NOTES
Please let patient know the thyroid ultrasound confirmed that he had an enlarged thyroid. A referral was placed for endocrinology.

## 2019-12-27 NOTE — PROGRESS NOTES
Please let the patient know his sugar level was elevated on the last blood draw.   Please have him schedule an appointment for additional blood work in the next couple of weeks

## 2020-01-02 RX ORDER — CARVEDILOL 12.5 MG/1
TABLET ORAL
Qty: 180 TAB | Refills: 2 | Status: SHIPPED | OUTPATIENT
Start: 2020-01-02 | End: 2020-03-04 | Stop reason: SDUPTHER

## 2020-01-03 DIAGNOSIS — I10 ESSENTIAL HYPERTENSION, BENIGN: ICD-10-CM

## 2020-01-03 DIAGNOSIS — I48.0 PAROXYSMAL ATRIAL FIBRILLATION (HCC): Primary | ICD-10-CM

## 2020-01-03 RX ORDER — BENAZEPRIL HYDROCHLORIDE 40 MG/1
40 TABLET ORAL DAILY
Qty: 90 TAB | Refills: 3 | Status: SHIPPED | OUTPATIENT
Start: 2020-01-03 | End: 2020-12-30 | Stop reason: SDUPTHER

## 2020-01-03 NOTE — TELEPHONE ENCOUNTER
Requested Prescriptions     Pending Prescriptions Disp Refills    benazepril (LOTENSIN) 40 mg tablet 90 Tab 3     Sig: Take 1 Tab by mouth daily.  STOP Ramipril

## 2020-01-15 ENCOUNTER — TELEPHONE ANTICOAG (OUTPATIENT)
Dept: CARDIOLOGY CLINIC | Age: 65
End: 2020-01-15

## 2020-01-15 DIAGNOSIS — I48.0 PAROXYSMAL ATRIAL FIBRILLATION (HCC): ICD-10-CM

## 2020-01-15 LAB — INR, EXTERNAL: 2.5

## 2020-01-16 ENCOUNTER — OFFICE VISIT (OUTPATIENT)
Dept: FAMILY MEDICINE CLINIC | Facility: CLINIC | Age: 65
End: 2020-01-16

## 2020-01-16 ENCOUNTER — HOSPITAL ENCOUNTER (OUTPATIENT)
Dept: LAB | Age: 65
Discharge: HOME | End: 2020-01-16
Payer: MEDICARE

## 2020-01-16 VITALS
BODY MASS INDEX: 38.92 KG/M2 | WEIGHT: 313 LBS | OXYGEN SATURATION: 98 % | HEIGHT: 75 IN | TEMPERATURE: 97 F | DIASTOLIC BLOOD PRESSURE: 88 MMHG | HEART RATE: 75 BPM | SYSTOLIC BLOOD PRESSURE: 149 MMHG | RESPIRATION RATE: 12 BRPM

## 2020-01-16 DIAGNOSIS — E11.8 CONTROLLED TYPE 2 DIABETES MELLITUS WITH COMPLICATION, WITHOUT LONG-TERM CURRENT USE OF INSULIN (HCC): ICD-10-CM

## 2020-01-16 DIAGNOSIS — R73.09 ELEVATED RANDOM BLOOD GLUCOSE LEVEL: Primary | ICD-10-CM

## 2020-01-16 DIAGNOSIS — R77.1 ELEVATED SERUM GLOBULIN LEVEL: ICD-10-CM

## 2020-01-16 DIAGNOSIS — I10 ESSENTIAL HYPERTENSION, BENIGN: ICD-10-CM

## 2020-01-16 DIAGNOSIS — E78.5 HYPERLIPIDEMIA LDL GOAL <100: ICD-10-CM

## 2020-01-16 LAB — HBA1C MFR BLD HPLC: 7.5 %

## 2020-01-16 PROCEDURE — 36415 COLL VENOUS BLD VENIPUNCTURE: CPT

## 2020-01-16 PROCEDURE — 84155 ASSAY OF PROTEIN SERUM: CPT

## 2020-01-16 RX ORDER — METFORMIN HYDROCHLORIDE 500 MG/1
500 TABLET, EXTENDED RELEASE ORAL 2 TIMES DAILY
Qty: 60 TAB | Refills: 2 | Status: SHIPPED | OUTPATIENT
Start: 2020-01-16 | End: 2020-03-16 | Stop reason: SDUPTHER

## 2020-01-16 NOTE — PROGRESS NOTES
Visit Vitals  /88   Pulse 75   Temp 97 °F (36.1 °C) (Oral)   Resp 12   Ht 6' 3\" (1.905 m)   Wt 313 lb (142 kg)   SpO2 98%   BMI 39.12 kg/m²     Rashid Mcnulty Sr. presents today for   Chief Complaint   Patient presents with    Results     Ultrasound    Labs     results       Is someone accompanying this pt? no    Is the patient using any DME equipment during OV? no    Depression Screening:  3 most recent PHQ Screens 1/16/2020   Little interest or pleasure in doing things Not at all   Feeling down, depressed, irritable, or hopeless Not at all   Total Score PHQ 2 0       Learning Assessment:  Learning Assessment 6/25/2019   PRIMARY LEARNER Patient   BARRIERS PRIMARY LEARNER NONE   PRIMARY LANGUAGE ENGLISH   LEARNER PREFERENCE PRIMARY DEMONSTRATION   ANSWERED BY patient   RELATIONSHIP SELF       Abuse Screening:  Abuse Screening Questionnaire 4/16/2018   Do you ever feel afraid of your partner? N   Are you in a relationship with someone who physically or mentally threatens you? N   Is it safe for you to go home? Y       Fall Risk  Fall Risk Assessment, last 12 mths 1/16/2020   Able to walk? Yes   Fall in past 12 months? No       Health Maintenance reviewed and discussed and ordered per Provider. Health Maintenance Due   Topic Date Due    DTaP/Tdap/Td series (1 - Tdap) 05/18/1964    Shingrix Vaccine Age 50> (1 of 2) 05/18/2003    FOBT Q 1 YEAR AGE 50-75  05/18/2003           Coordination of Care:  1. Have you been to the ER, urgent care clinic since your last visit? Hospitalized since your last visit? no    2. Have you seen or consulted any other health care providers outside of the [address] [place] [address] since your last visit? Include any pap smears or colon screening.  no

## 2020-01-16 NOTE — PROGRESS NOTES
Mike Demarco Sr. is a 75 y.o. male presenting today for Results (Ultrasound) and Labs (results)  . HPI:  Mike Demarco Sr. presents to the office today for hypertension follow-up care. He feels well today. He had fasting labs prior to today's office visit and had a US of his thyroid completed. He was referred to Dr. Jr Ladd, Endocrinologist but has not made an appointment. Per the US results: He has a large heterogeneous and nodular thyroid gland may represent multi-nodule goiter. Biopsy and nuclear medicine study is recommended for further evaluation. Per the patient he notes he was scheduled appointment today. His blood pressure today is mildly elevated 149/89. He is compliant with taking his medication daily and denies any chest pain or palpitation. Review of Systems   Constitutional: Negative for malaise/fatigue. Respiratory: Negative for cough and sputum production. Cardiovascular: Negative for chest pain, palpitations and leg swelling. Gastrointestinal: Negative for abdominal pain, nausea and vomiting. Neurological: Negative for dizziness and headaches. No Known Allergies    Current Outpatient Medications   Medication Sig Dispense Refill    metFORMIN ER (GLUCOPHAGE XR) 500 mg tablet Take 1 Tab by mouth two (2) times a day. 60 Tab 2    benazepril (LOTENSIN) 40 mg tablet Take 1 Tab by mouth daily. STOP Ramipril 90 Tab 3    carvedilol (COREG) 12.5 mg tablet TAKE 1 TABLET BY MOUTH TWO TIMES A DAY WITH MEALS 180 Tab 2    nisoldipine SR (SULAR) 17 mg Tb24 tablet Take 1 Tab by mouth daily. 30 Tab 05    COUMADIN 7.5 mg tablet Take 1 Tab by mouth daily. 60 Tab 5    atorvastatin (LIPITOR) 20 mg tablet Take 1 Tab by mouth daily. 90 Tab 3    furosemide (LASIX) 40 mg tablet Take 1 Tab by mouth two (2) times a day.  180 Tab 01       Past Medical History:   Diagnosis Date    Atrial fibrillation (HCC)     Chronic diastolic heart failure (HCC)     Hypertension     Mitral valve disorders(424.0)        Past Surgical History:   Procedure Laterality Date    HX ADENOIDECTOMY      HX OTHER SURGICAL  1965    infection drained from under chin    HX TONSILLECTOMY         Social History     Socioeconomic History    Marital status: SINGLE     Spouse name: Not on file    Number of children: Not on file    Years of education: Not on file    Highest education level: Not on file   Occupational History    Not on file   Social Needs    Financial resource strain: Not on file    Food insecurity:     Worry: Not on file     Inability: Not on file    Transportation needs:     Medical: Not on file     Non-medical: Not on file   Tobacco Use    Smoking status: Former Smoker     Last attempt to quit: 3/21/1981     Years since quittin.8    Smokeless tobacco: Never Used   Substance and Sexual Activity    Alcohol use: No    Drug use: No    Sexual activity: Never   Lifestyle    Physical activity:     Days per week: Not on file     Minutes per session: Not on file    Stress: Not on file   Relationships    Social connections:     Talks on phone: Not on file     Gets together: Not on file     Attends Jainism service: Not on file     Active member of club or organization: Not on file     Attends meetings of clubs or organizations: Not on file     Relationship status: Not on file    Intimate partner violence:     Fear of current or ex partner: Not on file     Emotionally abused: Not on file     Physically abused: Not on file     Forced sexual activity: Not on file   Other Topics Concern   [address] Service Not Asked    Blood Transfusions Not Asked    Caffeine Concern Not Asked    Occupational Exposure Not Asked   [place] Hazards Not Asked    Sleep Concern Not Asked    Stress Concern Not Asked    Weight Concern Not Asked    Special Diet Not Asked    Back Care Not Asked    Exercise Not Asked    Bike Helmet Not Asked    [address] Not Asked    Self-Exams Not Asked   Social History Narrative    Not on file       Patient does not have an advanced directive on file    Vitals:    01/16/20 1000   BP: 149/88   Pulse: 75   Resp: 12   Temp: 97 °F (36.1 °C)   TempSrc: Oral   SpO2: 98%   Weight: 313 lb (142 kg)   Height: 6' 3\" (1.905 m)   PainSc:   0 - No pain       Physical Exam  Vitals signs and nursing note reviewed. Constitutional:       Appearance: Normal appearance. Cardiovascular:      Rate and Rhythm: Normal rate and regular rhythm. Pulses: Normal pulses. Heart sounds: Normal heart sounds. Pulmonary:      Effort: Pulmonary effort is normal.      Breath sounds: Normal breath sounds. Abdominal:      General: Bowel sounds are normal. There is no distension. Skin:     General: Skin is warm and dry. Neurological:      Mental Status: He is alert.          Hospital Outpatient Visit on 01/16/2020   Component Date Value Ref Range Status    Protein, total 01/16/2020 8.0  6.0 - 8.5 g/dL Final    Albumin 01/16/2020 3.8  2.9 - 4.4 g/dL Final    Alpha-1-globulin 01/16/2020 0.2  0.0 - 0.4 g/dL Final    ALPHA-2 GLOBULIN 01/16/2020 0.8  0.4 - 1.0 g/dL Final    Beta globulin 01/16/2020 1.1  0.7 - 1.3 g/dL Final    Gamma globulin 01/16/2020 2.1* 0.4 - 1.8 g/dL Final    M-Mariano 01/16/2020 Not Observed  Not Observed g/dL Final    Globulin, total 01/16/2020 4.2* 2.2 - 3.9 g/dL Final    A/G ratio 01/16/2020 0.9  0.7 - 1.7   Final   Office Visit on 01/16/2020   Component Date Value Ref Range Status    Hemoglobin A1c (POC) 01/16/2020 7.5  % Final   Telephone Anticoag on 01/15/2020   Component Date Value Ref Range Status    INR, External 01/14/2020 2.5   Final   Telephone Anticoag on 12/17/2019   Component Date Value Ref Range Status    INR, External 12/16/2019 3.0   Final   Hospital Outpatient Visit on 12/13/2019   Component Date Value Ref Range Status    Sodium 12/13/2019 138  136 - 145 mmol/L Final    Potassium 12/13/2019 3.7  3.5 - 5.5 mmol/L Final    Chloride 12/13/2019 102  100 - 111 mmol/L Final    CO2 12/13/2019 31  21 - 32 mmol/L Final    Anion gap 12/13/2019 5  3.0 - 18 mmol/L Final    Glucose 12/13/2019 142* 74 - 99 mg/dL Final    BUN 12/13/2019 12  7.0 - 18 MG/DL Final    Creatinine 12/13/2019 1.39* 0.6 - 1.3 MG/DL Final    BUN/Creatinine ratio 12/13/2019 9* 12 - 20   Final    GFR est AA 12/13/2019 >60  >60 ml/min/1.73m2 Final    GFR est non-AA 12/13/2019 51* >60 ml/min/1.73m2 Final    Comment: (NOTE)  Estimated GFR is calculated using the Modification of Diet in Renal   Disease (MDRD) Study equation, reported for both  Americans   (GFRAA) and non- Americans (GFRNA), and normalized to 1.73m2   body surface area. The physician must decide which value applies to   the patient. The MDRD study equation should only be used in   individuals age 25 or older. It has not been validated for the   following: pregnant women, patients with serious comorbid conditions,   or on certain medications, or persons with extremes of body size,   muscle mass, or nutritional status.  Calcium 12/13/2019 9.5  8.5 - 10.1 MG/DL Final    Bilirubin, total 12/13/2019 0.8  0.2 - 1.0 MG/DL Final    ALT (SGPT) 12/13/2019 36  16 - 61 U/L Final    AST (SGOT) 12/13/2019 20  10 - 38 U/L Final    Alk.  phosphatase 12/13/2019 63  45 - 117 U/L Final    Protein, total 12/13/2019 8.8* 6.4 - 8.2 g/dL Final    Albumin 12/13/2019 4.1  3.4 - 5.0 g/dL Final    Globulin 12/13/2019 4.7* 2.0 - 4.0 g/dL Final    A-G Ratio 12/13/2019 0.9  0.8 - 1.7   Final    Prostate Specific Ag 12/13/2019 1.2  0.0 - 4.0 ng/mL Final   Telephone Anticoag on 11/18/2019   Component Date Value Ref Range Status    INR, External 11/18/2019 2.8   Final       .  Results for orders placed or performed during the hospital encounter of 01/16/20   PROTEIN ELECTROPHORESIS   Result Value Ref Range    Protein, total 8.0 6.0 - 8.5 g/dL    Albumin 3.8 2.9 - 4.4 g/dL    Alpha-1-globulin 0.2 0.0 - 0.4 g/dL    ALPHA-2 GLOBULIN 0.8 0.4 - 1.0 g/dL    Beta globulin 1.1 0.7 - 1.3 g/dL    Gamma globulin 2.1 (H) 0.4 - 1.8 g/dL    M-Mariano Not Observed Not Observed g/dL    Globulin, total 4.2 (H) 2.2 - 3.9 g/dL    A/G ratio 0.9 0.7 - 1.7     Results for orders placed or performed in visit on 01/16/20   AMB POC HEMOGLOBIN A1C   Result Value Ref Range    Hemoglobin A1c (POC) 7.5 %       Assessment / Plan:      ICD-10-CM ICD-9-CM    1. Elevated random blood glucose level R73.09 790.29 AMB POC HEMOGLOBIN A1C   2. Elevated serum globulin level R77.1 790.99 PROTEIN ELECTROPHORESIS   3. Controlled type 2 diabetes mellitus with complication, without long-term current use of insulin (HCC) E11.8 250.90 metFORMIN ER (GLUCOPHAGE XR) 500 mg tablet      REFERRAL TO DIETITIAN   4. Essential hypertension, benign I10 401.1    5. Hyperlipidemia LDL goal <100 E78.5 272.4      Patient had a random glucose level. Hemoglobin A1c 7.5. Patient started on metformin daily  Referral to dietitian  Patient also had elevated serum globulin level-protein electrophoresis ordered  Follow-up and Dispositions    · Return in about 4 months (around 5/16/2020). Advised patient of schedule appointment with endocrinologist for follow-up      I asked the patient if he  had any questions and answered his  questions. The patient stated that he understands the treatment plan and agrees with the treatment plan    This document was created with a voice activated dictation system and may contain transcription errors.

## 2020-01-20 LAB
ALBUMIN SERPL ELPH-MCNC: 3.8 G/DL (ref 2.9–4.4)
ALBUMIN/GLOB SERPL: 0.9 {RATIO} (ref 0.7–1.7)
ALPHA1 GLOB SERPL ELPH-MCNC: 0.2 G/DL (ref 0–0.4)
ALPHA2 GLOB SERPL ELPH-MCNC: 0.8 G/DL (ref 0.4–1)
B-GLOBULIN SERPL ELPH-MCNC: 1.1 G/DL (ref 0.7–1.3)
GAMMA GLOB SERPL ELPH-MCNC: 2.1 G/DL (ref 0.4–1.8)
GLOBULIN SER CALC-MCNC: 4.2 G/DL (ref 2.2–3.9)
M PROTEIN SERPL ELPH-MCNC: ABNORMAL G/DL
PROT SERPL-MCNC: 8 G/DL (ref 6–8.5)

## 2020-01-23 ENCOUNTER — ANESTHESIA EVENT (OUTPATIENT)
Dept: ENDOSCOPY | Age: 65
End: 2020-01-23
Payer: MEDICARE

## 2020-01-24 ENCOUNTER — ANESTHESIA (OUTPATIENT)
Dept: ENDOSCOPY | Age: 65
End: 2020-01-24
Payer: MEDICARE

## 2020-01-24 ENCOUNTER — HOSPITAL ENCOUNTER (OUTPATIENT)
Age: 65
Setting detail: OUTPATIENT SURGERY
Discharge: HOME | End: 2020-01-24
Attending: COLON & RECTAL SURGERY | Admitting: COLON & RECTAL SURGERY
Payer: MEDICARE

## 2020-01-24 VITALS
OXYGEN SATURATION: 95 % | WEIGHT: 297.13 LBS | DIASTOLIC BLOOD PRESSURE: 72 MMHG | SYSTOLIC BLOOD PRESSURE: 120 MMHG | HEART RATE: 100 BPM | HEIGHT: 76 IN | RESPIRATION RATE: 16 BRPM | BODY MASS INDEX: 36.18 KG/M2 | TEMPERATURE: 97.3 F

## 2020-01-24 LAB
BUN BLD-MCNC: 9 MG/DL (ref 7–18)
CHLORIDE BLD-SCNC: 102 MMOL/L (ref 100–108)
GLUCOSE BLD STRIP.AUTO-MCNC: 84 MG/DL (ref 74–106)
GLUCOSE BLD STRIP.AUTO-MCNC: 86 MG/DL (ref 70–110)
HCT VFR BLD CALC: 47 % (ref 36–49)
HGB BLD-MCNC: 16 G/DL (ref 12–16)
POTASSIUM BLD-SCNC: 3.2 MMOL/L (ref 3.5–5.5)
SODIUM BLD-SCNC: 140 MMOL/L (ref 136–145)

## 2020-01-24 PROCEDURE — 77030008565 HC TBNG SUC IRR ERBE -B: Performed by: COLON & RECTAL SURGERY

## 2020-01-24 PROCEDURE — 74011250637 HC RX REV CODE- 250/637: Performed by: COLON & RECTAL SURGERY

## 2020-01-24 PROCEDURE — 82962 GLUCOSE BLOOD TEST: CPT

## 2020-01-24 PROCEDURE — 76060000031 HC ANESTHESIA FIRST 0.5 HR: Performed by: COLON & RECTAL SURGERY

## 2020-01-24 PROCEDURE — 74011000250 HC RX REV CODE- 250: Performed by: NURSE ANESTHETIST, CERTIFIED REGISTERED

## 2020-01-24 PROCEDURE — 74011250636 HC RX REV CODE- 250/636: Performed by: NURSE ANESTHETIST, CERTIFIED REGISTERED

## 2020-01-24 PROCEDURE — 77030018846 HC SOL IRR STRL H20 ICUM -A: Performed by: COLON & RECTAL SURGERY

## 2020-01-24 PROCEDURE — 76040000019: Performed by: COLON & RECTAL SURGERY

## 2020-01-24 PROCEDURE — 82947 ASSAY GLUCOSE BLOOD QUANT: CPT

## 2020-01-24 PROCEDURE — C1729 CATH, DRAINAGE: HCPCS | Performed by: COLON & RECTAL SURGERY

## 2020-01-24 RX ORDER — SODIUM CHLORIDE, SODIUM LACTATE, POTASSIUM CHLORIDE, CALCIUM CHLORIDE 600; 310; 30; 20 MG/100ML; MG/100ML; MG/100ML; MG/100ML
50 INJECTION, SOLUTION INTRAVENOUS CONTINUOUS
Status: DISCONTINUED | OUTPATIENT
Start: 2020-01-24 | End: 2020-01-24 | Stop reason: HOSPADM

## 2020-01-24 RX ORDER — DEXTROSE 50 % IN WATER (D50W) INTRAVENOUS SYRINGE
25-50 AS NEEDED
Status: CANCELLED | OUTPATIENT
Start: 2020-01-24

## 2020-01-24 RX ORDER — MAGNESIUM SULFATE 100 %
4 CRYSTALS MISCELLANEOUS AS NEEDED
Status: CANCELLED | OUTPATIENT
Start: 2020-01-24

## 2020-01-24 RX ORDER — LIDOCAINE HYDROCHLORIDE 10 MG/ML
0.1 INJECTION, SOLUTION EPIDURAL; INFILTRATION; INTRACAUDAL; PERINEURAL AS NEEDED
Status: DISCONTINUED | OUTPATIENT
Start: 2020-01-24 | End: 2020-01-24 | Stop reason: HOSPADM

## 2020-01-24 RX ORDER — LIDOCAINE HYDROCHLORIDE 20 MG/ML
INJECTION, SOLUTION EPIDURAL; INFILTRATION; INTRACAUDAL; PERINEURAL AS NEEDED
Status: DISCONTINUED | OUTPATIENT
Start: 2020-01-24 | End: 2020-01-24 | Stop reason: HOSPADM

## 2020-01-24 RX ORDER — PROPOFOL 10 MG/ML
INJECTION, EMULSION INTRAVENOUS AS NEEDED
Status: DISCONTINUED | OUTPATIENT
Start: 2020-01-24 | End: 2020-01-24 | Stop reason: HOSPADM

## 2020-01-24 RX ORDER — INSULIN LISPRO 100 [IU]/ML
INJECTION, SOLUTION INTRAVENOUS; SUBCUTANEOUS ONCE
Status: CANCELLED | OUTPATIENT
Start: 2020-01-24 | End: 2020-01-24

## 2020-01-24 RX ORDER — SODIUM CHLORIDE 0.9 % (FLUSH) 0.9 %
5-40 SYRINGE (ML) INJECTION AS NEEDED
Status: CANCELLED | OUTPATIENT
Start: 2020-01-24

## 2020-01-24 RX ORDER — SODIUM CHLORIDE 0.9 % (FLUSH) 0.9 %
5-40 SYRINGE (ML) INJECTION EVERY 8 HOURS
Status: CANCELLED | OUTPATIENT
Start: 2020-01-24

## 2020-01-24 RX ORDER — SODIUM CHLORIDE, SODIUM LACTATE, POTASSIUM CHLORIDE, CALCIUM CHLORIDE 600; 310; 30; 20 MG/100ML; MG/100ML; MG/100ML; MG/100ML
50 INJECTION, SOLUTION INTRAVENOUS CONTINUOUS
Status: CANCELLED | OUTPATIENT
Start: 2020-01-24

## 2020-01-24 RX ORDER — DEXTROMETHORPHAN/PSEUDOEPHED 2.5-7.5/.8
DROPS ORAL AS NEEDED
Status: DISCONTINUED | OUTPATIENT
Start: 2020-01-24 | End: 2020-01-24 | Stop reason: HOSPADM

## 2020-01-24 RX ADMIN — SODIUM CHLORIDE, SODIUM LACTATE, POTASSIUM CHLORIDE, AND CALCIUM CHLORIDE 50 ML/HR: 600; 310; 30; 20 INJECTION, SOLUTION INTRAVENOUS at 08:31

## 2020-01-24 RX ADMIN — LIDOCAINE HYDROCHLORIDE 100 MG: 20 INJECTION, SOLUTION EPIDURAL; INFILTRATION; INTRACAUDAL; PERINEURAL at 09:54

## 2020-01-24 RX ADMIN — PROPOFOL 150 MG: 10 INJECTION, EMULSION INTRAVENOUS at 09:54

## 2020-01-24 RX ADMIN — PROPOFOL 50 MG: 10 INJECTION, EMULSION INTRAVENOUS at 09:55

## 2020-01-24 RX ADMIN — PROPOFOL 50 MG: 10 INJECTION, EMULSION INTRAVENOUS at 10:04

## 2020-01-24 RX ADMIN — FAMOTIDINE: 10 INJECTION, SOLUTION INTRAVENOUS at 08:32

## 2020-01-24 RX ADMIN — PROPOFOL 50 MG: 10 INJECTION, EMULSION INTRAVENOUS at 09:59

## 2020-01-24 NOTE — DISCHARGE INSTRUCTIONS
Colonoscopy: What to Expect at [address] [place] [address]  After you have a colonoscopy, you will stay at the clinic for 1 to 2 hours until the medicines wear off. Then you can go home. But you will need to arrange for a ride. Your doctor will tell you when you can eat and do your other usual activities. Your doctor will talk to you about when you will need your next colonoscopy. Your doctor can help you decide how often you need to be checked. This will depend on the results of your test and your risk for colorectal cancer. After the test, you may be bloated or have gas pains. You may need to pass gas. If a biopsy was done or a polyp was removed, you may have streaks of blood in your stool (feces) for a few days. This care sheet gives you a general idea about how long it will take for you to recover. But each person recovers at a different pace. Follow the steps below to get better as quickly as possible. How can you care for yourself at home? Activity  · Rest when you feel tired. · You can do your normal activities when it feels okay to do so. Diet  · Follow your doctor's directions for eating. · Unless your doctor has told you not to, drink plenty of fluids. This helps to replace the fluids that were lost during the colon prep. · Do not drink alcohol. Medicines  · If polyps were removed or a biopsy was done during the test, your doctor may tell you not to take aspirin or other anti-inflammatory medicines for a few days. These include ibuprofen (Advil, Motrin) and naproxen (Aleve). Other instructions  · For your safety, do not drive or operate machinery until the medicine wears off and you can think clearly. Your doctor may tell you not to drive or operate machinery until the day after your test.  · Do not sign legal documents or make major decisions until the medicine wears off and you can think clearly. The anesthesia can make it hard for you to fully understand what you are agreeing to.   Follow-up care is a key part of your treatment and safety. Be sure to make and go to all appointments, and call your doctor if you are having problems. It's also a good idea to know your test results and keep a list of the medicines you take. When should you call for help? Call 911 anytime you think you may need emergency care. For example, call if:  · You passed out (lost consciousness). · You pass maroon or bloody stools. · You have severe belly pain. Call your doctor now or seek immediate medical care if:  · Your stools are black and tarlike. · Your stools have streaks of blood, but you did not have a biopsy or any polyps removed. · You have belly pain, or your belly is swollen and firm. · You vomit. · You have a fever. · You are very dizzy. Watch closely for changes in your health, and be sure to contact your doctor if you have any problems. Where can you learn more? Go to [employer] logo[employer]  Enter E264 in the search box to learn more about \"Colonoscopy: What to Expect at Home. \"   © 1528-8114 Healthwise, Incorporated. Care instructions adapted under license by [employer] (which disclaims liability or warranty for this information). This care instruction is for use with your licensed healthcare professional. If you have questions about a medical condition or this instruction, always ask your healthcare professional. [place] [address] any warranty or liability for your use of this information. Content Version: 28.3.106359; Current as of: November 14, 2014      DISCHARGE SUMMARY from Nurse     POST-PROCEDURE INSTRUCTIONS:    Call your Physician if you:  ? Observe any excess bleeding. ? Develop a temperature over 100.5o F.  ? Experience abdominal, shoulder or chest pain. ? Notice any signs of decreased circulation or nerve impairment to an extremity such as a change in color, persistent numbness, tingling, coldness or increase in pain. ?  Vomit blood or you have nausea and vomiting lasting longer than 4 hours. ? Are unable to take medications. ? Are unable to urinate within 8 hours after discharge following general anesthesia or intravenous sedation. For the next 24 hours after receiving general anesthesia or intravenous sedation, or while taking prescription Narcotics, limit your activities:  ? Do NOT drive a motor vehicle, operate hazard machinery or power tools, or perform tasks that require coordination. The medication you received during your procedure may have some effect on your mental awareness. ? Do NOT make important personal or business decisions. The medication you received during your procedure may have some effect on your mental awareness. ? Do NOT drink alcoholic beverages. These drinks do not mix well with the medications that have been given to you. ? Upon discharge from the hospital, you must be accompanied by a responsible adult. ? Resume your diet as directed by your physician. ? Resume medications as your physician has prescribed. ? Please give a list of your current medications to your Primary Care Provider. ? Please update this list whenever your medications are discontinued, doses are changed, or new medications (including over-the-counter products) are added. ? Please carry medication information at all times in case of emergency situations. These are general instructions for a healthy lifestyle:    No smoking/ No tobacco products/ Avoid exposure to second hand smoke.  Surgeon General's Warning:  Quitting smoking now greatly reduces serious risk to your health. Obesity, smoking, and a sedentary lifestyle greatly increase your risk for illness.    A healthy diet, regular physical exercise & weight monitoring are important for maintaining a healthy lifestyle   You may be retaining fluid if you have a history of heart failure or if you experience any of the following symptoms:  Weight gain of 3 pounds or more overnight or 5 pounds in a week, increased swelling in our hands or feet or shortness of breath while lying flat in bed. Please call your doctor as soon as you notice any of these symptoms; do not wait until your next office visit. Recognize signs and symptoms of STROKE:  F  -  Face looks uneven  A  -  Arms unable to move or move unevenly  S  -  Speech slurred or non-existent  T  -  Time to call 911 - as soon as signs and symptoms begin - DO NOT go back to bed or wait to see If you get better - TIME IS BRAIN. Colorectal Screening   Colorectal cancer almost always develops from precancerous polyps (abnormal growths) in the colon or rectum. Screening tests can find precancerous polyps, so that they can be removed before they turn into cancer. Screening tests can also find colorectal cancer early, when treatment works best.  Sue Tubbs Speak with your physician about when you should begin screening and how often you should be tested. Additional Information    If you have questions, please call [phone]. Remember, [employer] is NOT to be used for urgent needs. For medical emergencies, dial 911. Educational references and/or instructions provided during this visit included:    see attachments      APPOINTMENTS:    Please make a follow-up appointment with your physician. Discharge information has been reviewed with the patient and responsible party. The patient and responsible party verbalized understanding. FOLLOW UP VISIT Appointment in: Other (Specify) Repeat colonoscopy in 5 years.

## 2020-01-24 NOTE — PROCEDURES
[facility]  [address], [address]   [place], [address]  [phone]                    Colonoscopy Procedure Note      [facility].  [birth year]  [id number]                Date of Procedure: 1/24/2020    Preoperative diagnosis: Z12.11,  Colon cancer Screening    Postoperative diagnosis: Normal, slightly limited prep    :  David Talbert MD    Assistant(s): Endoscopy Technician-1: Kiet Garcia  Endoscopy RN-1: Maris Sanford RN  Endoscopy RN-2: Mehrdad Larry RN  Float Staff: Flory Jacob    Sedation: MAC    Complications: None    Implants: None    Procedure Details:  Prior to the procedure, a history and physical were performed. The patients medications, allergies and sensitivities were reviewed and all questions were answered. After informed consent was obtained for the procedure, with all risks and benefits of procedure explained. The patient was taken to the endoscopy suite and placed in the left lateral decubitus position. Patient identification and proposed procedure were verified prior to the procedure by the nurse and I. After sequential anesthesia administered by anesthesiologist, a digital rectal exam was performed and was normal.  The Olympus video colonoscope was introduced through the anus and advanced to cecum, which was identified by the ileocecal valve and appendiceal orifice. The quality of preparation was fair. The colonoscope was slowly withdrawn and the mucosa examined for any abnormalities. Cecal withdrawal time was greater than 6 minutes. The patient tolerated the procedure well. There were no complications. Findings/Interventions:   Polyps - none    EBL: none    Recommendations: -Repeat colonoscopy in 5 years. Resume normal medication(s).      Discharge Disposition:  Arsenio Christy MD  1/24/2020  10:12 AM

## 2020-01-24 NOTE — H&P
HPI: Evelyn Cruz Sr. is a 75 y.o. male presenting with chief complain of need for crc screening. Past Medical History:   Diagnosis Date    Atrial fibrillation (HCC)     Chronic diastolic heart failure ([address]     Hypertension     Mitral valve disorders(424.0)     Prediabetes        Past Surgical History:   Procedure Laterality Date    HX ADENOIDECTOMY      HX OTHER SURGICAL  1965    infection drained from under chin    HX TONSILLECTOMY         Family History   Problem Relation Age of Onset    Kidney Disease Mother     Diabetes Mother     Cancer Father     No Known Problems Sister     No Known Problems Brother     Hypertension Neg Hx        Social History     Socioeconomic History    Marital status: SINGLE     Spouse name: Not on file    Number of children: Not on file    Years of education: Not on file    Highest education level: Not on file   Tobacco Use    Smoking status: Former Smoker     Last attempt to quit: 3/21/1981     Years since quittin.8    Smokeless tobacco: Never Used   Substance and Sexual Activity    Alcohol use: No    Drug use: No    Sexual activity: Never   Other Topics Concern       Review of Systems - neg    Outpatient Medications Marked as Taking for the 20 encounter [facility] Encounter)   Medication Sig Dispense Refill    metFORMIN ER (GLUCOPHAGE XR) 500 mg tablet Take 1 Tab by mouth two (2) times a day. 60 Tab 2    benazepril (LOTENSIN) 40 mg tablet Take 1 Tab by mouth daily. STOP Ramipril 90 Tab 3    carvedilol (COREG) 12.5 mg tablet TAKE 1 TABLET BY MOUTH TWO TIMES A DAY WITH MEALS 180 Tab 2    nisoldipine SR (SULAR) 17 mg Tb24 tablet Take 1 Tab by mouth daily. 30 Tab 05    COUMADIN 7.5 mg tablet Take 1 Tab by mouth daily. 60 Tab 5    atorvastatin (LIPITOR) 20 mg tablet Take 1 Tab by mouth daily. 90 Tab 3    furosemide (LASIX) 40 mg tablet Take 1 Tab by mouth two (2) times a day.  180 Tab 01       No Known Allergies    Vitals:    20 1420 01/24/20 0816   BP:  163/76   Pulse:  90   Resp:  20   Temp:  97.8 °F (36.6 °C)   SpO2:  94%   Weight: 137 kg (302 lb) 134.8 kg (297 lb 2 oz)   Height: 6' 4\" (1.93 m) 6' 4\" (1.93 m)       Physical Exam  Constitutional:       Appearance: He is well-developed. HENT:      Head: Normocephalic and atraumatic. Eyes:      Conjunctiva/sclera: Conjunctivae normal.   Abdominal:      General: There is no distension. Palpations: Abdomen is soft. There is no mass. Tenderness: There is no tenderness. Musculoskeletal: Normal range of motion. Lymphadenopathy:      Cervical: No cervical adenopathy. Skin:     General: Skin is warm and dry. Neurological:      Sensory: No sensory deficit. Psychiatric:         Speech: Speech normal.         Assessment / Plan    colonoscopy    The diagnoses and plan were discussed with the patient. All questions answered. Plan of care agreed to by all concerned.

## 2020-02-14 ENCOUNTER — TELEPHONE ANTICOAG (OUTPATIENT)
Dept: CARDIOLOGY CLINIC | Age: 65
End: 2020-02-14

## 2020-02-14 DIAGNOSIS — I48.0 PAROXYSMAL ATRIAL FIBRILLATION (HCC): ICD-10-CM

## 2020-02-14 LAB — INR, EXTERNAL: 2.8 (ref 2–3)

## 2020-02-24 ENCOUNTER — PATIENT OUTREACH (OUTPATIENT)
Dept: FAMILY MEDICINE CLINIC | Facility: CLINIC | Age: 65
End: 2020-02-24

## 2020-02-24 NOTE — PROGRESS NOTES
NN health screening:    Colonoscopy completed last month by Dr. Reyes Or without polyps and with recommended surveillance of 5 yrs. I've asked office staff to please print and scan into HM the report.

## 2020-02-26 ENCOUNTER — HOSPITAL ENCOUNTER (OUTPATIENT)
Dept: NUTRITION | Age: 65
Discharge: HOME | End: 2020-02-26
Payer: MEDICARE

## 2020-02-26 PROCEDURE — 97802 MEDICAL NUTRITION INDIV IN: CPT

## 2020-02-26 NOTE — PROGRESS NOTES
[facility]  [facility], [place], [address]  Phone: [phone] Fax: [phone]   Nutrition Assessment  Medical Nutrition Therapy   Outpatient Initial Evaluation         Patient Name: Claudia Waterman Sr. : 1955   Treatment Diagnosis: Type 2 diabetes   Referral Source: [facility]): 2020     Gender: male Age: 75 y.o. Ht: 75 in Wt: 299.4  lb  kg   BMI: 37.4 BMR   Male  BMR Female      Past Medical History:  Diabetes, A fib, dyastolic heart failure     Pertinent Medications:   Metformin, Coumadin, Lasix     Biochemical Data:   Lab Results   Component Value Date/Time    Hemoglobin A1c (POC) 7.5 2020 10:30 AM     Lab Results   Component Value Date/Time    Sodium 138 2019 10:24 AM    Potassium 3.7 2019 10:24 AM    Chloride 102 2019 10:24 AM    CO2 31 2019 10:24 AM    Anion gap 5 2019 10:24 AM    Glucose 142 (H) 2019 10:24 AM    BUN 12 2019 10:24 AM    Creatinine 1.39 (H) 2019 10:24 AM    BUN/Creatinine ratio 9 (L) 2019 10:24 AM    GFR est AA >60 2019 10:24 AM    GFR est non-AA 51 (L) 2019 10:24 AM    Calcium 9.5 2019 10:24 AM    Bilirubin, total 0.8 2019 10:24 AM    AST (SGOT) 20 2019 10:24 AM    Alk.  phosphatase 63 2019 10:24 AM    Protein, total 8.0 2020 10:59 AM    Albumin 4.1 2019 10:24 AM    Globulin 4.7 (H) 2019 10:24 AM    A-G Ratio 0.9 2019 10:24 AM    ALT (SGPT) 36 2019 10:24 AM     Lab Results   Component Value Date/Time    Cholesterol, total 105 2019 10:13 AM    HDL Cholesterol 45 2019 10:13 AM    LDL, calculated 48.2 2019 10:13 AM    VLDL, calculated 11.8 2019 10:13 AM    Triglyceride 59 2019 10:13 AM    CHOL/HDL Ratio 2.3 2019 10:13 AM     Lab Results   Component Value Date/Time    ALT (SGPT) 36 2019 10:24 AM    AST (SGOT) 20 2019 10:24 AM    Alk. phosphatase 63 12/13/2019 10:24 AM    Bilirubin, total 0.8 12/13/2019 10:24 AM     Lab Results   Component Value Date/Time    Creatinine 1.39 (H) 12/13/2019 10:24 AM     Lab Results   Component Value Date/Time    BUN 12 12/13/2019 10:24 AM    BUN, POC 9 01/24/2020 08:33 AM     No results found for: MCACR, MCA1, MCA2, MCA3, MCAU, MCAU2, MCALPOCT     Assessment:    Pt is seeking eduction for diabetes diagnosis. Pt was diagnosed ~ 1 month ago with an A1C of 7.5. He is also on coumadin and goes to the INR lab once a month. He lives alone and cooks some foods or buys pre-made from the grocery store. He does not exercise due to a lower back problem, but is willing to try things that will not aggrevate it. He has eliminated sodas as well as high sugar snacks from his diet since his diagnosis. He mostly drinks water only. He would like to reduce his blood sugars and learn more about the foods he can eat and how to balance them. Spend time discussing high Vitamin K foods as well. Pt acknowledged understanding of all information and wants to take time to incorporate it into his lifestyle before returning for follow-up, pt will call for follow up. Food & Nutrition: B: 2 egg, 1 banana, 1 toast, 16 oz water  L: chicken salad sandwich, 1 banana, 16 oz water  D: chicken salad sandwich, apple, 16 oz water     Estimate Needs   Calories: 0102-6102  Protein: 166-184 Carbs: 143-158 Fat: 74-82   Kcal/day  g/day  g/day  g/day        percent: 30  35  30               Nutrition Diagnosis Excessive Carbohydrate intake related to food -and nutrition- knowledge deficit and food and nutrition compliance limitations as evidenced by Hyperglycemia (fasting BG > 126 mg/dL), and Hemoglobin A1c > 6%, and pt's food recall reveals high carbohydrate intake at meals and snacks.      Nutrition Intervention &  Education: Educated pt on the pathophysiology of Type II Diabetes, insulin resistance and the rationale for dietary modifications and increased activity. Educated pt on lean proteins, healthy fats, non-starchy vegetables, and complex carbohydrate food sources. Discussed limiting carbohydrates, label reading, meal timing, and appropriate serving sizes. Encouraged pt to avoid sugary beverages. Reviewed meal builder tool, calorie and macro breakdown as well as exercise parameters. 1.Meal Plan: Assures nutritional adequacy  Structured so as to consistently promote energy deficit  Sufficiently satisfying so that the patient can maintain the meal plan and make it fit into his or her lifestyle; .  2. Physical activity of moderate intensity for 30 to 60 minutes daily:  Planned activities (walking, swimming, cycling, jogging, etc)  Lifestyle changes (change environment to use more energy by walking, taking stairs, reduce sedentary time by watching less television, etc.) Many overweight or obese individuals will need to begin low-intensity activities for only 5 to 10 minutes per session; however, goals should include increasing frequency, intensity, and time. 3. Behavior change: Identify and interrupt cues to inappropriate eating behaviors (environmental and emotional). Build a network of support for the patient. Integrate meal plan and physical activity into lifestyle so as to maintain weight loss.    Handouts Provided: [x]  Carbohydrates  [x]  Protein  [x]  Non-starchy Vegatbles  []  Food Label  [x]  Meal and Snack Ideas  []  Food Journals [x]  Diabetes  []  Cholesterol  []  Sodium  [x]  Gen Nutr Guidelines  []  SBGM Guidelines  []  Others:   Information Reviewed with: Patient   Readiness to Change Stage: []  Pre-contemplative    []  Contemplative  [x]  Preparation               [x]  Action                  []  Maintenance   Potential Barriers to Learning: []  Decline in memory    []  Language barrier   []  Other:  []  Emotional                  []  Limited mobility  []  Lack of motivation     [] Vision, hearing or cognitive impairment   Expected Compliance: Good / Fair      Nutritional Goal - To promote lifestyle changes to result in:    [x]  Weight loss  [x]  Improved diabetic control  []  Decreased cholesterol levels  []  Decreased blood pressure  []  Weight maintenance []  Preventing any interactions associated with food allergies  []  Adequate weight gain toward goal weight  []  Other:        Patient Goals:   1. Follow consistent and appropriate meal timing; follow a structured timeline, space meals by 4-5 hours with snacks between if going longer than 5 hours  2. Focus on good sources of protein; Never eat carbs alone, always pair them with protein,   3. Have a plan; use the meal builder tool + diet plan for good portion contol and balance, plan out meals/snacks ahead of time, and create shopping lists  4. Exercise for 150 min each week divided as schedule permits.      Dietitian Signature: Queenie Villasenor MA, RD Date: 2/26/2020   Follow-up: Pt will call Time: 12:58 PM

## 2020-03-04 DIAGNOSIS — I50.32 DIASTOLIC CHF, CHRONIC (HCC): ICD-10-CM

## 2020-03-04 DIAGNOSIS — I50.32 CHRONIC DIASTOLIC HEART FAILURE (HCC): ICD-10-CM

## 2020-03-04 DIAGNOSIS — M79.89 LEG SWELLING: ICD-10-CM

## 2020-03-04 DIAGNOSIS — I48.91 ATRIAL FIBRILLATION, UNSPECIFIED TYPE (HCC): ICD-10-CM

## 2020-03-04 RX ORDER — FUROSEMIDE 40 MG/1
40 TABLET ORAL 2 TIMES DAILY
Qty: 180 TAB | Refills: 1 | Status: SHIPPED | OUTPATIENT
Start: 2020-03-04 | End: 2021-03-29 | Stop reason: SDUPTHER

## 2020-03-04 RX ORDER — CARVEDILOL 12.5 MG/1
12.5 TABLET ORAL 2 TIMES DAILY WITH MEALS
Qty: 180 TAB | Refills: 2 | Status: SHIPPED | OUTPATIENT
Start: 2020-03-04 | End: 2020-12-11 | Stop reason: SDUPTHER

## 2020-03-04 RX ORDER — NISOLDIPINE 17 MG/1
TABLET, FILM COATED, EXTENDED RELEASE ORAL
Qty: 30 TAB | Refills: 4 | Status: SHIPPED | OUTPATIENT
Start: 2020-03-04 | End: 2020-04-02

## 2020-03-06 ENCOUNTER — OFFICE VISIT (OUTPATIENT)
Dept: CARDIOLOGY CLINIC | Age: 65
End: 2020-03-06

## 2020-03-06 VITALS
SYSTOLIC BLOOD PRESSURE: 137 MMHG | HEIGHT: 76 IN | OXYGEN SATURATION: 95 % | WEIGHT: 294.8 LBS | DIASTOLIC BLOOD PRESSURE: 68 MMHG | HEART RATE: 84 BPM | BODY MASS INDEX: 35.9 KG/M2 | TEMPERATURE: 97.8 F

## 2020-03-06 DIAGNOSIS — I27.20 PULMONARY HTN (HCC): ICD-10-CM

## 2020-03-06 DIAGNOSIS — I48.20 CHRONIC ATRIAL FIBRILLATION (HCC): ICD-10-CM

## 2020-03-06 DIAGNOSIS — I50.32 CHRONIC DIASTOLIC HEART FAILURE (HCC): Primary | ICD-10-CM

## 2020-03-06 DIAGNOSIS — I10 ESSENTIAL HYPERTENSION, BENIGN: ICD-10-CM

## 2020-03-06 DIAGNOSIS — Z79.01 ANTICOAGULANT LONG-TERM USE: ICD-10-CM

## 2020-03-06 NOTE — PROGRESS NOTES
HISTORY OF PRESENT ILLNESS  Berneda Leyden Sr. is a 75 y.o. male. Patient with a fib,chf,pulmonary htn,mr. On follow up patient denies any chest pains,sob, palpitation or other significant symptoms. Valvular Heart Disease   The history is provided by the patient. This is a chronic problem. The problem occurs constantly. The problem has not changed since onset. Pertinent negatives include no chest pain, no abdominal pain, no headaches and no shortness of breath. CHF   The history is provided by the patient. This is a chronic problem. The problem occurs constantly. The problem has not changed since onset. Pertinent negatives include no chest pain, no abdominal pain, no headaches and no shortness of breath. Palpitations    The history is provided by the patient. This is a chronic problem. The problem has not changed since onset. Associated symptoms include lower extremity edema. Pertinent negatives include no fever, no chest pain, no claudication, no orthopnea, no PND, no abdominal pain, no nausea, no vomiting, no headaches, no dizziness, no weakness, no cough, no hemoptysis, no shortness of breath and no sputum production. His past medical history is significant for hypertension. Hypertension   The history is provided by the patient. This is a chronic problem. The problem occurs constantly. The problem has not changed since onset. Pertinent negatives include no chest pain, no abdominal pain, no headaches and no shortness of breath. Leg Swelling   The history is provided by the patient. This is a chronic problem. The problem occurs daily. The problem has not changed since onset. Pertinent negatives include no chest pain, no abdominal pain, no headaches and no shortness of breath. Nothing aggravates the symptoms. Review of Systems   Constitutional: Negative for chills and fever. HENT: Negative for nosebleeds. Eyes: Negative for blurred vision and double vision.    Respiratory: Negative for cough, hemoptysis, sputum production, shortness of breath and wheezing. Cardiovascular: Negative for chest pain, palpitations, orthopnea, claudication, leg swelling and PND. Gastrointestinal: Negative for abdominal pain, heartburn, nausea and vomiting. Musculoskeletal: Negative for myalgias. Skin: Negative for rash. Neurological: Negative for dizziness, weakness and headaches. Endo/Heme/Allergies: Does not bruise/bleed easily. Family History   Problem Relation Age of Onset    Kidney Disease Mother     Diabetes Mother     Cancer Father     No Known Problems Sister     No Known Problems Brother     Hypertension Neg Hx        Past Medical History:   Diagnosis Date    Atrial fibrillation ([address]     Chronic diastolic heart failure ([address]     Hypertension     Mitral valve disorders(424.0)     Prediabetes        Past Surgical History:   Procedure Laterality Date    COLONOSCOPY N/A 1/24/2020    COLONOSCOPY performed by Martina Flynn MD at [facility]    HX ADENOIDECTOMY      [address]    infection drained from under chin    HX TONSILLECTOMY         No Known Allergies    Current Outpatient Medications   Medication Sig    furosemide (LASIX) 40 mg tablet Take 1 Tab by mouth two (2) times a day.  carvediloL (COREG) 12.5 mg tablet Take 1 Tab by mouth two (2) times daily (with meals).  nisoldipine SR (SULAR) 17 mg Tb24 tablet TAKE 1 TABLET BY MOUTH ONCE DAILY    metFORMIN ER (GLUCOPHAGE XR) 500 mg tablet Take 1 Tab by mouth two (2) times a day.  benazepril (LOTENSIN) 40 mg tablet Take 1 Tab by mouth daily. STOP Ramipril    COUMADIN 7.5 mg tablet Take 1 Tab by mouth daily.  atorvastatin (LIPITOR) 20 mg tablet Take 1 Tab by mouth daily. No current facility-administered medications for this visit.         Visit Vitals  /68 (BP 1 Location: Left arm, BP Patient Position: Sitting)   Pulse 84   Temp 97.8 °F (36.6 °C) (Oral)   Ht 6' 4\" (1.93 m)   Wt 133.7 kg (294 lb 12.8 oz)   SpO2 95%   BMI 35.88 kg/m²         Physical Exam   Constitutional: He is oriented to person, place, and time. He appears well-developed and well-nourished. HENT:   Head: Normocephalic and atraumatic. Eyes: Conjunctivae are normal.   Neck: Neck supple. No JVD present. No tracheal deviation present. No thyromegaly present. Cardiovascular: Normal rate and normal heart sounds. An irregularly irregular rhythm present. Exam reveals no gallop and no friction rub. No murmur heard. Pulmonary/Chest: Breath sounds normal. No respiratory distress. He has no wheezes. He has no rales. He exhibits no tenderness. Abdominal: Soft. There is no abdominal tenderness. Musculoskeletal:         General: No edema. Neurological: He is alert and oriented to person, place, and time. Skin: Skin is warm and dry. Psychiatric: He has a normal mood and affect. Mr. Eileen Batista has a reminder for a \"due or due soon\" health maintenance. I have asked that he contact his primary care provider for follow-up on this health maintenance. No flowsheet data found. SUMMARY:echo:5/2015  Left ventricle: Systolic function was normal. Ejection fraction was  estimated in the range of 55 % to 60 %. There were no regional wall motion  abnormalities. Left atrium: The atrium was mildly dilated. Mitral valve: There was mild annular calcification. There was mild diffuse  thickening of the anterior and posterior leaflets. There was mild  regurgitation. Mean transmitral gradient was 1.6 mmHg. Tricuspid valve: Tricuspid regurgitation peak velocity: 3.1 m/sec. Pulmonary artery systolic pressure: 41 mmHg. I Have personally reviewed recent relevant labs available and discussed with patient  8/2017-dig,bmp    SUMMARY:3/2018-echo  Left ventricle: Patient was in an irregular rhythm throughout the entire  exam. Systolic function was normal. Ejection fraction was estimated in the  range of 55 % to 60 %.  No obvious wall motion abnormalities identified in  the views obtained. There was mild concentric hypertrophy. Right ventricle: The ventricle was dilated. Left atrium: The atrium was mildly dilated. Right atrium: The atrium was dilated. Mitral valve: There was mild annular calcification. There was mild  regurgitation. Tricuspid valve: There was mild regurgitation. Pulmonary artery systolic  pressure: 59 mmHg. There was moderate pulmonary hypertension. Assessment       ICD-10-CM ICD-9-CM    1. Chronic diastolic heart failure (HCC) I50.32 428.32     Stable continue current medical management class II   2. Chronic atrial fibrillation I48.20 427.31     Stable continue with anticoagulation and rate control   3. Pulmonary HTN (HCC) I27.20 416.8     Stable multifactorial   4. Essential hypertension, benign I10 401.1     Stable on treatment   5. Anticoagulant long-term use Z79.01 V58.61     For A. fib monitor     3/2018  Off dig and coreg due to bradycardia-dig level 1.5 after daily dose at that time  Edema better-use zaroxolyn as needed  3/2019  Mildly elevated blood pressure. Will restart Coreg at 12.5 mg twice a day to was discontinued in past due to bradycardia associated with digoxin and Coreg use. Currently off digoxin. Also LDL of 126 and 10/2018. Continue with diet and weight loss  9/2019  Cardiac status stable continue current treatment  2/2020  Cardiac status stable. Continue treatment lab with PCP. Last LDL controlled      No orders of the defined types were placed in this encounter. Follow-up and Dispositions    · Return in about 6 months (around 9/6/2020).

## 2020-03-06 NOTE — PROGRESS NOTES
Patient didn't bring medications, verbally reviewed. 1. Have you been to the ER, urgent care clinic since your last visit? Hospitalized since your last visit? No    2. Have you seen or consulted any other health care providers outside of the [address] [place] [address] since your last visit? Include any pap smears or colon screening.  No

## 2020-03-16 DIAGNOSIS — E11.8 CONTROLLED TYPE 2 DIABETES MELLITUS WITH COMPLICATION, WITHOUT LONG-TERM CURRENT USE OF INSULIN (HCC): ICD-10-CM

## 2020-03-16 RX ORDER — METFORMIN HYDROCHLORIDE 500 MG/1
500 TABLET, EXTENDED RELEASE ORAL 2 TIMES DAILY
Qty: 180 TAB | Refills: 2 | Status: SHIPPED | OUTPATIENT
Start: 2020-03-16 | End: 2020-04-17

## 2020-03-16 NOTE — TELEPHONE ENCOUNTER
90 day requested    Requested Prescriptions     Pending Prescriptions Disp Refills    metFORMIN ER (GLUCOPHAGE XR) 500 mg tablet 180 Tab 2     Sig: Take 1 Tab by mouth two (2) times a day.

## 2020-03-17 ENCOUNTER — TELEPHONE ANTICOAG (OUTPATIENT)
Dept: CARDIOLOGY CLINIC | Age: 65
End: 2020-03-17

## 2020-03-17 DIAGNOSIS — I48.0 PAROXYSMAL ATRIAL FIBRILLATION (HCC): ICD-10-CM

## 2020-03-17 LAB — INR, EXTERNAL: 3.2 (ref 2–3)

## 2020-03-17 NOTE — PATIENT INSTRUCTIONS
Mr. Lola Fothergill is here today for anticoagulation monitoring for the diagnosis of Atrial Fibrillation. His INR goal is 2.0-3.0 and his current Coumadin dose is 7.5 mg. Today's findings include an INR of 3.2     Considering Mr. Ibarra's past history, todays findings, and per the coumadin policy/protocol, Mr. Autumn Jacinto was instructed to take Coumadin as follows,  Take 7.5 mg daily. He was also instructed to come back in 4 weeks for an INR check. A full discussion of the nature of anticoagulants has been carried out. A full discussion of the need for frequent and regular monitoring, precise dosage adjustment and compliance was stressed. Side effects of potential bleeding were discussed and Mr. Autumn Jacinto was instructed to call [phone] if there are any signs of abnormal bleeding. Mr. Autumn Jacinto was instructed to avoid any OTC items containing aspirin or ibuprofen and prior to starting any new OTC products to consult with his physician or pharmacist to ensure no drug interactions are present. Mr. Autumn Jacinto was instructed to avoid any major changes in his general diet and to avoid alcohol consumption. .      Mr. Autumn Jacinto verbalized his understanding of all instructions and will call the office with any questions, concerns, or signs of abnormal bleeding or blood clot.

## 2020-03-26 ENCOUNTER — PATIENT OUTREACH (OUTPATIENT)
Dept: FAMILY MEDICINE CLINIC | Facility: CLINIC | Age: 65
End: 2020-03-26

## 2020-03-26 NOTE — PROGRESS NOTES
[facility] health screening:    Reminded office staff to please update HM with 1/24/20 colonoscopy report. Surveillance recommendation of 5 yrs. Closed this episode of care.

## 2020-04-02 DIAGNOSIS — I50.32 CHRONIC DIASTOLIC HEART FAILURE (HCC): ICD-10-CM

## 2020-04-02 DIAGNOSIS — I48.91 ATRIAL FIBRILLATION, UNSPECIFIED TYPE (HCC): ICD-10-CM

## 2020-04-02 DIAGNOSIS — I50.32 DIASTOLIC CHF, CHRONIC (HCC): ICD-10-CM

## 2020-04-02 RX ORDER — NISOLDIPINE 17 MG/1
TABLET, FILM COATED, EXTENDED RELEASE ORAL
Qty: 30 TAB | Refills: 4 | Status: SHIPPED | OUTPATIENT
Start: 2020-04-02 | End: 2020-12-28 | Stop reason: SDUPTHER

## 2020-04-16 DIAGNOSIS — E11.8 CONTROLLED TYPE 2 DIABETES MELLITUS WITH COMPLICATION, WITHOUT LONG-TERM CURRENT USE OF INSULIN (HCC): ICD-10-CM

## 2020-04-17 RX ORDER — METFORMIN HYDROCHLORIDE 500 MG/1
TABLET, EXTENDED RELEASE ORAL
Qty: 60 TAB | Refills: 1 | Status: SHIPPED | OUTPATIENT
Start: 2020-04-17

## 2020-05-28 ENCOUNTER — TELEPHONE ANTICOAG (OUTPATIENT)
Dept: CARDIOLOGY CLINIC | Age: 65
End: 2020-05-28

## 2020-05-28 ENCOUNTER — TELEPHONE (OUTPATIENT)
Dept: CARDIOLOGY CLINIC | Age: 65
End: 2020-05-28

## 2020-05-28 NOTE — TELEPHONE ENCOUNTER
Called patient to inform of missed inr check, no answer. Left message if could come in some time this week or give us a call if any inconvenience .

## 2020-06-01 ENCOUNTER — TELEPHONE ANTICOAG (OUTPATIENT)
Dept: CARDIOLOGY CLINIC | Age: 65
End: 2020-06-01

## 2020-06-01 DIAGNOSIS — I48.0 PAROXYSMAL ATRIAL FIBRILLATION (HCC): ICD-10-CM

## 2020-06-01 LAB — INR, EXTERNAL: 2.2

## 2020-06-26 LAB — INR, EXTERNAL: 2.3

## 2020-06-29 ENCOUNTER — TELEPHONE ANTICOAG (OUTPATIENT)
Dept: CARDIOLOGY CLINIC | Age: 65
End: 2020-06-29

## 2020-06-29 DIAGNOSIS — I48.0 PAROXYSMAL ATRIAL FIBRILLATION (HCC): ICD-10-CM

## 2020-07-01 RX ORDER — WARFARIN SODIUM 5 MG/1
5 TABLET ORAL DAILY
Qty: 60 TAB | Refills: 6 | Status: SHIPPED | OUTPATIENT
Start: 2020-07-01 | End: 2020-08-10 | Stop reason: DRUGHIGH

## 2020-07-01 NOTE — TELEPHONE ENCOUNTER
Why are you sending me this message. I am in the hospital today. This patient belongs to Dr. Cherelle Nance.   Please send him this message

## 2020-07-01 NOTE — TELEPHONE ENCOUNTER
Pt called stating that he needs a refill on the coumadin medication. Also stated that 7.5mg is no longer available and he will need the 5mg tablets.

## 2020-07-24 DIAGNOSIS — I48.0 PAROXYSMAL ATRIAL FIBRILLATION (HCC): ICD-10-CM

## 2020-07-24 DIAGNOSIS — I05.9 MITRAL VALVE DISORDER: Primary | ICD-10-CM

## 2020-07-28 ENCOUNTER — TELEPHONE ANTICOAG (OUTPATIENT)
Dept: CARDIOLOGY CLINIC | Age: 65
End: 2020-07-28

## 2020-07-28 DIAGNOSIS — I48.0 PAROXYSMAL ATRIAL FIBRILLATION (HCC): ICD-10-CM

## 2020-07-28 LAB — INR, EXTERNAL: 2.5

## 2020-08-10 RX ORDER — WARFARIN SODIUM 5 MG/1
7.5 TABLET ORAL DAILY
Qty: 45 TAB | Refills: 6 | Status: SHIPPED | OUTPATIENT
Start: 2020-08-10 | End: 2021-02-15 | Stop reason: SDUPTHER

## 2020-08-26 ENCOUNTER — TELEPHONE ANTICOAG (OUTPATIENT)
Dept: CARDIOLOGY CLINIC | Age: 65
End: 2020-08-26

## 2020-08-26 DIAGNOSIS — I48.0 PAROXYSMAL ATRIAL FIBRILLATION (HCC): ICD-10-CM

## 2020-08-26 LAB — INR, EXTERNAL: 1.8

## 2020-08-26 NOTE — PROGRESS NOTES
Mr. Pura Foss is here today for anticoagulation monitoring for the diagnosis of Atrial Fibrillation. His INR goal is 2.0-3.0 and his current Coumadin dose is 7.5 mg. Today's findings include an INR of 1.8     Considering Mr. Ibarra's past history, todays findings, and per the coumadin policy/protocol, Mr. Meagan Jung was instructed to take Coumadin as follows,  7.5 mg daily. He was also instructed to come back in 1 weeks for an INR check. A full discussion of the nature of anticoagulants has been carried out. A full discussion of the need for frequent and regular monitoring, precise dosage adjustment and compliance was stressed. Side effects of potential bleeding were discussed and Mr. Meagan Jung was instructed to call [phone] if there are any signs of abnormal bleeding. Mr. Meagan Jung was instructed to avoid any OTC items containing aspirin or ibuprofen and prior to starting any new OTC products to consult with his physician or pharmacist to ensure no drug interactions are present. Mr. Meagan Jung was instructed to avoid any major changes in his general diet and to avoid alcohol consumption. .      Mr. Meagan Jung verbalized his understanding of all instructions and will call the office with any questions, concerns, or signs of abnormal bleeding or blood clot.

## 2020-08-26 NOTE — PATIENT INSTRUCTIONS
Ã¯Â»Â¿  Anticoagulants: After Your Visit  Your Care Instructions  Your doctor prescribed an anticoagulant medicine. Anticoagulants, often called blood thinners, prevent new blood clots from forming and keep existing clots from getting larger. They do not actually thin the blood, but they make the blood take longer to clot. This lowers the risk of a blood clot moving to the lungs (pulmonary embolism) or moving to the brain and causing a stroke. Blood thinners come in two forms. Heparin is given by shot, either under your skin or through a needle in your vein, and starts working right away. Warfarin (Coumadin) comes in pill form and takes longer to work. Your doctor may have you begin taking both forms at the same time. As soon as the pills start to work, you will stop the shots but continue to take the pills. If you have a blood clot in your leg, you may need to take warfarin for several months. People who have heart conditions such as atrial fibrillation often need to take it for the rest of their lives. The right dose of this medicine is different for each person. You will need regular blood tests to see if your dose is correct. Follow-up care is a key part of your treatment and safety. Be sure to make and go to all appointments, and call your doctor if you are having problems. Itâs also a good idea to know your test results and keep a list of the medicines you take. How do you take blood thinners? · Take your medicines exactly as prescribed. Call your doctor if you think you are having a problem with your medicine. · Call your doctor if you are not sure what to do if you missed a dose of blood thinner. ¨ Your doctor can tell you exactly what to do so you do not take too much or too little blood thinner. Then you will be as safe as possible. · Some general rules for what to do if you miss a dose:  ¨ If you remember it in the same day, take the missed dose. Then go back to your regular schedule.   ¨ If it is the next day, or almost time to take the next dose, do not take the missed dose. Do not double the dose to make up for the missed one. At your next regularly scheduled time, take your normal dose. ¨ If you miss your dose for 2 or more days, call your doctor. · To help you stay on schedule, use a calendar to remind you when to take your blood thinner. When you take the medicine, note it on the calendar. · If you are going to give yourself shots, your doctor will give you instructions for how to safely inject the medicine. Follow the directions carefully. · Do not take any vitamins, over-the-counter medicines, or herbal products without talking to your doctor first.  · Avoid contact sports and other activities that could lead to injury. Make your home safe and take other measures to reduce your risk of falling. Always wear a seat belt while in a car. · Do not suddenly change your intake of vitamin Kârich foods, such as broccoli, cabbage, asparagus, lettuce, and spinach. This will help blood thinners work evenly from day to day. · Limit alcohol to 2 drinks a day for men and 1 drink a day for women. Alcohol may interfere with blood thinners. It also increases your risk of falls, which can cause bruising and bleeding. · Tell your dentist, pharmacist, and other health professionals that you take blood thinners. Wear medical alert jewelry that says you take blood thinners. You can buy this at most drugstores. When should you call for help? Call 911 anytime you think you may need emergency care. For example, call if:  · You cough up blood. · You vomit blood or what looks like coffee grounds. · You pass maroon or very bloody stools. Call your doctor now or seek immediate medical care if:  · You have new bruises or blood spots under your skin. · You have a nosebleed. · Your gums bleed when you brush your teeth. · You have blood in your urine.   · Your stools are black and tarlike or have streaks of blood.  · You have vaginal bleeding when you are not having your period, or heavy period bleeding. Watch closely for changes in your health, and be sure to contact your doctor if:  · You have questions about your medicine. Where can you learn more? Go to [employer]  Enter Z901 in the search box to learn more about \"Anticoagulants: After Your Visit. \"   Â© 4648-8085 [employer]. Care instructions adapted under license by [facility] (which disclaims liability or warranty for this information). This care instruction is for use with your licensed healthcare professional. If you have questions about a medical condition or this instruction, always ask your healthcare professional. [place] [address] any warranty or liability for your use of this information.   Content Version: 8.9.36050; Last Revised: July 1, 2009

## 2020-09-04 ENCOUNTER — OFFICE VISIT (OUTPATIENT)
Dept: CARDIOLOGY CLINIC | Age: 65
End: 2020-09-04

## 2020-09-04 VITALS
SYSTOLIC BLOOD PRESSURE: 165 MMHG | HEIGHT: 76 IN | DIASTOLIC BLOOD PRESSURE: 85 MMHG | OXYGEN SATURATION: 97 % | HEART RATE: 92 BPM | WEIGHT: 300.4 LBS | BODY MASS INDEX: 36.58 KG/M2 | TEMPERATURE: 97.5 F

## 2020-09-04 DIAGNOSIS — I05.9 MITRAL VALVE DISORDER: ICD-10-CM

## 2020-09-04 DIAGNOSIS — I48.20 CHRONIC ATRIAL FIBRILLATION (HCC): Primary | ICD-10-CM

## 2020-09-04 DIAGNOSIS — I10 ESSENTIAL HYPERTENSION, BENIGN: ICD-10-CM

## 2020-09-04 DIAGNOSIS — E78.5 HYPERLIPIDEMIA LDL GOAL <100: ICD-10-CM

## 2020-09-04 DIAGNOSIS — I50.32 CHRONIC DIASTOLIC HEART FAILURE (HCC): ICD-10-CM

## 2020-09-04 RX ORDER — ATORVASTATIN CALCIUM 20 MG/1
20 TABLET, FILM COATED ORAL DAILY
Qty: 90 TAB | Refills: 3 | Status: SHIPPED | OUTPATIENT
Start: 2020-09-04 | End: 2021-09-03 | Stop reason: SDUPTHER

## 2020-09-04 NOTE — PROGRESS NOTES
1. Have you been to the ER, urgent care clinic since your last visit? Hospitalized since your last visit? No    2. Have you seen or consulted any other health care providers outside of the [address] [place] [address] since your last visit? Include any pap smears or colon screening.  No

## 2020-09-04 NOTE — PROGRESS NOTES
HISTORY OF PRESENT ILLNESS  Marianela Vincent Sr. is a 77 y.o. male. Patient with a fib,chf,pulmonary htn,mr. On follow up patient denies any chest pains,sob, palpitation or other significant symptoms. Valvular Heart Disease   The history is provided by the patient. This is a chronic problem. The problem occurs constantly. The problem has not changed since onset. Pertinent negatives include no chest pain, no abdominal pain, no headaches and no shortness of breath. CHF   The history is provided by the patient. This is a chronic problem. The problem occurs constantly. The problem has not changed since onset. Pertinent negatives include no chest pain, no abdominal pain, no headaches and no shortness of breath. Palpitations    The history is provided by the patient. This is a chronic problem. The problem has not changed since onset. Associated symptoms include lower extremity edema. Pertinent negatives include no fever, no chest pain, no claudication, no orthopnea, no PND, no abdominal pain, no nausea, no vomiting, no headaches, no dizziness, no weakness, no cough, no hemoptysis, no shortness of breath and no sputum production. His past medical history is significant for hypertension. Hypertension   The history is provided by the patient. This is a chronic problem. The problem occurs constantly. The problem has not changed since onset. Pertinent negatives include no chest pain, no abdominal pain, no headaches and no shortness of breath. Leg Swelling   The history is provided by the patient. This is a chronic problem. The problem occurs daily. The problem has not changed since onset. Pertinent negatives include no chest pain, no abdominal pain, no headaches and no shortness of breath. Nothing aggravates the symptoms. Review of Systems   Constitutional: Negative for chills and fever. HENT: Negative for nosebleeds. Eyes: Negative for blurred vision and double vision.    Respiratory: Negative for cough, hemoptysis, sputum production, shortness of breath and wheezing. Cardiovascular: Negative for chest pain, palpitations, orthopnea, claudication, leg swelling and PND. Gastrointestinal: Negative for abdominal pain, heartburn, nausea and vomiting. Musculoskeletal: Negative for myalgias. Skin: Negative for rash. Neurological: Negative for dizziness, weakness and headaches. Endo/Heme/Allergies: Does not bruise/bleed easily. Family History   Problem Relation Age of Onset    Kidney Disease Mother     Diabetes Mother     Cancer Father     No Known Problems Sister     No Known Problems Brother     Hypertension Neg Hx        Past Medical History:   Diagnosis Date    Atrial fibrillation ([address]     Chronic diastolic heart failure ([address]     Hypertension     Mitral valve disorders(424.0)     Prediabetes        Past Surgical History:   Procedure Laterality Date    COLONOSCOPY N/A 1/24/2020    COLONOSCOPY performed by Dakota Abad MD at [facility]    HX ADENOIDECTOMY      [address]    infection drained from under chin    HX TONSILLECTOMY         No Known Allergies    Current Outpatient Medications   Medication Sig    atorvastatin (LIPITOR) 20 mg tablet Take 1 Tab by mouth daily.  warfarin (COUMADIN) 5 mg tablet Take 2 Tabs by mouth daily. Or as directed    metFORMIN ER (GLUCOPHAGE XR) 500 mg tablet TAKE 1 TABLET BY MOUTH TWO TIMES A DAY    nisoldipine SR (SULAR) 17 mg Tb24 tablet TAKE 1 TABLET BY MOUTH ONCE DAILY    furosemide (LASIX) 40 mg tablet Take 1 Tab by mouth two (2) times a day.  carvediloL (COREG) 12.5 mg tablet Take 1 Tab by mouth two (2) times daily (with meals).  benazepril (LOTENSIN) 40 mg tablet Take 1 Tab by mouth daily. STOP Ramipril     No current facility-administered medications for this visit.         Visit Vitals  /85 (BP 1 Location: Left arm, BP Patient Position: Sitting)   Pulse 92   Temp 97.5 °F (36.4 °C) (Temporal)   Ht 6' 4\" (1.93 m) Wt 136.3 kg (300 lb 6.4 oz)   SpO2 97%   BMI 36.57 kg/m²         Physical Exam   Constitutional: He is oriented to person, place, and time. He appears well-developed and well-nourished. HENT:   Head: Normocephalic and atraumatic. Eyes: Conjunctivae are normal.   Neck: Neck supple. No JVD present. No tracheal deviation present. No thyromegaly present. Cardiovascular: Normal rate and normal heart sounds. An irregularly irregular rhythm present. Exam reveals no gallop and no friction rub. No murmur heard. Pulmonary/Chest: Breath sounds normal. No respiratory distress. He has no wheezes. He has no rales. He exhibits no tenderness. Abdominal: Soft. There is no abdominal tenderness. Musculoskeletal:         General: No edema. Neurological: He is alert and oriented to person, place, and time. Skin: Skin is warm and dry. Psychiatric: He has a normal mood and affect. Mr. Stacey Villanueva has a reminder for a \"due or due soon\" health maintenance. I have asked that he contact his primary care provider for follow-up on this health maintenance. No flowsheet data found. SUMMARY:echo:5/2015  Left ventricle: Systolic function was normal. Ejection fraction was  estimated in the range of 55 % to 60 %. There were no regional wall motion  abnormalities. Left atrium: The atrium was mildly dilated. Mitral valve: There was mild annular calcification. There was mild diffuse  thickening of the anterior and posterior leaflets. There was mild  regurgitation. Mean transmitral gradient was 1.6 mmHg. Tricuspid valve: Tricuspid regurgitation peak velocity: 3.1 m/sec. Pulmonary artery systolic pressure: 41 mmHg. I Have personally reviewed recent relevant labs available and discussed with patient  8/2017-dig,bmp    SUMMARY:3/2018-echo  Left ventricle: Patient was in an irregular rhythm throughout the entire  exam. Systolic function was normal. Ejection fraction was estimated in the  range of 55 % to 60 %.  No obvious wall motion abnormalities identified in  the views obtained. There was mild concentric hypertrophy. Right ventricle: The ventricle was dilated. Left atrium: The atrium was mildly dilated. Right atrium: The atrium was dilated. Mitral valve: There was mild annular calcification. There was mild  regurgitation. Tricuspid valve: There was mild regurgitation. Pulmonary artery systolic  pressure: 59 mmHg. There was moderate pulmonary hypertension. Assessment       ICD-10-CM ICD-9-CM    1. Chronic atrial fibrillation (HCC)  O57.21 820.62 METABOLIC PANEL, BASIC      CBC WITH AUTOMATED DIFF      LIPID PANEL      HEPATIC FUNCTION PANEL    Stable continue current medical management   2. Chronic diastolic heart failure (HCC)  I50.32 428.32     Stable compensated monitor   3. Essential hypertension, benign  I10 401.1 atorvastatin (LIPITOR) 20 mg tablet    Elevated today. Usually better controlled monitor   4. Hyperlipidemia LDL goal <100  E78.5 272.4 atorvastatin (LIPITOR) 20 mg tablet      METABOLIC PANEL, BASIC      CBC WITH AUTOMATED DIFF      LIPID PANEL      HEPATIC FUNCTION PANEL    Continue treatment lab with PCP   5. Mitral valve disorder  I05.9 394.9     Stable     3/2018  Off dig and coreg due to bradycardia-dig level 1.5 after daily dose at that time  Edema better-use zaroxolyn as needed  3/2019  Mildly elevated blood pressure. Will restart Coreg at 12.5 mg twice a day to was discontinued in past due to bradycardia associated with digoxin and Coreg use. Currently off digoxin. Also LDL of 126 and 10/2018. Continue with diet and weight loss  9/2019  Cardiac status stable continue current treatment  2/2020  Cardiac status stable. Continue treatment lab with PCP.   Last LDL controlled  8/2020  Cardiac status stable continue current treatment check labs    Orders Placed This Encounter    METABOLIC PANEL, BASIC     Standing Status:   Future     Standing Expiration Date:   10/4/2020    CBC WITH AUTOMATED DIFF     Standing Status:   Future     Standing Expiration Date:   10/4/2020    LIPID PANEL     Standing Status:   Future     Standing Expiration Date:   3/5/2021    HEPATIC FUNCTION PANEL     Standing Status:   Future     Standing Expiration Date:   3/5/2021    atorvastatin (LIPITOR) 20 mg tablet     Sig: Take 1 Tab by mouth daily. Dispense:  90 Tab     Refill:  3       Follow-up and Dispositions    · Return in about 6 months (around 3/4/2021).

## 2020-09-10 LAB — INR, EXTERNAL: 2.39

## 2020-09-11 ENCOUNTER — TELEPHONE ANTICOAG (OUTPATIENT)
Dept: CARDIOLOGY CLINIC | Age: 65
End: 2020-09-11

## 2020-09-11 DIAGNOSIS — I48.91 ATRIAL FIBRILLATION, UNSPECIFIED TYPE (HCC): ICD-10-CM

## 2020-09-21 ENCOUNTER — TELEPHONE (OUTPATIENT)
Dept: CARDIOLOGY CLINIC | Age: 65
End: 2020-09-21

## 2020-09-21 NOTE — TELEPHONE ENCOUNTER
Called and regarding lab/test per Dr. Sima Waite, lab reviewed except for mild increase in glucose of 114 all other labs look normal. He voices understanding and acceptance of this advice and will call back if any further questions or concerns.

## 2020-09-21 NOTE — TELEPHONE ENCOUNTER
Patient would like to know results of labs.  They have been done at [facility] and are [facility]

## 2020-11-20 ENCOUNTER — TELEPHONE ANTICOAG (OUTPATIENT)
Dept: CARDIOLOGY CLINIC | Age: 65
End: 2020-11-20

## 2020-11-20 DIAGNOSIS — I48.0 PAROXYSMAL ATRIAL FIBRILLATION (HCC): ICD-10-CM

## 2020-11-20 LAB — INR, EXTERNAL: 2

## 2020-12-11 RX ORDER — CARVEDILOL 12.5 MG/1
12.5 TABLET ORAL 2 TIMES DAILY WITH MEALS
Qty: 180 TAB | Refills: 2 | Status: SHIPPED | OUTPATIENT
Start: 2020-12-11 | End: 2021-09-03 | Stop reason: SDUPTHER

## 2020-12-17 LAB — INR, EXTERNAL: 2.5

## 2020-12-18 ENCOUNTER — TELEPHONE ANTICOAG (OUTPATIENT)
Dept: CARDIOLOGY CLINIC | Age: 65
End: 2020-12-18

## 2020-12-18 DIAGNOSIS — I48.0 PAROXYSMAL ATRIAL FIBRILLATION (HCC): ICD-10-CM

## 2020-12-28 DIAGNOSIS — I50.32 CHRONIC DIASTOLIC HEART FAILURE (HCC): ICD-10-CM

## 2020-12-28 DIAGNOSIS — I48.91 ATRIAL FIBRILLATION, UNSPECIFIED TYPE (HCC): ICD-10-CM

## 2020-12-28 DIAGNOSIS — I50.32 DIASTOLIC CHF, CHRONIC (HCC): ICD-10-CM

## 2020-12-28 RX ORDER — NISOLDIPINE 17 MG/1
TABLET, FILM COATED, EXTENDED RELEASE ORAL
Qty: 30 TAB | Refills: 3 | Status: SHIPPED | OUTPATIENT
Start: 2020-12-28 | End: 2021-08-02 | Stop reason: SDUPTHER

## 2020-12-28 NOTE — TELEPHONE ENCOUNTER
Requested Prescriptions     Pending Prescriptions Disp Refills    nisoldipine SR (SULAR) 17 mg Tb24 tablet 30 Tab 3     Sig: TAKE 1 TABLET BY MOUTH ONCE DAILY

## 2020-12-30 DIAGNOSIS — I10 ESSENTIAL HYPERTENSION, BENIGN: ICD-10-CM

## 2020-12-30 RX ORDER — BENAZEPRIL HYDROCHLORIDE 40 MG/1
40 TABLET ORAL DAILY
Qty: 90 TAB | Refills: 3 | Status: SHIPPED | OUTPATIENT
Start: 2020-12-30 | End: 2021-12-30 | Stop reason: SDUPTHER

## 2021-01-20 LAB — INR, EXTERNAL: 2.23

## 2021-01-21 ENCOUNTER — TELEPHONE ANTICOAG (OUTPATIENT)
Dept: CARDIOLOGY CLINIC | Age: 66
End: 2021-01-21

## 2021-01-21 DIAGNOSIS — I48.0 PAROXYSMAL ATRIAL FIBRILLATION (HCC): ICD-10-CM

## 2021-02-15 ENCOUNTER — TELEPHONE (OUTPATIENT)
Dept: CARDIOLOGY CLINIC | Age: 66
End: 2021-02-15

## 2021-02-15 DIAGNOSIS — I48.20 CHRONIC ATRIAL FIBRILLATION (HCC): ICD-10-CM

## 2021-02-15 DIAGNOSIS — I05.9 MITRAL VALVE DISORDER: Primary | ICD-10-CM

## 2021-02-15 RX ORDER — WARFARIN SODIUM 5 MG/1
10 TABLET ORAL DAILY
Qty: 90 TAB | Refills: 6 | Status: SHIPPED | OUTPATIENT
Start: 2021-02-15 | End: 2022-05-17

## 2021-02-22 ENCOUNTER — TELEPHONE ANTICOAG (OUTPATIENT)
Dept: CARDIOLOGY CLINIC | Age: 66
End: 2021-02-22

## 2021-02-22 DIAGNOSIS — I48.0 PAROXYSMAL ATRIAL FIBRILLATION (HCC): ICD-10-CM

## 2021-02-22 LAB — INR, EXTERNAL: 2.1 (ref 2–3)

## 2021-03-29 DIAGNOSIS — I50.32 CHRONIC DIASTOLIC HEART FAILURE (HCC): ICD-10-CM

## 2021-03-29 DIAGNOSIS — M79.89 LEG SWELLING: ICD-10-CM

## 2021-03-29 RX ORDER — FUROSEMIDE 40 MG/1
40 TABLET ORAL 2 TIMES DAILY
Qty: 180 TAB | Refills: 1 | Status: SHIPPED | OUTPATIENT
Start: 2021-03-29 | End: 2022-04-01 | Stop reason: SDUPTHER

## 2021-03-29 NOTE — TELEPHONE ENCOUNTER
Requested Prescriptions     Pending Prescriptions Disp Refills    furosemide (LASIX) 40 mg tablet 180 Tab 01     Sig: Take 1 Tab by mouth two (2) times a day.

## 2021-03-31 ENCOUNTER — TELEPHONE ANTICOAG (OUTPATIENT)
Dept: CARDIOLOGY CLINIC | Age: 66
End: 2021-03-31

## 2021-03-31 DIAGNOSIS — I48.0 PAROXYSMAL ATRIAL FIBRILLATION (HCC): ICD-10-CM

## 2021-03-31 LAB — INR, EXTERNAL: 2.4

## 2021-04-14 ENCOUNTER — OFFICE VISIT (OUTPATIENT)
Dept: CARDIOLOGY CLINIC | Age: 66
End: 2021-04-14
Payer: MEDICARE

## 2021-04-14 VITALS
SYSTOLIC BLOOD PRESSURE: 176 MMHG | TEMPERATURE: 97.5 F | HEIGHT: 76 IN | HEART RATE: 97 BPM | DIASTOLIC BLOOD PRESSURE: 101 MMHG | OXYGEN SATURATION: 96 % | BODY MASS INDEX: 37.14 KG/M2 | WEIGHT: 305 LBS

## 2021-04-14 DIAGNOSIS — E66.01 SEVERE OBESITY (BMI 35.0-39.9) WITH COMORBIDITY (HCC): ICD-10-CM

## 2021-04-14 DIAGNOSIS — I27.20 PULMONARY HTN (HCC): ICD-10-CM

## 2021-04-14 DIAGNOSIS — I10 ESSENTIAL HYPERTENSION, BENIGN: ICD-10-CM

## 2021-04-14 DIAGNOSIS — I48.20 CHRONIC ATRIAL FIBRILLATION (HCC): Primary | ICD-10-CM

## 2021-04-14 DIAGNOSIS — I50.32 CHRONIC DIASTOLIC HEART FAILURE (HCC): ICD-10-CM

## 2021-04-14 DIAGNOSIS — M79.89 LEG SWELLING: ICD-10-CM

## 2021-04-14 DIAGNOSIS — Z79.01 ANTICOAGULANT LONG-TERM USE: ICD-10-CM

## 2021-04-14 PROCEDURE — G8427 DOCREV CUR MEDS BY ELIG CLIN: HCPCS | Performed by: INTERNAL MEDICINE

## 2021-04-14 PROCEDURE — G8753 SYS BP > OR = 140: HCPCS | Performed by: INTERNAL MEDICINE

## 2021-04-14 PROCEDURE — G8510 SCR DEP NEG, NO PLAN REQD: HCPCS | Performed by: INTERNAL MEDICINE

## 2021-04-14 PROCEDURE — 1101F PT FALLS ASSESS-DOCD LE1/YR: CPT | Performed by: INTERNAL MEDICINE

## 2021-04-14 PROCEDURE — 3017F COLORECTAL CA SCREEN DOC REV: CPT | Performed by: INTERNAL MEDICINE

## 2021-04-14 PROCEDURE — G8417 CALC BMI ABV UP PARAM F/U: HCPCS | Performed by: INTERNAL MEDICINE

## 2021-04-14 PROCEDURE — 99214 OFFICE O/P EST MOD 30 MIN: CPT | Performed by: INTERNAL MEDICINE

## 2021-04-14 PROCEDURE — G8755 DIAS BP > OR = 90: HCPCS | Performed by: INTERNAL MEDICINE

## 2021-04-14 PROCEDURE — G8536 NO DOC ELDER MAL SCRN: HCPCS | Performed by: INTERNAL MEDICINE

## 2021-04-14 RX ORDER — HYDRALAZINE HYDROCHLORIDE 25 MG/1
25 TABLET, FILM COATED ORAL 3 TIMES DAILY
Qty: 90 TAB | Refills: 5 | Status: SHIPPED | OUTPATIENT
Start: 2021-04-14 | End: 2021-12-30 | Stop reason: SDUPTHER

## 2021-04-14 NOTE — PROGRESS NOTES
HISTORY OF PRESENT ILLNESS  Estela Saul Sr. is a 77 y.o. male. Patient with a fib,chf,pulmonary htn,mr. On follow up patient denies any chest pains,sob, palpitation or other significant symptoms. Valvular Heart Disease  The history is provided by the patient. This is a chronic problem. The problem occurs constantly. The problem has not changed since onset. Pertinent negatives include no chest pain, no abdominal pain, no headaches and no shortness of breath. CHF  The history is provided by the patient. This is a chronic problem. The problem occurs constantly. The problem has not changed since onset. Pertinent negatives include no chest pain, no abdominal pain, no headaches and no shortness of breath. Palpitations   The history is provided by the patient. This is a chronic problem. The problem has not changed since onset. Associated symptoms include lower extremity edema. Pertinent negatives include no fever, no chest pain, no claudication, no orthopnea, no PND, no abdominal pain, no nausea, no vomiting, no headaches, no dizziness, no weakness, no cough, no hemoptysis, no shortness of breath and no sputum production. His past medical history is significant for hypertension. Hypertension  The history is provided by the patient. This is a chronic problem. The problem occurs constantly. The problem has not changed since onset. Pertinent negatives include no chest pain, no abdominal pain, no headaches and no shortness of breath. Leg Swelling  The history is provided by the patient. This is a chronic problem. The problem occurs daily. The problem has not changed since onset. Pertinent negatives include no chest pain, no abdominal pain, no headaches and no shortness of breath. Nothing aggravates the symptoms. Follow-up  Pertinent negatives include no chest pain, no abdominal pain, no headaches and no shortness of breath. Review of Systems   Constitutional: Negative for chills and fever.    HENT: Negative for nosebleeds. Eyes: Negative for blurred vision and double vision. Respiratory: Negative for cough, hemoptysis, sputum production, shortness of breath and wheezing. Cardiovascular: Negative for chest pain, palpitations, orthopnea, claudication, leg swelling and PND. Gastrointestinal: Negative for abdominal pain, heartburn, nausea and vomiting. Musculoskeletal: Negative for myalgias. Skin: Negative for rash. Neurological: Negative for dizziness, weakness and headaches. Endo/Heme/Allergies: Does not bruise/bleed easily. Family History   Problem Relation Age of Onset    Kidney Disease Mother     Diabetes Mother     Cancer Father     No Known Problems Sister     No Known Problems Brother     Hypertension Neg Hx        Past Medical History:   Diagnosis Date    Atrial fibrillation ([address]     Chronic diastolic heart failure ([address]     Hypertension     Mitral valve disorders(424.0)     Prediabetes        Past Surgical History:   Procedure Laterality Date    COLONOSCOPY N/A 1/24/2020    COLONOSCOPY performed by Lg Scales MD at [facility]    HX ADENOIDECTOMY      [address]    infection drained from under chin    HX TONSILLECTOMY         No Known Allergies    Current Outpatient Medications   Medication Sig    hydrALAZINE (APRESOLINE) 25 mg tablet Take 1 Tab by mouth three (3) times daily.  furosemide (LASIX) 40 mg tablet Take 1 Tab by mouth two (2) times a day.  warfarin (COUMADIN) 5 mg tablet Take 2 Tabs by mouth daily. Or as directed    benazepriL (LOTENSIN) 40 mg tablet Take 1 Tab by mouth daily. STOP Ramipril    nisoldipine SR (SULAR) 17 mg Tb24 tablet TAKE 1 TABLET BY MOUTH ONCE DAILY    carvediloL (COREG) 12.5 mg tablet Take 1 Tab by mouth two (2) times daily (with meals).  atorvastatin (LIPITOR) 20 mg tablet Take 1 Tab by mouth daily.     metFORMIN ER (GLUCOPHAGE XR) 500 mg tablet TAKE 1 TABLET BY MOUTH TWO TIMES A DAY     No current facility-administered medications for this visit. Visit Vitals  BP (!) 176/101 (BP 1 Location: Left arm, BP Patient Position: Sitting, BP Cuff Size: Large adult)   Pulse 97   Temp 97.5 °F (36.4 °C) (Temporal)   Ht 6' 4\" (1.93 m)   Wt 138.3 kg (305 lb)   SpO2 96%   BMI 37.13 kg/m²         Physical Exam   Constitutional: He is oriented to person, place, and time. He appears well-developed and well-nourished. HENT:   Head: Normocephalic and atraumatic. Eyes: Conjunctivae are normal.   Neck: Neck supple. No JVD present. No tracheal deviation present. No thyromegaly present. Cardiovascular: Normal rate and normal heart sounds. An irregularly irregular rhythm present. Exam reveals no gallop and no friction rub. No murmur heard. Pulmonary/Chest: Breath sounds normal. No respiratory distress. He has no wheezes. He has no rales. He exhibits no tenderness. Abdominal: Soft. There is no abdominal tenderness. Musculoskeletal:         General: No edema. Neurological: He is alert and oriented to person, place, and time. Skin: Skin is warm and dry. Psychiatric: He has a normal mood and affect. Mr. Russell Cowan has a reminder for a \"due or due soon\" health maintenance. I have asked that he contact his primary care provider for follow-up on this health maintenance. No flowsheet data found. CARDIOLOGY STUDIES 7/24/2013 5/18/2011 12/19/2007 5/9/2006 4/16/2002   EKG Result - - - - -   Myocardial Perfusion Scan Result - - - - nl scan   Echocardiogram - Complete Result normal ef,enlarged la,mild mod mr,mild tr,pap 38 EF 55%,mild MR & TR,PAP 51 Mild to Mod. MR, Normal EF - -   Doppler US Vascular Result - - - Vascular -            SUMMARY:echo:5/2015  Left ventricle: Systolic function was normal. Ejection fraction was  estimated in the range of 55 % to 60 %. There were no regional wall motion  abnormalities. Left atrium: The atrium was mildly dilated. Mitral valve:  There was mild annular calcification. There was mild diffuse  thickening of the anterior and posterior leaflets. There was mild  regurgitation. Mean transmitral gradient was 1.6 mmHg. Tricuspid valve: Tricuspid regurgitation peak velocity: 3.1 m/sec. Pulmonary artery systolic pressure: 41 mmHg. I Have personally reviewed recent relevant labs available and discussed with patient  8/2017-dig,bmp    SUMMARY:3/2018-echo  Left ventricle: Patient was in an irregular rhythm throughout the entire  exam. Systolic function was normal. Ejection fraction was estimated in the  range of 55 % to 60 %. No obvious wall motion abnormalities identified in  the views obtained. There was mild concentric hypertrophy. Right ventricle: The ventricle was dilated. Left atrium: The atrium was mildly dilated. Right atrium: The atrium was dilated. Mitral valve: There was mild annular calcification. There was mild  regurgitation. Tricuspid valve: There was mild regurgitation. Pulmonary artery systolic  pressure: 59 mmHg. There was moderate pulmonary hypertension. Care Everywhere Result Report  Lipid Complete PanelResulted: 9/10/2020 7:39 PM  [address]  Component Name Value Ref Range   Cholesterol 117 110 - 200 mg/dL   Triglyceride 58 40 - 149 mg/dL   HDL 44 >=40 mg/dL   Cholesterol/HDL 2.7 0.0 - 5.0    Non-HDL Cholesterol 73 <130 mg/dL   LDL CALCULATION 62 50 - 99 mg/dL   VLDL CALCULATION 12 8 - 30 mg/dL   LDL/HDL Ratio 1.4     Specimen Collected on   Blood - Blood (substance) 9/10/2020 1:07 PM   Result Narrative   Cholesterol Recommended NCEP guidelines in mg/dL:     Less than 200            Desirable   200 - 239                Borderline High   Greater than or  = 240   High           Assessment       ICD-10-CM ICD-9-CM    1. Chronic atrial fibrillation (HCC)  I48.20 427.31     Stable rate controlled continue anticoagulation   2. Chronic diastolic heart failure (HCC)  I50.32 428.32     Stable monitor compensated   3.  Pulmonary HTN ([facility]  I27.20 416.8     Multifactorial monitor   4. Leg swelling  M79.89 729.81     Stable monitor continue treatment   5. Essential hypertension, benign  I10 401.1     Stable continue current treatment   6. Anticoagulant long-term use  Z79.01 V58.61     For A. fib monitor   7. Severe obesity (BMI 35.0-39. 9) with comorbidity ([facility]  E66.01 278.01     Continue with diet and exercise monitor     3/2018  Off dig and coreg due to bradycardia-dig level 1.5 after daily dose at that time  Edema better-use zaroxolyn as needed  3/2019  Mildly elevated blood pressure. Will restart Coreg at 12.5 mg twice a day to was discontinued in past due to bradycardia associated with digoxin and Coreg use. Currently off digoxin. Also LDL of 126 and 10/2018. Continue with diet and weight loss  9/2019  Cardiac status stable continue current treatment  2/2020  Cardiac status stable. Continue treatment lab with PCP. Last LDL controlled  8/2020  Cardiac status stable continue current treatment check labs  4 2021  Blood pressure elevated I have added hydralazine. Did not increase Coreg as patient had bradycardia with Coreg and digoxin use in the past.  LDL level controlled in last lab of 9/2020. Continue dietary modification. Currently not on Metformin due to E. coli advised him to discuss with PCP alternate  Orders Placed This Encounter    hydrALAZINE (APRESOLINE) 25 mg tablet     Sig: Take 1 Tab by mouth three (3) times daily. Dispense:  90 Tab     Refill:  5       Follow-up and Dispositions    · Return in about 6 months (around 10/14/2021).

## 2021-04-22 ENCOUNTER — TELEPHONE ANTICOAG (OUTPATIENT)
Dept: CARDIOLOGY CLINIC | Age: 66
End: 2021-04-22

## 2021-04-22 DIAGNOSIS — I48.0 PAROXYSMAL ATRIAL FIBRILLATION (HCC): ICD-10-CM

## 2021-04-22 LAB — INR, EXTERNAL: 2.1

## 2021-04-22 NOTE — PROGRESS NOTES
Left message. Mr. Edis Reina is here today for anticoagulation monitoring for the diagnosis of Atrial Fibrillation. His INR goal is 2.0-3.0 and his current Coumadin dose is 7.5 mg. Today's findings include an INR of 2.1     Considering Mr. Ibarra's past history, todays findings, and per the coumadin policy/protocol, Mr. Prosper Urbina was instructed to take Coumadin as follows,  7.5 mg every night. He was also instructed to come back in 3 weeks for an INR check. A full discussion of the nature of anticoagulants has been carried out. A full discussion of the need for frequent and regular monitoring, precise dosage adjustment and compliance was stressed. Side effects of potential bleeding were discussed and Mr. Prosper Urbina was instructed to call [phone] if there are any signs of abnormal bleeding. Mr. Prosper Urbina was instructed to avoid any OTC items containing aspirin or ibuprofen and prior to starting any new OTC products to consult with his physician or pharmacist to ensure no drug interactions are present. Mr. Prosper Urbina was instructed to avoid any major changes in his general diet and to avoid alcohol consumption. .      Mr. Prosper Urbina verbalized his understanding of all instructions and will call the office with any questions, concerns, or signs of abnormal bleeding or blood clot.

## 2021-05-27 LAB — INR, EXTERNAL: 2.01

## 2021-05-28 ENCOUNTER — TELEPHONE ANTICOAG (OUTPATIENT)
Dept: CARDIOLOGY CLINIC | Age: 66
End: 2021-05-28

## 2021-05-28 DIAGNOSIS — I48.91 ATRIAL FIBRILLATION, UNSPECIFIED TYPE (HCC): Primary | ICD-10-CM

## 2021-06-17 ENCOUNTER — TELEPHONE ANTICOAG (OUTPATIENT)
Dept: CARDIOLOGY CLINIC | Age: 66
End: 2021-06-17

## 2021-06-17 DIAGNOSIS — I48.91 ATRIAL FIBRILLATION, UNSPECIFIED TYPE (HCC): Primary | ICD-10-CM

## 2021-06-17 LAB — INR, EXTERNAL: 3.27

## 2021-07-14 ENCOUNTER — TELEPHONE ANTICOAG (OUTPATIENT)
Dept: CARDIOLOGY CLINIC | Age: 66
End: 2021-07-14

## 2021-07-14 DIAGNOSIS — I48.91 ATRIAL FIBRILLATION, UNSPECIFIED TYPE (HCC): Primary | ICD-10-CM

## 2021-07-14 LAB — INR, EXTERNAL: 3.2 (ref 2–3)

## 2021-07-15 NOTE — PROGRESS NOTES
Mr. Vanessa Cramer is here today for anticoagulation monitoring for the diagnosis of Atrial Fibrillation. His INR goal is 2.0-3.0 and his current Coumadin dose is 7.5 mg. Today's findings include an INR of 3.2     Considering Mr. Ibarra's past history, todays findings, and per the coumadin policy/protocol, Mr. Percy Bermudez was instructed to take Coumadin as follows,  7.5 mg every night. He was also instructed to come back in 1 weeks for an INR check. A full discussion of the nature of anticoagulants has been carried out. A full discussion of the need for frequent and regular monitoring, precise dosage adjustment and compliance was stressed. Side effects of potential bleeding were discussed and Mr. Percy Bermudez was instructed to call [phone] if there are any signs of abnormal bleeding. Mr. Percy Bermudez was instructed to avoid any OTC items containing aspirin or ibuprofen and prior to starting any new OTC products to consult with his physician or pharmacist to ensure no drug interactions are present. Mr. Percy Bermudez was instructed to avoid any major changes in his general diet and to avoid alcohol consumption. .      Mr. Percy Bermudez verbalized his understanding of all instructions and will call the office with any questions, concerns, or signs of abnormal bleeding or blood clot.

## 2021-08-02 DIAGNOSIS — I50.32 DIASTOLIC CHF, CHRONIC (HCC): ICD-10-CM

## 2021-08-02 DIAGNOSIS — I50.32 CHRONIC DIASTOLIC HEART FAILURE (HCC): ICD-10-CM

## 2021-08-02 DIAGNOSIS — I48.91 ATRIAL FIBRILLATION, UNSPECIFIED TYPE (HCC): ICD-10-CM

## 2021-08-02 RX ORDER — NISOLDIPINE 17 MG/1
TABLET, FILM COATED, EXTENDED RELEASE ORAL
Qty: 30 TABLET | Refills: 3 | Status: SHIPPED | OUTPATIENT
Start: 2021-08-02 | End: 2022-03-15 | Stop reason: SDUPTHER

## 2021-08-11 NOTE — PROGRESS NOTES
Description          1/5/17 continue with current treatment recheck 1 week. Yes but his INR is unstable. INR can be check  every 2-3 weeks if INR is therapeutic a least in two consecutive weeks. But this is not the case. Please reinforce patient his INR is out range not this week but before it was. Left detailed message on patient's VM   I left a message for the patient to return my call. PAST SURGICAL HISTORY:  H/O hernia repair bilateral inguinal hernia repair in 2012

## 2021-08-12 ENCOUNTER — TELEPHONE (OUTPATIENT)
Dept: CARDIOLOGY CLINIC | Age: 66
End: 2021-08-12

## 2021-08-12 DIAGNOSIS — I48.91 ATRIAL FIBRILLATION, UNSPECIFIED TYPE (HCC): Primary | ICD-10-CM

## 2021-08-13 ENCOUNTER — TELEPHONE ANTICOAG (OUTPATIENT)
Dept: CARDIOLOGY CLINIC | Age: 66
End: 2021-08-13

## 2021-08-13 DIAGNOSIS — I48.0 PAROXYSMAL ATRIAL FIBRILLATION (HCC): Primary | ICD-10-CM

## 2021-08-13 LAB — INR, EXTERNAL: 3.1

## 2021-09-02 LAB — INR, EXTERNAL: 3.5

## 2021-09-03 ENCOUNTER — TELEPHONE ANTICOAG (OUTPATIENT)
Dept: CARDIOLOGY CLINIC | Age: 66
End: 2021-09-03

## 2021-09-03 DIAGNOSIS — I48.91 ATRIAL FIBRILLATION, UNSPECIFIED TYPE (HCC): Primary | ICD-10-CM

## 2021-09-03 DIAGNOSIS — I10 ESSENTIAL HYPERTENSION, BENIGN: ICD-10-CM

## 2021-09-03 DIAGNOSIS — E78.5 HYPERLIPIDEMIA LDL GOAL <100: ICD-10-CM

## 2021-09-03 RX ORDER — CARVEDILOL 12.5 MG/1
12.5 TABLET ORAL 2 TIMES DAILY WITH MEALS
Qty: 180 TABLET | Refills: 2 | Status: SHIPPED | OUTPATIENT
Start: 2021-09-03 | End: 2022-06-06 | Stop reason: SDUPTHER

## 2021-09-03 RX ORDER — ATORVASTATIN CALCIUM 20 MG/1
20 TABLET, FILM COATED ORAL DAILY
Qty: 90 TABLET | Refills: 3 | Status: SHIPPED | OUTPATIENT
Start: 2021-09-03 | End: 2022-06-06 | Stop reason: SDUPTHER

## 2021-09-03 NOTE — TELEPHONE ENCOUNTER
Requested Prescriptions     Pending Prescriptions Disp Refills    carvediloL (COREG) 12.5 mg tablet 180 Tablet 2     Sig: Take 1 Tablet by mouth two (2) times daily (with meals).  atorvastatin (LIPITOR) 20 mg tablet 90 Tablet 3     Sig: Take 1 Tablet by mouth daily.

## 2021-10-27 ENCOUNTER — TELEPHONE ANTICOAG (OUTPATIENT)
Dept: CARDIOLOGY CLINIC | Age: 66
End: 2021-10-27

## 2021-10-27 DIAGNOSIS — I48.0 PAROXYSMAL ATRIAL FIBRILLATION (HCC): Primary | ICD-10-CM

## 2021-10-27 LAB — INR, EXTERNAL: 2.2

## 2021-12-01 ENCOUNTER — TELEPHONE ANTICOAG (OUTPATIENT)
Dept: CARDIOLOGY CLINIC | Age: 66
End: 2021-12-01

## 2021-12-01 DIAGNOSIS — I48.0 PAROXYSMAL ATRIAL FIBRILLATION (HCC): Primary | ICD-10-CM

## 2021-12-01 LAB
INR, EXTERNAL: 2
INR, EXTERNAL: 2.04

## 2021-12-30 ENCOUNTER — ANTI-COAG VISIT (OUTPATIENT)
Dept: CARDIOLOGY CLINIC | Age: 66
End: 2021-12-30
Payer: MEDICARE

## 2021-12-30 ENCOUNTER — TELEPHONE (OUTPATIENT)
Dept: CARDIOLOGY CLINIC | Age: 66
End: 2021-12-30

## 2021-12-30 DIAGNOSIS — I10 ESSENTIAL HYPERTENSION, BENIGN: ICD-10-CM

## 2021-12-30 DIAGNOSIS — I48.91 ATRIAL FIBRILLATION, UNSPECIFIED TYPE (HCC): Primary | ICD-10-CM

## 2021-12-30 LAB
INR BLD: 2.7
PT POC: NORMAL
VALID INTERNAL CONTROL?: YES

## 2021-12-30 PROCEDURE — 85610 PROTHROMBIN TIME: CPT | Performed by: NURSE PRACTITIONER

## 2021-12-30 RX ORDER — HYDRALAZINE HYDROCHLORIDE 25 MG/1
25 TABLET, FILM COATED ORAL 3 TIMES DAILY
Qty: 270 TABLET | Refills: 1 | Status: SHIPPED | OUTPATIENT
Start: 2021-12-30 | End: 2022-03-03 | Stop reason: SDUPTHER

## 2021-12-30 RX ORDER — BENAZEPRIL HYDROCHLORIDE 40 MG/1
40 TABLET ORAL DAILY
Qty: 90 TABLET | Refills: 1 | Status: SHIPPED | OUTPATIENT
Start: 2021-12-30 | End: 2022-05-17

## 2021-12-30 NOTE — PATIENT INSTRUCTIONS
Mr. Saintclair Mako is here today for anticoagulation monitoring for the diagnosis of Atrial Fibrillation. His INR goal is 2.0-3.0 and his current Coumadin dose is 7.5mg every day. Today's findings include an INR of 2.7     Considering Mr. Ibarra's past history, todays findings, and per the coumadin policy/protocol, Mr. Shawna Mcclain was instructed to take Coumadin as follows,  7.5mg every day. He was also instructed to come back in 4 weeks for an INR check. A full discussion of the nature of anticoagulants has been carried out. A full discussion of the need for frequent and regular monitoring, precise dosage adjustment and compliance was stressed. Side effects of potential bleeding were discussed and Mr. Shawna Mcclain was instructed to call [phone] if there are any signs of abnormal bleeding. Mr. Shawna Mcclain was instructed to avoid any OTC items containing aspirin or ibuprofen and prior to starting any new OTC products to consult with his physician or pharmacist to ensure no drug interactions are present. Mr. Shawna Mcclain was instructed to avoid any major changes in his general diet and to avoid alcohol consumption. .      Mr. Shawna Mcclain verbalized his understanding of all instructions and will call the office with any questions, concerns, or signs of abnormal bleeding or blood clot.

## 2021-12-30 NOTE — TELEPHONE ENCOUNTER
Refill  Benazepril 40 mg one daily # 90,  Hydralazine 25 mg one three times a day,  # [facility] to [facility]

## 2022-01-27 ENCOUNTER — ANTI-COAG VISIT (OUTPATIENT)
Dept: CARDIOLOGY CLINIC | Age: 67
End: 2022-01-27
Payer: MEDICARE

## 2022-01-27 DIAGNOSIS — I48.91 ATRIAL FIBRILLATION, UNSPECIFIED TYPE (HCC): Primary | ICD-10-CM

## 2022-01-27 LAB
INR BLD: 2.4
PT POC: NORMAL
VALID INTERNAL CONTROL?: YES

## 2022-01-27 PROCEDURE — 85610 PROTHROMBIN TIME: CPT | Performed by: NURSE PRACTITIONER

## 2022-01-27 NOTE — PATIENT INSTRUCTIONS
Mr. Carin Chew is here today for anticoagulation monitoring for the diagnosis of Atrial Fibrillation. His INR goal is 2.0-3.0 and his current Coumadin dose is 7.5mg every day. Today's findings include an INR of 2.4     Considering Mr. Ibarra's past history, todays findings, and per the coumadin policy/protocol, Mr. Sherry Velasco was instructed to take Coumadin as follows,  7.5mg every day. He was also instructed to come back in 4 weeks for an INR check. A full discussion of the nature of anticoagulants has been carried out. A full discussion of the need for frequent and regular monitoring, precise dosage adjustment and compliance was stressed. Side effects of potential bleeding were discussed and Mr. Sherry Velasco was instructed to call [phone] if there are any signs of abnormal bleeding. Mr. Sherry Velasco was instructed to avoid any OTC items containing aspirin or ibuprofen and prior to starting any new OTC products to consult with his physician or pharmacist to ensure no drug interactions are present. Mr. Sherry Velasco was instructed to avoid any major changes in his general diet and to avoid alcohol consumption. .      Mr. Sherry Velasco verbalized his understanding of all instructions and will call the office with any questions, concerns, or signs of abnormal bleeding or blood clot.

## 2022-02-24 ENCOUNTER — ANTI-COAG VISIT (OUTPATIENT)
Dept: CARDIOLOGY CLINIC | Age: 67
End: 2022-02-24
Payer: MEDICARE

## 2022-02-24 DIAGNOSIS — I48.91 ATRIAL FIBRILLATION, UNSPECIFIED TYPE (HCC): Primary | ICD-10-CM

## 2022-02-24 LAB
INR BLD: 2.3
PT POC: NORMAL
VALID INTERNAL CONTROL?: YES

## 2022-02-24 PROCEDURE — 85610 PROTHROMBIN TIME: CPT | Performed by: INTERNAL MEDICINE

## 2022-03-03 ENCOUNTER — OFFICE VISIT (OUTPATIENT)
Dept: CARDIOLOGY CLINIC | Age: 67
End: 2022-03-03
Payer: MEDICARE

## 2022-03-03 VITALS
HEART RATE: 76 BPM | OXYGEN SATURATION: 96 % | SYSTOLIC BLOOD PRESSURE: 153 MMHG | HEIGHT: 76 IN | DIASTOLIC BLOOD PRESSURE: 72 MMHG | BODY MASS INDEX: 37.87 KG/M2 | WEIGHT: 311 LBS

## 2022-03-03 DIAGNOSIS — I10 ESSENTIAL HYPERTENSION, BENIGN: ICD-10-CM

## 2022-03-03 DIAGNOSIS — I50.32 CHRONIC DIASTOLIC HEART FAILURE (HCC): ICD-10-CM

## 2022-03-03 DIAGNOSIS — I27.20 PULMONARY HTN (HCC): ICD-10-CM

## 2022-03-03 DIAGNOSIS — I48.20 CHRONIC ATRIAL FIBRILLATION (HCC): Primary | ICD-10-CM

## 2022-03-03 PROBLEM — E11.9 TYPE 2 DIABETES MELLITUS (HCC): Status: ACTIVE | Noted: 2022-03-03

## 2022-03-03 PROCEDURE — G8754 DIAS BP LESS 90: HCPCS | Performed by: INTERNAL MEDICINE

## 2022-03-03 PROCEDURE — 1101F PT FALLS ASSESS-DOCD LE1/YR: CPT | Performed by: INTERNAL MEDICINE

## 2022-03-03 PROCEDURE — G8417 CALC BMI ABV UP PARAM F/U: HCPCS | Performed by: INTERNAL MEDICINE

## 2022-03-03 PROCEDURE — G8536 NO DOC ELDER MAL SCRN: HCPCS | Performed by: INTERNAL MEDICINE

## 2022-03-03 PROCEDURE — G8753 SYS BP > OR = 140: HCPCS | Performed by: INTERNAL MEDICINE

## 2022-03-03 PROCEDURE — G8510 SCR DEP NEG, NO PLAN REQD: HCPCS | Performed by: INTERNAL MEDICINE

## 2022-03-03 PROCEDURE — G8427 DOCREV CUR MEDS BY ELIG CLIN: HCPCS | Performed by: INTERNAL MEDICINE

## 2022-03-03 PROCEDURE — 3017F COLORECTAL CA SCREEN DOC REV: CPT | Performed by: INTERNAL MEDICINE

## 2022-03-03 PROCEDURE — 99214 OFFICE O/P EST MOD 30 MIN: CPT | Performed by: INTERNAL MEDICINE

## 2022-03-03 RX ORDER — HYDRALAZINE HYDROCHLORIDE 50 MG/1
50 TABLET, FILM COATED ORAL 3 TIMES DAILY
Qty: 270 TABLET | Refills: 3 | Status: SHIPPED | OUTPATIENT
Start: 2022-03-03 | End: 2022-09-15 | Stop reason: SDUPTHER

## 2022-03-03 NOTE — PROGRESS NOTES
HISTORY OF PRESENT ILLNESS  Ty Odonnell Sr. is a 74 y.o. male. Patient with a fib,chf,pulmonary htn,mr. On follow up patient denies any chest pains,sob, palpitation or other significant symptoms. Follow-up  Pertinent negatives include no chest pain, no abdominal pain, no headaches and no shortness of breath. Valvular Heart Disease  The history is provided by the patient. This is a chronic problem. The problem occurs constantly. The problem has not changed since onset. Pertinent negatives include no chest pain, no abdominal pain, no headaches and no shortness of breath. CHF  The history is provided by the patient. This is a chronic problem. The problem occurs constantly. The problem has not changed since onset. Pertinent negatives include no chest pain, no abdominal pain, no headaches and no shortness of breath. Palpitations   The history is provided by the patient. This is a chronic problem. The problem has not changed since onset. Associated symptoms include lower extremity edema. Pertinent negatives include no fever, no chest pain, no claudication, no orthopnea, no PND, no abdominal pain, no nausea, no vomiting, no headaches, no dizziness, no weakness, no cough, no hemoptysis, no shortness of breath and no sputum production. His past medical history is significant for hypertension. Hypertension  The history is provided by the patient. This is a chronic problem. The problem occurs constantly. The problem has not changed since onset. Pertinent negatives include no chest pain, no abdominal pain, no headaches and no shortness of breath. Leg Swelling  The history is provided by the patient. This is a chronic problem. The problem occurs daily. The problem has not changed since onset. Pertinent negatives include no chest pain, no abdominal pain, no headaches and no shortness of breath. Nothing aggravates the symptoms. Review of Systems   Constitutional: Negative for chills and fever.    HENT: Negative for nosebleeds. Eyes: Negative for blurred vision and double vision. Respiratory: Negative for cough, hemoptysis, sputum production, shortness of breath and wheezing. Cardiovascular: Negative for chest pain, palpitations, orthopnea, claudication, leg swelling and PND. Gastrointestinal: Negative for abdominal pain, heartburn, nausea and vomiting. Musculoskeletal: Negative for myalgias. Skin: Negative for rash. Neurological: Negative for dizziness, weakness and headaches. Endo/Heme/Allergies: Does not bruise/bleed easily. Family History   Problem Relation Age of Onset    Kidney Disease Mother     Diabetes Mother     Cancer Father     No Known Problems Sister     No Known Problems Brother     Hypertension Neg Hx        Past Medical History:   Diagnosis Date    Atrial fibrillation ([address]     Chronic diastolic heart failure ([address]     Hypertension     Mitral valve disorders(424.0)     Prediabetes        Past Surgical History:   Procedure Laterality Date    COLONOSCOPY N/A 1/24/2020    COLONOSCOPY performed by Michael Dow MD at [facility]    HX ADENOIDECTOMY      [address]    infection drained from under chin    HX TONSILLECTOMY         No Known Allergies    Current Outpatient Medications   Medication Sig    hydrALAZINE (APRESOLINE) 50 mg tablet Take 1 Tablet by mouth three (3) times daily.  benazepriL (LOTENSIN) 40 mg tablet Take 1 Tablet by mouth daily. STOP Ramipril    carvediloL (COREG) 12.5 mg tablet Take 1 Tablet by mouth two (2) times daily (with meals).  atorvastatin (LIPITOR) 20 mg tablet Take 1 Tablet by mouth daily.  nisoldipine SR (SULAR) 17 mg Tb24 tablet TAKE 1 TABLET BY MOUTH ONCE DAILY    furosemide (LASIX) 40 mg tablet Take 1 Tab by mouth two (2) times a day.  warfarin (COUMADIN) 5 mg tablet Take 2 Tabs by mouth daily.  Or as directed    metFORMIN ER (GLUCOPHAGE XR) 500 mg tablet TAKE 1 TABLET BY MOUTH TWO TIMES A DAY No current facility-administered medications for this visit. Visit Vitals  BP (!) 153/72   Pulse 76   Ht 6' 4\" (1.93 m)   Wt 141.1 kg (311 lb)   SpO2 96%   BMI 37.86 kg/m²         Physical Exam  Constitutional:       Appearance: He is well-developed. HENT:      Head: Normocephalic and atraumatic. Eyes:      Conjunctiva/sclera: Conjunctivae normal.   Neck:      Thyroid: No thyromegaly. Vascular: No JVD. Trachea: No tracheal deviation. Cardiovascular:      Rate and Rhythm: Normal rate. Rhythm irregularly irregular. Heart sounds: Normal heart sounds. No murmur heard. No friction rub. No gallop. Pulmonary:      Effort: No respiratory distress. Breath sounds: Normal breath sounds. No wheezing or rales. Chest:      Chest wall: No tenderness. Abdominal:      Palpations: Abdomen is soft. Tenderness: There is no abdominal tenderness. Musculoskeletal:      Cervical back: Neck supple. Skin:     General: Skin is warm and dry. Neurological:      Mental Status: He is alert and oriented to person, place, and time. Mr. Lizeth Ulloa has a reminder for a \"due or due soon\" health maintenance. I have asked that he contact his primary care provider for follow-up on this health maintenance. No flowsheet data found. CARDIOLOGY STUDIES 7/24/2013 5/18/2011 12/19/2007 5/9/2006 4/16/2002   EKG Result - - - - -   Myocardial Perfusion Scan Result - - - - nl scan   Echocardiogram - Complete Result normal ef,enlarged la,mild mod mr,mild tr,pap 38 EF 55%,mild MR & TR,PAP 51 Mild to Mod. MR, Normal EF - -   Doppler US Vascular Result - - - Vascular -            SUMMARY:echo:5/2015  Left ventricle: Systolic function was normal. Ejection fraction was  estimated in the range of 55 % to 60 %. There were no regional wall motion  abnormalities. Left atrium: The atrium was mildly dilated. Mitral valve: There was mild annular calcification.  There was mild diffuse  thickening of the anterior and posterior leaflets. There was mild  regurgitation. Mean transmitral gradient was 1.6 mmHg. Tricuspid valve: Tricuspid regurgitation peak velocity: 3.1 m/sec. Pulmonary artery systolic pressure: 41 mmHg. I Have personally reviewed recent relevant labs available and discussed with patient  8/2017-dig,bmp    SUMMARY:3/2018-echo  Left ventricle: Patient was in an irregular rhythm throughout the entire  exam. Systolic function was normal. Ejection fraction was estimated in the  range of 55 % to 60 %. No obvious wall motion abnormalities identified in  the views obtained. There was mild concentric hypertrophy. Right ventricle: The ventricle was dilated. Left atrium: The atrium was mildly dilated. Right atrium: The atrium was dilated. Mitral valve: There was mild annular calcification. There was mild  regurgitation. Tricuspid valve: There was mild regurgitation. Pulmonary artery systolic  pressure: 59 mmHg. There was moderate pulmonary hypertension. Care Everywhere Result Report  Lipid Complete PanelResulted: 9/10/2020 7:39 PM  [address]  Component Name Value Ref Range   Cholesterol 117 110 - 200 mg/dL   Triglyceride 58 40 - 149 mg/dL   HDL 44 >=40 mg/dL   Cholesterol/HDL 2.7 0.0 - 5.0    Non-HDL Cholesterol 73 <130 mg/dL   LDL CALCULATION 62 50 - 99 mg/dL   VLDL CALCULATION 12 8 - 30 mg/dL   LDL/HDL Ratio 1.4     Specimen Collected on   Blood - Blood (substance) 9/10/2020 1:07 PM   Result Narrative   Cholesterol Recommended NCEP guidelines in mg/dL:     Less than 200            Desirable   200 - 239                Borderline High   Greater than or  = 240   High           Assessment       ICD-10-CM ICD-9-CM    1. Chronic atrial fibrillation (HCC)  I48.20 427.31     Stable. Controlled rate continue therapy monitor   2. Chronic diastolic heart failure (HCC)  I50.32 428.32     Stable monitor class II   3.  Essential hypertension, benign  I10 401.1 hydrALAZINE (APRESOLINE) 50 mg tablet    Stable continue treatment   4. Pulmonary HTN (HCC)  I27.20 416.8     Stable multifactorial   5. Essential hypertension, benign  I10 401.1 hydrALAZINE (APRESOLINE) 50 mg tablet     3/2018  Off dig and coreg due to bradycardia-dig level 1.5 after daily dose at that time  Edema better-use zaroxolyn as needed  3/2019  Mildly elevated blood pressure. Will restart Coreg at 12.5 mg twice a day to was discontinued in past due to bradycardia associated with digoxin and Coreg use. Currently off digoxin. Also LDL of 126 and 10/2018. Continue with diet and weight loss  9/2019  Cardiac status stable continue current treatment  2/2020  Cardiac status stable. Continue treatment lab with PCP. Last LDL controlled  8/2020  Cardiac status stable continue current treatment check labs  4 2021  Blood pressure elevated I have added hydralazine. Did not increase Coreg as patient had bradycardia with Coreg and digoxin use in the past.  LDL level controlled in last lab of 9/2020. Continue dietary modification. Currently not on Metformin due to E. coli advised him to discuss with PCP alternate  3/2022  Cardiac status stable. INR monitored. Rate controlled. Blood pressure is elevated I will increase hydralazine to 50 mg 3 times a day    Orders Placed This Encounter    hydrALAZINE (APRESOLINE) 50 mg tablet     Sig: Take 1 Tablet by mouth three (3) times daily. Dispense:  270 Tablet     Refill:  3       Follow-up and Dispositions    · Return in about 6 months (around 9/3/2022).

## 2022-03-03 NOTE — PROGRESS NOTES
1. Have you been to the ER, urgent care clinic since your last visit? Hospitalized since your last visit?     no  2. Have you seen or consulted any other health care providers outside of the [address] [place] [address] since your last visit? Include any pap smears or colon screening. No     3. Since your last visit, have you had any of the following symptoms?      swelling in legs/arms.

## 2022-03-15 DIAGNOSIS — I50.32 DIASTOLIC CHF, CHRONIC (HCC): ICD-10-CM

## 2022-03-15 DIAGNOSIS — I48.91 ATRIAL FIBRILLATION, UNSPECIFIED TYPE (HCC): ICD-10-CM

## 2022-03-15 DIAGNOSIS — I50.32 CHRONIC DIASTOLIC HEART FAILURE (HCC): ICD-10-CM

## 2022-03-15 RX ORDER — NISOLDIPINE 17 MG/1
TABLET, FILM COATED, EXTENDED RELEASE ORAL
Qty: 30 TABLET | Refills: 5 | Status: SHIPPED | OUTPATIENT
Start: 2022-03-15

## 2022-03-18 PROBLEM — E11.9 TYPE 2 DIABETES MELLITUS (HCC): Status: ACTIVE | Noted: 2022-03-03

## 2022-03-19 PROBLEM — Z79.01 ANTICOAGULANT LONG-TERM USE: Status: ACTIVE | Noted: 2017-02-08

## 2022-03-20 PROBLEM — E66.01 SEVERE OBESITY (BMI 35.0-39.9) WITH COMORBIDITY (HCC): Status: ACTIVE | Noted: 2018-04-16

## 2022-03-24 ENCOUNTER — ANTI-COAG VISIT (OUTPATIENT)
Dept: CARDIOLOGY CLINIC | Age: 67
End: 2022-03-24
Payer: MEDICARE

## 2022-03-24 DIAGNOSIS — I48.91 ATRIAL FIBRILLATION, UNSPECIFIED TYPE (HCC): Primary | ICD-10-CM

## 2022-03-24 LAB
INR BLD: 2.4
PT POC: NORMAL
VALID INTERNAL CONTROL?: YES

## 2022-03-24 PROCEDURE — 85610 PROTHROMBIN TIME: CPT | Performed by: NURSE PRACTITIONER

## 2022-03-24 NOTE — PATIENT INSTRUCTIONS
Mr. Blanca Ignacio is here today for anticoagulation monitoring for the diagnosis of Atrial Fibrillation. His INR goal is 2.0-3.0 and his current Coumadin dose is 7.5 mg every day. Today's findings include an INR of 2.4     Considering Mr. Ibarra's past history, todays findings, and per the coumadin policy/protocol, Mr. Ambar Nolan was instructed to take Coumadin as follows,  7.5 mg every day. He was also instructed to come back in 4 weeks for an INR check. A full discussion of the nature of anticoagulants has been carried out. A full discussion of the need for frequent and regular monitoring, precise dosage adjustment and compliance was stressed. Side effects of potential bleeding were discussed and Mr. Ambar Nolan was instructed to call [phone] if there are any signs of abnormal bleeding. Mr. Ambar Nolan was instructed to avoid any OTC items containing aspirin or ibuprofen and prior to starting any new OTC products to consult with his physician or pharmacist to ensure no drug interactions are present. Mr. Ambar Noaln was instructed to avoid any major changes in his general diet and to avoid alcohol consumption. .      Mr. Ambar Nolan verbalized his understanding of all instructions and will call the office with any questions, concerns, or signs of abnormal bleeding or blood clot.

## 2022-03-24 NOTE — PROGRESS NOTES
The INR is stable and therapeutic. Continue same dose of coumadin: Continue 7.5mg daily. Recheck INR in 4 weeks.

## 2022-04-01 DIAGNOSIS — I50.32 CHRONIC DIASTOLIC HEART FAILURE (HCC): ICD-10-CM

## 2022-04-01 DIAGNOSIS — M79.89 LEG SWELLING: ICD-10-CM

## 2022-04-01 RX ORDER — FUROSEMIDE 40 MG/1
40 TABLET ORAL 2 TIMES DAILY
Qty: 180 TABLET | Refills: 1 | Status: SHIPPED | OUTPATIENT
Start: 2022-04-01 | End: 2022-10-24 | Stop reason: SDUPTHER

## 2022-04-01 NOTE — TELEPHONE ENCOUNTER
Requested Prescriptions     Pending Prescriptions Disp Refills    furosemide (LASIX) 40 mg tablet 180 Tablet 1     Sig: Take 1 Tablet by mouth two (2) times a day.

## 2022-04-25 ENCOUNTER — ANTI-COAG VISIT (OUTPATIENT)
Dept: CARDIOLOGY CLINIC | Age: 67
End: 2022-04-25
Payer: MEDICARE

## 2022-04-25 DIAGNOSIS — I48.91 ATRIAL FIBRILLATION, UNSPECIFIED TYPE (HCC): Primary | ICD-10-CM

## 2022-04-25 LAB
INR BLD: 2.8
PT POC: NORMAL
VALID INTERNAL CONTROL?: YES

## 2022-04-25 PROCEDURE — 85610 PROTHROMBIN TIME: CPT | Performed by: NURSE PRACTITIONER

## 2022-04-25 NOTE — PATIENT INSTRUCTIONS
Mr. Darrel Cedillo is here today for anticoagulation monitoring for the diagnosis of Atrial Fibrillation. His INR goal is 2.0-3.0 and his current Coumadin dose is 7.5 mg every day. Today's findings include an INR of 2.8     Considering Mr. Ibarra's past history, todays findings, and per the coumadin policy/protocol, Mr. Luis Benitez was instructed to take Coumadin as follows,  7.5 mg every day. He was also instructed to come back in 4 weeks for an INR check. A full discussion of the nature of anticoagulants has been carried out. A full discussion of the need for frequent and regular monitoring, precise dosage adjustment and compliance was stressed. Side effects of potential bleeding were discussed and Mr. Luis Benitez was instructed to call [phone] if there are any signs of abnormal bleeding. Mr. Luis Benitez was instructed to avoid any OTC items containing aspirin or ibuprofen and prior to starting any new OTC products to consult with his physician or pharmacist to ensure no drug interactions are present. Mr. Luis Benitez was instructed to avoid any major changes in his general diet and to avoid alcohol consumption. .      Mr. Luis Benitez verbalized his understanding of all instructions and will call the office with any questions, concerns, or signs of abnormal bleeding or blood clot.

## 2022-05-17 DIAGNOSIS — I10 ESSENTIAL HYPERTENSION, BENIGN: ICD-10-CM

## 2022-05-17 RX ORDER — BENAZEPRIL HYDROCHLORIDE 40 MG/1
40 TABLET ORAL DAILY
Qty: 90 TABLET | Refills: 0 | Status: SHIPPED | OUTPATIENT
Start: 2022-05-17 | End: 2022-06-23 | Stop reason: SDUPTHER

## 2022-05-17 RX ORDER — WARFARIN SODIUM 5 MG/1
10 TABLET ORAL DAILY
Qty: 90 TABLET | Refills: 5 | Status: SHIPPED | OUTPATIENT
Start: 2022-05-17

## 2022-05-26 ENCOUNTER — ANTI-COAG VISIT (OUTPATIENT)
Dept: CARDIOLOGY CLINIC | Age: 67
End: 2022-05-26
Payer: MEDICARE

## 2022-05-26 DIAGNOSIS — I48.91 ATRIAL FIBRILLATION, UNSPECIFIED TYPE (HCC): Primary | ICD-10-CM

## 2022-05-26 LAB
INR BLD: 2.8
PT POC: NORMAL
VALID INTERNAL CONTROL?: YES

## 2022-05-26 PROCEDURE — 85610 PROTHROMBIN TIME: CPT | Performed by: NURSE PRACTITIONER

## 2022-05-26 NOTE — PATIENT INSTRUCTIONS
Mr. Galen Kan is here today for anticoagulation monitoring for the diagnosis of Atrial Fibrillation. His INR goal is 2.0-3.0 and his current Coumadin dose is 7.5 mg every day. Today's findings include an INR of 2.8     Considering Mr. Ibarra's past history, todays findings, and per the coumadin policy/protocol, Mr. Cameron Martinez was instructed to take Coumadin as follows,  7.5 mg every day. He was also instructed to come back in 5 weeks for an INR check. A full discussion of the nature of anticoagulants has been carried out. A full discussion of the need for frequent and regular monitoring, precise dosage adjustment and compliance was stressed. Side effects of potential bleeding were discussed and Mr. Cameron Martinez was instructed to call [phone] if there are any signs of abnormal bleeding. Mr. Cameron Martinez was instructed to avoid any OTC items containing aspirin or ibuprofen and prior to starting any new OTC products to consult with his physician or pharmacist to ensure no drug interactions are present. Mr. Cameron Martinez was instructed to avoid any major changes in his general diet and to avoid alcohol consumption. .      Mr. Cameron Martinez verbalized his understanding of all instructions and will call the office with any questions, concerns, or signs of abnormal bleeding or blood clot.

## 2022-06-06 DIAGNOSIS — I10 ESSENTIAL HYPERTENSION, BENIGN: ICD-10-CM

## 2022-06-06 DIAGNOSIS — E78.5 HYPERLIPIDEMIA LDL GOAL <100: ICD-10-CM

## 2022-06-06 RX ORDER — CARVEDILOL 12.5 MG/1
12.5 TABLET ORAL 2 TIMES DAILY WITH MEALS
Qty: 180 TABLET | Refills: 3 | Status: SHIPPED | OUTPATIENT
Start: 2022-06-06 | End: 2022-09-15

## 2022-06-06 RX ORDER — ATORVASTATIN CALCIUM 20 MG/1
20 TABLET, FILM COATED ORAL DAILY
Qty: 90 TABLET | Refills: 3 | Status: SHIPPED | OUTPATIENT
Start: 2022-06-06

## 2022-06-23 ENCOUNTER — ANTI-COAG VISIT (OUTPATIENT)
Dept: CARDIOLOGY CLINIC | Age: 67
End: 2022-06-23
Payer: MEDICARE

## 2022-06-23 DIAGNOSIS — I10 ESSENTIAL HYPERTENSION, BENIGN: ICD-10-CM

## 2022-06-23 DIAGNOSIS — I48.91 ATRIAL FIBRILLATION, UNSPECIFIED TYPE (HCC): Primary | ICD-10-CM

## 2022-06-23 LAB
INR BLD: 3.2
PT POC: NORMAL
VALID INTERNAL CONTROL?: YES

## 2022-06-23 PROCEDURE — 85610 PROTHROMBIN TIME: CPT | Performed by: NURSE PRACTITIONER

## 2022-06-23 RX ORDER — BENAZEPRIL HYDROCHLORIDE 40 MG/1
40 TABLET ORAL DAILY
Qty: 90 TABLET | Refills: 1 | Status: SHIPPED | OUTPATIENT
Start: 2022-06-23

## 2022-06-23 NOTE — PATIENT INSTRUCTIONS
Mr. Angela Lazaro is here today for anticoagulation monitoring for the diagnosis of Atrial Fibrillation. His INR goal is 2.0-3.0 and his current Coumadin dose is 7.5 mg every day. Today's findings include an INR of 3.2     Considering Mr. Ibarra's past history, todays findings, and per the coumadin policy/protocol, Mr. Ten Monte was instructed to take Coumadin as follows,  6/23 3.75 mg; otherwise 7.5 mg every day. He was also instructed to come back in 4 weeks for an INR check. A full discussion of the nature of anticoagulants has been carried out. A full discussion of the need for frequent and regular monitoring, precise dosage adjustment and compliance was stressed. Side effects of potential bleeding were discussed and Mr. Ten Monte was instructed to call [phone] if there are any signs of abnormal bleeding. Mr. Ten Monte was instructed to avoid any OTC items containing aspirin or ibuprofen and prior to starting any new OTC products to consult with his physician or pharmacist to ensure no drug interactions are present. Mr. Ten Monte was instructed to avoid any major changes in his general diet and to avoid alcohol consumption. .      Mr. Ten Monte verbalized his understanding of all instructions and will call the office with any questions, concerns, or signs of abnormal bleeding or blood clot.

## 2022-07-21 ENCOUNTER — ANTI-COAG VISIT (OUTPATIENT)
Dept: CARDIOLOGY CLINIC | Age: 67
End: 2022-07-21
Payer: MEDICARE

## 2022-07-21 DIAGNOSIS — I48.91 ATRIAL FIBRILLATION, UNSPECIFIED TYPE (HCC): Primary | ICD-10-CM

## 2022-07-21 LAB
INR BLD: 3.1
PT POC: NORMAL
VALID INTERNAL CONTROL?: YES

## 2022-07-21 PROCEDURE — 85610 PROTHROMBIN TIME: CPT | Performed by: INTERNAL MEDICINE

## 2022-07-21 NOTE — PATIENT INSTRUCTIONS
Mr. Jayro Gabriel is here today for anticoagulation monitoring for the diagnosis of Atrial Fibrillation. His INR goal is 2.0-3.0 and his current Coumadin dose is 7.5 mg every day. Today's findings include an INR of 3.1     Considering Mr. Ibarra's past history, todays findings, and per the coumadin policy/protocol, Mr. Justo Browning was instructed to take Coumadin as follows,  7.5 mg every day. He was also instructed to come back in 3 weeks for an INR check. A full discussion of the nature of anticoagulants has been carried out. A full discussion of the need for frequent and regular monitoring, precise dosage adjustment and compliance was stressed. Side effects of potential bleeding were discussed and Mr. Justo Browning was instructed to call [phone] if there are any signs of abnormal bleeding. Mr. Justo Browning was instructed to avoid any OTC items containing aspirin or ibuprofen and prior to starting any new OTC products to consult with his physician or pharmacist to ensure no drug interactions are present. Mr. Justo Browning was instructed to avoid any major changes in his general diet and to avoid alcohol consumption. .      Mr. Justo Browning verbalized his understanding of all instructions and will call the office with any questions, concerns, or signs of abnormal bleeding or blood clot.

## 2022-08-18 ENCOUNTER — ANTI-COAG VISIT (OUTPATIENT)
Dept: CARDIOLOGY CLINIC | Age: 67
End: 2022-08-18
Payer: MEDICARE

## 2022-08-18 DIAGNOSIS — I48.91 ATRIAL FIBRILLATION, UNSPECIFIED TYPE (HCC): Primary | ICD-10-CM

## 2022-08-18 LAB
INR BLD: 3.9
PT POC: NORMAL
VALID INTERNAL CONTROL?: YES

## 2022-08-18 PROCEDURE — 85610 PROTHROMBIN TIME: CPT | Performed by: NURSE PRACTITIONER

## 2022-08-18 NOTE — PATIENT INSTRUCTIONS
Mr. Tom Marie is here today for anticoagulation monitoring for the diagnosis of Atrial Fibrillation. His INR goal is 2.0-3.0 and his current Coumadin dose is 7.5 mg every day. Today's findings include an INR of 3.9     Considering Mr. Ibarra's past history, todays findings, and per the coumadin policy/protocol, Mr. Jane Hope was instructed to take Coumadin as follows,  8/18 hold; otherwise 7.5 mg every day. He was also instructed to come back in 1 week for an INR check. A full discussion of the nature of anticoagulants has been carried out. A full discussion of the need for frequent and regular monitoring, precise dosage adjustment and compliance was stressed. Side effects of potential bleeding were discussed and Mr. Jane Hope was instructed to call [phone] if there are any signs of abnormal bleeding. Mr. Jane Hope was instructed to avoid any OTC items containing aspirin or ibuprofen and prior to starting any new OTC products to consult with his physician or pharmacist to ensure no drug interactions are present. Mr. Jane Hope was instructed to avoid any major changes in his general diet and to avoid alcohol consumption. .      Mr. Jane Hope verbalized his understanding of all instructions and will call the office with any questions, concerns, or signs of abnormal bleeding or blood clot.

## 2022-09-15 ENCOUNTER — ANTI-COAG VISIT (OUTPATIENT)
Dept: CARDIOLOGY CLINIC | Age: 67
End: 2022-09-15

## 2022-09-15 ENCOUNTER — OFFICE VISIT (OUTPATIENT)
Dept: CARDIOLOGY CLINIC | Age: 67
End: 2022-09-15
Payer: MEDICARE

## 2022-09-15 VITALS
BODY MASS INDEX: 37.38 KG/M2 | DIASTOLIC BLOOD PRESSURE: 74 MMHG | WEIGHT: 307 LBS | HEIGHT: 76 IN | HEART RATE: 79 BPM | SYSTOLIC BLOOD PRESSURE: 151 MMHG | OXYGEN SATURATION: 98 %

## 2022-09-15 DIAGNOSIS — I10 ESSENTIAL HYPERTENSION, BENIGN: ICD-10-CM

## 2022-09-15 DIAGNOSIS — E66.01 SEVERE OBESITY (BMI 35.0-39.9) WITH COMORBIDITY (HCC): ICD-10-CM

## 2022-09-15 DIAGNOSIS — I48.91 ATRIAL FIBRILLATION, UNSPECIFIED TYPE (HCC): Primary | ICD-10-CM

## 2022-09-15 DIAGNOSIS — I48.20 CHRONIC ATRIAL FIBRILLATION (HCC): Primary | ICD-10-CM

## 2022-09-15 DIAGNOSIS — E78.5 HYPERLIPIDEMIA LDL GOAL <100: ICD-10-CM

## 2022-09-15 DIAGNOSIS — I50.32 CHRONIC DIASTOLIC HEART FAILURE (HCC): ICD-10-CM

## 2022-09-15 DIAGNOSIS — Z79.01 ANTICOAGULANT LONG-TERM USE: ICD-10-CM

## 2022-09-15 LAB
INR BLD: 4.5
PT POC: NORMAL
VALID INTERNAL CONTROL?: YES

## 2022-09-15 PROCEDURE — G8753 SYS BP > OR = 140: HCPCS | Performed by: INTERNAL MEDICINE

## 2022-09-15 PROCEDURE — 1123F ACP DISCUSS/DSCN MKR DOCD: CPT | Performed by: INTERNAL MEDICINE

## 2022-09-15 PROCEDURE — 85610 PROTHROMBIN TIME: CPT | Performed by: NURSE PRACTITIONER

## 2022-09-15 PROCEDURE — G8427 DOCREV CUR MEDS BY ELIG CLIN: HCPCS | Performed by: INTERNAL MEDICINE

## 2022-09-15 PROCEDURE — 3017F COLORECTAL CA SCREEN DOC REV: CPT | Performed by: INTERNAL MEDICINE

## 2022-09-15 PROCEDURE — G8754 DIAS BP LESS 90: HCPCS | Performed by: INTERNAL MEDICINE

## 2022-09-15 PROCEDURE — G8432 DEP SCR NOT DOC, RNG: HCPCS | Performed by: INTERNAL MEDICINE

## 2022-09-15 PROCEDURE — 1101F PT FALLS ASSESS-DOCD LE1/YR: CPT | Performed by: INTERNAL MEDICINE

## 2022-09-15 PROCEDURE — G8417 CALC BMI ABV UP PARAM F/U: HCPCS | Performed by: INTERNAL MEDICINE

## 2022-09-15 PROCEDURE — G8536 NO DOC ELDER MAL SCRN: HCPCS | Performed by: INTERNAL MEDICINE

## 2022-09-15 PROCEDURE — 99214 OFFICE O/P EST MOD 30 MIN: CPT | Performed by: INTERNAL MEDICINE

## 2022-09-15 RX ORDER — CARVEDILOL 25 MG/1
25 TABLET ORAL 2 TIMES DAILY WITH MEALS
Qty: 180 TABLET | Refills: 3 | Status: SHIPPED | OUTPATIENT
Start: 2022-09-15

## 2022-09-15 RX ORDER — HYDRALAZINE HYDROCHLORIDE 50 MG/1
50 TABLET, FILM COATED ORAL 3 TIMES DAILY
Qty: 270 TABLET | Refills: 3 | Status: SHIPPED | OUTPATIENT
Start: 2022-09-15

## 2022-09-15 NOTE — PROGRESS NOTES
1. Have you been to the ER, urgent care clinic since your last visit? Hospitalized since your last visit?  no    2. Have you seen or consulted any other health care providers outside of the [address] [place] [address] since your last visit? Include any pap smears or colon screening. No     3. Since your last visit, have you had any of the following symptoms?      swelling in legs/arms.

## 2022-09-15 NOTE — PATIENT INSTRUCTIONS
Mr. Luis Dao is here today for anticoagulation monitoring for the diagnosis of Atrial Fibrillation. His INR goal is 2.0-3.0 and his current Coumadin dose is 7.5 mg every day. Today's findings include an INR of 4.5     Considering Mr. Ibarra's past history, todays findings, and per the coumadin policy/protocol, Mr. Angelica Lenz was instructed to take Coumadin as follows,  9/15 hold, 9/16 hold; otherwise 7.5 mg every day. He was also instructed to come back in 1 week for an INR check. A full discussion of the nature of anticoagulants has been carried out. A full discussion of the need for frequent and regular monitoring, precise dosage adjustment and compliance was stressed. Side effects of potential bleeding were discussed and Mr. Angelica Lenz was instructed to call [phone] if there are any signs of abnormal bleeding. Mr. Angelica Lenz was instructed to avoid any OTC items containing aspirin or ibuprofen and prior to starting any new OTC products to consult with his physician or pharmacist to ensure no drug interactions are present. Mr. Angelica Lenz was instructed to avoid any major changes in his general diet and to avoid alcohol consumption. .      Mr. Angelica Lenz verbalized his understanding of all instructions and will call the office with any questions, concerns, or signs of abnormal bleeding or blood clot.

## 2022-09-15 NOTE — PROGRESS NOTES
HISTORY OF PRESENT ILLNESS  Chela Maier Sr. is a 69 y.o. male. Patient with a fib,chf,pulmonary htn,mr. On follow up patient denies any chest pains,sob, palpitation or other significant symptoms. Follow-up  Pertinent negatives include no chest pain, no abdominal pain, no headaches and no shortness of breath. Valvular Heart Disease  The history is provided by the Patient. This is a chronic problem. The problem occurs constantly. The problem has not changed since onset. Pertinent negatives include no chest pain, no abdominal pain, no headaches and no shortness of breath. CHF  The history is provided by the Patient. This is a chronic problem. The problem occurs constantly. The problem has not changed since onset. Pertinent negatives include no chest pain, no abdominal pain, no headaches and no shortness of breath. Palpitations   The history is provided by the Patient. This is a chronic problem. The problem has not changed since onset. Associated symptoms include lower extremity edema. Pertinent negatives include no fever, no chest pain, no claudication, no orthopnea, no PND, no abdominal pain, no nausea, no vomiting, no headaches, no dizziness, no weakness, no cough, no hemoptysis, no shortness of breath and no sputum production. His past medical history is significant for hypertension. Hypertension  The history is provided by the Patient. This is a chronic problem. The problem occurs constantly. The problem has not changed since onset. Pertinent negatives include no chest pain, no abdominal pain, no headaches and no shortness of breath. Leg Swelling  The history is provided by the Patient. This is a chronic problem. The problem occurs daily. The problem has not changed since onset. Pertinent negatives include no chest pain, no abdominal pain, no headaches and no shortness of breath. Nothing aggravates the symptoms. Review of Systems   Constitutional:  Negative for chills and fever.    HENT: Negative for nosebleeds. Eyes:  Negative for blurred vision and double vision. Respiratory:  Negative for cough, hemoptysis, sputum production, shortness of breath and wheezing. Cardiovascular:  Negative for chest pain, palpitations, orthopnea, claudication, leg swelling and PND. Gastrointestinal:  Negative for abdominal pain, heartburn, nausea and vomiting. Musculoskeletal:  Negative for myalgias. Skin:  Negative for rash. Neurological:  Negative for dizziness, weakness and headaches. Endo/Heme/Allergies:  Does not bruise/bleed easily. Family History   Problem Relation Age of Onset    Kidney Disease Mother     Diabetes Mother     Cancer Father     No Known Problems Sister     No Known Problems Brother     Hypertension Neg Hx        Past Medical History:   Diagnosis Date    Atrial fibrillation ([address]     Chronic diastolic heart failure ([address]     Hypertension     Mitral valve disorders(424.0)     Prediabetes        Past Surgical History:   Procedure Laterality Date    COLONOSCOPY N/A 1/24/2020    COLONOSCOPY performed by Saroj Park MD at [address] [place] [address]    infection drained from under chin    HX TONSILLECTOMY         No Known Allergies    Current Outpatient Medications   Medication Sig    carvediloL (COREG) 25 mg tablet Take 1 Tablet by mouth two (2) times daily (with meals). hydrALAZINE (APRESOLINE) 50 mg tablet Take 1 Tablet by mouth three (3) times daily. benazepriL (LOTENSIN) 40 mg tablet Take 1 Tablet by mouth daily. STOP Ramipril    atorvastatin (LIPITOR) 20 mg tablet Take 1 Tablet by mouth daily. warfarin (COUMADIN) 5 mg tablet TAKE 2 TABS BY MOUTH DAILY. OR AS DIRECTED    furosemide (LASIX) 40 mg tablet Take 1 Tablet by mouth two (2) times a day.     nisoldipine SR (SULAR) 17 mg Tb24 tablet TAKE 1 TABLET BY MOUTH ONCE DAILY    metFORMIN ER (GLUCOPHAGE XR) 500 mg tablet TAKE 1 TABLET BY MOUTH TWO TIMES A DAY     No current facility-administered medications for this visit. Visit Vitals  BP (!) 151/74   Pulse 79   Ht 6' 4\" (1.93 m)   Wt 139.3 kg (307 lb)   SpO2 98%   BMI 37.37 kg/m²         Physical Exam  Constitutional:       Appearance: He is well-developed. HENT:      Head: Normocephalic and atraumatic. Eyes:      Conjunctiva/sclera: Conjunctivae normal.   Neck:      Thyroid: No thyromegaly. Vascular: No JVD. Trachea: No tracheal deviation. Cardiovascular:      Rate and Rhythm: Normal rate. Rhythm irregularly irregular. Heart sounds: Normal heart sounds. No murmur heard. No friction rub. No gallop. Pulmonary:      Effort: No respiratory distress. Breath sounds: Normal breath sounds. No wheezing or rales. Chest:      Chest wall: No tenderness. Abdominal:      Palpations: Abdomen is soft. Tenderness: There is no abdominal tenderness. Musculoskeletal:      Cervical back: Neck supple. Skin:     General: Skin is warm and dry. Neurological:      Mental Status: He is alert and oriented to person, place, and time. Mr. Saulo Nowak has a reminder for a \"due or due soon\" health maintenance. I have asked that he contact his primary care provider for follow-up on this health maintenance. No flowsheet data found. CARDIOLOGY STUDIES 7/24/2013 5/18/2011 12/19/2007 5/9/2006 4/16/2002   EKG Result - - - - -   Myocardial Perfusion Scan Result - - - - nl scan   Echocardiogram - Complete Result normal ef,enlarged la,mild mod mr,mild tr,pap 38 EF 55%,mild MR & TR,PAP 51 Mild to Mod. MR, Normal EF - -   Doppler US Vascular Result - - - Vascular -            SUMMARY:echo:5/2015  Left ventricle: Systolic function was normal. Ejection fraction was  estimated in the range of 55 % to 60 %. There were no regional wall motion  abnormalities. Left atrium: The atrium was mildly dilated. Mitral valve: There was mild annular calcification.  There was mild diffuse  thickening of the anterior and posterior leaflets. There was mild  regurgitation. Mean transmitral gradient was 1.6 mmHg. Tricuspid valve: Tricuspid regurgitation peak velocity: 3.1 m/sec. Pulmonary artery systolic pressure: 41 mmHg. I Have personally reviewed recent relevant labs available and discussed with patient  8/2017-dig,bmp    SUMMARY:3/2018-echo  Left ventricle: Patient was in an irregular rhythm throughout the entire  exam. Systolic function was normal. Ejection fraction was estimated in the  range of 55 % to 60 %. No obvious wall motion abnormalities identified in  the views obtained. There was mild concentric hypertrophy. Right ventricle: The ventricle was dilated. Left atrium: The atrium was mildly dilated. Right atrium: The atrium was dilated. Mitral valve: There was mild annular calcification. There was mild  regurgitation. Tricuspid valve: There was mild regurgitation. Pulmonary artery systolic  pressure: 59 mmHg. There was moderate pulmonary hypertension. Care Everywhere Result Report  Lipid Complete PanelResulted: 9/10/2020 7:39 PM  [address]  Component Name Value Ref Range   Cholesterol 117 110 - 200 mg/dL   Triglyceride 58 40 - 149 mg/dL   HDL 44 >=40 mg/dL   Cholesterol/HDL 2.7 0.0 - 5.0    Non-HDL Cholesterol 73 <130 mg/dL   LDL CALCULATION 62 50 - 99 mg/dL   VLDL CALCULATION 12 8 - 30 mg/dL   LDL/HDL Ratio 1.4     Specimen Collected on   Blood - Blood (substance) 9/10/2020 1:07 PM   Result Narrative   Cholesterol Recommended NCEP guidelines in mg/dL:     Less than 200            Desirable   200 - 239                Borderline High   Greater than or  = 240   High           Assessment       ICD-10-CM ICD-9-CM    1. Chronic atrial fibrillation (HCC)  I48.20 427.31     Stable rate controlled continue treatment      2. Chronic diastolic heart failure (HCC)  I50.32 428.32     Stable compensated class II      3.  Essential hypertension, benign  I10 401.1 carvediloL (COREG) 25 mg tablet hydrALAZINE (APRESOLINE) 50 mg tablet    Stable monitor      4. Hyperlipidemia LDL goal <100  E78.5 272.4 carvediloL (COREG) 25 mg tablet    Continue treatment lab with PCP      5. Anticoagulant long-term use  Z79.01 V58.61     Continue for A. fib      6. Essential hypertension, benign  I10 401.1 carvediloL (COREG) 25 mg tablet      hydrALAZINE (APRESOLINE) 50 mg tablet    Elevated today. Usually better controlled monitor      7. Severe obesity (BMI 35.0-39. 9) with comorbidity ([facility]  E66.01 278.01       8. Essential hypertension, benign  I10 401.1 carvediloL (COREG) 25 mg tablet      hydrALAZINE (APRESOLINE) 50 mg tablet        3/2018  Off dig and coreg due to bradycardia-dig level 1.5 after daily dose at that time  Edema better-use zaroxolyn as needed  3/2019  Mildly elevated blood pressure. Will restart Coreg at 12.5 mg twice a day to was discontinued in past due to bradycardia associated with digoxin and Coreg use. Currently off digoxin. Also LDL of 126 and 10/2018. Continue with diet and weight loss  9/2019  Cardiac status stable continue current treatment  2/2020  Cardiac status stable. Continue treatment lab with PCP. Last LDL controlled  8/2020  Cardiac status stable continue current treatment check labs  4 2021  Blood pressure elevated I have added hydralazine. Did not increase Coreg as patient had bradycardia with Coreg and digoxin use in the past.  LDL level controlled in last lab of 9/2020. Continue dietary modification. Currently not on Metformin due to E. coli advised him to discuss with PCP alternate  3/2022  Cardiac status stable. INR monitored. Rate controlled. Blood pressure is elevated I will increase hydralazine to 50 mg 3 times a day  9/2022  Blood pressure remains elevated increase carvedilol to 25 mg twice a day. Rate controlled.   Continue dietary modification  Orders Placed This Encounter    carvediloL (COREG) 25 mg tablet     Sig: Take 1 Tablet by mouth two (2) times daily (with meals). Dispense:  180 Tablet     Refill:  3    hydrALAZINE (APRESOLINE) 50 mg tablet     Sig: Take 1 Tablet by mouth three (3) times daily. Dispense:  270 Tablet     Refill:  3       Follow-up and Dispositions    Return in about 6 months (around 3/15/2023).

## 2022-10-24 DIAGNOSIS — M79.89 LEG SWELLING: ICD-10-CM

## 2022-10-24 DIAGNOSIS — I50.32 CHRONIC DIASTOLIC HEART FAILURE (HCC): ICD-10-CM

## 2022-10-24 RX ORDER — FUROSEMIDE 40 MG/1
40 TABLET ORAL 2 TIMES DAILY
Qty: 180 TABLET | Refills: 1 | Status: SHIPPED | OUTPATIENT
Start: 2022-10-24

## 2022-10-27 ENCOUNTER — ANTI-COAG VISIT (OUTPATIENT)
Dept: CARDIOLOGY CLINIC | Age: 67
End: 2022-10-27
Payer: MEDICARE

## 2022-10-27 DIAGNOSIS — I48.91 ATRIAL FIBRILLATION, UNSPECIFIED TYPE (HCC): Primary | ICD-10-CM

## 2022-10-27 LAB
INR BLD: 3.9
PT POC: NORMAL
VALID INTERNAL CONTROL?: YES

## 2022-10-27 PROCEDURE — 85610 PROTHROMBIN TIME: CPT | Performed by: NURSE PRACTITIONER

## 2022-10-27 NOTE — PATIENT INSTRUCTIONS
Mr. Stephany Muro is here today for anticoagulation monitoring for the diagnosis of Atrial Fibrillation. His INR goal is 2.0-3.0 and his current Coumadin dose is hold for 2 days and start 7.5 mg daily. Today's findings include an INR of 3.9     Considering Mr. Ibarra's past history, todays findings, and per the coumadin policy/protocol, Mr. Jj Virgen was instructed to take Coumadin as follows,  hold for 1 day and continue 7.5 mg daily. He was also instructed to come back in 1 weeks for an INR check. A full discussion of the nature of anticoagulants has been carried out. A full discussion of the need for frequent and regular monitoring, precise dosage adjustment and compliance was stressed. Side effects of potential bleeding were discussed and Mr. Jj Virgen was instructed to call [phone] if there are any signs of abnormal bleeding. Mr. Jj Virgen was instructed to avoid any OTC items containing aspirin or ibuprofen and prior to starting any new OTC products to consult with his physician or pharmacist to ensure no drug interactions are present. Mr. Jj Virgen was instructed to avoid any major changes in his general diet and to avoid alcohol consumption. .      Mr. Jj Virgen verbalized his understanding of all instructions and will call the office with any questions, concerns, or signs of abnormal bleeding or blood clot.

## 2022-11-07 ENCOUNTER — TELEPHONE (OUTPATIENT)
Dept: CARDIOLOGY CLINIC | Age: 67
End: 2022-11-07

## 2022-11-07 NOTE — TELEPHONE ENCOUNTER
Left voicemail for pt. to return call. Wanted to inform patient will need to walk-in Mon.- Fri 1-3pm to complete INR check as he is overdue for INR test as of 11/3/22.

## 2022-11-10 ENCOUNTER — ANTI-COAG VISIT (OUTPATIENT)
Dept: CARDIOLOGY CLINIC | Age: 67
End: 2022-11-10
Payer: MEDICARE

## 2022-11-10 DIAGNOSIS — I48.91 ATRIAL FIBRILLATION, UNSPECIFIED TYPE (HCC): Primary | ICD-10-CM

## 2022-11-10 LAB
INR BLD: 2.6
PT POC: NORMAL
VALID INTERNAL CONTROL?: YES

## 2022-11-10 PROCEDURE — 85610 PROTHROMBIN TIME: CPT | Performed by: NURSE PRACTITIONER

## 2022-11-15 NOTE — PATIENT INSTRUCTIONS
Mr. Chandan Gomez is here today for anticoagulation monitoring for the diagnosis of Atrial Fibrillation. His INR goal is 2.0-3.0 and his current Coumadin dose is 7.5 mg daily. Today's findings include an INR of 2.6     Considering Mr. Ibarra's past history, todays findings, and per the coumadin policy/protocol, Mr. Lizeth Ulloa was instructed to take Coumadin as follows,  resume 7.5 mg daily. He was also instructed to come back in 3 weeks for an INR check. A full discussion of the nature of anticoagulants has been carried out. A full discussion of the need for frequent and regular monitoring, precise dosage adjustment and compliance was stressed. Side effects of potential bleeding were discussed and Mr. Lizeth Ulloa was instructed to call [phone] if there are any signs of abnormal bleeding. Mr. Lizeth Ulloa was instructed to avoid any OTC items containing aspirin or ibuprofen and prior to starting any new OTC products to consult with his physician or pharmacist to ensure no drug interactions are present. Mr. Lizeth Ulloa was instructed to avoid any major changes in his general diet and to avoid alcohol consumption. .      Mr. Lizeth Ulloa verbalized his understanding of all instructions and will call the office with any questions, concerns, or signs of abnormal bleeding or blood clot.

## 2022-12-09 ENCOUNTER — ANTI-COAG VISIT (OUTPATIENT)
Dept: CARDIOLOGY CLINIC | Age: 67
End: 2022-12-09
Payer: MEDICARE

## 2022-12-09 DIAGNOSIS — I48.91 ATRIAL FIBRILLATION, UNSPECIFIED TYPE (HCC): Primary | ICD-10-CM

## 2022-12-09 LAB
INR BLD: 2.9
PT POC: NORMAL
VALID INTERNAL CONTROL?: YES

## 2022-12-09 PROCEDURE — 85610 PROTHROMBIN TIME: CPT | Performed by: NURSE PRACTITIONER

## 2022-12-12 ENCOUNTER — TELEPHONE ANTICOAG (OUTPATIENT)
Dept: CARDIOLOGY CLINIC | Age: 67
End: 2022-12-12

## 2022-12-12 DIAGNOSIS — I48.91 ATRIAL FIBRILLATION, UNSPECIFIED TYPE (HCC): Primary | ICD-10-CM

## 2022-12-14 NOTE — PATIENT INSTRUCTIONS
Mr. Morgan Donald is here today for anticoagulation monitoring for the diagnosis of Atrial Fibrillation. His INR goal is 2.0-3.0 and his current Coumadin dose is 7.5 mg daily. Today's findings include an INR of 2.9     Considering Mr. Ibarra's past history, todays findings, and per the coumadin policy/protocol, Mr. Jeramie Cruz was instructed to take Coumadin as follows,  resume same dose. He was also instructed to come back in 3 weeks for an INR check. A full discussion of the nature of anticoagulants has been carried out. A full discussion of the need for frequent and regular monitoring, precise dosage adjustment and compliance was stressed. Side effects of potential bleeding were discussed and Mr. Jeramie Cruz was instructed to call [phone] if there are any signs of abnormal bleeding. Mr. Jeramie Cruz was instructed to avoid any OTC items containing aspirin or ibuprofen and prior to starting any new OTC products to consult with his physician or pharmacist to ensure no drug interactions are present. Mr. Jeramie Cruz was instructed to avoid any major changes in his general diet and to avoid alcohol consumption. .      Mr. Jeramie Cruz verbalized his understanding of all instructions and will call the office with any questions, concerns, or signs of abnormal bleeding or blood clot.

## 2022-12-27 DIAGNOSIS — I10 ESSENTIAL HYPERTENSION, BENIGN: ICD-10-CM

## 2022-12-27 RX ORDER — BENAZEPRIL HYDROCHLORIDE 40 MG/1
40 TABLET ORAL DAILY
Qty: 90 TABLET | Refills: 3 | Status: SHIPPED | OUTPATIENT
Start: 2022-12-27

## 2023-01-30 DIAGNOSIS — I50.32 CHRONIC DIASTOLIC HEART FAILURE (HCC): ICD-10-CM

## 2023-01-30 DIAGNOSIS — M79.89 LEG SWELLING: ICD-10-CM

## 2023-01-30 RX ORDER — FUROSEMIDE 40 MG/1
40 TABLET ORAL 2 TIMES DAILY
Qty: 180 TABLET | Refills: 0 | Status: SHIPPED | OUTPATIENT
Start: 2023-01-30

## 2023-01-31 ENCOUNTER — ANTI-COAG VISIT (OUTPATIENT)
Dept: CARDIOLOGY CLINIC | Age: 68
End: 2023-01-31
Payer: MEDICARE

## 2023-01-31 DIAGNOSIS — I48.91 ATRIAL FIBRILLATION, UNSPECIFIED TYPE (HCC): Primary | ICD-10-CM

## 2023-01-31 LAB
INR BLD: 3.3
PT POC: NORMAL
VALID INTERNAL CONTROL?: YES

## 2023-01-31 PROCEDURE — 85610 PROTHROMBIN TIME: CPT | Performed by: NURSE PRACTITIONER

## 2023-03-04 ENCOUNTER — HOSPITAL ENCOUNTER (EMERGENCY)
Facility: HOSPITAL | Age: 68
Discharge: HOME | End: 2023-03-04
Attending: EMERGENCY MEDICINE
Payer: MEDICARE

## 2023-03-04 ENCOUNTER — APPOINTMENT (OUTPATIENT)
Facility: HOSPITAL | Age: 68
End: 2023-03-04
Payer: MEDICARE

## 2023-03-04 VITALS
HEART RATE: 87 BPM | SYSTOLIC BLOOD PRESSURE: 132 MMHG | BODY MASS INDEX: 37.14 KG/M2 | HEIGHT: 76 IN | OXYGEN SATURATION: 98 % | WEIGHT: 305 LBS | RESPIRATION RATE: 19 BRPM | DIASTOLIC BLOOD PRESSURE: 72 MMHG | TEMPERATURE: 98.1 F

## 2023-03-04 DIAGNOSIS — I50.9 CONGESTIVE HEART FAILURE, UNSPECIFIED HF CHRONICITY, UNSPECIFIED HEART FAILURE TYPE (HCC): Primary | ICD-10-CM

## 2023-03-04 LAB
ALBUMIN SERPL-MCNC: 3.3 G/DL (ref 3.4–5)
ALBUMIN/GLOB SERPL: 0.7 (ref 0.8–1.7)
ALP SERPL-CCNC: 51 U/L (ref 45–117)
ALT SERPL-CCNC: 22 U/L (ref 16–61)
ANION GAP SERPL CALC-SCNC: 7 MMOL/L (ref 3–18)
APPEARANCE UR: CLEAR
AST SERPL-CCNC: 27 U/L (ref 10–38)
BACTERIA URNS QL MICRO: NEGATIVE /HPF
BASOPHILS # BLD: 0 K/UL (ref 0–0.1)
BASOPHILS NFR BLD: 1 % (ref 0–2)
BILIRUB SERPL-MCNC: 1.7 MG/DL (ref 0.2–1)
BILIRUB UR QL: NEGATIVE
BUN SERPL-MCNC: 14 MG/DL (ref 7–18)
BUN/CREAT SERPL: 10 (ref 12–20)
CALCIUM SERPL-MCNC: 8.6 MG/DL (ref 8.5–10.1)
CHLORIDE SERPL-SCNC: 103 MMOL/L (ref 100–111)
CO2 SERPL-SCNC: 28 MMOL/L (ref 21–32)
COLOR UR: YELLOW
CREAT SERPL-MCNC: 1.34 MG/DL (ref 0.6–1.3)
DIFFERENTIAL METHOD BLD: ABNORMAL
EKG ATRIAL RATE: 84 BPM
EKG DIAGNOSIS: NORMAL
EKG Q-T INTERVAL: 340 MS
EKG QRS DURATION: 90 MS
EKG QTC CALCULATION (BAZETT): 462 MS
EKG R AXIS: 16 DEGREES
EKG T AXIS: 58 DEGREES
EKG VENTRICULAR RATE: 111 BPM
EOSINOPHIL # BLD: 0.1 K/UL (ref 0–0.4)
EOSINOPHIL NFR BLD: 3 % (ref 0–5)
EPITH CASTS URNS QL MICRO: NORMAL /LPF (ref 0–5)
ERYTHROCYTE [DISTWIDTH] IN BLOOD BY AUTOMATED COUNT: 14.9 % (ref 11.6–14.5)
GLOBULIN SER CALC-MCNC: 4.5 G/DL (ref 2–4)
GLUCOSE SERPL-MCNC: 130 MG/DL (ref 74–99)
GLUCOSE UR STRIP.AUTO-MCNC: NEGATIVE MG/DL
HCT VFR BLD AUTO: 38.5 % (ref 36–48)
HGB BLD-MCNC: 12.8 G/DL (ref 13–16)
HGB UR QL STRIP: NEGATIVE
IMM GRANULOCYTES # BLD AUTO: 0 K/UL (ref 0–0.04)
IMM GRANULOCYTES NFR BLD AUTO: 1 % (ref 0–0.5)
INR PPP: 2.6 (ref 0.8–1.2)
KETONES UR QL STRIP.AUTO: ABNORMAL MG/DL
LEUKOCYTE ESTERASE UR QL STRIP.AUTO: NEGATIVE
LYMPHOCYTES # BLD: 1.4 K/UL (ref 0.9–3.6)
LYMPHOCYTES NFR BLD: 28 % (ref 21–52)
MCH RBC QN AUTO: 30.2 PG (ref 24–34)
MCHC RBC AUTO-ENTMCNC: 33.2 G/DL (ref 31–37)
MCV RBC AUTO: 90.8 FL (ref 78–100)
MONOCYTES # BLD: 0.8 K/UL (ref 0.05–1.2)
MONOCYTES NFR BLD: 15 % (ref 3–10)
NEUTS SEG # BLD: 2.7 K/UL (ref 1.8–8)
NEUTS SEG NFR BLD: 53 % (ref 40–73)
NITRITE UR QL STRIP.AUTO: NEGATIVE
NRBC # BLD: 0 K/UL (ref 0–0.01)
NRBC BLD-RTO: 0 PER 100 WBC
NT PRO BNP: 1298 PG/ML (ref 0–900)
PH UR STRIP: 7 (ref 5–8)
PLATELET # BLD AUTO: 172 K/UL (ref 135–420)
PMV BLD AUTO: 11.3 FL (ref 9.2–11.8)
POTASSIUM SERPL-SCNC: 3.7 MMOL/L (ref 3.5–5.5)
PROT SERPL-MCNC: 7.8 G/DL (ref 6.4–8.2)
PROT UR STRIP-MCNC: 30 MG/DL
PROTHROMBIN TIME: 28.5 SEC (ref 11.5–15.2)
RBC # BLD AUTO: 4.24 M/UL (ref 4.35–5.65)
RBC #/AREA URNS HPF: NEGATIVE /HPF (ref 0–5)
SODIUM SERPL-SCNC: 138 MMOL/L (ref 136–145)
SP GR UR REFRACTOMETRY: 1.01 (ref 1–1.03)
TROPONIN I SERPL HS-MCNC: 18 NG/L (ref 0–78)
TROPONIN I SERPL HS-MCNC: 31 NG/L (ref 0–78)
UROBILINOGEN UR QL STRIP.AUTO: 1 EU/DL (ref 0.2–1)
WBC # BLD AUTO: 5.1 K/UL (ref 4.6–13.2)
WBC URNS QL MICRO: NORMAL /HPF (ref 0–4)

## 2023-03-04 PROCEDURE — 85025 COMPLETE CBC W/AUTO DIFF WBC: CPT

## 2023-03-04 PROCEDURE — 93005 ELECTROCARDIOGRAM TRACING: CPT | Performed by: EMERGENCY MEDICINE

## 2023-03-04 PROCEDURE — 96374 THER/PROPH/DIAG INJ IV PUSH: CPT

## 2023-03-04 PROCEDURE — 71046 X-RAY EXAM CHEST 2 VIEWS: CPT

## 2023-03-04 PROCEDURE — 83880 ASSAY OF NATRIURETIC PEPTIDE: CPT

## 2023-03-04 PROCEDURE — 80053 COMPREHEN METABOLIC PANEL: CPT

## 2023-03-04 PROCEDURE — 85610 PROTHROMBIN TIME: CPT

## 2023-03-04 PROCEDURE — 84484 ASSAY OF TROPONIN QUANT: CPT

## 2023-03-04 PROCEDURE — 81001 URINALYSIS AUTO W/SCOPE: CPT

## 2023-03-04 PROCEDURE — 99285 EMERGENCY DEPT VISIT HI MDM: CPT

## 2023-03-04 PROCEDURE — 6360000002 HC RX W HCPCS: Performed by: EMERGENCY MEDICINE

## 2023-03-04 RX ORDER — HYDRALAZINE HYDROCHLORIDE 25 MG/1
25 TABLET, FILM COATED ORAL 3 TIMES DAILY
COMMUNITY

## 2023-03-04 RX ORDER — FUROSEMIDE 40 MG/1
40 TABLET ORAL 2 TIMES DAILY
COMMUNITY

## 2023-03-04 RX ORDER — BENAZEPRIL HYDROCHLORIDE 40 MG/1
40 TABLET, FILM COATED ORAL DAILY
COMMUNITY

## 2023-03-04 RX ORDER — CARVEDILOL 25 MG/1
25 TABLET ORAL 2 TIMES DAILY WITH MEALS
COMMUNITY

## 2023-03-04 RX ORDER — FUROSEMIDE 10 MG/ML
40 INJECTION INTRAMUSCULAR; INTRAVENOUS ONCE
Status: COMPLETED | OUTPATIENT
Start: 2023-03-04 | End: 2023-03-04

## 2023-03-04 RX ORDER — WARFARIN SODIUM 7.5 MG/1
7.5 TABLET ORAL
COMMUNITY

## 2023-03-04 RX ADMIN — FUROSEMIDE 40 MG: 10 INJECTION, SOLUTION INTRAMUSCULAR; INTRAVENOUS at 11:16

## 2023-03-04 ASSESSMENT — PAIN SCALES - GENERAL
PAINLEVEL_OUTOF10: 0
PAINLEVEL_OUTOF10: 0

## 2023-03-04 ASSESSMENT — PAIN - FUNCTIONAL ASSESSMENT: PAIN_FUNCTIONAL_ASSESSMENT: 0-10

## 2023-03-04 NOTE — ED NOTES
Patient brought to ED room 5 via wheelchair. I have introduced myself to the patient and discussed anticipated plan of care. Patient resting quietly on stretcher in low and locked position. Call bell in reach. Side rail up x1. Family at bedside.         Ed Wren RN  03/04/23 1532

## 2023-03-04 NOTE — ED TRIAGE NOTES
Swelling to body progessively worse over the last week.    Denies pain, PENDLETON, shortness of breath in general.

## 2023-03-04 NOTE — DISCHARGE INSTRUCTIONS
You may return at any time if you change your mind about wanting to get further testing, or treatment in the emergency department

## 2023-03-04 NOTE — ED PROVIDER NOTES
[id number] [facility] EMERGENCY DEPT   3/4/23     Emergency Physician: Obie Richardson MD  Initial Documentation: 10:05 AM EST     Patient: Dalton Peralta, 69 y.o. male     MRN: [id number]  : 1955    Location: [facility]     Chief Complaint: Swelling       HPI: The patient presents to the emergency department complaining of Lower 70 swelling. The patient has history of atrial fibrillation, and congestive heart failure. He is on Coumadin as an outpatient. He states this is for atrial fibrillation. He has no prior known history of thromboembolic disease. He is also on Lasix 40 mg twice a day. He is followed by cardiology, Dr. Mookie Ramirez. He now complaining of worsening lower extremity edema. He has been vaccinated for coronavirus. He is not really having much shortness of breath. Review of Systems: 14 organ system review of systems is complete and is negative except as addressed in the HPI.     PCP: None None    Social History     Socioeconomic History    Marital status: Single     Spouse name: None    Number of children: None    Years of education: None    Highest education level: None   Tobacco Use    Smoking status: Former     Types: Cigarettes     Quit date: 3/21/1981     Years since quittin.9    Smokeless tobacco: Never   Substance and Sexual Activity    Alcohol use: Yes     Comment: socially    Drug use: No        Family History   Problem Relation Age of Onset    No Known Problems Brother     No Known Problems Sister     Hypertension Neg Hx     Kidney Disease Mother     Cancer Father     Diabetes Mother         Past Medical History:   Diagnosis Date    Atrial fibrillation ([address]     Chronic diastolic heart failure ([address]     Hypertension     Mitral valve disorders(424.0)     Prediabetes         Past Surgical History:  No date: ADENOIDECTOMY  2020: COLONOSCOPY; N/A      Comment:  COLONOSCOPY performed by Alex Bee MD at [address] [place] [address] Ave: [address] Comment:  infection drained from under chin  No date: TONSILLECTOMY     Immunization History   Administered Date(s) Administered    Td vaccine (adult) 06/22/2010       No Known Allergies    No current facility-administered medications for this encounter. Current Outpatient Medications   Medication Sig Dispense Refill    carvedilol (COREG) 25 MG tablet Take 25 mg by mouth 2 times daily (with meals)      warfarin (COUMADIN) 7.5 MG tablet Take 7.5 mg by mouth      hydrALAZINE (APRESOLINE) 25 MG tablet Take 25 mg by mouth 3 times daily      benazepril (LOTENSIN) 40 MG tablet Take 40 mg by mouth daily      furosemide (LASIX) 40 MG tablet Take 40 mg by mouth 2 times daily      NONFORMULARY Nisoldipine ER 17mg daily      metFORMIN (GLUCOPHAGE) 500 MG tablet Take 500 mg by mouth 2 times daily (with meals)      atorvastatin (LIPITOR) 20 MG tablet Take 20 mg by mouth daily      benazepril (LOTENSIN) 40 MG tablet Take 40 mg by mouth daily      carvedilol (COREG) 25 MG tablet Take 25 mg by mouth 2 times daily (with meals)      furosemide (LASIX) 40 MG tablet Take 40 mg by mouth 2 times daily      hydrALAZINE (APRESOLINE) 50 MG tablet Take 50 mg by mouth 3 times daily      metFORMIN (GLUCOPHAGE-XR) 500 MG extended release tablet TAKE 1 TABLET BY MOUTH TWO TIMES A DAY      nisoldipine (SULAR) 17 MG TB24 extended release tablet TAKE 1 TABLET BY MOUTH ONCE DAILY      warfarin (COUMADIN) 5 MG tablet Take 10 mg by mouth daily          Physical Exam:Body mass index is 37.13 kg/m². Vitals:    03/04/23 1244   BP: 132/72   Pulse: 87   Resp: 19   Temp:    SpO2: 98%     General: Well developed, well nourished, alert, appears stated age  [de-identified]: Normocephalic, atraumatic. No scleral icterus. Extraocular movements intact. Normal mucous membranes. Uvula midline. Airway widely patent. Respiratory: No accessory muscle use. No wheeze, No rales, No rhonchi. Normal chest wall excursion.  No subcutaneous air, no rib crepitus  Cardiovascular: Regular rhythm and rate, Normal pulses, Normal perfusion. No edema. Gastrointestinal: Non distended, No masses. No ascites. No organomegaly. No evidence of trauma, nontender  Musculoskeletal: Full range of motion at all other tested joints. No joint effusions. Neurological: Normal strength, Normal sensation. Normal speech. No ataxia. Cranial nerves II-XII normal as tested. Skin: No rash, petechia or purpura. Warm and dry  Psychiatric: No suicidal ideation, No homicidal ideation. No hallucinations. Organized thoughts. Normal mood. normal affect. Heme: No lymphadenopathy. : Deferred    EKG: EKG was completed at 9:34 AM.  Atrial fibrillation, rate of 111, QRS duration 90 ms, Qtc 462 ms, EKG quality was degraded by motion artifact. Imaging:   XR CHEST (2 VW)   Final Result    IMPRESSION:      1. Sequela of minimal congestion.  Follow-up with plain imaging of the chest.        Labs:   Labs Reviewed   BRAIN NATRIURETIC PEPTIDE - Abnormal; Notable for the following components:       Result Value    NT Pro-BNP 1,298 (*)     All other components within normal limits   CBC WITH AUTO DIFFERENTIAL - Abnormal; Notable for the following components:    RBC 4.24 (*)     Hemoglobin 12.8 (*)     RDW 14.9 (*)     Monocytes 15 (*)     Immature Granulocytes 1 (*)     All other components within normal limits   COMPREHENSIVE METABOLIC PANEL - Abnormal; Notable for the following components:    Glucose 130 (*)     Creatinine 1.34 (*)     Bun/Cre Ratio 10 (*)     Est, Glom Filt Rate 57 (*)     Total Bilirubin 1.7 (*)     Albumin 3.3 (*)     Globulin 4.5 (*)     Albumin/Globulin Ratio 0.7 (*)     All other components within normal limits   URINALYSIS - Abnormal; Notable for the following components:    Protein, UA 30 (*)     Ketones, Urine TRACE (*)     All other components within normal limits   PROTIME-INR - Abnormal; Notable for the following components:    Protime 28.5 (*)     INR 2.6 (*)     All other components within normal limits   TROPONIN   URINALYSIS, MICRO   TROPONIN     ED Medication Orders (From admission, onward)      Start Ordered     Status Ordering Provider    03/04/23 1130 03/04/23 1100  furosemide (LASIX) injection 40 mg  ONCE         Last MAR action: Given - by Thiago Acosta on 03/04/23 at [address]           ______________________________________________________________________  Medical Decision Making:  3:04 PM:  I had a lengthy discussion with the patient regarding the results. We discussed the risks, and potential benefits of further testing, observation, and management here in the emergency department, as an outpatient, and under observation. I answered all their questions. Ultimately, the patient decided against further testing or observation. They are low risk for outpatient management. I did  the patient that if they change their mind they can return the emergency department at any time for further testing and treatment. They voiced understanding of these instructions. DIAGNOSIS:   1. Congestive heart failure, unspecified HF chronicity, unspecified heart failure type [facility])        PRESCRIPTIONS:   New Prescriptions    No medications on file       DISPOSITION Decision To Discharge 03/04/2023 03:03:52 PM     FOLLOW-UP: None None    This chart was completed using  an electronic medical record and dictation software.   It may contain unedited transcription errors

## 2023-03-20 ENCOUNTER — TELEPHONE (OUTPATIENT)
Age: 68
End: 2023-03-20

## 2023-03-20 RX ORDER — NISOLDIPINE 17 MG/1
TABLET, FILM COATED, EXTENDED RELEASE ORAL
Qty: 30 TABLET | Refills: 4 | Status: SHIPPED | OUTPATIENT
Start: 2023-03-20

## 2023-03-20 NOTE — TELEPHONE ENCOUNTER
Refill nisoldipine 17 mg one daily # 30 [facility]

## 2023-03-27 ENCOUNTER — HOSPITAL ENCOUNTER (EMERGENCY)
Facility: HOSPITAL | Age: 68
Discharge: HOME | End: 2023-03-27
Attending: EMERGENCY MEDICINE
Payer: MEDICARE

## 2023-03-27 ENCOUNTER — APPOINTMENT (OUTPATIENT)
Facility: HOSPITAL | Age: 68
End: 2023-03-27
Payer: MEDICARE

## 2023-03-27 ENCOUNTER — OFFICE VISIT (OUTPATIENT)
Age: 68
End: 2023-03-27

## 2023-03-27 VITALS
RESPIRATION RATE: 24 BRPM | HEIGHT: 76 IN | HEART RATE: 102 BPM | WEIGHT: 300 LBS | TEMPERATURE: 97.1 F | BODY MASS INDEX: 36.53 KG/M2 | OXYGEN SATURATION: 95 %

## 2023-03-27 VITALS
WEIGHT: 305 LBS | TEMPERATURE: 97.9 F | HEART RATE: 96 BPM | OXYGEN SATURATION: 98 % | RESPIRATION RATE: 18 BRPM | DIASTOLIC BLOOD PRESSURE: 89 MMHG | SYSTOLIC BLOOD PRESSURE: 158 MMHG | BODY MASS INDEX: 37.14 KG/M2 | HEIGHT: 76 IN

## 2023-03-27 DIAGNOSIS — M25.472 LEFT ANKLE SWELLING: ICD-10-CM

## 2023-03-27 DIAGNOSIS — S82.892A CLOSED FRACTURE OF LEFT ANKLE, INITIAL ENCOUNTER: Primary | ICD-10-CM

## 2023-03-27 DIAGNOSIS — S82.455A CLOSED NONDISPLACED COMMINUTED FRACTURE OF SHAFT OF LEFT FIBULA, INITIAL ENCOUNTER: Primary | ICD-10-CM

## 2023-03-27 DIAGNOSIS — S09.90XA INJURY OF HEAD, INITIAL ENCOUNTER: ICD-10-CM

## 2023-03-27 LAB
APTT PPP: 48.2 SEC (ref 23–36.4)
GLUCOSE BLD STRIP.AUTO-MCNC: 130 MG/DL (ref 70–110)
INR PPP: 2.6 (ref 0.8–1.2)
PROTHROMBIN TIME: 28.6 SEC (ref 11.5–15.2)

## 2023-03-27 PROCEDURE — 99284 EMERGENCY DEPT VISIT MOD MDM: CPT

## 2023-03-27 PROCEDURE — 82962 GLUCOSE BLOOD TEST: CPT

## 2023-03-27 PROCEDURE — 73610 X-RAY EXAM OF ANKLE: CPT

## 2023-03-27 PROCEDURE — 29515 APPLICATION SHORT LEG SPLINT: CPT

## 2023-03-27 PROCEDURE — 70450 CT HEAD/BRAIN W/O DYE: CPT

## 2023-03-27 PROCEDURE — 96374 THER/PROPH/DIAG INJ IV PUSH: CPT

## 2023-03-27 PROCEDURE — 6360000002 HC RX W HCPCS: Performed by: EMERGENCY MEDICINE

## 2023-03-27 PROCEDURE — 85610 PROTHROMBIN TIME: CPT

## 2023-03-27 PROCEDURE — 85730 THROMBOPLASTIN TIME PARTIAL: CPT

## 2023-03-27 RX ORDER — DIGOXIN 125 MCG
0.12 TABLET ORAL DAILY
COMMUNITY

## 2023-03-27 RX ORDER — MORPHINE SULFATE 4 MG/ML
4 INJECTION, SOLUTION INTRAMUSCULAR; INTRAVENOUS
Status: COMPLETED | OUTPATIENT
Start: 2023-03-27 | End: 2023-03-27

## 2023-03-27 RX ADMIN — MORPHINE SULFATE 4 MG: 4 INJECTION, SOLUTION INTRAMUSCULAR; INTRAVENOUS at 08:02

## 2023-03-27 ASSESSMENT — PAIN DESCRIPTION - LOCATION: LOCATION: ANKLE

## 2023-03-27 ASSESSMENT — PAIN DESCRIPTION - ORIENTATION: ORIENTATION: LEFT

## 2023-03-27 ASSESSMENT — PAIN SCALES - GENERAL: PAINLEVEL_OUTOF10: 8

## 2023-03-27 ASSESSMENT — PAIN - FUNCTIONAL ASSESSMENT: PAIN_FUNCTIONAL_ASSESSMENT: 0-10

## 2023-03-27 NOTE — ED NOTES
Dr. Tripp Barton office called and advised that the pt was on his way to their office and will need assistance getting out of his car     Oxana José RN  03/27/23 1114
I have reviewed discharge instructions with the spouse. The spouse verbalized understanding. Patient armband removed and shredded. Pt assisted to wheelchair and then to car.       Tamy Hauser RN  03/27/23 5598
Intimate Partner Violence: Not on file   Depression: Not on file   Housing Stability: Not on file       Review of Systems     Negative except as listed above in HPI. Physical Exam     Vitals:    03/27/23 0655   BP: (!) 158/89   Pulse: 96   Resp: 18   Temp: 97.9 °F (36.6 °C)   TempSrc: Oral   SpO2: 98%   Weight: (!) 305 lb (138.3 kg)   Height: 6' 4\" (1.93 m)       Physical Exam  Vitals and nursing note reviewed. Constitutional:       Appearance: He is obese. HENT:      Head: Normocephalic and atraumatic. Right Ear: External ear normal.      Left Ear: External ear normal.      Nose: Nose normal.      Mouth/Throat:      Mouth: Mucous membranes are moist.      Pharynx: Oropharynx is clear. Eyes:      Extraocular Movements: Extraocular movements intact. Conjunctiva/sclera: Conjunctivae normal.      Pupils: Pupils are equal, round, and reactive to light. Cardiovascular:      Rate and Rhythm: Normal rate and regular rhythm. Pulses: Normal pulses. Heart sounds: Normal heart sounds. Pulmonary:      Effort: Pulmonary effort is normal.      Breath sounds: Normal breath sounds. Abdominal:      General: Abdomen is flat. Bowel sounds are normal.      Palpations: Abdomen is soft. Musculoskeletal:      Cervical back: Normal range of motion and neck supple. Right lower leg: Edema present. Left lower leg: Edema present. Right ankle: Normal.      Left ankle: Tenderness present over the lateral malleolus and medial malleolus. Decreased range of motion. Left Achilles Tendon: Normal.      Comments: Bilateral lower extremity woody edema   Skin:     General: Skin is warm and dry. Capillary Refill: Capillary refill takes less than 2 seconds. Comments: Small skin tear approximately 3 cm long on the anterior shin. Due to the edema, there is some yellow fluid leaking from the skin tear. Neurological:      General: No focal deficit present.       Mental Status: He is alert

## 2023-03-27 NOTE — ED TRIAGE NOTES
A&O male tripped and fell Sunday around [address]. Patient reports hitting head on floor and injuring left ankle. Reports continued pain in left ankle. Denies LOC.

## 2023-03-27 NOTE — PROGRESS NOTES
Patient: Seema Peterson Sr. MRN: [id number]       SSN: [id number]  YOB: 1955        AGE: 69 y.o. SEX: male      PCP: None None  03/27/23    Chief Complaint   Patient presents with    Ankle Pain     Left ankle        HISTORY:  Seema Peterson Sr. is a 69 y.o. male who is seen for left foot fracture. He says that he tripped and fell over a blanket as he was on his walking down a hallway at home 2 days ago. He felt immediate pain but did not immediately go to ER since he was hoping it was a sprain. He has developed significant swelling. He denies any numbness or tingling in his toes. He is concerned about surgery since he has a relative who required amputation for complications after an ankle injury. Occupation, etc: [employer] Sch[employer] explains that he is hypertensive. He is currently on Coumadin for his Afib. He is a Metformin controlled diabetic. He says that he retired in 2015. 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 He has not been very active since his residential. He says that he gained about 15lbs since he retired. He lives in [place] with his wife. He has 1 daughter, 1 son, 6 grandchildren and 5 great grandchildren. Wt Readings from Last 3 Encounters:   03/27/23 300 lb (136.1 kg)   03/27/23 (!) 305 lb (138.3 kg)   03/04/23 (!) 305 lb (138.3 kg)      Body mass index is 36.52 kg/m². Patient Active Problem List   Diagnosis    Pulmonary HTN ([address]    Chronic atrial fibrillation ([address]    Type 2 diabetes mellitus ([address]    Atrial fibrillation (HCC)    Essential hypertension, benign    Anticoagulant long-term use    Benign hypertensive heart disease without heart failure    Obesity    Mitral valve disorder    Chronic diastolic heart failure (HCC)    Severe obesity (BMI 35.0-39. 9) with comorbidity ([address]       Social History     Tobacco Use    Smoking status: Former     Types: Cigarettes     Quit date: 3/21/1981     Years since quitting:

## 2023-03-27 NOTE — LETTER
[facility] EMERGENCY DEPT  [facility] [address]  Phone: [phone]               March 27, 2023    Patient: Annalee Nageotte . YOB: 1955   Date of Visit: 3/27/2023       To Whom It May Concern:    Jesse Rodrigues was seen and treated in our emergency department on 3/27/2023. His wife, Berta Mesa accompanied him to the ED today.       Sincerely,               Signature:__________________________________

## 2023-03-28 RX ORDER — ATORVASTATIN CALCIUM 20 MG/1
TABLET, FILM COATED ORAL
Qty: 90 TABLET | Refills: 2 | OUTPATIENT
Start: 2023-03-28

## 2023-03-28 RX ORDER — CARVEDILOL 12.5 MG/1
TABLET ORAL
Qty: 180 TABLET | Refills: 2 | OUTPATIENT
Start: 2023-03-28

## 2023-03-29 ENCOUNTER — TELEPHONE (OUTPATIENT)
Age: 68
End: 2023-03-29

## 2023-03-29 DIAGNOSIS — S82.455A CLOSED NONDISPLACED COMMINUTED FRACTURE OF SHAFT OF LEFT FIBULA, INITIAL ENCOUNTER: Primary | ICD-10-CM

## 2023-03-29 NOTE — TELEPHONE ENCOUNTER
Patient wants rolling knee scooter--will address with Dr Marjan Gaming per Dr Alondra Caceres order written--faxed per pt to [facility]

## 2023-03-29 NOTE — TELEPHONE ENCOUNTER
Seble Hinesers called in and wanted to let you know that the scooter that was order for the patient, the two front wheels do not move, and it will not guide him. Patient would like to know if you could provider a scooter that he can put his left knee on so that he can have better use with it because he can not put pressure/use his left ankle/foot.       Burton Harris is asking for a call back when the order is ready and states to please make sure Dr. Lindsey Johnson sign the order because when she went to pick it up yesterday the order was not sign       [phone]

## 2023-03-30 ENCOUNTER — TELEPHONE (OUTPATIENT)
Age: 68
End: 2023-03-30

## 2023-04-03 ENCOUNTER — CARE COORDINATION (OUTPATIENT)
Facility: CLINIC | Age: 68
End: 2023-04-03

## 2023-04-04 ENCOUNTER — CARE COORDINATION (OUTPATIENT)
Facility: CLINIC | Age: 68
End: 2023-04-04

## 2023-04-17 ENCOUNTER — CARE COORDINATION (OUTPATIENT)
Facility: CLINIC | Age: 68
End: 2023-04-17

## 2023-04-21 RX ORDER — FUROSEMIDE 40 MG/1
40 TABLET ORAL 2 TIMES DAILY
Qty: 180 TABLET | Refills: 3 | Status: SHIPPED | OUTPATIENT
Start: 2023-04-21

## 2023-05-01 ENCOUNTER — OFFICE VISIT (OUTPATIENT)
Age: 68
End: 2023-05-01
Payer: MEDICARE

## 2023-05-01 VITALS — BODY MASS INDEX: 37.14 KG/M2 | WEIGHT: 305 LBS | HEIGHT: 76 IN | TEMPERATURE: 98 F

## 2023-05-01 DIAGNOSIS — S82.455A CLOSED NONDISPLACED COMMINUTED FRACTURE OF SHAFT OF LEFT FIBULA, INITIAL ENCOUNTER: Primary | ICD-10-CM

## 2023-05-01 DIAGNOSIS — M25.472 LEFT ANKLE SWELLING: ICD-10-CM

## 2023-05-01 PROCEDURE — 73610 X-RAY EXAM OF ANKLE: CPT | Performed by: PHYSICIAN ASSISTANT

## 2023-05-01 PROCEDURE — 99024 POSTOP FOLLOW-UP VISIT: CPT | Performed by: PHYSICIAN ASSISTANT

## 2023-05-01 NOTE — PROGRESS NOTES
Patient: Shlomo Uribe Sr. MRN: [id number]       SSN: [id number]  YOB: 1955        AGE: 69 y.o. SEX: male      PCP: Claudetta Bacon, MD  05/01/23 5/1/2023: Patient returns the office for cast check, removal, and marcelino-ray. He is nonweightbearing of his left lower extremity. He is using crutches for ambulation assistance. He has no pain reported of his left ankle. Chief Complaint   Patient presents with    Ankle Pain     Left         HISTORY:  Shlomo Uribe Sr. is a 69 y.o. male who is seen for left foot fracture. He says that he tripped and fell over a blanket as he was on his walking down a hallway at home 2 days ago. He felt immediate pain but did not immediately go to ER since he was hoping it was a sprain. He has developed significant swelling. He denies any numbness or tingling in his toes. He is concerned about surgery since he has a relative who required amputation for complications after an ankle injury. Occupation, etc: [employer] Arana explains that he is hypertensive. He is currently on Coumadin for his Afib. He is a Metformin controlled diabetic. He says that he retired in 2015. 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 He says that he gained about 15lbs since he retired. He lives in [place] with his wife. He has 1 daughter, 1 son, 6 grandchildren and 5 great grandchildren. Wt Readings from Last 3 Encounters:   05/01/23 (!) 305 lb (138.3 kg)   03/27/23 300 lb (136.1 kg)   03/27/23 (!) 305 lb (138.3 kg)      Body mass index is 37.13 kg/m².     Patient Active Problem List   Diagnosis    Pulmonary HTN ([address]    Chronic atrial fibrillation ([address]    Type 2 diabetes mellitus ([address]    Atrial fibrillation ([address]    Essential hypertension, benign    Anticoagulant long-term use    Benign hypertensive heart disease without heart failure    Obesity    Mitral valve disorder

## 2023-05-25 RX ORDER — WARFARIN SODIUM 5 MG/1
10 TABLET ORAL DAILY
Qty: 135 TABLET | Refills: 2 | Status: SHIPPED | OUTPATIENT
Start: 2023-05-25

## 2023-05-30 RX ORDER — WARFARIN SODIUM 5 MG/1
TABLET ORAL
Qty: 90 TABLET | Refills: 4 | OUTPATIENT
Start: 2023-05-30

## 2023-06-19 ENCOUNTER — TELEPHONE (OUTPATIENT)
Age: 68
End: 2023-06-19

## 2023-06-20 ENCOUNTER — APPOINTMENT (OUTPATIENT)
Age: 68
End: 2023-06-20
Payer: MEDICARE

## 2023-06-20 ENCOUNTER — OFFICE VISIT (OUTPATIENT)
Age: 68
End: 2023-06-20
Payer: MEDICARE

## 2023-06-20 VITALS
WEIGHT: 299 LBS | SYSTOLIC BLOOD PRESSURE: 139 MMHG | HEART RATE: 83 BPM | OXYGEN SATURATION: 93 % | RESPIRATION RATE: 16 BRPM | DIASTOLIC BLOOD PRESSURE: 81 MMHG | BODY MASS INDEX: 37.18 KG/M2 | HEIGHT: 75 IN | TEMPERATURE: 98.6 F

## 2023-06-20 DIAGNOSIS — Z87.891 EX-SMOKER: ICD-10-CM

## 2023-06-20 DIAGNOSIS — Z13.0 SCREENING FOR ENDOCRINE, METABOLIC AND IMMUNITY DISORDER: Primary | ICD-10-CM

## 2023-06-20 DIAGNOSIS — Z00.00 HEALTHCARE MAINTENANCE: ICD-10-CM

## 2023-06-20 DIAGNOSIS — I50.32 CHRONIC DIASTOLIC HEART FAILURE (HCC): ICD-10-CM

## 2023-06-20 DIAGNOSIS — Z13.29 SCREENING FOR ENDOCRINE, METABOLIC AND IMMUNITY DISORDER: Primary | ICD-10-CM

## 2023-06-20 DIAGNOSIS — I10 ESSENTIAL HYPERTENSION, BENIGN: ICD-10-CM

## 2023-06-20 DIAGNOSIS — Z13.228 SCREENING FOR ENDOCRINE, METABOLIC AND IMMUNITY DISORDER: Primary | ICD-10-CM

## 2023-06-20 DIAGNOSIS — Z12.11 SCREEN FOR COLON CANCER: ICD-10-CM

## 2023-06-20 DIAGNOSIS — R73.03 PREDIABETES: ICD-10-CM

## 2023-06-20 DIAGNOSIS — I48.20 CHRONIC ATRIAL FIBRILLATION, UNSPECIFIED (HCC): ICD-10-CM

## 2023-06-20 DIAGNOSIS — Z12.5 SCREENING FOR PROSTATE CANCER: ICD-10-CM

## 2023-06-20 PROBLEM — Z91.81 AT HIGH RISK FOR FALLS: Status: ACTIVE | Noted: 2023-06-20

## 2023-06-20 PROCEDURE — 3075F SYST BP GE 130 - 139MM HG: CPT | Performed by: INTERNAL MEDICINE

## 2023-06-20 PROCEDURE — 3017F COLORECTAL CA SCREEN DOC REV: CPT | Performed by: INTERNAL MEDICINE

## 2023-06-20 PROCEDURE — 1123F ACP DISCUSS/DSCN MKR DOCD: CPT | Performed by: INTERNAL MEDICINE

## 2023-06-20 PROCEDURE — G8417 CALC BMI ABV UP PARAM F/U: HCPCS | Performed by: INTERNAL MEDICINE

## 2023-06-20 PROCEDURE — G8427 DOCREV CUR MEDS BY ELIG CLIN: HCPCS | Performed by: INTERNAL MEDICINE

## 2023-06-20 PROCEDURE — 3079F DIAST BP 80-89 MM HG: CPT | Performed by: INTERNAL MEDICINE

## 2023-06-20 PROCEDURE — 1036F TOBACCO NON-USER: CPT | Performed by: INTERNAL MEDICINE

## 2023-06-20 PROCEDURE — 99203 OFFICE O/P NEW LOW 30 MIN: CPT | Performed by: INTERNAL MEDICINE

## 2023-06-20 PROCEDURE — 99211 OFF/OP EST MAY X REQ PHY/QHP: CPT

## 2023-06-20 RX ORDER — CARVEDILOL 12.5 MG/1
TABLET ORAL
Qty: 180 TABLET | Refills: 2 | Status: SHIPPED | OUTPATIENT
Start: 2023-06-20

## 2023-06-20 RX ORDER — BENAZEPRIL HYDROCHLORIDE 40 MG/1
TABLET, FILM COATED ORAL
Qty: 90 TABLET | Refills: 2 | Status: SHIPPED | OUTPATIENT
Start: 2023-06-20

## 2023-06-20 SDOH — ECONOMIC STABILITY: FOOD INSECURITY: WITHIN THE PAST 12 MONTHS, THE FOOD YOU BOUGHT JUST DIDN'T LAST AND YOU DIDN'T HAVE MONEY TO GET MORE.: NEVER TRUE

## 2023-06-20 SDOH — ECONOMIC STABILITY: FOOD INSECURITY: WITHIN THE PAST 12 MONTHS, YOU WORRIED THAT YOUR FOOD WOULD RUN OUT BEFORE YOU GOT MONEY TO BUY MORE.: NEVER TRUE

## 2023-06-20 SDOH — ECONOMIC STABILITY: HOUSING INSECURITY
IN THE LAST 12 MONTHS, WAS THERE A TIME WHEN YOU DID NOT HAVE A STEADY PLACE TO SLEEP OR SLEPT IN A SHELTER (INCLUDING NOW)?: NO

## 2023-06-20 SDOH — ECONOMIC STABILITY: INCOME INSECURITY: HOW HARD IS IT FOR YOU TO PAY FOR THE VERY BASICS LIKE FOOD, HOUSING, MEDICAL CARE, AND HEATING?: NOT HARD AT ALL

## 2023-06-20 ASSESSMENT — PATIENT HEALTH QUESTIONNAIRE - PHQ9
SUM OF ALL RESPONSES TO PHQ9 QUESTIONS 1 & 2: 0
SUM OF ALL RESPONSES TO PHQ QUESTIONS 1-9: 0
2. FEELING DOWN, DEPRESSED OR HOPELESS: 0
SUM OF ALL RESPONSES TO PHQ QUESTIONS 1-9: 0
SUM OF ALL RESPONSES TO PHQ QUESTIONS 1-9: 0
1. LITTLE INTEREST OR PLEASURE IN DOING THINGS: 0
SUM OF ALL RESPONSES TO PHQ QUESTIONS 1-9: 0

## 2023-06-20 ASSESSMENT — ENCOUNTER SYMPTOMS
RESPIRATORY NEGATIVE: 1
EYES NEGATIVE: 1
ALLERGIC/IMMUNOLOGIC NEGATIVE: 1
GASTROINTESTINAL NEGATIVE: 1

## 2023-06-20 NOTE — ASSESSMENT & PLAN NOTE
Blood pressure under control on nisoldipine 17 mg once a day, carvedilol 12.5 mg twice a day, benazepril 40 mg every day, hydralazine 50 mg 3 times a day

## 2023-06-20 NOTE — PROGRESS NOTES
Chief Complaint   Patient presents with    New Patient       Gilford Dalton presents today for   Chief Complaint   Patient presents with    New Patient       Patient will need to have pre-op form filled out for his up-coming eye surgery. 1. \"Have you been to the ER, urgent care clinic since your last visit? Hospitalized since your last visit? \" no    2. \"Have you seen or consulted any other health care providers outside of the [address] [place] [address] since your last visit? \" no     3. For patients aged 39-70: Has the patient had a colonoscopy / FIT/ Cologuard? No      If the patient is female:    4. For patients aged 41-77: Has the patient had a mammogram within the past 2 years? NA - based on age or sex      11. For patients aged 21-65: Has the patient had a pap smear?  NA - based on age or sex
Occupational History    Not on file   Tobacco Use    Smoking status: Former     Packs/day: 0.50     Years: 3.00     Pack years: 1.50     Types: Cigarettes     Quit date: 3/21/1981     Years since quittin.2    Smokeless tobacco: Never   Vaping Use    Vaping Use: Never used   Substance and Sexual Activity    Alcohol use: Yes     Comment: socially    Drug use: No    Sexual activity: Not on file   Other Topics Concern    Not on file   Social History Narrative    Not on file     Social Determinants of Health     Financial Resource Strain: Low Risk     Difficulty of Paying Living Expenses: Not hard at all   Food Insecurity: No Food Insecurity    Worried About [address] in the Last Year: Never true    [address] in the Last Year: Never true   Transportation Needs: Unknown    Lack of Transportation (Medical): Not on file    Lack of Transportation (Non-Medical): No   Physical Activity: Not on file   Stress: Not on file   Social Connections: Not on file   Intimate Partner Violence: Not on file   Housing Stability: Unknown    Unable to Pay for Housing in the Last Year: Not on file    Number of Places Lived in the Last Year: Not on file    Unstable Housing in the Last Year: No       No Known Allergies    Current Outpatient Medications on File Prior to Visit   Medication Sig Dispense Refill    nisoldipine (SULAR) 17 MG TB24 extended release tablet TAKE 1 TABLET BY MOUTH ONCE DAILY 30 tablet 4    warfarin (COUMADIN) 7.5 MG tablet Take 1 tablet by mouth      benazepril (LOTENSIN) 40 MG tablet Take 1 tablet by mouth daily      furosemide (LASIX) 40 MG tablet Take 1 tablet by mouth 2 times daily      hydrALAZINE (APRESOLINE) 50 MG tablet Take 1 tablet by mouth 3 times daily       No current facility-administered medications on file prior to visit. Review of Systems   Constitutional: Negative. Negative for activity change. HENT: Negative. Eyes: Negative. Respiratory: Negative.

## 2023-06-21 LAB
ALBUMIN SERPL-MCNC: 4 G/DL (ref 3.8–4.8)
ALBUMIN/GLOB SERPL: 0.9 {RATIO} (ref 1.2–2.2)
ALP SERPL-CCNC: 61 IU/L (ref 44–121)
ALT SERPL-CCNC: 18 IU/L (ref 0–44)
AST SERPL-CCNC: 24 IU/L (ref 0–40)
BASOPHILS # BLD AUTO: 0.1 X10E3/UL (ref 0–0.2)
BASOPHILS NFR BLD AUTO: 1 %
BILIRUB SERPL-MCNC: 1.3 MG/DL (ref 0–1.2)
BUN SERPL-MCNC: 12 MG/DL (ref 8–27)
BUN/CREAT SERPL: 10 (ref 10–24)
CALCIUM SERPL-MCNC: 9.8 MG/DL (ref 8.6–10.2)
CHLORIDE SERPL-SCNC: 101 MMOL/L (ref 96–106)
CHOLEST SERPL-MCNC: 146 MG/DL (ref 100–199)
CO2 SERPL-SCNC: 22 MMOL/L (ref 20–29)
CREAT SERPL-MCNC: 1.24 MG/DL (ref 0.76–1.27)
EGFRCR SERPLBLD CKD-EPI 2021: 63 ML/MIN/1.73
EOSINOPHIL # BLD AUTO: 0.2 X10E3/UL (ref 0–0.4)
EOSINOPHIL NFR BLD AUTO: 3 %
ERYTHROCYTE [DISTWIDTH] IN BLOOD BY AUTOMATED COUNT: 14.3 % (ref 11.6–15.4)
GLOBULIN SER CALC-MCNC: 4.5 G/DL (ref 1.5–4.5)
GLUCOSE SERPL-MCNC: 124 MG/DL (ref 70–99)
HBA1C MFR BLD: 6.1 % (ref 4.8–5.6)
HCT VFR BLD AUTO: 40.9 % (ref 37.5–51)
HDLC SERPL-MCNC: 43 MG/DL
HGB BLD-MCNC: 13.4 G/DL (ref 13–17.7)
IMM GRANULOCYTES # BLD AUTO: 0 X10E3/UL (ref 0–0.1)
IMM GRANULOCYTES NFR BLD AUTO: 0 %
LDLC SERPL CALC-MCNC: 91 MG/DL (ref 0–99)
LYMPHOCYTES # BLD AUTO: 1.4 X10E3/UL (ref 0.7–3.1)
LYMPHOCYTES NFR BLD AUTO: 28 %
MCH RBC QN AUTO: 30.5 PG (ref 26.6–33)
MCHC RBC AUTO-ENTMCNC: 32.8 G/DL (ref 31.5–35.7)
MCV RBC AUTO: 93 FL (ref 79–97)
MONOCYTES # BLD AUTO: 0.8 X10E3/UL (ref 0.1–0.9)
MONOCYTES NFR BLD AUTO: 15 %
NEUTROPHILS # BLD AUTO: 2.7 X10E3/UL (ref 1.4–7)
NEUTROPHILS NFR BLD AUTO: 53 %
PLATELET # BLD AUTO: 227 X10E3/UL (ref 150–450)
POTASSIUM SERPL-SCNC: 3.8 MMOL/L (ref 3.5–5.2)
PROT SERPL-MCNC: 8.5 G/DL (ref 6–8.5)
PSA SERPL-MCNC: 1.5 NG/ML (ref 0–4)
RBC # BLD AUTO: 4.39 X10E6/UL (ref 4.14–5.8)
SODIUM SERPL-SCNC: 139 MMOL/L (ref 134–144)
SPECIMEN STATUS REPORT: NORMAL
TRIGL SERPL-MCNC: 57 MG/DL (ref 0–149)
VLDLC SERPL CALC-MCNC: 12 MG/DL (ref 5–40)
WBC # BLD AUTO: 5.1 X10E3/UL (ref 3.4–10.8)

## 2023-06-27 ENCOUNTER — TELEPHONE (OUTPATIENT)
Age: 68
End: 2023-06-27

## 2023-06-30 ENCOUNTER — OFFICE VISIT (OUTPATIENT)
Age: 68
End: 2023-06-30
Payer: MEDICARE

## 2023-06-30 ENCOUNTER — TELEPHONE (OUTPATIENT)
Age: 68
End: 2023-06-30

## 2023-06-30 VITALS
DIASTOLIC BLOOD PRESSURE: 89 MMHG | OXYGEN SATURATION: 95 % | SYSTOLIC BLOOD PRESSURE: 163 MMHG | HEIGHT: 76 IN | WEIGHT: 298 LBS | RESPIRATION RATE: 16 BRPM | BODY MASS INDEX: 36.29 KG/M2 | HEART RATE: 69 BPM | TEMPERATURE: 98.3 F

## 2023-06-30 DIAGNOSIS — I48.20 CHRONIC ATRIAL FIBRILLATION, UNSPECIFIED (HCC): Primary | ICD-10-CM

## 2023-06-30 DIAGNOSIS — R73.03 PREDIABETES: ICD-10-CM

## 2023-06-30 DIAGNOSIS — Z01.818 PREOP EXAMINATION: ICD-10-CM

## 2023-06-30 DIAGNOSIS — I10 ESSENTIAL HYPERTENSION, BENIGN: ICD-10-CM

## 2023-06-30 PROCEDURE — 1123F ACP DISCUSS/DSCN MKR DOCD: CPT | Performed by: INTERNAL MEDICINE

## 2023-06-30 PROCEDURE — 3017F COLORECTAL CA SCREEN DOC REV: CPT | Performed by: INTERNAL MEDICINE

## 2023-06-30 PROCEDURE — 3074F SYST BP LT 130 MM HG: CPT | Performed by: INTERNAL MEDICINE

## 2023-06-30 PROCEDURE — 99213 OFFICE O/P EST LOW 20 MIN: CPT | Performed by: INTERNAL MEDICINE

## 2023-06-30 PROCEDURE — G8417 CALC BMI ABV UP PARAM F/U: HCPCS | Performed by: INTERNAL MEDICINE

## 2023-06-30 PROCEDURE — 1036F TOBACCO NON-USER: CPT | Performed by: INTERNAL MEDICINE

## 2023-06-30 PROCEDURE — 99211 OFF/OP EST MAY X REQ PHY/QHP: CPT

## 2023-06-30 PROCEDURE — G8427 DOCREV CUR MEDS BY ELIG CLIN: HCPCS | Performed by: INTERNAL MEDICINE

## 2023-06-30 PROCEDURE — 3078F DIAST BP <80 MM HG: CPT | Performed by: INTERNAL MEDICINE

## 2023-06-30 RX ORDER — CARVEDILOL 25 MG/1
25 TABLET ORAL 2 TIMES DAILY
COMMUNITY

## 2023-06-30 SDOH — ECONOMIC STABILITY: FOOD INSECURITY: WITHIN THE PAST 12 MONTHS, YOU WORRIED THAT YOUR FOOD WOULD RUN OUT BEFORE YOU GOT MONEY TO BUY MORE.: NEVER TRUE

## 2023-06-30 SDOH — ECONOMIC STABILITY: INCOME INSECURITY: HOW HARD IS IT FOR YOU TO PAY FOR THE VERY BASICS LIKE FOOD, HOUSING, MEDICAL CARE, AND HEATING?: NOT HARD AT ALL

## 2023-06-30 SDOH — ECONOMIC STABILITY: FOOD INSECURITY: WITHIN THE PAST 12 MONTHS, THE FOOD YOU BOUGHT JUST DIDN'T LAST AND YOU DIDN'T HAVE MONEY TO GET MORE.: NEVER TRUE

## 2023-06-30 ASSESSMENT — PATIENT HEALTH QUESTIONNAIRE - PHQ9
SUM OF ALL RESPONSES TO PHQ QUESTIONS 1-9: 0
1. LITTLE INTEREST OR PLEASURE IN DOING THINGS: 0
SUM OF ALL RESPONSES TO PHQ QUESTIONS 1-9: 0
SUM OF ALL RESPONSES TO PHQ QUESTIONS 1-9: 0
SUM OF ALL RESPONSES TO PHQ9 QUESTIONS 1 & 2: 0
2. FEELING DOWN, DEPRESSED OR HOPELESS: 0
SUM OF ALL RESPONSES TO PHQ QUESTIONS 1-9: 0

## 2023-07-05 ENCOUNTER — TELEPHONE (OUTPATIENT)
Age: 68
End: 2023-07-05

## 2023-07-06 ENCOUNTER — OFFICE VISIT (OUTPATIENT)
Age: 68
End: 2023-07-06

## 2023-07-06 ENCOUNTER — ANTI-COAG VISIT (OUTPATIENT)
Age: 68
End: 2023-07-06

## 2023-07-06 VITALS
BODY MASS INDEX: 35.31 KG/M2 | WEIGHT: 290 LBS | HEART RATE: 82 BPM | SYSTOLIC BLOOD PRESSURE: 152 MMHG | DIASTOLIC BLOOD PRESSURE: 70 MMHG | OXYGEN SATURATION: 95 % | HEIGHT: 76 IN

## 2023-07-06 DIAGNOSIS — I48.20 CHRONIC ATRIAL FIBRILLATION, UNSPECIFIED (HCC): Primary | ICD-10-CM

## 2023-07-06 DIAGNOSIS — E66.01 MORBID (SEVERE) OBESITY DUE TO EXCESS CALORIES (HCC): ICD-10-CM

## 2023-07-06 DIAGNOSIS — E78.2 MIXED HYPERLIPIDEMIA: ICD-10-CM

## 2023-07-06 DIAGNOSIS — I10 ESSENTIAL (PRIMARY) HYPERTENSION: ICD-10-CM

## 2023-07-06 DIAGNOSIS — Z79.01 LONG TERM (CURRENT) USE OF ANTICOAGULANTS: ICD-10-CM

## 2023-07-06 DIAGNOSIS — I50.32 CHRONIC DIASTOLIC (CONGESTIVE) HEART FAILURE (HCC): ICD-10-CM

## 2023-07-06 LAB
POC INR: 1.3
PROTHROMBIN TIME, POC: ABNORMAL

## 2023-07-06 PROCEDURE — 85610 PROTHROMBIN TIME: CPT | Performed by: NURSE PRACTITIONER

## 2023-07-06 RX ORDER — WARFARIN SODIUM 5 MG/1
7.5 TABLET ORAL DAILY
Qty: 30 TABLET | Refills: 5 | Status: SHIPPED | OUTPATIENT
Start: 2023-07-06

## 2023-07-06 RX ORDER — FUROSEMIDE 40 MG/1
40 TABLET ORAL 2 TIMES DAILY
Qty: 180 TABLET | Refills: 3 | Status: SHIPPED | OUTPATIENT
Start: 2023-07-06

## 2023-07-06 RX ORDER — WARFARIN SODIUM 5 MG/1
5 TABLET ORAL
COMMUNITY
End: 2023-07-06 | Stop reason: SDUPTHER

## 2023-07-06 NOTE — PROGRESS NOTES
1. Have you been to the ER, urgent care clinic since your last visit? Hospitalized since your last visit? yes [facility]    2. Have you seen or consulted any other health care providers outside of the [address] [place] [address] since your last visit? Include any pap smears or colon screening. No     3. Do you need any refills today?    yes
include no chest pain, no abdominal pain, no headaches and no shortness of breath. Nothing aggravates the symptoms. Review of Systems   Constitutional:  Negative for chills and fever. HENT:  Negative for nosebleeds. Eyes:  Negative for blurred vision and double vision. Respiratory:  Negative for cough, hemoptysis, sputum production, shortness of breath and wheezing. Cardiovascular: +leg swelling  Negative for chest pain, palpitations, orthopnea, claudication, and PND. Gastrointestinal:  Negative for abdominal pain, heartburn, nausea and vomiting. Musculoskeletal:  Negative for myalgias. Skin:  Negative for rash. Neurological:  Negative for dizziness, weakness and headaches. Endo/Heme/Allergies:  Does not bruise/bleed easily. Family History   Problem Relation Age of Onset    No Known Problems Brother     No Known Problems Sister     Hypertension Neg Hx     Kidney Disease Mother     Cancer Father     Diabetes Mother        Past Medical History:   Diagnosis Date    Atrial fibrillation ([address])     Chronic diastolic heart failure (HCC)     Hypertension     Mitral valve disorders(424.0)     Prediabetes        Past Surgical History:   Procedure Laterality Date    ADENOIDECTOMY      COLONOSCOPY N/A 2020    COLONOSCOPY performed by Eleanor Chi MD at [address] [place] [address] Pkwy    infection drained from under chin    TONSILLECTOMY         Social History     Tobacco Use    Smoking status: Former     Packs/day: 0.50     Years: 3.00     Pack years: 1.50     Types: Cigarettes     Quit date: 3/21/1981     Years since quittin.3    Smokeless tobacco: Never   Substance Use Topics    Alcohol use: Yes     Comment: socially       No Known Allergies    Prior to Admission medications    Medication Sig Start Date End Date Taking?  Authorizing Provider   warfarin (COUMADIN) 5 MG tablet Take 1.5 tablets by mouth daily 23  Yes SONI Goodman - NP   furosemide (LASIX) 40 MG

## 2023-07-12 RX ORDER — FUROSEMIDE 40 MG/1
40 TABLET ORAL 2 TIMES DAILY
Qty: 180 TABLET | Refills: 1 | Status: ON HOLD | OUTPATIENT
Start: 2023-07-12 | End: 2023-08-27 | Stop reason: SDUPTHER

## 2023-07-13 ENCOUNTER — ANTI-COAG VISIT (OUTPATIENT)
Age: 68
End: 2023-07-13
Payer: MEDICARE

## 2023-07-13 DIAGNOSIS — I48.20 CHRONIC ATRIAL FIBRILLATION, UNSPECIFIED (HCC): Primary | ICD-10-CM

## 2023-07-13 LAB
POC INR: 1.3
PROTHROMBIN TIME, POC: ABNORMAL

## 2023-07-13 PROCEDURE — 85610 PROTHROMBIN TIME: CPT | Performed by: NURSE PRACTITIONER

## 2023-07-17 ENCOUNTER — OFFICE VISIT (OUTPATIENT)
Age: 68
End: 2023-07-17
Payer: MEDICARE

## 2023-07-17 VITALS
TEMPERATURE: 98.8 F | SYSTOLIC BLOOD PRESSURE: 131 MMHG | OXYGEN SATURATION: 93 % | DIASTOLIC BLOOD PRESSURE: 76 MMHG | WEIGHT: 304 LBS | RESPIRATION RATE: 16 BRPM | HEIGHT: 75 IN | HEART RATE: 88 BPM | BODY MASS INDEX: 37.8 KG/M2

## 2023-07-17 DIAGNOSIS — I48.20 CHRONIC ATRIAL FIBRILLATION, UNSPECIFIED (HCC): ICD-10-CM

## 2023-07-17 DIAGNOSIS — I10 ESSENTIAL HYPERTENSION, BENIGN: ICD-10-CM

## 2023-07-17 DIAGNOSIS — Z00.00 MEDICARE ANNUAL WELLNESS VISIT, SUBSEQUENT: Primary | ICD-10-CM

## 2023-07-17 DIAGNOSIS — Z13.0 SCREENING FOR ENDOCRINE, METABOLIC AND IMMUNITY DISORDER: ICD-10-CM

## 2023-07-17 DIAGNOSIS — Z79.01 ANTICOAGULANT LONG-TERM USE: ICD-10-CM

## 2023-07-17 DIAGNOSIS — Z13.228 SCREENING FOR ENDOCRINE, METABOLIC AND IMMUNITY DISORDER: ICD-10-CM

## 2023-07-17 DIAGNOSIS — R63.8 INCREASED BMI: ICD-10-CM

## 2023-07-17 DIAGNOSIS — Z71.89 ACP (ADVANCE CARE PLANNING): ICD-10-CM

## 2023-07-17 DIAGNOSIS — Z87.891 EX-SMOKER: ICD-10-CM

## 2023-07-17 DIAGNOSIS — R73.03 PREDIABETES: ICD-10-CM

## 2023-07-17 DIAGNOSIS — I50.32 CHRONIC DIASTOLIC HEART FAILURE (HCC): ICD-10-CM

## 2023-07-17 DIAGNOSIS — D68.69 SECONDARY HYPERCOAGULABLE STATE (HCC): ICD-10-CM

## 2023-07-17 DIAGNOSIS — Z01.818 PREOP EXAMINATION: ICD-10-CM

## 2023-07-17 DIAGNOSIS — Z13.29 SCREENING FOR ENDOCRINE, METABOLIC AND IMMUNITY DISORDER: ICD-10-CM

## 2023-07-17 PROCEDURE — 99214 OFFICE O/P EST MOD 30 MIN: CPT | Performed by: INTERNAL MEDICINE

## 2023-07-17 PROCEDURE — 3078F DIAST BP <80 MM HG: CPT | Performed by: INTERNAL MEDICINE

## 2023-07-17 PROCEDURE — 99497 ADVNCD CARE PLAN 30 MIN: CPT | Performed by: INTERNAL MEDICINE

## 2023-07-17 PROCEDURE — G8427 DOCREV CUR MEDS BY ELIG CLIN: HCPCS | Performed by: INTERNAL MEDICINE

## 2023-07-17 PROCEDURE — 1036F TOBACCO NON-USER: CPT | Performed by: INTERNAL MEDICINE

## 2023-07-17 PROCEDURE — 3075F SYST BP GE 130 - 139MM HG: CPT | Performed by: INTERNAL MEDICINE

## 2023-07-17 PROCEDURE — 1123F ACP DISCUSS/DSCN MKR DOCD: CPT | Performed by: INTERNAL MEDICINE

## 2023-07-17 PROCEDURE — 3017F COLORECTAL CA SCREEN DOC REV: CPT | Performed by: INTERNAL MEDICINE

## 2023-07-17 PROCEDURE — G0439 PPPS, SUBSEQ VISIT: HCPCS | Performed by: INTERNAL MEDICINE

## 2023-07-17 PROCEDURE — 99211 OFF/OP EST MAY X REQ PHY/QHP: CPT

## 2023-07-17 PROCEDURE — G8417 CALC BMI ABV UP PARAM F/U: HCPCS | Performed by: INTERNAL MEDICINE

## 2023-07-17 ASSESSMENT — PATIENT HEALTH QUESTIONNAIRE - PHQ9
SUM OF ALL RESPONSES TO PHQ QUESTIONS 1-9: 0
SUM OF ALL RESPONSES TO PHQ9 QUESTIONS 1 & 2: 0
1. LITTLE INTEREST OR PLEASURE IN DOING THINGS: 0
SUM OF ALL RESPONSES TO PHQ QUESTIONS 1-9: 0
2. FEELING DOWN, DEPRESSED OR HOPELESS: 0
SUM OF ALL RESPONSES TO PHQ QUESTIONS 1-9: 0
SUM OF ALL RESPONSES TO PHQ QUESTIONS 1-9: 0

## 2023-07-17 NOTE — ASSESSMENT & PLAN NOTE
Blood pressure is under control on Lotensin 40 mg once a day, hydralazine 50 mg 3 times a day, carvedilol 12.5 mg every day.   Sular 17 mg every day

## 2023-07-17 NOTE — ASSESSMENT & PLAN NOTE
Heart rate under control with carvedilol. Patient follows with cardiology. On Coumadin for stroke prophylaxis.

## 2023-07-17 NOTE — ACP (ADVANCE CARE PLANNING)
Advance Care Planning     General Advance Care Planning (ACP) Conversation    Date of Conversation: 7/17/2023  Conducted with: Patient with Decision Making Capacity    Healthcare Decision Maker:    Primary Decision Maker: ana tiwari - spouse - [phone]  Click here to complete Healthcare Decision Makers including selection of the Healthcare Decision Maker Relationship (ie \"Primary\").       Content/Action Overview:  Has NO ACP documents/care preferences - refer to ACP Clinical Specialist  Reviewed DNR/DNI and patient elects Full Code (Attempt Resuscitation)        Length of Voluntary ACP Conversation in minutes:  12 minutes    Fredrick Franco MD

## 2023-07-18 ENCOUNTER — CLINICAL DOCUMENTATION (OUTPATIENT)
Dept: SPIRITUAL SERVICES | Age: 68
End: 2023-07-18

## 2023-07-18 NOTE — PROGRESS NOTES
(Specify) : Outcomes:                [] 3rd -  Date:                Intervention:  [] Spoke with Patient   [] Left Voice mail [] Email / Mail    [] MyChart  [] Other [id number]) : Outcomes:           []  Additional Outreach -  Date:     (Specify Dates & special circumstances): Outcomes:          Thank you for this referral.

## 2023-07-20 ENCOUNTER — ANTI-COAG VISIT (OUTPATIENT)
Age: 68
End: 2023-07-20
Payer: MEDICARE

## 2023-07-20 ENCOUNTER — OFFICE VISIT (OUTPATIENT)
Age: 68
End: 2023-07-20
Payer: MEDICARE

## 2023-07-20 VITALS
SYSTOLIC BLOOD PRESSURE: 118 MMHG | WEIGHT: 304 LBS | DIASTOLIC BLOOD PRESSURE: 49 MMHG | BODY MASS INDEX: 37.8 KG/M2 | HEIGHT: 75 IN | HEART RATE: 80 BPM

## 2023-07-20 DIAGNOSIS — Z79.01 LONG TERM (CURRENT) USE OF ANTICOAGULANTS: ICD-10-CM

## 2023-07-20 DIAGNOSIS — I50.32 CHRONIC DIASTOLIC (CONGESTIVE) HEART FAILURE (HCC): ICD-10-CM

## 2023-07-20 DIAGNOSIS — E66.01 MORBID (SEVERE) OBESITY DUE TO EXCESS CALORIES (HCC): ICD-10-CM

## 2023-07-20 DIAGNOSIS — I48.20 CHRONIC ATRIAL FIBRILLATION, UNSPECIFIED (HCC): Primary | ICD-10-CM

## 2023-07-20 DIAGNOSIS — E78.2 MIXED HYPERLIPIDEMIA: ICD-10-CM

## 2023-07-20 DIAGNOSIS — I10 ESSENTIAL (PRIMARY) HYPERTENSION: ICD-10-CM

## 2023-07-20 PROBLEM — Z12.11 SCREEN FOR COLON CANCER: Status: RESOLVED | Noted: 2023-06-20 | Resolved: 2023-07-20

## 2023-07-20 PROBLEM — Z12.5 SCREENING FOR PROSTATE CANCER: Status: RESOLVED | Noted: 2023-06-20 | Resolved: 2023-07-20

## 2023-07-20 PROBLEM — Z00.00 HEALTHCARE MAINTENANCE: Status: RESOLVED | Noted: 2023-06-20 | Resolved: 2023-07-20

## 2023-07-20 LAB
POC INR: 1.8
PROTHROMBIN TIME, POC: ABNORMAL

## 2023-07-20 PROCEDURE — G8417 CALC BMI ABV UP PARAM F/U: HCPCS | Performed by: NURSE PRACTITIONER

## 2023-07-20 PROCEDURE — 99214 OFFICE O/P EST MOD 30 MIN: CPT | Performed by: NURSE PRACTITIONER

## 2023-07-20 PROCEDURE — 3017F COLORECTAL CA SCREEN DOC REV: CPT | Performed by: NURSE PRACTITIONER

## 2023-07-20 PROCEDURE — 1036F TOBACCO NON-USER: CPT | Performed by: NURSE PRACTITIONER

## 2023-07-20 PROCEDURE — 85610 PROTHROMBIN TIME: CPT | Performed by: NURSE PRACTITIONER

## 2023-07-20 PROCEDURE — 3078F DIAST BP <80 MM HG: CPT | Performed by: NURSE PRACTITIONER

## 2023-07-20 PROCEDURE — G8427 DOCREV CUR MEDS BY ELIG CLIN: HCPCS | Performed by: NURSE PRACTITIONER

## 2023-07-20 PROCEDURE — 1123F ACP DISCUSS/DSCN MKR DOCD: CPT | Performed by: NURSE PRACTITIONER

## 2023-07-20 PROCEDURE — 3074F SYST BP LT 130 MM HG: CPT | Performed by: NURSE PRACTITIONER

## 2023-07-20 NOTE — PROGRESS NOTES
1. Have you been to the ER, urgent care clinic since your last visit? Hospitalized since your last visit? No    2. Have you seen or consulted any other health care providers outside of the [address] [place] [address] since your last visit? Include any pap smears or colon screening. No     3. Do you need any refills today?    no

## 2023-07-20 NOTE — PATIENT INSTRUCTIONS
Mr. Keyonna Genao is here today for anticoagulation monitoring for the diagnosis of atrial fibrillation. His INR goal is 2.0-3.0 and his current Coumadin dose is 10 mg 2 days, then 7.5 mg daily. Today's findings include an INR of 1.8     Considering Mr. Camacho's past history, todays findings, and per the coumadin policy/protocol, Mr. Andra Griffith was instructed to take Coumadin as follows,  10 mg tonight/then 7.5 mg daily. He was also instructed to come back in 2 weeks for an INR check. A full discussion of the nature of anticoagulants has been carried out. A full discussion of the need for frequent and regular monitoring, precise dosage adjustment and compliance was stressed. Side effects of potential bleeding were discussed and Mr. Andra Griffith was instructed to call [phone] if there are any signs of abnormal bleeding. Mr. Andra Griffith was instructed to avoid any OTC items containing aspirin or ibuprofen and prior to starting any new OTC products to consult with his physician or pharmacist to ensure no drug interactions are present. Mr. Andra Griffith was instructed to avoid any major changes in his general diet and to avoid alcohol consumption. .      Mr. Andra Griffith verbalized his understanding of all instructions and will call the office with any questions, concerns, or signs of abnormal bleeding or blood clot.

## 2023-07-20 NOTE — PROGRESS NOTES
HISTORY OF PRESENT ILLNESS  Neo Hodges Sr. is a 69 y.o. male. Patient with a fib,chf,pulmonary htn,mr. On follow up patient denies any chest pains,sob, palpitation or other significant symptoms. 7/6/2023  Patient seen for pre-op evaluation. He is anticipating right cataract surgery on 7/11/2023. He denies chest pain, shortness of breath or palpitations. He has chronic dependent edema. 7/20/2023  Patient seen In f/u for Afib. He denies chest pain, shortness of breath or palpitations. He has chronic dependent edema, controlled with compression stockings and lasix. Follow-up  Pertinent negatives include no chest pain, no abdominal pain, no headaches and no shortness of breath. Valvular Heart Disease  The history is provided by the Patient. This is a chronic problem. The problem occurs constantly. The problem has not changed since onset. Pertinent negatives include no chest pain, no abdominal pain, no headaches and no shortness of breath. CHF  The history is provided by the Patient. This is a chronic problem. The problem occurs constantly. The problem has not changed since onset. Pertinent negatives include no chest pain, no abdominal pain, no headaches and no shortness of breath. Palpitations   The history is provided by the Patient. This is a chronic problem. The problem has not changed since onset. Associated symptoms include lower extremity edema. Pertinent negatives include no fever, no chest pain, no claudication, no orthopnea, no PND, no abdominal pain, no nausea, no vomiting, no headaches, no dizziness, no weakness, no cough, no hemoptysis, no shortness of breath and no sputum production. His past medical history is significant for hypertension. Hypertension  The history is provided by the Patient. This is a chronic problem. The problem occurs constantly. The problem has not changed since onset. Pertinent negatives include no chest pain, no abdominal pain, no headaches and no shortness

## 2023-07-26 ENCOUNTER — CLINICAL DOCUMENTATION (OUTPATIENT)
Dept: SPIRITUAL SERVICES | Age: 68
End: 2023-07-26

## 2023-08-16 PROBLEM — Z01.818 PREOP EXAMINATION: Status: RESOLVED | Noted: 2023-07-17 | Resolved: 2023-08-16

## 2023-08-17 ENCOUNTER — CLINICAL DOCUMENTATION (OUTPATIENT)
Dept: CASE MANAGEMENT | Age: 68
End: 2023-08-17

## 2023-08-17 NOTE — ACP (ADVANCE CARE PLANNING)
Advance Care Planning   Ambulatory ACP Specialist Patient Outreach    Date:  8/17/2023    ACP Specialist:  Jon Diez LCSW    Outreach call to patient in follow-up to ACP Specialist referral from:Delmer Richardson MD       [x] PCP  [] Provider   [] Ambulatory Care Management [] Other      For:                  [x] Advance Directive Assistance              [] Complete Portable DNR order              [] Complete POST/POLST/MOST              [] Code Status Discussion             [] Discuss Goals of Care             [] Early ACP Decision-Making              [] Other (Specify)     Date Referral Received:7/17/23    Next Step:   [] ACP scheduled conversation  [x] Outreach again in one week               [] Email / Mail [address]  [] Email / Mail Advance Directive   [] Closing referral.  Routing closure to referring provider/staff and to ACP Specialist . [] Closure letter mailed to patient with invitation to contact ACP Specialist if / when ready. [] Other (Specify here):         [x] At this time, Healthcare Decision Maker Is:        [] Primary agent named in scanned advance directive. [] Legal Next of Kin. [x] Unable to determine legal decision maker at this time. Outreaches:    [x]  Additional Outreach -  Date: 8/17/2023 (Specify Dates & special circumstances): Unable to leave voicemail message for patient    Outcomes: ACP Specialist called patient for scheduled Advance Care Planning appointment. Pt did not answer and his voicemail box was full. ACP Specialist will call patient again in 1 week to try to contact him and assist him with obtaining a rescheduled appointment date and time if he wishes to proceed with ACP discussion. Thank you for this referral.    Shala Oliver.  ALEXANDRU Smith  Advance Care   [address] [place] [address]  [phone]

## 2023-08-23 ENCOUNTER — HOSPITAL ENCOUNTER (INPATIENT)
Facility: HOSPITAL | Age: 68
LOS: 4 days | Discharge: HOME | DRG: 291 | End: 2023-08-27
Attending: EMERGENCY MEDICINE | Admitting: INTERNAL MEDICINE
Payer: MEDICARE

## 2023-08-23 ENCOUNTER — APPOINTMENT (OUTPATIENT)
Facility: HOSPITAL | Age: 68
DRG: 291 | End: 2023-08-23
Payer: MEDICARE

## 2023-08-23 DIAGNOSIS — N17.9 ACUTE KIDNEY INJURY (HCC): ICD-10-CM

## 2023-08-23 DIAGNOSIS — I50.9 ACUTE ON CHRONIC CONGESTIVE HEART FAILURE, UNSPECIFIED HEART FAILURE TYPE (HCC): Primary | ICD-10-CM

## 2023-08-23 DIAGNOSIS — N18.2 CKD (CHRONIC KIDNEY DISEASE), STAGE II: ICD-10-CM

## 2023-08-23 DIAGNOSIS — R60.1: ICD-10-CM

## 2023-08-23 PROBLEM — I50.31 ACUTE DIASTOLIC CHF (CONGESTIVE HEART FAILURE) (HCC): Status: ACTIVE | Noted: 2023-08-23

## 2023-08-23 LAB
ALBUMIN SERPL-MCNC: 3.3 G/DL (ref 3.4–5)
ALBUMIN/GLOB SERPL: 0.7 (ref 0.8–1.7)
ALP SERPL-CCNC: 49 U/L (ref 45–117)
ALT SERPL-CCNC: 17 U/L (ref 16–61)
ANION GAP SERPL CALC-SCNC: 5 MMOL/L (ref 3–18)
AST SERPL-CCNC: 21 U/L (ref 10–38)
BASOPHILS # BLD: 0 K/UL (ref 0–0.1)
BASOPHILS NFR BLD: 1 % (ref 0–2)
BILIRUB SERPL-MCNC: 1.7 MG/DL (ref 0.2–1)
BUN SERPL-MCNC: 25 MG/DL (ref 7–18)
BUN/CREAT SERPL: 13 (ref 12–20)
CALCIUM SERPL-MCNC: 9 MG/DL (ref 8.5–10.1)
CHLORIDE SERPL-SCNC: 106 MMOL/L (ref 100–111)
CO2 SERPL-SCNC: 29 MMOL/L (ref 21–32)
CREAT SERPL-MCNC: 1.88 MG/DL (ref 0.6–1.3)
DIFFERENTIAL METHOD BLD: ABNORMAL
EKG ATRIAL RATE: 87 BPM
EKG ATRIAL RATE: 87 BPM
EKG DIAGNOSIS: NORMAL
EKG DIAGNOSIS: NORMAL
EKG Q-T INTERVAL: 368 MS
EKG Q-T INTERVAL: 368 MS
EKG QRS DURATION: 88 MS
EKG QRS DURATION: 88 MS
EKG QTC CALCULATION (BAZETT): 450 MS
EKG QTC CALCULATION (BAZETT): 450 MS
EKG R AXIS: 11 DEGREES
EKG R AXIS: 11 DEGREES
EKG T AXIS: 20 DEGREES
EKG T AXIS: 20 DEGREES
EKG VENTRICULAR RATE: 90 BPM
EKG VENTRICULAR RATE: 90 BPM
EOSINOPHIL # BLD: 0.1 K/UL (ref 0–0.4)
EOSINOPHIL NFR BLD: 3 % (ref 0–5)
ERYTHROCYTE [DISTWIDTH] IN BLOOD BY AUTOMATED COUNT: 15.5 % (ref 11.6–14.5)
GLOBULIN SER CALC-MCNC: 4.9 G/DL (ref 2–4)
GLUCOSE SERPL-MCNC: 141 MG/DL (ref 74–99)
HCT VFR BLD AUTO: 35.1 % (ref 36–48)
HGB BLD-MCNC: 12.1 G/DL (ref 13–16)
IMM GRANULOCYTES # BLD AUTO: 0 K/UL (ref 0–0.04)
IMM GRANULOCYTES NFR BLD AUTO: 0 % (ref 0–0.5)
LYMPHOCYTES # BLD: 1.1 K/UL (ref 0.9–3.6)
LYMPHOCYTES NFR BLD: 32 % (ref 21–52)
MCH RBC QN AUTO: 30.3 PG (ref 24–34)
MCHC RBC AUTO-ENTMCNC: 34.5 G/DL (ref 31–37)
MCV RBC AUTO: 87.8 FL (ref 78–100)
MONOCYTES # BLD: 0.6 K/UL (ref 0.05–1.2)
MONOCYTES NFR BLD: 17 % (ref 3–10)
NEUTS SEG # BLD: 1.6 K/UL (ref 1.8–8)
NEUTS SEG NFR BLD: 47 % (ref 40–73)
NRBC # BLD: 0 K/UL (ref 0–0.01)
NRBC BLD-RTO: 0 PER 100 WBC
NT PRO BNP: 1203 PG/ML (ref 0–900)
PLATELET # BLD AUTO: 156 K/UL (ref 135–420)
PMV BLD AUTO: 11 FL (ref 9.2–11.8)
POTASSIUM SERPL-SCNC: 4.1 MMOL/L (ref 3.5–5.5)
PROT SERPL-MCNC: 8.2 G/DL (ref 6.4–8.2)
RBC # BLD AUTO: 4 M/UL (ref 4.35–5.65)
SODIUM SERPL-SCNC: 140 MMOL/L (ref 136–145)
WBC # BLD AUTO: 3.5 K/UL (ref 4.6–13.2)

## 2023-08-23 PROCEDURE — 93010 ELECTROCARDIOGRAM REPORT: CPT | Performed by: INTERNAL MEDICINE

## 2023-08-23 PROCEDURE — 71046 X-RAY EXAM CHEST 2 VIEWS: CPT

## 2023-08-23 PROCEDURE — 6370000000 HC RX 637 (ALT 250 FOR IP): Performed by: EMERGENCY MEDICINE

## 2023-08-23 PROCEDURE — 85025 COMPLETE CBC W/AUTO DIFF WBC: CPT

## 2023-08-23 PROCEDURE — 6360000002 HC RX W HCPCS: Performed by: EMERGENCY MEDICINE

## 2023-08-23 PROCEDURE — 96374 THER/PROPH/DIAG INJ IV PUSH: CPT

## 2023-08-23 PROCEDURE — 93005 ELECTROCARDIOGRAM TRACING: CPT | Performed by: EMERGENCY MEDICINE

## 2023-08-23 PROCEDURE — 83880 ASSAY OF NATRIURETIC PEPTIDE: CPT

## 2023-08-23 PROCEDURE — 80053 COMPREHEN METABOLIC PANEL: CPT

## 2023-08-23 PROCEDURE — 1100000003 HC PRIVATE W/ TELEMETRY

## 2023-08-23 PROCEDURE — 99285 EMERGENCY DEPT VISIT HI MDM: CPT

## 2023-08-23 PROCEDURE — 99239 HOSP IP/OBS DSCHRG MGMT >30: CPT | Performed by: INTERNAL MEDICINE

## 2023-08-23 RX ORDER — ONDANSETRON 2 MG/ML
4 INJECTION INTRAMUSCULAR; INTRAVENOUS EVERY 6 HOURS PRN
Status: DISCONTINUED | OUTPATIENT
Start: 2023-08-23 | End: 2023-08-27 | Stop reason: HOSPADM

## 2023-08-23 RX ORDER — AMLODIPINE BESYLATE 5 MG/1
5 TABLET ORAL DAILY
Status: DISCONTINUED | OUTPATIENT
Start: 2023-08-24 | End: 2023-08-27 | Stop reason: HOSPADM

## 2023-08-23 RX ORDER — FUROSEMIDE 10 MG/ML
80 INJECTION INTRAMUSCULAR; INTRAVENOUS 2 TIMES DAILY
Status: DISCONTINUED | OUTPATIENT
Start: 2023-08-24 | End: 2023-08-24

## 2023-08-23 RX ORDER — SODIUM CHLORIDE 0.9 % (FLUSH) 0.9 %
5-40 SYRINGE (ML) INJECTION PRN
Status: DISCONTINUED | OUTPATIENT
Start: 2023-08-23 | End: 2023-08-27 | Stop reason: HOSPADM

## 2023-08-23 RX ORDER — HYDRALAZINE HYDROCHLORIDE 50 MG/1
50 TABLET, FILM COATED ORAL 3 TIMES DAILY
Status: DISCONTINUED | OUTPATIENT
Start: 2023-08-24 | End: 2023-08-27 | Stop reason: HOSPADM

## 2023-08-23 RX ORDER — ACETAMINOPHEN 325 MG/1
650 TABLET ORAL EVERY 6 HOURS PRN
Status: DISCONTINUED | OUTPATIENT
Start: 2023-08-23 | End: 2023-08-27 | Stop reason: HOSPADM

## 2023-08-23 RX ORDER — POTASSIUM CHLORIDE 20 MEQ/1
20 TABLET, EXTENDED RELEASE ORAL 2 TIMES DAILY WITH MEALS
Status: DISCONTINUED | OUTPATIENT
Start: 2023-08-24 | End: 2023-08-24

## 2023-08-23 RX ORDER — LISINOPRIL 40 MG/1
40 TABLET ORAL DAILY
Status: DISCONTINUED | OUTPATIENT
Start: 2023-08-24 | End: 2023-08-24

## 2023-08-23 RX ORDER — POLYETHYLENE GLYCOL 3350 17 G/17G
17 POWDER, FOR SOLUTION ORAL DAILY PRN
Status: DISCONTINUED | OUTPATIENT
Start: 2023-08-23 | End: 2023-08-27 | Stop reason: HOSPADM

## 2023-08-23 RX ORDER — ACETAMINOPHEN 650 MG/1
650 SUPPOSITORY RECTAL EVERY 6 HOURS PRN
Status: DISCONTINUED | OUTPATIENT
Start: 2023-08-23 | End: 2023-08-27 | Stop reason: HOSPADM

## 2023-08-23 RX ORDER — FUROSEMIDE 10 MG/ML
60 INJECTION INTRAMUSCULAR; INTRAVENOUS
Status: COMPLETED | OUTPATIENT
Start: 2023-08-23 | End: 2023-08-23

## 2023-08-23 RX ORDER — SODIUM CHLORIDE 9 MG/ML
INJECTION, SOLUTION INTRAVENOUS PRN
Status: DISCONTINUED | OUTPATIENT
Start: 2023-08-23 | End: 2023-08-27 | Stop reason: HOSPADM

## 2023-08-23 RX ORDER — SODIUM CHLORIDE 0.9 % (FLUSH) 0.9 %
5-40 SYRINGE (ML) INJECTION EVERY 12 HOURS SCHEDULED
Status: DISCONTINUED | OUTPATIENT
Start: 2023-08-24 | End: 2023-08-27 | Stop reason: HOSPADM

## 2023-08-23 RX ORDER — POTASSIUM CHLORIDE 20 MEQ/1
40 TABLET, EXTENDED RELEASE ORAL
Status: COMPLETED | OUTPATIENT
Start: 2023-08-23 | End: 2023-08-23

## 2023-08-23 RX ORDER — CARVEDILOL 12.5 MG/1
12.5 TABLET ORAL 2 TIMES DAILY
Status: DISCONTINUED | OUTPATIENT
Start: 2023-08-24 | End: 2023-08-27 | Stop reason: HOSPADM

## 2023-08-23 RX ORDER — ONDANSETRON 4 MG/1
4 TABLET, ORALLY DISINTEGRATING ORAL EVERY 8 HOURS PRN
Status: DISCONTINUED | OUTPATIENT
Start: 2023-08-23 | End: 2023-08-27 | Stop reason: HOSPADM

## 2023-08-23 RX ORDER — POTASSIUM CHLORIDE 20 MEQ/1
TABLET, EXTENDED RELEASE ORAL
Status: DISPENSED
Start: 2023-08-23 | End: 2023-08-24

## 2023-08-23 RX ADMIN — FUROSEMIDE 60 MG: 10 INJECTION, SOLUTION INTRAMUSCULAR; INTRAVENOUS at 14:16

## 2023-08-23 RX ADMIN — POTASSIUM CHLORIDE 40 MEQ: 1500 TABLET, EXTENDED RELEASE ORAL at 16:20

## 2023-08-23 ASSESSMENT — PAIN SCALES - GENERAL: PAINLEVEL_OUTOF10: 0

## 2023-08-23 ASSESSMENT — ENCOUNTER SYMPTOMS
WHEEZING: 0
TROUBLE SWALLOWING: 0
CONSTIPATION: 0
EYE PAIN: 0
VOMITING: 0
SHORTNESS OF BREATH: 1
BACK PAIN: 0
ABDOMINAL PAIN: 0
DIARRHEA: 0
COUGH: 0
NAUSEA: 0
RHINORRHEA: 0
FACIAL SWELLING: 0

## 2023-08-23 ASSESSMENT — LIFESTYLE VARIABLES
HOW MANY STANDARD DRINKS CONTAINING ALCOHOL DO YOU HAVE ON A TYPICAL DAY: PATIENT DOES NOT DRINK
HOW OFTEN DO YOU HAVE A DRINK CONTAINING ALCOHOL: NEVER

## 2023-08-23 ASSESSMENT — PAIN - FUNCTIONAL ASSESSMENT: PAIN_FUNCTIONAL_ASSESSMENT: 0-10

## 2023-08-23 NOTE — ED NOTES
TRANSFER - OUT REPORT:    Verbal report given to Monik Wren on Chuck Nelsonde Sr.  being transferred to [facility] for routine progression of patient care       Report consisted of patient's Situation, Background, Assessment and   Recommendations(SBAR). Information from the following report(s) ED SBAR was reviewed with the receiving nurse. Dolores Fall Assessment:    Presents to emergency department  because of falls (Syncope, seizure, or loss of consciousness): No  Age > 70: No  Altered Mental Status, Intoxication with alcohol or substance confusion (Disorientation, impaired judgment, poor safety awaremess, or inability to follow instructions): No  Impaired Mobility: Ambulates or transfers with assistive devices or assistance; Unable to ambulate or transer.: Yes  Nursing Judgement: Yes          Lines:   Peripheral IV 08/23/23 Left Antecubital (Active)   Site Assessment Clean, dry & intact 08/23/23 1312   Line Status Blood return noted 08/23/23 1312   Phlebitis Assessment No symptoms 08/23/23 1312   Infiltration Assessment 0 08/23/23 1312        Opportunity for questions and clarification was provided.       Patient transported with:  Anca Gonzalez  08/23/23 5085

## 2023-08-23 NOTE — ED NOTES
TRANSFER - OUT REPORT:    Verbal report given to Nathaly Aguayo RN  on LoydaBanner.  being transferred to *** for {Transfer TEGT:90776}       Report consisted of patient's Situation, Background, Assessment and   Recommendations(SBAR). Information from the following report(s) {SBAR Reports List:31372} was reviewed with the receiving nurse. Ragland Fall Assessment:    Presents to emergency department  because of falls (Syncope, seizure, or loss of consciousness): No  Age > 70: No  Altered Mental Status, Intoxication with alcohol or substance confusion (Disorientation, impaired judgment, poor safety awaremess, or inability to follow instructions): No  Impaired Mobility: Ambulates or transfers with assistive devices or assistance; Unable to ambulate or transer.: Yes  Nursing Judgement: Yes          Lines:   Peripheral IV 08/23/23 Left Antecubital (Active)   Site Assessment Clean, dry & intact 08/23/23 1312   Line Status Blood return noted 08/23/23 1312   Phlebitis Assessment No symptoms 08/23/23 1312   Infiltration Assessment 0 08/23/23 1312        Opportunity for questions and clarification was provided.       Patient transported with:  {Transport Details:29046}

## 2023-08-23 NOTE — ED TRIAGE NOTES
Patient is wheeled into room for c/o swelling to feet/legs/ and scrotal area x 5 days. Patient does have CHF and is supposed to take Lasix 80 mg twice a day but only takes it once a day.

## 2023-08-24 ENCOUNTER — CLINICAL DOCUMENTATION (OUTPATIENT)
Dept: CASE MANAGEMENT | Age: 68
End: 2023-08-24

## 2023-08-24 LAB
ANION GAP SERPL CALC-SCNC: 5 MMOL/L (ref 3–18)
BUN SERPL-MCNC: 23 MG/DL (ref 7–18)
BUN/CREAT SERPL: 13 (ref 12–20)
CALCIUM SERPL-MCNC: 9 MG/DL (ref 8.5–10.1)
CHLORIDE SERPL-SCNC: 106 MMOL/L (ref 100–111)
CO2 SERPL-SCNC: 30 MMOL/L (ref 21–32)
CREAT SERPL-MCNC: 1.73 MG/DL (ref 0.6–1.3)
GLUCOSE SERPL-MCNC: 129 MG/DL (ref 74–99)
INR PPP: 3.4 (ref 0.9–1.1)
MAGNESIUM SERPL-MCNC: 2 MG/DL (ref 1.6–2.6)
POTASSIUM SERPL-SCNC: 3.9 MMOL/L (ref 3.5–5.5)
PROTHROMBIN TIME: 34.9 SEC (ref 11.9–14.7)
SODIUM SERPL-SCNC: 141 MMOL/L (ref 136–145)
T4 FREE SERPL-MCNC: 1.5 NG/DL (ref 0.7–1.5)
TROPONIN I SERPL HS-MCNC: 15 NG/L (ref 0–78)
TSH SERPL DL<=0.05 MIU/L-ACNC: 1.62 UIU/ML (ref 0.36–3.74)

## 2023-08-24 PROCEDURE — 2580000003 HC RX 258: Performed by: INTERNAL MEDICINE

## 2023-08-24 PROCEDURE — 94761 N-INVAS EAR/PLS OXIMETRY MLT: CPT

## 2023-08-24 PROCEDURE — 84165 PROTEIN E-PHORESIS SERUM: CPT

## 2023-08-24 PROCEDURE — 83521 IG LIGHT CHAINS FREE EACH: CPT

## 2023-08-24 PROCEDURE — 99223 1ST HOSP IP/OBS HIGH 75: CPT | Performed by: INTERNAL MEDICINE

## 2023-08-24 PROCEDURE — 6360000002 HC RX W HCPCS: Performed by: INTERNAL MEDICINE

## 2023-08-24 PROCEDURE — 80048 BASIC METABOLIC PNL TOTAL CA: CPT

## 2023-08-24 PROCEDURE — 85610 PROTHROMBIN TIME: CPT

## 2023-08-24 PROCEDURE — 1100000003 HC PRIVATE W/ TELEMETRY

## 2023-08-24 PROCEDURE — 6370000000 HC RX 637 (ALT 250 FOR IP): Performed by: INTERNAL MEDICINE

## 2023-08-24 PROCEDURE — 36415 COLL VENOUS BLD VENIPUNCTURE: CPT

## 2023-08-24 PROCEDURE — 84443 ASSAY THYROID STIM HORMONE: CPT

## 2023-08-24 PROCEDURE — 99232 SBSQ HOSP IP/OBS MODERATE 35: CPT | Performed by: HOSPITALIST

## 2023-08-24 PROCEDURE — 84484 ASSAY OF TROPONIN QUANT: CPT

## 2023-08-24 PROCEDURE — 83735 ASSAY OF MAGNESIUM: CPT

## 2023-08-24 PROCEDURE — 84155 ASSAY OF PROTEIN SERUM: CPT

## 2023-08-24 PROCEDURE — 84439 ASSAY OF FREE THYROXINE: CPT

## 2023-08-24 RX ORDER — FUROSEMIDE 10 MG/ML
40 INJECTION INTRAMUSCULAR; INTRAVENOUS 3 TIMES DAILY
Status: COMPLETED | OUTPATIENT
Start: 2023-08-24 | End: 2023-08-25

## 2023-08-24 RX ADMIN — CARVEDILOL 12.5 MG: 12.5 TABLET, FILM COATED ORAL at 20:56

## 2023-08-24 RX ADMIN — CARVEDILOL 12.5 MG: 12.5 TABLET, FILM COATED ORAL at 00:51

## 2023-08-24 RX ADMIN — FUROSEMIDE 40 MG: 10 INJECTION, SOLUTION INTRAMUSCULAR; INTRAVENOUS at 17:57

## 2023-08-24 RX ADMIN — POTASSIUM CHLORIDE 20 MEQ: 1500 TABLET, EXTENDED RELEASE ORAL at 09:17

## 2023-08-24 RX ADMIN — HYDRALAZINE HYDROCHLORIDE 50 MG: 50 TABLET, FILM COATED ORAL at 09:14

## 2023-08-24 RX ADMIN — LISINOPRIL 40 MG: 40 TABLET ORAL at 09:15

## 2023-08-24 RX ADMIN — AMLODIPINE BESYLATE 5 MG: 5 TABLET ORAL at 09:14

## 2023-08-24 RX ADMIN — HYDRALAZINE HYDROCHLORIDE 50 MG: 50 TABLET, FILM COATED ORAL at 14:51

## 2023-08-24 RX ADMIN — HYDRALAZINE HYDROCHLORIDE 50 MG: 50 TABLET, FILM COATED ORAL at 20:56

## 2023-08-24 RX ADMIN — CARVEDILOL 12.5 MG: 12.5 TABLET, FILM COATED ORAL at 09:16

## 2023-08-24 RX ADMIN — SODIUM CHLORIDE, PRESERVATIVE FREE 5 ML: 5 INJECTION INTRAVENOUS at 09:18

## 2023-08-24 RX ADMIN — FUROSEMIDE 80 MG: 10 INJECTION, SOLUTION INTRAMUSCULAR; INTRAVENOUS at 09:14

## 2023-08-24 RX ADMIN — SODIUM CHLORIDE, PRESERVATIVE FREE 10 ML: 5 INJECTION INTRAVENOUS at 20:56

## 2023-08-24 ASSESSMENT — PAIN SCALES - GENERAL
PAINLEVEL_OUTOF10: 0
PAINLEVEL_OUTOF10: 0

## 2023-08-24 NOTE — ACP (ADVANCE CARE PLANNING)
Advance Care Planning   Ambulatory ACP Specialist Patient Outreach    Date:  8/24/2023    ACP Specialist:  Shiloh Ferguson LCSW    Outreach call to patient in follow-up to ACP Specialist referral from:Delmer Valladares MD       [x] PCP  [] Provider   [] Ambulatory Care Management [] Other      For:                  [x] Advance Directive Assistance              [] Complete Portable DNR order              [] Complete POST/POLST/MOST              [] Code Status Discussion             [] Discuss Goals of Care             [] Early ACP Decision-Making              [] Other (Specify)     Date Referral Received:7/17/23    Next Step:   [] ACP scheduled conversation  [x] Outreach again in one/two weeks              [] Email / Mail [address]  [] Email / Mail Advance Directive   [] Closing referral.  Routing closure to referring provider/staff and to ACP Specialist . [] Closure letter mailed to patient with invitation to contact ACP Specialist if / when ready. [] Other (Specify here):         [x] At this time, Healthcare Decision Maker Is:        [] Primary agent named in scanned advance directive. [x] Legal Next of Kin. [] Unable to determine legal decision maker at this time. Outreaches:    []  Additional Outreach -  Date: 8/17/2023 (Specify Dates & special circumstances): Unable to leave voicemail message for patient    Outcomes: ACP Specialist called patient for scheduled Advance Care Planning appointment. Pt did not answer and his voicemail box was full. ACP Specialist will call patient again in 1 week to try to contact him and assist him with obtaining a rescheduled appointment date and time if he wishes to proceed with ACP discussion. [x]  Additional Outreach -  Date: 8/24/2023 (Specify Dates & special circumstances): Chart reviewed    Outcomes: ACP Specialist reviewed chart prior to making follow up telephone call to offer rescheduled ACP appointment that was missed on 8/17/2023.

## 2023-08-24 NOTE — ACP (ADVANCE CARE PLANNING)
Advance Care Planning     Advance Care Planning Inpatient Note  Spiritual Care Department    Today's Date: 8/24/2023  Unit: [facility] 2S TELEMETRY    Received request from rounding. Upon review of chart and communication with care team, patient's decision making abilities are not in question. . Patient and Spouse was/were present in the room during visit. Goals of ACP Conversation:  Discuss advance care planning documents    Health Care Decision Makers:     No healthcare decision makers have been documented. Click here to complete [address] including selection of the Healthcare Decision Maker Relationship (ie \"Primary\")  Summary:  Documented Next of Kin, per patient report  Patient declined ACP conversation saying that he already knew about it but is not interested to make one. Advance Care Planning Documents (Patient Wishes):  None     Assessment:  Patient's health care decision is not influenced by anybody else. ACP discussion is deferred by patient.   Interventions:  Patient DECLINED ACP conversation    Care Preferences Communicated:   No    Outcomes/Plan:  ACP Discussion: Refused    Electronically signed by Norbert Houston [facility] on 8/24/2023 at 6:31 PM

## 2023-08-24 NOTE — H&P
25 (H) 7.0 - 18 MG/DL    Creatinine 1.88 (H) 0.6 - 1.3 MG/DL    Bun/Cre Ratio 13 12 - 20      Est, Glom Filt Rate 38 (L) >60 ml/min/1.73m2    Calcium 9.0 8.5 - 10.1 MG/DL    Total Bilirubin 1.7 (H) 0.2 - 1.0 MG/DL    ALT 17 16 - 61 U/L    AST 21 10 - 38 U/L    Alk Phosphatase 49 45 - 117 U/L    Total Protein 8.2 6.4 - 8.2 g/dL    Albumin 3.3 (L) 3.4 - 5.0 g/dL    Globulin 4.9 (H) 2.0 - 4.0 g/dL    Albumin/Globulin Ratio 0.7 (L) 0.8 - 1.7     Brain Natriuretic Peptide    Collection Time: 08/23/23 12:40 PM   Result Value Ref Range    NT Pro-BNP 1,203 (H) 0 - 900 PG/ML   EKG 12 Lead    Collection Time: 08/23/23  1:34 PM   Result Value Ref Range    Ventricular Rate 90 BPM    Atrial Rate 87 BPM    QRS Duration 88 ms    Q-T Interval 368 ms    QTc Calculation (Bazett) 450 ms    R Axis 11 degrees    T Axis 20 degrees    Diagnosis       Atrial fibrillation with premature ventricular or aberrantly conducted   complexes  Abnormal ECG  When compared with ECG of 04-MAR-2023 09:34,  Nonspecific T wave abnormality, improved in Lateral leads  Confirmed by Manas Sparrow MD, Tate Tamayo ([id number]) on 8/23/2023 5:09:31 PM         Imaging Reviewed:  XR CHEST (2 VW)    Result Date: 8/23/2023  Enlarged cardiac silhouette with pulmonary vascular congestion and likely perihilar interstitial edema and trace pleural effusions. EKG, A-fib, heart rate 90    Anel Sears MD  8/23/2023, 10:55 PM        Disclaimer: Sections of this note are dictated using utilizing voice recognition software. Minor typographical errors may be present. If questions arise, please do not hesitate to contact me or call our department.

## 2023-08-25 ENCOUNTER — APPOINTMENT (OUTPATIENT)
Facility: HOSPITAL | Age: 68
DRG: 291 | End: 2023-08-25
Payer: MEDICARE

## 2023-08-25 PROBLEM — E78.5 HYPERLIPIDEMIA: Status: ACTIVE | Noted: 2023-08-25

## 2023-08-25 LAB
ANION GAP SERPL CALC-SCNC: 6 MMOL/L (ref 3–18)
BASOPHILS # BLD: 0 K/UL (ref 0–0.1)
BASOPHILS NFR BLD: 1 % (ref 0–2)
BUN SERPL-MCNC: 20 MG/DL (ref 7–18)
BUN/CREAT SERPL: 13 (ref 12–20)
CALCIUM SERPL-MCNC: 9.3 MG/DL (ref 8.5–10.1)
CHLORIDE SERPL-SCNC: 106 MMOL/L (ref 100–111)
CO2 SERPL-SCNC: 29 MMOL/L (ref 21–32)
CREAT SERPL-MCNC: 1.6 MG/DL (ref 0.6–1.3)
CREAT UR-MCNC: 48 MG/DL (ref 30–125)
DIFFERENTIAL METHOD BLD: ABNORMAL
ECHO AO ASC DIAM: 3.2 CM
ECHO AO ASCENDING AORTA INDEX: 1.18 CM/M2
ECHO AO ROOT DIAM: 3.7 CM
ECHO AO ROOT INDEX: 1.37 CM/M2
ECHO AV AREA PEAK VELOCITY: 1.9 CM2
ECHO AV AREA VTI: 2.1 CM2
ECHO AV AREA/BSA PEAK VELOCITY: 0.7 CM2/M2
ECHO AV AREA/BSA VTI: 0.8 CM2/M2
ECHO AV MEAN GRADIENT: 4 MMHG
ECHO AV MEAN VELOCITY: 0.9 M/S
ECHO AV PEAK GRADIENT: 6 MMHG
ECHO AV PEAK VELOCITY: 1.3 M/S
ECHO AV VELOCITY RATIO: 0.54
ECHO AV VTI: 21.4 CM
ECHO BSA: 2.81 M2
ECHO EST RA PRESSURE: 15 MMHG
ECHO LA VOL 2C: 111 ML (ref 18–58)
ECHO LA VOL 2C: 117 ML (ref 18–58)
ECHO LA VOL 4C: 102 ML (ref 18–58)
ECHO LA VOL 4C: 94 ML (ref 18–58)
ECHO LA VOL BP: 105 ML (ref 18–58)
ECHO LA VOL/BSA BIPLANE: 39 ML/M2 (ref 16–34)
ECHO LA VOLUME AREA LENGTH: 113 ML
ECHO LA VOLUME INDEX AREA LENGTH: 42 ML/M2 (ref 16–34)
ECHO LV EDV A2C: 114 ML
ECHO LV EDV A4C: 81 ML
ECHO LV EDV BP: 97 ML (ref 67–155)
ECHO LV EDV INDEX A4C: 30 ML/M2
ECHO LV EDV INDEX BP: 36 ML/M2
ECHO LV EDV NDEX A2C: 42 ML/M2
ECHO LV EJECTION FRACTION A2C: 54 %
ECHO LV EJECTION FRACTION A4C: 52 %
ECHO LV EJECTION FRACTION BIPLANE: 53 % (ref 55–100)
ECHO LV ESV A2C: 52 ML
ECHO LV ESV A4C: 39 ML
ECHO LV ESV BP: 45 ML (ref 22–58)
ECHO LV ESV INDEX A2C: 19 ML/M2
ECHO LV ESV INDEX A4C: 14 ML/M2
ECHO LV ESV INDEX BP: 17 ML/M2
ECHO LV FRACTIONAL SHORTENING: 19 % (ref 28–44)
ECHO LV INTERNAL DIMENSION DIASTOLE INDEX: 1.77 CM/M2
ECHO LV INTERNAL DIMENSION DIASTOLIC: 4.8 CM (ref 4.2–5.9)
ECHO LV INTERNAL DIMENSION SYSTOLIC INDEX: 1.44 CM/M2
ECHO LV INTERNAL DIMENSION SYSTOLIC: 3.9 CM
ECHO LV IVSD: 1 CM (ref 0.6–1)
ECHO LV MASS 2D: 194 G (ref 88–224)
ECHO LV MASS INDEX 2D: 71.6 G/M2 (ref 49–115)
ECHO LV POSTERIOR WALL DIASTOLIC: 1.2 CM (ref 0.6–1)
ECHO LV RELATIVE WALL THICKNESS RATIO: 0.5
ECHO LVOT AREA: 3.5 CM2
ECHO LVOT AV VTI INDEX: 0.62
ECHO LVOT DIAM: 2.1 CM
ECHO LVOT MEAN GRADIENT: 1 MMHG
ECHO LVOT PEAK GRADIENT: 2 MMHG
ECHO LVOT PEAK VELOCITY: 0.7 M/S
ECHO LVOT STROKE VOLUME INDEX: 17 ML/M2
ECHO LVOT SV: 46 ML
ECHO LVOT VTI: 13.3 CM
ECHO PV MAX VELOCITY: 1 M/S
ECHO PV PEAK GRADIENT: 4 MMHG
ECHO RA AREA 4C: 29.8 CM2
ECHO RIGHT VENTRICULAR SYSTOLIC PRESSURE (RVSP): 57 MMHG
ECHO RV BASAL DIMENSION: 5 CM
ECHO RV FREE WALL PEAK S': 10 CM/S
ECHO RV LONGITUDINAL DIMENSION: 8.7 CM
ECHO RV MID DIMENSION: 4.6 CM
ECHO RV TAPSE: 2.3 CM (ref 1.7–?)
ECHO TV REGURGITANT MAX VELOCITY: 3.23 M/S
ECHO TV REGURGITANT PEAK GRADIENT: 42 MMHG
EOSINOPHIL # BLD: 0.1 K/UL (ref 0–0.4)
EOSINOPHIL NFR BLD: 4 % (ref 0–5)
ERYTHROCYTE [DISTWIDTH] IN BLOOD BY AUTOMATED COUNT: 15.7 % (ref 11.6–14.5)
GLUCOSE SERPL-MCNC: 102 MG/DL (ref 74–99)
HCT VFR BLD AUTO: 35.1 % (ref 36–48)
HGB BLD-MCNC: 12 G/DL (ref 13–16)
IMM GRANULOCYTES # BLD AUTO: 0 K/UL (ref 0–0.04)
IMM GRANULOCYTES NFR BLD AUTO: 0 % (ref 0–0.5)
INR PPP: 3.2 (ref 0.9–1.1)
KAPPA LC FREE SER-MCNC: 114.7 MG/L (ref 3.3–19.4)
KAPPA LC FREE/LAMBDA FREE SER: 3.08 (ref 0.26–1.65)
LAMBDA LC FREE SERPL-MCNC: 37.3 MG/L (ref 5.7–26.3)
LYMPHOCYTES # BLD: 1 K/UL (ref 0.9–3.6)
LYMPHOCYTES NFR BLD: 29 % (ref 21–52)
MAGNESIUM SERPL-MCNC: 2.2 MG/DL (ref 1.6–2.6)
MCH RBC QN AUTO: 30.4 PG (ref 24–34)
MCHC RBC AUTO-ENTMCNC: 34.2 G/DL (ref 31–37)
MCV RBC AUTO: 88.9 FL (ref 78–100)
MONOCYTES # BLD: 0.6 K/UL (ref 0.05–1.2)
MONOCYTES NFR BLD: 18 % (ref 3–10)
NEUTS SEG # BLD: 1.7 K/UL (ref 1.8–8)
NEUTS SEG NFR BLD: 48 % (ref 40–73)
NRBC # BLD: 0 K/UL (ref 0–0.01)
NRBC BLD-RTO: 0 PER 100 WBC
PLATELET # BLD AUTO: 126 K/UL (ref 135–420)
PMV BLD AUTO: 12.1 FL (ref 9.2–11.8)
POTASSIUM SERPL-SCNC: 3.6 MMOL/L (ref 3.5–5.5)
PROT UR-MCNC: 8 MG/DL
PROTHROMBIN TIME: 32.9 SEC (ref 11.9–14.7)
RBC # BLD AUTO: 3.95 M/UL (ref 4.35–5.65)
SODIUM SERPL-SCNC: 141 MMOL/L (ref 136–145)
WBC # BLD AUTO: 3.6 K/UL (ref 4.6–13.2)

## 2023-08-25 PROCEDURE — C8929 TTE W OR WO FOL WCON,DOPPLER: HCPCS

## 2023-08-25 PROCEDURE — 99232 SBSQ HOSP IP/OBS MODERATE 35: CPT | Performed by: INTERNAL MEDICINE

## 2023-08-25 PROCEDURE — 94761 N-INVAS EAR/PLS OXIMETRY MLT: CPT

## 2023-08-25 PROCEDURE — 6370000000 HC RX 637 (ALT 250 FOR IP): Performed by: INTERNAL MEDICINE

## 2023-08-25 PROCEDURE — 85610 PROTHROMBIN TIME: CPT

## 2023-08-25 PROCEDURE — 83735 ASSAY OF MAGNESIUM: CPT

## 2023-08-25 PROCEDURE — 1100000003 HC PRIVATE W/ TELEMETRY

## 2023-08-25 PROCEDURE — 84156 ASSAY OF PROTEIN URINE: CPT

## 2023-08-25 PROCEDURE — 6360000002 HC RX W HCPCS: Performed by: INTERNAL MEDICINE

## 2023-08-25 PROCEDURE — 82570 ASSAY OF URINE CREATININE: CPT

## 2023-08-25 PROCEDURE — 80048 BASIC METABOLIC PNL TOTAL CA: CPT

## 2023-08-25 PROCEDURE — 2580000003 HC RX 258: Performed by: INTERNAL MEDICINE

## 2023-08-25 PROCEDURE — 36415 COLL VENOUS BLD VENIPUNCTURE: CPT

## 2023-08-25 PROCEDURE — 6360000004 HC RX CONTRAST MEDICATION: Performed by: HOSPITALIST

## 2023-08-25 PROCEDURE — 85025 COMPLETE CBC W/AUTO DIFF WBC: CPT

## 2023-08-25 RX ORDER — FUROSEMIDE 10 MG/ML
40 INJECTION INTRAMUSCULAR; INTRAVENOUS 3 TIMES DAILY
Status: COMPLETED | OUTPATIENT
Start: 2023-08-25 | End: 2023-08-26

## 2023-08-25 RX ADMIN — HYDRALAZINE HYDROCHLORIDE 50 MG: 50 TABLET, FILM COATED ORAL at 20:55

## 2023-08-25 RX ADMIN — FUROSEMIDE 40 MG: 10 INJECTION, SOLUTION INTRAMUSCULAR; INTRAVENOUS at 23:55

## 2023-08-25 RX ADMIN — SODIUM CHLORIDE, PRESERVATIVE FREE 10 ML: 5 INJECTION INTRAVENOUS at 21:03

## 2023-08-25 RX ADMIN — HYDRALAZINE HYDROCHLORIDE 50 MG: 50 TABLET, FILM COATED ORAL at 08:58

## 2023-08-25 RX ADMIN — SODIUM CHLORIDE, PRESERVATIVE FREE 5 ML: 5 INJECTION INTRAVENOUS at 09:00

## 2023-08-25 RX ADMIN — FUROSEMIDE 40 MG: 10 INJECTION, SOLUTION INTRAMUSCULAR; INTRAVENOUS at 02:01

## 2023-08-25 RX ADMIN — FUROSEMIDE 40 MG: 10 INJECTION, SOLUTION INTRAMUSCULAR; INTRAVENOUS at 08:58

## 2023-08-25 RX ADMIN — POLYETHYLENE GLYCOL 3350 17 G: 17 POWDER, FOR SOLUTION ORAL at 20:55

## 2023-08-25 RX ADMIN — CARVEDILOL 12.5 MG: 12.5 TABLET, FILM COATED ORAL at 08:58

## 2023-08-25 RX ADMIN — PERFLUTREN 2 ML: 6.52 INJECTION, SUSPENSION INTRAVENOUS at 15:00

## 2023-08-25 RX ADMIN — FUROSEMIDE 40 MG: 10 INJECTION, SOLUTION INTRAMUSCULAR; INTRAVENOUS at 17:02

## 2023-08-25 RX ADMIN — HYDRALAZINE HYDROCHLORIDE 50 MG: 50 TABLET, FILM COATED ORAL at 14:05

## 2023-08-25 RX ADMIN — CARVEDILOL 12.5 MG: 12.5 TABLET, FILM COATED ORAL at 20:55

## 2023-08-25 ASSESSMENT — PAIN SCALES - GENERAL
PAINLEVEL_OUTOF10: 0
PAINLEVEL_OUTOF10: 0

## 2023-08-26 PROBLEM — N17.9 ACUTE RENAL FAILURE (HCC): Status: ACTIVE | Noted: 2023-08-26

## 2023-08-26 PROBLEM — N18.2 CKD (CHRONIC KIDNEY DISEASE), STAGE II: Status: ACTIVE | Noted: 2023-08-26

## 2023-08-26 LAB
ANION GAP SERPL CALC-SCNC: 5 MMOL/L (ref 3–18)
BUN SERPL-MCNC: 17 MG/DL (ref 7–18)
BUN/CREAT SERPL: 12 (ref 12–20)
CALCIUM SERPL-MCNC: 9.1 MG/DL (ref 8.5–10.1)
CHLORIDE SERPL-SCNC: 103 MMOL/L (ref 100–111)
CO2 SERPL-SCNC: 32 MMOL/L (ref 21–32)
CREAT SERPL-MCNC: 1.45 MG/DL (ref 0.6–1.3)
GLUCOSE SERPL-MCNC: 92 MG/DL (ref 74–99)
INR PPP: 2.5 (ref 0.9–1.1)
POTASSIUM SERPL-SCNC: 3.5 MMOL/L (ref 3.5–5.5)
PROTHROMBIN TIME: 26.8 SEC (ref 11.9–14.7)
SODIUM SERPL-SCNC: 140 MMOL/L (ref 136–145)

## 2023-08-26 PROCEDURE — 6370000000 HC RX 637 (ALT 250 FOR IP): Performed by: HOSPITALIST

## 2023-08-26 PROCEDURE — 80048 BASIC METABOLIC PNL TOTAL CA: CPT

## 2023-08-26 PROCEDURE — 99232 SBSQ HOSP IP/OBS MODERATE 35: CPT | Performed by: HOSPITALIST

## 2023-08-26 PROCEDURE — 85610 PROTHROMBIN TIME: CPT

## 2023-08-26 PROCEDURE — 6360000002 HC RX W HCPCS: Performed by: INTERNAL MEDICINE

## 2023-08-26 PROCEDURE — 36415 COLL VENOUS BLD VENIPUNCTURE: CPT

## 2023-08-26 PROCEDURE — 6370000000 HC RX 637 (ALT 250 FOR IP): Performed by: PHYSICIAN ASSISTANT

## 2023-08-26 PROCEDURE — 94761 N-INVAS EAR/PLS OXIMETRY MLT: CPT

## 2023-08-26 PROCEDURE — 1100000003 HC PRIVATE W/ TELEMETRY

## 2023-08-26 PROCEDURE — 97161 PT EVAL LOW COMPLEX 20 MIN: CPT

## 2023-08-26 PROCEDURE — 97530 THERAPEUTIC ACTIVITIES: CPT

## 2023-08-26 PROCEDURE — 6370000000 HC RX 637 (ALT 250 FOR IP): Performed by: INTERNAL MEDICINE

## 2023-08-26 PROCEDURE — 2580000003 HC RX 258: Performed by: INTERNAL MEDICINE

## 2023-08-26 RX ORDER — WARFARIN SODIUM 7.5 MG/1
7.5 TABLET ORAL
Status: COMPLETED | OUTPATIENT
Start: 2023-08-26 | End: 2023-08-26

## 2023-08-26 RX ORDER — FUROSEMIDE 40 MG/1
40 TABLET ORAL 2 TIMES DAILY
Status: DISCONTINUED | OUTPATIENT
Start: 2023-08-27 | End: 2023-08-27 | Stop reason: HOSPADM

## 2023-08-26 RX ADMIN — SODIUM CHLORIDE, PRESERVATIVE FREE 10 ML: 5 INJECTION INTRAVENOUS at 21:58

## 2023-08-26 RX ADMIN — SACUBITRIL AND VALSARTAN 1 TABLET: 49; 51 TABLET, FILM COATED ORAL at 09:10

## 2023-08-26 RX ADMIN — HYDRALAZINE HYDROCHLORIDE 50 MG: 50 TABLET, FILM COATED ORAL at 17:43

## 2023-08-26 RX ADMIN — CARVEDILOL 12.5 MG: 12.5 TABLET, FILM COATED ORAL at 09:09

## 2023-08-26 RX ADMIN — HYDRALAZINE HYDROCHLORIDE 50 MG: 50 TABLET, FILM COATED ORAL at 09:10

## 2023-08-26 RX ADMIN — CARVEDILOL 12.5 MG: 12.5 TABLET, FILM COATED ORAL at 21:57

## 2023-08-26 RX ADMIN — FUROSEMIDE 40 MG: 10 INJECTION, SOLUTION INTRAMUSCULAR; INTRAVENOUS at 17:44

## 2023-08-26 RX ADMIN — WARFARIN SODIUM 7.5 MG: 7.5 TABLET ORAL at 17:43

## 2023-08-26 RX ADMIN — SACUBITRIL AND VALSARTAN 1 TABLET: 49; 51 TABLET, FILM COATED ORAL at 21:57

## 2023-08-26 RX ADMIN — FUROSEMIDE 40 MG: 10 INJECTION, SOLUTION INTRAMUSCULAR; INTRAVENOUS at 09:09

## 2023-08-26 RX ADMIN — SODIUM CHLORIDE, PRESERVATIVE FREE 10 ML: 5 INJECTION INTRAVENOUS at 09:11

## 2023-08-26 ASSESSMENT — PAIN SCALES - GENERAL
PAINLEVEL_OUTOF10: 0

## 2023-08-26 NOTE — PLAN OF CARE
Problem: Discharge Planning  Goal: Discharge to home or other facility with appropriate resources  8/25/2023 1311 by Solomon Mendosa RN  Outcome: Progressing  8/25/2023 1248 by Solomon Mendosa RN  Outcome: Progressing  Flowsheets (Taken 8/25/2023 8196)  Discharge to home or other facility with appropriate resources: Identify barriers to discharge with patient and caregiver     Problem: Safety - Adult  Goal: Free from fall injury  8/25/2023 1311 by Solomon Mendosa RN  Outcome: Progressing  8/25/2023 1248 by Solomon Mendosa RN  Outcome: Progressing     Problem: Chronic Conditions and Co-morbidities  Goal: Patient's chronic conditions and co-morbidity symptoms are monitored and maintained or improved  8/25/2023 1311 by Solomon Mendosa RN  Outcome: Progressing  8/25/2023 1248 by Solomon Mendosa RN  Outcome: Progressing  Flowsheets (Taken 8/25/2023 1338)  Care Plan - Patient's Chronic Conditions and Co-Morbidity Symptoms are Monitored and Maintained or Improved: Monitor and assess patient's chronic conditions and comorbid symptoms for stability, deterioration, or improvement
Problem: Discharge Planning  Goal: Discharge to home or other facility with appropriate resources  Outcome: Progressing  Flowsheets (Taken 8/24/2023 7869)  Discharge to home or other facility with appropriate resources: Identify barriers to discharge with patient and caregiver     Problem: Safety - Adult  Goal: Free from fall injury  Outcome: Progressing     Problem: Chronic Conditions and Co-morbidities  Goal: Patient's chronic conditions and co-morbidity symptoms are monitored and maintained or improved  Outcome: Progressing  Flowsheets (Taken 8/24/2023 2404)  Care Plan - Patient's Chronic Conditions and Co-Morbidity Symptoms are Monitored and Maintained or Improved: Monitor and assess patient's chronic conditions and comorbid symptoms for stability, deterioration, or improvement
Problem: Discharge Planning  Goal: Discharge to home or other facility with appropriate resources  Outcome: Progressing  Flowsheets (Taken 8/25/2023 0855)  Discharge to home or other facility with appropriate resources: Identify barriers to discharge with patient and caregiver     Problem: Safety - Adult  Goal: Free from fall injury  Outcome: Progressing     Problem: Chronic Conditions and Co-morbidities  Goal: Patient's chronic conditions and co-morbidity symptoms are monitored and maintained or improved  Outcome: Progressing  Flowsheets (Taken 8/25/2023 0855)  Care Plan - Patient's Chronic Conditions and Co-Morbidity Symptoms are Monitored and Maintained or Improved: Monitor and assess patient's chronic conditions and comorbid symptoms for stability, deterioration, or improvement
Past Medical History:   Diagnosis Date    Atrial fibrillation (HCC)     Chronic diastolic heart failure (HCC)     Hypertension     Mitral valve disorders(424.0)     Prediabetes      Past Surgical History:   Procedure Laterality Date    ADENOIDECTOMY      COLONOSCOPY N/A 1/24/2020    COLONOSCOPY performed by Caryle Santiago, MD at [address]    infection drained from under chin    TONSILLECTOMY         Home Situation:  Social/Functional History  Lives With: Spouse  Type of Home: House  Home Layout: One level  Home Access: Level entry  Bathroom Shower/Tub: Tub/Shower unit  Bathroom Toilet: Standard  Bathroom Equipment: None  Receives Help From: Family  ADL Assistance: Independent  Ambulation Assistance: Independent  Transfer Assistance: Independent  Active : Yes  Critical Behavior:  Orientation  Overall Orientation Status: Within Normal Limits  Orientation Level: Oriented X4  Cognition  Overall Cognitive Status: WNL  Strength:    Strength: Generally decreased, functional  Tone & Sensation:   Tone: Normal  Sensation: Intact  Range Of Motion:  AROM: Within functional limits  PROM: Within functional limits  Functional Mobility:  Bed Mobility:  Bed Mobility Training  Bed Mobility Training: Yes  Overall Level of Assistance: Modified independent  Supine to Sit: Modified independent  Sit to Supine: Modified independent  Transfers:  Transfer Training  Transfer Training: Yes  Overall Level of Assistance: Modified independent  Sit to Stand: Modified independent  Stand to Sit: Modified independent  Balance:   Balance  Sitting: Intact  Standing: Impaired  Standing - Static: Good  Standing - Dynamic: Good  Ambulation/Gait Training:  Gait  Overall Level of Assistance: Stand-by assistance  Speed/Roselia: Slow  Step Length: Left shortened;Right shortened  Distance (ft): 50 Feet  Assistive Device: Other (comment) (none)  Pain:  Pain level pre-treatment: 0/10   Pain level post-treatment:

## 2023-08-27 VITALS
WEIGHT: 315 LBS | TEMPERATURE: 99.2 F | RESPIRATION RATE: 18 BRPM | OXYGEN SATURATION: 97 % | SYSTOLIC BLOOD PRESSURE: 130 MMHG | HEART RATE: 92 BPM | HEIGHT: 75 IN | DIASTOLIC BLOOD PRESSURE: 76 MMHG | BODY MASS INDEX: 39.17 KG/M2

## 2023-08-27 PROBLEM — I50.9 ACUTE ON CHRONIC CONGESTIVE HEART FAILURE, UNSPECIFIED HEART FAILURE TYPE (HCC): Status: RESOLVED | Noted: 2023-08-23 | Resolved: 2023-08-27

## 2023-08-27 PROBLEM — N17.9 ACUTE RENAL FAILURE (HCC): Status: RESOLVED | Noted: 2023-08-26 | Resolved: 2023-08-27

## 2023-08-27 LAB
ANION GAP SERPL CALC-SCNC: 7 MMOL/L (ref 3–18)
BUN SERPL-MCNC: 15 MG/DL (ref 7–18)
BUN/CREAT SERPL: 11 (ref 12–20)
CALCIUM SERPL-MCNC: 9.2 MG/DL (ref 8.5–10.1)
CHLORIDE SERPL-SCNC: 101 MMOL/L (ref 100–111)
CO2 SERPL-SCNC: 31 MMOL/L (ref 21–32)
CREAT SERPL-MCNC: 1.32 MG/DL (ref 0.6–1.3)
GLUCOSE SERPL-MCNC: 82 MG/DL (ref 74–99)
INR PPP: 1.8 (ref 0.9–1.1)
MAGNESIUM SERPL-MCNC: 1.9 MG/DL (ref 1.6–2.6)
POTASSIUM SERPL-SCNC: 3.4 MMOL/L (ref 3.5–5.5)
PROTHROMBIN TIME: 21.3 SEC (ref 11.9–14.7)
SODIUM SERPL-SCNC: 139 MMOL/L (ref 136–145)

## 2023-08-27 PROCEDURE — 99239 HOSP IP/OBS DSCHRG MGMT >30: CPT | Performed by: HOSPITALIST

## 2023-08-27 PROCEDURE — 6370000000 HC RX 637 (ALT 250 FOR IP): Performed by: INTERNAL MEDICINE

## 2023-08-27 PROCEDURE — 6370000000 HC RX 637 (ALT 250 FOR IP): Performed by: PHYSICIAN ASSISTANT

## 2023-08-27 PROCEDURE — 85610 PROTHROMBIN TIME: CPT

## 2023-08-27 PROCEDURE — 2580000003 HC RX 258: Performed by: INTERNAL MEDICINE

## 2023-08-27 PROCEDURE — 80048 BASIC METABOLIC PNL TOTAL CA: CPT

## 2023-08-27 PROCEDURE — 6370000000 HC RX 637 (ALT 250 FOR IP): Performed by: HOSPITALIST

## 2023-08-27 PROCEDURE — 36415 COLL VENOUS BLD VENIPUNCTURE: CPT

## 2023-08-27 PROCEDURE — 83735 ASSAY OF MAGNESIUM: CPT

## 2023-08-27 RX ORDER — POTASSIUM CHLORIDE 20 MEQ/1
40 TABLET, EXTENDED RELEASE ORAL ONCE
Status: COMPLETED | OUTPATIENT
Start: 2023-08-27 | End: 2023-08-27

## 2023-08-27 RX ORDER — FUROSEMIDE 40 MG/1
40 TABLET ORAL 2 TIMES DAILY
Qty: 180 TABLET | Refills: 1 | Status: SHIPPED | OUTPATIENT
Start: 2023-08-27

## 2023-08-27 RX ORDER — WARFARIN SODIUM 7.5 MG/1
7.5 TABLET ORAL
Status: DISCONTINUED | OUTPATIENT
Start: 2023-08-27 | End: 2023-08-27 | Stop reason: HOSPADM

## 2023-08-27 RX ORDER — POTASSIUM CHLORIDE 20 MEQ/1
20 TABLET, EXTENDED RELEASE ORAL DAILY
Qty: 30 TABLET | Refills: 0 | Status: SHIPPED | OUTPATIENT
Start: 2023-08-27

## 2023-08-27 RX ADMIN — HYDRALAZINE HYDROCHLORIDE 50 MG: 50 TABLET, FILM COATED ORAL at 09:07

## 2023-08-27 RX ADMIN — SACUBITRIL AND VALSARTAN 1 TABLET: 49; 51 TABLET, FILM COATED ORAL at 09:07

## 2023-08-27 RX ADMIN — FUROSEMIDE 40 MG: 40 TABLET ORAL at 09:07

## 2023-08-27 RX ADMIN — SODIUM CHLORIDE, PRESERVATIVE FREE 10 ML: 5 INJECTION INTRAVENOUS at 09:09

## 2023-08-27 RX ADMIN — POTASSIUM CHLORIDE 40 MEQ: 1500 TABLET, EXTENDED RELEASE ORAL at 09:08

## 2023-08-27 RX ADMIN — CARVEDILOL 12.5 MG: 12.5 TABLET, FILM COATED ORAL at 09:07

## 2023-08-27 ASSESSMENT — PAIN SCALES - GENERAL
PAINLEVEL_OUTOF10: 0
PAINLEVEL_OUTOF10: 0

## 2023-08-27 NOTE — DISCHARGE SUMMARY
doctor:       FOLLOW UP CARE:   Radha Cueva MD. Schedule an appointment as soon as possible for a visit   in 1 week(s). Specialty: Internal Medicine  Contact information:  [address] [facility]  [address]  [address] [facility] [address]             Abida Roper MD .    Specialty: Internal Medicine  Contact information:  [address] [facility]  [address]             Hetal Yoder MD. Schedule an appointment as soon as possible for a   visit in 2 week(s). Specialties: Cardiology, Internal Medicine  Contact information:  [address] [phone]                       Minutes spent on discharge: 40 minutes spent coordinating this discharge (review instructions/follow-up, prescriptions, preparing report for sign off)    Anupam Jones MD  8/27/2023 9:55 AM    Disclaimer: Sections of this note are dictated using utilizing voice recognition software. Minor typographical errors may be present. If questions arise, please do not hesitate to contact me or call our department.

## 2023-08-27 NOTE — CARE COORDINATION
D/C order noted for today. Orders reviewed. No needs identified at this time. Patient's wife refused home health services for patient. Patient's wife to transport patient home today for discharge. CM remains available if needed.              Sheri Gaming -[phone]

## 2023-08-27 NOTE — CARE COORDINATION
TYSHAWN spoke with patient's wife Vinod Pinzon, patient's wife refused Home Health services, and said she will be transporting patient home today  for discharge. Kathie Bondss Dr. Vincent Lazo and updated that patient's wife refused Home Health services.                Gi Fortune, RN  Case Management [phone]

## 2023-08-27 NOTE — DISCHARGE INSTRUCTIONS
Discharge Instructions    Patient: Chi Desouza Sr. MRN: [id number]  CSN: [id number]    YOB: 1955  Age: 77 y.o. Sex: male    DOA: 8/23/2023       DIET:  cardiac diet    ACTIVITY: activity as tolerated  Home health care for Skilled care for CHF management, Hypertension and medication management        ADDITIONAL INFORMATION: If you experience any of the following symptoms but not limited to Fever, chills, nausea, vomiting, diarrhea, change in mentation, falling, bleeding, shortness of breath, chest pain, please call your primary care physician or return to the emergency room if you cannot get hold of your doctor:     FOLLOW UP CARE:   Salome Gonsalez MD. Schedule an appointment as soon as possible for a visit   in 1 week(s). Specialty: Internal Medicine  Contact information:  [address] [facility]  [address]  [address] [place] [address]             MD Juliette Solano    Specialty: Internal Medicine  Contact information:  [address] [facility]  [address] [facility] [address] [facility]             Karen Valle MD. Schedule an appointment as soon as possible for a   visit in 2 week(s). Specialties: Cardiology, Internal Medicine  Contact information:  [address] [facility] [address] [place] [address]                       Eduardo Wiley MD  8/27/2023 9:54 AM               DISCHARGE SUMMARY from Nurse    PATIENT INSTRUCTIONS:    After general anesthesia or intravenous sedation, for 24 hours or while taking prescription Narcotics:  Limit your activities  Do not drive and operate hazardous machinery  Do not make important personal or business decisions  Do  not drink alcoholic beverages  If you have not urinated within 8 hours after discharge, please contact your surgeon on call.     Report the following to your surgeon:  Excessive pain, swelling, redness or odor of or around the surgical area  Temperature over 100.5  Nausea

## 2023-08-27 NOTE — CARE COORDINATION
08/27/23 1050   Service Assessment   Patient Orientation Unable to Assess   History Provided By Significant Other   Primary Caregiver Spouse   Support Systems Spouse/Significant Other   PCP Verified by CM Yes   Prior Functional Level Independent in ADLs/IADLs   Current Functional Level Independent in ADLs/IADLs   Can patient return to prior living arrangement Yes   Ability to make needs known: Good   Family able to assist with home care needs: Yes   Financial Resources [employer] None   Social/Functional History   Lives With Spouse   Type of Home Apartment   Home Layout One level   Home Access Level entry   Bathroom Shower/Tub Tub/Shower unit   [address] [facility] bars in [address]   Transfer Assistance Independent   Active  Yes   Mode of Transportation SUV   Occupation Retired   Discharge Planning   Type of [address] Prior To Admission None   Potential Assistance Needed N/A   DME Ordered? No   Potential Assistance Purchasing Medications No   Type of Home Care Services None   Patient expects to be discharged to: Apartment  (with Family Assistance.)   Services At/After Discharge   Transition of Care Consult (CM Consult) N/A   Services At/After Discharge None   Mode of Transport at Discharge Self  (Patient's wife will be transporting patient home at time of discharge.)   Confirm Follow Up Transport Self   Condition of Participation: Discharge Planning   The Plan for Transition of Care is related to the following treatment goals: Apartment with Family Assistance.                    Case Management Assessment  Initial Evaluation    Date/Time of Evaluation: 8/27/2023 10:53 AM  Assessment Completed by: Farrah Miller

## 2023-08-28 ENCOUNTER — CLINICAL DOCUMENTATION (OUTPATIENT)
Facility: HOSPITAL | Age: 68
End: 2023-08-28

## 2023-08-28 ENCOUNTER — CARE COORDINATION (OUTPATIENT)
Facility: CLINIC | Age: 68
End: 2023-08-28

## 2023-08-28 NOTE — CARE COORDINATION
Care Transitions Outreach Attempt    Call within 2 business days of discharge: Yes   Attempted to reach patient for transitions of care follow up. Unable to reach patient. Patient: Luis Carlos Lares Sr. Patient : 1955 MRN: [id number]    Last Discharge [address]       Date Complaint Diagnosis Description Type Department Provider    23 Edema Acute on chronic congestive heart failure, unspecified heart failure type ([address] [facility]) . .. ED to Hosp-Admission (Discharged) (ADMITTED) Chanel Sanchez MD; Ad. .. Was this an external facility discharge?  No   [facility]charge Facility: [facility]     Noted following upcoming appointments from discharge chart review:   [facility](s):   Future Appointments   Date Time Provider [facility] [address]   10/10/2023  3:20 PM IO LAB VISIT [facility] BS AMB   10/17/2023  3:40 PM Ruth Ann Robbins MD [facility] BS AMB   2024 10:00 AM SONI Goodman - J[facility] BS AMB

## 2023-08-28 NOTE — PROGRESS NOTES
Patient discharged from hospital over the weekend   Will schedule f/u with nephrology office in a few weeks   Continue Lasix 40 mg BID   Reassess on f/u    Please call with questions    Saintclair Saran, MD [facility]  Cell [phone]  Pager: [phone]  Fax   [phone]

## 2023-08-29 ENCOUNTER — CARE COORDINATION (OUTPATIENT)
Facility: CLINIC | Age: 68
End: 2023-08-29

## 2023-08-29 LAB
ALBUMIN SERPL ELPH-MCNC: 3.4 G/DL (ref 2.9–4.4)
ALBUMIN/GLOB SERPL: 0.8 (ref 0.7–1.7)
ALPHA1 GLOB SERPL ELPH-MCNC: 0.2 G/DL (ref 0–0.4)
ALPHA2 GLOB SERPL ELPH-MCNC: 0.6 G/DL (ref 0.4–1)
B-GLOBULIN SERPL ELPH-MCNC: 1.2 G/DL (ref 0.7–1.3)
GAMMA GLOB SERPL ELPH-MCNC: 2.3 G/DL (ref 0.4–1.8)
GLOBULIN SER CALC-MCNC: 4.3 G/DL (ref 2.2–3.9)
M PROTEIN SERPL ELPH-MCNC: ABNORMAL G/DL
PROT SERPL-MCNC: 7.7 G/DL (ref 6–8.5)

## 2023-08-29 NOTE — CARE COORDINATION
Care Transitions Outreach Attempt    Call within 2 business days of discharge: Yes   Attempted to reach patient for transitions of care follow up. Unable to reach patient. Patient: Natan Cabello Sr. Patient : 1955 MRN: [id number]    Last Discharge [address]       Date Complaint Diagnosis Description Type Department Provider    23 Edema Acute on chronic congestive heart failure, unspecified heart failure type ([address] [facility]) . .. ED to Hosp-Admission (Discharged) (ADMITTED) Maximiliano Canales MD; Ad. .. Was this an external facility discharge? No [facility]     Noted following upcoming appointments from discharge chart review:   [facility] follow up appointment(s):   Future Appointments   Date Time Provider [address]   10/10/2023  3:20 PM IO LAB VISIT [facility] BS AMB   10/17/2023  3:40 PM Camilla Badillo MD [facility] BS AMB   2024 10:00 AM Ania Garcia APRN - NP [facility] BS AMB     CTN unable to reach patient via phone call x2. Patient's emergency contact individual is not listed on patient's PHI form on file. MyChart status noted as pending. Care Transitions resolved.       Addend:   Care Transitions program closed

## 2023-09-05 ENCOUNTER — CLINICAL DOCUMENTATION (OUTPATIENT)
Dept: CASE MANAGEMENT | Age: 68
End: 2023-09-05

## 2023-09-05 NOTE — ACP (ADVANCE CARE PLANNING)
Advance Care Planning   Ambulatory ACP Specialist Patient Outreach    Date:  9/5/2023    ACP Specialist:  Kimberly Del Rio LCSW    Outreach call to patient in follow-up to ACP Specialist referral from:Delmer Ko MD       [x] PCP  [] Provider   [] Ambulatory Care Management [] Other      For:                  [x] Advance Directive Assistance              [] Complete Portable DNR order              [] Complete POST/POLST/MOST              [] Code Status Discussion             [] Discuss Goals of Care             [] Early ACP Decision-Making              [] Other (Specify)     Date Referral Received:7/17/23    Next Step:   [] ACP scheduled conversation  [] Outreach again in one/two weeks              [] Email / Mail [address]  [] Email / Mail Advance Directive   [x] Closing referral.  Routing closure to referring provider/staff and to ACP Specialist . [x] Closure letter mailed to patient with invitation to contact ACP Specialist if / when ready. [] Other (Specify here):         [x] At this time, Healthcare Decision Maker Is:        [] Primary agent named in scanned advance directive. [x] Legal Next of Kin. [] Unable to determine legal decision maker at this time. Outreaches:    []  Additional Outreach -  Date: 8/17/2023 (Specify Dates & special circumstances): Unable to leave voicemail message for patient    Outcomes: ACP Specialist called patient for scheduled Advance Care Planning appointment. Pt did not answer and his voicemail box was full. ACP Specialist will call patient again in 1 week to try to contact him and assist him with obtaining a rescheduled appointment date and time if he wishes to proceed with ACP discussion. []  Additional Outreach -  Date: 8/24/2023 (Specify Dates & special circumstances): Chart reviewed    Outcomes: ACP Specialist reviewed chart prior to making follow up telephone call to offer rescheduled ACP appointment that was missed on 8/17/2023.

## 2023-10-10 ENCOUNTER — NURSE ONLY (OUTPATIENT)
Age: 68
End: 2023-10-10

## 2023-10-10 DIAGNOSIS — Z13.0 SCREENING FOR ENDOCRINE, METABOLIC AND IMMUNITY DISORDER: ICD-10-CM

## 2023-10-10 DIAGNOSIS — Z13.228 SCREENING FOR ENDOCRINE, METABOLIC AND IMMUNITY DISORDER: ICD-10-CM

## 2023-10-10 DIAGNOSIS — Z13.29 SCREENING FOR ENDOCRINE, METABOLIC AND IMMUNITY DISORDER: ICD-10-CM

## 2023-10-11 LAB
ALBUMIN SERPL-MCNC: 3.4 G/DL (ref 3.9–4.9)
ALBUMIN/GLOB SERPL: 0.8 {RATIO} (ref 1.2–2.2)
ALP SERPL-CCNC: 49 IU/L (ref 44–121)
ALT SERPL-CCNC: 13 IU/L (ref 0–44)
AST SERPL-CCNC: 24 IU/L (ref 0–40)
BASOPHILS # BLD AUTO: 0 X10E3/UL (ref 0–0.2)
BASOPHILS NFR BLD AUTO: 1 %
BILIRUB SERPL-MCNC: 1.6 MG/DL (ref 0–1.2)
BUN SERPL-MCNC: 12 MG/DL (ref 8–27)
BUN/CREAT SERPL: 9 (ref 10–24)
CALCIUM SERPL-MCNC: 8.9 MG/DL (ref 8.6–10.2)
CHLORIDE SERPL-SCNC: 99 MMOL/L (ref 96–106)
CHOLEST SERPL-MCNC: 121 MG/DL (ref 100–199)
CO2 SERPL-SCNC: 29 MMOL/L (ref 20–29)
CREAT SERPL-MCNC: 1.29 MG/DL (ref 0.76–1.27)
EGFRCR SERPLBLD CKD-EPI 2021: 60 ML/MIN/1.73
EOSINOPHIL # BLD AUTO: 0.1 X10E3/UL (ref 0–0.4)
EOSINOPHIL NFR BLD AUTO: 3 %
ERYTHROCYTE [DISTWIDTH] IN BLOOD BY AUTOMATED COUNT: 15.1 % (ref 11.6–15.4)
GLOBULIN SER CALC-MCNC: 4.4 G/DL (ref 1.5–4.5)
GLUCOSE SERPL-MCNC: 152 MG/DL (ref 70–99)
HBA1C MFR BLD: 6 % (ref 4.8–5.6)
HCT VFR BLD AUTO: 36.2 % (ref 37.5–51)
HDLC SERPL-MCNC: 31 MG/DL
HGB BLD-MCNC: 12.5 G/DL (ref 13–17.7)
IMM GRANULOCYTES # BLD AUTO: 0 X10E3/UL (ref 0–0.1)
IMM GRANULOCYTES NFR BLD AUTO: 0 %
LDLC SERPL CALC-MCNC: 78 MG/DL (ref 0–99)
LYMPHOCYTES # BLD AUTO: 1.3 X10E3/UL (ref 0.7–3.1)
LYMPHOCYTES NFR BLD AUTO: 33 %
MCH RBC QN AUTO: 30.7 PG (ref 26.6–33)
MCHC RBC AUTO-ENTMCNC: 34.5 G/DL (ref 31.5–35.7)
MCV RBC AUTO: 89 FL (ref 79–97)
MONOCYTES # BLD AUTO: 0.7 X10E3/UL (ref 0.1–0.9)
MONOCYTES NFR BLD AUTO: 18 %
NEUTROPHILS # BLD AUTO: 1.8 X10E3/UL (ref 1.4–7)
NEUTROPHILS NFR BLD AUTO: 45 %
PLATELET # BLD AUTO: 187 X10E3/UL (ref 150–450)
POTASSIUM SERPL-SCNC: 3.5 MMOL/L (ref 3.5–5.2)
PROT SERPL-MCNC: 7.8 G/DL (ref 6–8.5)
PSA SERPL-MCNC: 1.4 NG/ML (ref 0–4)
RBC # BLD AUTO: 4.07 X10E6/UL (ref 4.14–5.8)
SODIUM SERPL-SCNC: 140 MMOL/L (ref 134–144)
SPECIMEN STATUS REPORT: NORMAL
TRIGL SERPL-MCNC: 51 MG/DL (ref 0–149)
VLDLC SERPL CALC-MCNC: 12 MG/DL (ref 5–40)
WBC # BLD AUTO: 3.9 X10E3/UL (ref 3.4–10.8)

## 2023-10-17 ENCOUNTER — OFFICE VISIT (OUTPATIENT)
Age: 68
End: 2023-10-17
Payer: MEDICARE

## 2023-10-17 DIAGNOSIS — Z79.01 ANTICOAGULANT LONG-TERM USE: ICD-10-CM

## 2023-10-17 DIAGNOSIS — Z00.00 HEALTHCARE MAINTENANCE: ICD-10-CM

## 2023-10-17 DIAGNOSIS — I48.20 CHRONIC ATRIAL FIBRILLATION (HCC): Primary | ICD-10-CM

## 2023-10-17 DIAGNOSIS — D64.9 ANEMIA, UNSPECIFIED TYPE: ICD-10-CM

## 2023-10-17 DIAGNOSIS — N18.2 CKD (CHRONIC KIDNEY DISEASE), STAGE II: ICD-10-CM

## 2023-10-17 DIAGNOSIS — Z12.11 SCREEN FOR COLON CANCER: ICD-10-CM

## 2023-10-17 DIAGNOSIS — I10 PRIMARY HYPERTENSION: ICD-10-CM

## 2023-10-17 DIAGNOSIS — R77.0 LOW SERUM ALBUMIN: ICD-10-CM

## 2023-10-17 DIAGNOSIS — I05.9 MITRAL VALVE DISORDER: ICD-10-CM

## 2023-10-17 DIAGNOSIS — I50.32 CHRONIC DIASTOLIC HEART FAILURE (HCC): ICD-10-CM

## 2023-10-17 DIAGNOSIS — R73.03 PREDIABETES: ICD-10-CM

## 2023-10-17 PROCEDURE — G8417 CALC BMI ABV UP PARAM F/U: HCPCS | Performed by: INTERNAL MEDICINE

## 2023-10-17 PROCEDURE — 3017F COLORECTAL CA SCREEN DOC REV: CPT | Performed by: INTERNAL MEDICINE

## 2023-10-17 PROCEDURE — 1036F TOBACCO NON-USER: CPT | Performed by: INTERNAL MEDICINE

## 2023-10-17 PROCEDURE — 3078F DIAST BP <80 MM HG: CPT | Performed by: INTERNAL MEDICINE

## 2023-10-17 PROCEDURE — 99214 OFFICE O/P EST MOD 30 MIN: CPT | Performed by: INTERNAL MEDICINE

## 2023-10-17 PROCEDURE — 3074F SYST BP LT 130 MM HG: CPT | Performed by: INTERNAL MEDICINE

## 2023-10-17 PROCEDURE — 1124F ACP DISCUSS-NO DSCNMKR DOCD: CPT | Performed by: INTERNAL MEDICINE

## 2023-10-17 PROCEDURE — G8484 FLU IMMUNIZE NO ADMIN: HCPCS | Performed by: INTERNAL MEDICINE

## 2023-10-17 PROCEDURE — G8427 DOCREV CUR MEDS BY ELIG CLIN: HCPCS | Performed by: INTERNAL MEDICINE

## 2023-10-18 ENCOUNTER — TELEPHONE (OUTPATIENT)
Age: 68
End: 2023-10-18

## 2023-10-18 VITALS
HEIGHT: 72 IN | OXYGEN SATURATION: 95 % | HEART RATE: 89 BPM | SYSTOLIC BLOOD PRESSURE: 156 MMHG | BODY MASS INDEX: 41.85 KG/M2 | TEMPERATURE: 97.3 F | WEIGHT: 309 LBS | RESPIRATION RATE: 18 BRPM | DIASTOLIC BLOOD PRESSURE: 84 MMHG

## 2023-10-18 PROBLEM — E11.9 TYPE 2 DIABETES MELLITUS (HCC): Status: RESOLVED | Noted: 2022-03-03 | Resolved: 2023-10-18

## 2023-10-18 PROBLEM — D64.9 ANEMIA: Status: ACTIVE | Noted: 2023-10-18

## 2023-10-18 PROBLEM — R77.0 LOW SERUM ALBUMIN: Status: ACTIVE | Noted: 2023-10-18

## 2023-10-18 RX ORDER — LANOLIN ALCOHOL/MO/W.PET/CERES
325 CREAM (GRAM) TOPICAL 2 TIMES DAILY
Qty: 90 TABLET | Refills: 3 | Status: SHIPPED | OUTPATIENT
Start: 2023-10-18

## 2023-10-18 SDOH — ECONOMIC STABILITY: FOOD INSECURITY: WITHIN THE PAST 12 MONTHS, YOU WORRIED THAT YOUR FOOD WOULD RUN OUT BEFORE YOU GOT MONEY TO BUY MORE.: NEVER TRUE

## 2023-10-18 SDOH — ECONOMIC STABILITY: INCOME INSECURITY: HOW HARD IS IT FOR YOU TO PAY FOR THE VERY BASICS LIKE FOOD, HOUSING, MEDICAL CARE, AND HEATING?: NOT HARD AT ALL

## 2023-10-18 SDOH — ECONOMIC STABILITY: FOOD INSECURITY: WITHIN THE PAST 12 MONTHS, THE FOOD YOU BOUGHT JUST DIDN'T LAST AND YOU DIDN'T HAVE MONEY TO GET MORE.: NEVER TRUE

## 2023-10-18 ASSESSMENT — ENCOUNTER SYMPTOMS
EYES NEGATIVE: 1
RESPIRATORY NEGATIVE: 1
ALLERGIC/IMMUNOLOGIC NEGATIVE: 1
GASTROINTESTINAL NEGATIVE: 1

## 2023-10-18 NOTE — ASSESSMENT & PLAN NOTE
BMI is out of normal parameters and plan is as follows: Discussed in great detail on diet, portion control, exercise, avoiding foods high in sugar, carbs and starches, fatty/greasy foods and to eat until satisfied not til full. I have counseled this patient on diet and exercise regimens as well.       HbA1c stable without medications

## 2023-10-18 NOTE — PROGRESS NOTES
Janet Jhaveri presents today for   Chief Complaint   Patient presents with    Follow-up                 1. \"Have you been to the ER, urgent care clinic since your last visit? Hospitalized since your last visit? \" no    2. \"Have you seen or consulted any other health care providers outside of the [address] [place] [address] since your last visit? \" no     3. For patients aged 43-73: Has the patient had a colonoscopy / FIT/ Cologuard? no      If the patient is female:    4. For patients aged 43-66: Has the patient had a mammogram within the past 2 years? NA - based on age or sex      11. For patients aged 21-65: Has the patient had a pap smear?  NA - based on age or sex
Cervical back: Normal range of motion and neck supple. Comments: Bilateral pedal edema left more than right   Skin:     General: Skin is warm. Neurological:      General: No focal deficit present. Mental Status: He is alert. Mental status is at baseline. We discussed the diagnosis, risks and benefits of medications    Disclaimer: The patient understands our medical plan. Alternatives have been explained and offered. The risks, benefits and significant side effects of all medications have been reviewed. Anticipated time course and progression of condition reviewed. All questions have been addressed. He is encouraged to employ the information provided in the after visit summary, which was reviewed. Where appropriate, he is instructed to call the clinic if he has not been notified either by phone or through [address] [place] [address] with the results of his tests or with an appointment plan for any referrals within 1 week(s). No news is not good news; it's no news. The patient  is to call if his condition worsens or fails to improve or if significant side effects are experienced. Aspects of this note may have been generated using voice recognition software. Despite editing, there may be unrecognized errors. An electronic signature was used to authenticate this note.   -- An Diamond MD

## 2023-10-18 NOTE — TELEPHONE ENCOUNTER
Please inform the patient to follow-up with GI for screening colonoscopy, low albumin. Referral placed.   Thanks

## 2023-10-20 NOTE — TELEPHONE ENCOUNTER
Patient has been advised, provided with the number for scheduling and referral faxed to [place]

## 2023-10-25 ENCOUNTER — ANTI-COAG VISIT (OUTPATIENT)
Age: 68
End: 2023-10-25

## 2023-10-25 DIAGNOSIS — I48.20 CHRONIC ATRIAL FIBRILLATION, UNSPECIFIED (HCC): Primary | ICD-10-CM

## 2023-10-25 LAB
POC INR: 3.3
PROTHROMBIN TIME, POC: ABNORMAL

## 2023-10-25 NOTE — PATIENT INSTRUCTIONS
Mr. Dorie Soler is here today for anticoagulation monitoring for the diagnosis of atrial fibrillation. His INR goal is 2.0-3.0 and his current Coumadin dose is 7.5mg nightly. Today's findings include an INR of 3.3     Considering Mr. Camacho's past history, todays findings, and per the coumadin policy/protocol, Mr. Hayder Martinez was instructed to take Coumadin as follows,  HOLD 10/25/23 otherwise; 7.5mg nightly . He was also instructed to come back in 1 weeks for an INR check. A full discussion of the nature of anticoagulants has been carried out. A full discussion of the need for frequent and regular monitoring, precise dosage adjustment and compliance was stressed. Side effects of potential bleeding were discussed and Mr. Hayder Martinez was instructed to call [phone] if there are any signs of abnormal bleeding. Mr. Hayder Martinez was instructed to avoid any OTC items containing aspirin or ibuprofen and prior to starting any new OTC products to consult with his physician or pharmacist to ensure no drug interactions are present. Mr. Hayder Martinez was instructed to avoid any major changes in his general diet and to avoid alcohol consumption. .      Mr. Hayder Martinez verbalized his understanding of all instructions and will call the office with any questions, concerns, or signs of abnormal bleeding or blood clot.

## 2023-10-31 RX ORDER — CARVEDILOL 12.5 MG/1
12.5 TABLET ORAL 2 TIMES DAILY
Qty: 180 TABLET | Refills: 2 | Status: SHIPPED | OUTPATIENT
Start: 2023-10-31

## 2023-11-17 PROBLEM — Z00.00 HEALTHCARE MAINTENANCE: Status: RESOLVED | Noted: 2023-06-20 | Resolved: 2023-11-17

## 2023-11-28 ENCOUNTER — TELEPHONE (OUTPATIENT)
Age: 68
End: 2023-11-28

## 2023-11-30 DIAGNOSIS — I50.9 ACUTE ON CHRONIC CONGESTIVE HEART FAILURE, UNSPECIFIED HEART FAILURE TYPE (HCC): ICD-10-CM

## 2024-01-05 RX ORDER — HYDRALAZINE HYDROCHLORIDE 50 MG/1
50 TABLET, FILM COATED ORAL 3 TIMES DAILY
Qty: 270 TABLET | Refills: 2 | Status: SHIPPED | OUTPATIENT
Start: 2024-01-05

## 2024-01-29 ENCOUNTER — ANTI-COAG VISIT (OUTPATIENT)
Age: 69
End: 2024-01-29
Payer: MEDICARE

## 2024-01-29 ENCOUNTER — OFFICE VISIT (OUTPATIENT)
Age: 69
End: 2024-01-29
Payer: MEDICARE

## 2024-01-29 ENCOUNTER — ANTI-COAG VISIT (OUTPATIENT)
Age: 69
End: 2024-01-29

## 2024-01-29 VITALS
BODY MASS INDEX: 42.66 KG/M2 | OXYGEN SATURATION: 96 % | DIASTOLIC BLOOD PRESSURE: 63 MMHG | WEIGHT: 315 LBS | HEIGHT: 72 IN | SYSTOLIC BLOOD PRESSURE: 126 MMHG | HEART RATE: 100 BPM

## 2024-01-29 DIAGNOSIS — I48.20 CHRONIC ATRIAL FIBRILLATION, UNSPECIFIED (HCC): Primary | ICD-10-CM

## 2024-01-29 DIAGNOSIS — E78.2 MIXED HYPERLIPIDEMIA: ICD-10-CM

## 2024-01-29 DIAGNOSIS — I50.32 CHRONIC DIASTOLIC (CONGESTIVE) HEART FAILURE (HCC): ICD-10-CM

## 2024-01-29 DIAGNOSIS — E66.01 MORBID (SEVERE) OBESITY DUE TO EXCESS CALORIES (HCC): ICD-10-CM

## 2024-01-29 DIAGNOSIS — I10 ESSENTIAL (PRIMARY) HYPERTENSION: ICD-10-CM

## 2024-01-29 DIAGNOSIS — Z79.01 LONG TERM (CURRENT) USE OF ANTICOAGULANTS: ICD-10-CM

## 2024-01-29 DIAGNOSIS — I48.91 ATRIAL FIBRILLATION (HCC): Primary | ICD-10-CM

## 2024-01-29 LAB
POC INR: 3.3
PROTHROMBIN TIME, POC: NORMAL

## 2024-01-29 PROCEDURE — 99214 OFFICE O/P EST MOD 30 MIN: CPT | Performed by: NURSE PRACTITIONER

## 2024-01-29 PROCEDURE — G8484 FLU IMMUNIZE NO ADMIN: HCPCS | Performed by: NURSE PRACTITIONER

## 2024-01-29 PROCEDURE — G8427 DOCREV CUR MEDS BY ELIG CLIN: HCPCS | Performed by: NURSE PRACTITIONER

## 2024-01-29 PROCEDURE — 85610 PROTHROMBIN TIME: CPT | Performed by: NURSE PRACTITIONER

## 2024-01-29 PROCEDURE — G8417 CALC BMI ABV UP PARAM F/U: HCPCS | Performed by: NURSE PRACTITIONER

## 2024-01-29 PROCEDURE — 3074F SYST BP LT 130 MM HG: CPT | Performed by: NURSE PRACTITIONER

## 2024-01-29 PROCEDURE — 3017F COLORECTAL CA SCREEN DOC REV: CPT | Performed by: NURSE PRACTITIONER

## 2024-01-29 PROCEDURE — 1124F ACP DISCUSS-NO DSCNMKR DOCD: CPT | Performed by: NURSE PRACTITIONER

## 2024-01-29 PROCEDURE — 1036F TOBACCO NON-USER: CPT | Performed by: NURSE PRACTITIONER

## 2024-01-29 PROCEDURE — 3078F DIAST BP <80 MM HG: CPT | Performed by: NURSE PRACTITIONER

## 2024-01-29 RX ORDER — METOLAZONE 5 MG/1
5 TABLET ORAL DAILY
Qty: 30 TABLET | Refills: 0 | Status: SHIPPED | OUTPATIENT
Start: 2024-01-29

## 2024-01-29 RX ORDER — CARVEDILOL 25 MG/1
25 TABLET ORAL 2 TIMES DAILY WITH MEALS
Qty: 180 TABLET | Refills: 2 | Status: SHIPPED | OUTPATIENT
Start: 2024-01-29

## 2024-01-29 NOTE — PATIENT INSTRUCTIONS
Mr. Ishan Camacho is here today for anticoagulation monitoring for the diagnosis of atrial fibrillation.  His INR goal is 2.0-3.0 and his current Coumadin dose is 7.5mg.     Today's findings include an INR of 3.3     Considering Mr. Camacho's past history, todays findings, and per the coumadin policy/protocol, Mr. Camacho was instructed to take Coumadin as follows,  2.5mg 1/29/24 otherwise; 7.5mg.  He was also instructed to come back in 2 weeks for an INR check.    A full discussion of the nature of anticoagulants has been carried out.  A full discussion of the need for frequent and regular monitoring, precise dosage adjustment and compliance was stressed.  Side effects of potential bleeding were discussed and Mr. Camcaho was instructed to call [phone] if there are any signs of abnormal bleeding.  Mr. Camacho was instructed to avoid any OTC items containing aspirin or ibuprofen and prior to starting any new OTC products to consult with his physician or pharmacist to ensure no drug interactions are present.  Mr. Camacho was instructed to avoid any major changes in his general diet and to avoid alcohol consumption.  .      Mr. Camacho verbalized his understanding of all instructions and will call the office with any questions, concerns, or signs of abnormal bleeding or blood clot.

## 2024-01-29 NOTE — PATIENT INSTRUCTIONS
Mr. Ishan Camacho is here today for anticoagulation monitoring for the diagnosis of atrial fibrillation.  His INR goal is 2.0-3.0 and his current Coumadin dose is 7.5 mg daily.     Today's findings include an INR of 3.3     Considering Mr. Camacho's past history, todays findings, and per the coumadin policy/protocol, Mr. Camacho was instructed to take Coumadin as follows,  7.5 mg daily.  He was also instructed to come back in 2 weeks for an INR check.    A full discussion of the nature of anticoagulants has been carried out.  A full discussion of the need for frequent and regular monitoring, precise dosage adjustment and compliance was stressed.  Side effects of potential bleeding were discussed and Mr. Camacho was instructed to call [phone] if there are any signs of abnormal bleeding.  Mr. Camacho was instructed to avoid any OTC items containing aspirin or ibuprofen and prior to starting any new OTC products to consult with his physician or pharmacist to ensure no drug interactions are present.  Mr. Camacho was instructed to avoid any major changes in his general diet and to avoid alcohol consumption.  .      Mr. Camacho verbalized his understanding of all instructions and will call the office with any questions, concerns, or signs of abnormal bleeding or blood clot.

## 2024-01-29 NOTE — PROGRESS NOTES
1. Have you been to the ER, urgent care clinic since your last visit?  Hospitalized since your last visit?     no      2.  Where do you normally have your labs drawn?       3. Do you need any refills today?   no           
twice a day to was discontinued in past due to bradycardia associated with digoxin and Coreg use.  Currently off digoxin.  Also LDL of 126 and 10/2018.  Continue with diet and weight loss  9/2019  Cardiac status stable continue current treatment  2/2020  Cardiac status stable.  Continue treatment lab with PCP.  Last LDL controlled  8/2020  Cardiac status stable continue current treatment check labs  4 2021  Blood pressure elevated I have added hydralazine.  Did not increase Coreg as patient had bradycardia with Coreg and digoxin use in the past.  LDL level controlled in last lab of 9/2020.  Continue dietary modification.  Currently not on Metformin due to E. coli advised him to discuss with PCP alternate  3/2022  Cardiac status stable.  INR monitored.  Rate controlled.  Blood pressure is elevated I will increase hydralazine to 50 mg 3 times a day  9/2022  Blood pressure remains elevated increase carvedilol to 25 mg twice a day.  Rate controlled.  Continue dietary modification  7/6/2023  Cardiac status is stable.  May proceed with cataract surgery with low cardiac risk.  Will check INR today - advised compliance with INR checks in future.   7/20/2023  Patient seen in f/u for persistent afib.  Cardiac status is stable.  May proceed with cataract surgery with low cardiac risk.  Will check INR today -   1/2024  Patient with persistent A-fib seen for complaint of increased bilateral lower extremity edema with weight gain shortness of breath.  Will add metolazone 5 mg/day check BMP in 1 week.  Will check INR today patient has been noncompliant with checks.  Encouraged to check INR consistently.  Follow-up in 2 weeks or sooner if needed

## 2024-02-09 ENCOUNTER — HOSPITAL ENCOUNTER (OUTPATIENT)
Facility: HOSPITAL | Age: 69
End: 2024-02-09
Payer: MEDICARE

## 2024-02-09 DIAGNOSIS — I50.32 CHRONIC DIASTOLIC (CONGESTIVE) HEART FAILURE (HCC): ICD-10-CM

## 2024-02-09 DIAGNOSIS — I48.20 CHRONIC ATRIAL FIBRILLATION, UNSPECIFIED (HCC): ICD-10-CM

## 2024-02-09 LAB
ANION GAP SERPL CALC-SCNC: 2 MMOL/L (ref 3–18)
BUN SERPL-MCNC: 28 MG/DL (ref 7–18)
BUN/CREAT SERPL: 18 (ref 12–20)
CALCIUM SERPL-MCNC: 9.2 MG/DL (ref 8.5–10.1)
CHLORIDE SERPL-SCNC: 98 MMOL/L (ref 100–111)
CO2 SERPL-SCNC: 38 MMOL/L (ref 21–32)
CREAT SERPL-MCNC: 1.58 MG/DL (ref 0.6–1.3)
GLUCOSE SERPL-MCNC: 87 MG/DL (ref 74–99)
POTASSIUM SERPL-SCNC: 3.6 MMOL/L (ref 3.5–5.5)
SODIUM SERPL-SCNC: 138 MMOL/L (ref 136–145)

## 2024-02-09 PROCEDURE — 80048 BASIC METABOLIC PNL TOTAL CA: CPT

## 2024-02-09 PROCEDURE — 36415 COLL VENOUS BLD VENIPUNCTURE: CPT

## 2024-02-14 ENCOUNTER — ANTI-COAG VISIT (OUTPATIENT)
Age: 69
End: 2024-02-14
Payer: MEDICARE

## 2024-02-14 ENCOUNTER — OFFICE VISIT (OUTPATIENT)
Age: 69
End: 2024-02-14
Payer: MEDICARE

## 2024-02-14 VITALS
BODY MASS INDEX: 38.02 KG/M2 | HEART RATE: 102 BPM | WEIGHT: 305.8 LBS | SYSTOLIC BLOOD PRESSURE: 121 MMHG | OXYGEN SATURATION: 97 % | HEIGHT: 75 IN | DIASTOLIC BLOOD PRESSURE: 73 MMHG

## 2024-02-14 DIAGNOSIS — I48.20 CHRONIC ATRIAL FIBRILLATION, UNSPECIFIED (HCC): Primary | ICD-10-CM

## 2024-02-14 DIAGNOSIS — I50.32 CHRONIC DIASTOLIC (CONGESTIVE) HEART FAILURE (HCC): ICD-10-CM

## 2024-02-14 DIAGNOSIS — I10 ESSENTIAL (PRIMARY) HYPERTENSION: ICD-10-CM

## 2024-02-14 DIAGNOSIS — Z79.01 LONG TERM (CURRENT) USE OF ANTICOAGULANTS: ICD-10-CM

## 2024-02-14 DIAGNOSIS — E78.2 MIXED HYPERLIPIDEMIA: ICD-10-CM

## 2024-02-14 DIAGNOSIS — E66.01 MORBID (SEVERE) OBESITY DUE TO EXCESS CALORIES (HCC): ICD-10-CM

## 2024-02-14 LAB
POC INR: 3.2
PROTHROMBIN TIME, POC: ABNORMAL

## 2024-02-14 PROCEDURE — G8417 CALC BMI ABV UP PARAM F/U: HCPCS | Performed by: NURSE PRACTITIONER

## 2024-02-14 PROCEDURE — G8484 FLU IMMUNIZE NO ADMIN: HCPCS | Performed by: NURSE PRACTITIONER

## 2024-02-14 PROCEDURE — 3078F DIAST BP <80 MM HG: CPT | Performed by: NURSE PRACTITIONER

## 2024-02-14 PROCEDURE — 99214 OFFICE O/P EST MOD 30 MIN: CPT | Performed by: NURSE PRACTITIONER

## 2024-02-14 PROCEDURE — 1036F TOBACCO NON-USER: CPT | Performed by: NURSE PRACTITIONER

## 2024-02-14 PROCEDURE — 3017F COLORECTAL CA SCREEN DOC REV: CPT | Performed by: NURSE PRACTITIONER

## 2024-02-14 PROCEDURE — 1124F ACP DISCUSS-NO DSCNMKR DOCD: CPT | Performed by: NURSE PRACTITIONER

## 2024-02-14 PROCEDURE — G8427 DOCREV CUR MEDS BY ELIG CLIN: HCPCS | Performed by: NURSE PRACTITIONER

## 2024-02-14 PROCEDURE — 85610 PROTHROMBIN TIME: CPT | Performed by: NURSE PRACTITIONER

## 2024-02-14 PROCEDURE — 3074F SYST BP LT 130 MM HG: CPT | Performed by: NURSE PRACTITIONER

## 2024-02-14 RX ORDER — METOLAZONE 5 MG/1
5 TABLET ORAL EVERY OTHER DAY
Qty: 30 TABLET | Refills: 4 | Status: SHIPPED | OUTPATIENT
Start: 2024-02-14

## 2024-02-14 RX ORDER — CARVEDILOL 25 MG/1
25 TABLET ORAL 2 TIMES DAILY WITH MEALS
Qty: 180 TABLET | Refills: 2 | Status: SHIPPED | OUTPATIENT
Start: 2024-02-14

## 2024-02-14 NOTE — PROGRESS NOTES
HISTORY OF PRESENT ILLNESS  Ishan Camacho Sr. is a 67 y.o. male.    Patient with a fib,chf,pulmonary htn,mr.On follow up patient denies any chest pains,sob, palpitation or other significant symptoms.  7/6/2023  Patient seen for pre-op evaluation.  He is anticipating right cataract surgery on 7/11/2023.  He denies chest pain, shortness of breath or palpitations.  He has chronic dependent edema.   7/20/2023  Patient seen In f/u for Afib.  He denies chest pain, shortness of breath or palpitations.  He has chronic dependent edema, controlled with compression stockings and lasix.  1/2024  Patient complaining of increased bilateral lower extremity edema with weight gain and abdominal distention.  He complains of mild shortness of breath, denies chest pain or palpitations.  2/2024.  Patient seen in follow-up for acute diastolic CHF.  Lower extremity edema has improved with 25 pound weight loss.  He reports improvement in shortness of breath.      Follow-up  Pertinent negatives include no chest pain, no abdominal pain, no headaches and no shortness of breath.   Valvular Heart Disease  The history is provided by the Patient. This is a chronic problem. The problem occurs constantly. The problem has not changed since onset.Pertinent negatives include no chest pain, no abdominal pain, no headaches and no shortness of breath.   CHF  The history is provided by the Patient. This is a chronic problem. The problem occurs constantly. The problem has not changed since onset.Pertinent negatives include no chest pain, no abdominal pain, no headaches and no shortness of breath.   Palpitations   The history is provided by the Patient. This is a chronic problem. The problem has not changed since onset.Associated symptoms include lower extremity edema. Pertinent negatives include no fever, no chest pain, no claudication, no orthopnea, no PND, no abdominal pain, no nausea, no vomiting, no headaches, no dizziness, no weakness, no cough, no

## 2024-02-14 NOTE — PATIENT INSTRUCTIONS
Mr. Ishan Camacho is here today for anticoagulation monitoring for the diagnosis of atrial fibrillation.  His INR goal is 2.0-3.0 and his current Coumadin dose is 7.5 mg daily.     Today's findings include an INR of 3.2     Considering Mr. Camacho's past history, todays findings, and per the coumadin policy/protocol, Mr. Camacho was instructed to take Coumadin as follows,  7.5 mg daily except Wednesdays and  Saturdays 5 mg.  He was also instructed to come back in 2 weeks for an INR check.    A full discussion of the nature of anticoagulants has been carried out.  A full discussion of the need for frequent and regular monitoring, precise dosage adjustment and compliance was stressed.  Side effects of potential bleeding were discussed and Mr. Camacho was instructed to call [phone] if there are any signs of abnormal bleeding.  Mr. Camacho was instructed to avoid any OTC items containing aspirin or ibuprofen and prior to starting any new OTC products to consult with his physician or pharmacist to ensure no drug interactions are present.  Mr. Camacho was instructed to avoid any major changes in his general diet and to avoid alcohol consumption.  .      Mr. Camacho verbalized his understanding of all instructions and will call the office with any questions, concerns, or signs of abnormal bleeding or blood clot.

## 2024-02-22 NOTE — PROGRESS NOTES
A user error has taken place: encounter opened in error, closed for administrative reasons. Name: VILMA MCCLENDON    Question 1  General Status  Over the last week, have you developed any new or worsening symptoms, such as but not limited to,  shortness of breath, fever, fatigue, nausea, vomiting, or increased pain? If yes please reply 1, if no reply 2, if you're not sure please reply 3, or if you're feeling better reply 4.  New Symptoms     Question 2  Post Appointment Questions  After completing your follow up appointment with your doctor, do you have any additional questions or concerns? Please reply 1 for yes, or reply 2 for no.  Additional Questions      Question 3  Daily Activities  Have you been able to perform your daily activities such as getting dressed or making your meals, as you normally would? Please reply 1 if you have been able to perform daily activities. Reply 2 if you have not been able to.  Not performing Daily Activities     Required Interventions and Feedback  Call Status: Attempt 1, Answered    Clinical Concerns - Issues List:  Fatigue/Weakness/Dizziness     Comments: Patient reports intermittent lightheadedness, feels it can be due to her new blood pressure medication Amlodipine. Patient was evaluated by her provider already and has scheduled an appointment with Cardiologist next week. Denies any chest pain shortness of breath or dizziness at this moment.       Clinical Concerns - Actions List:  Encouraged to Follow Up with PCP    Disease Management Education   Encouraged to Follow Up with Speciality Doctor     Comments: CTN advised not drive when feeling dizzy, drink plenty of fluids, and change positions slowly. Blood pressure of 135/66, blood sugar of 166 after dinner      Daily Activities - Issues List:  Other    Has a Caregiver/Family Member to Assist   Comments: Patient reports she is able to do all her activities, just slower then before. She is taking her time, has partner at home to assist if needed.      Daily Activities - Actions Taken  Comments:No need at this time

## 2024-02-29 ENCOUNTER — ANTI-COAG VISIT (OUTPATIENT)
Age: 69
End: 2024-02-29
Payer: MEDICARE

## 2024-02-29 DIAGNOSIS — I48.20 CHRONIC ATRIAL FIBRILLATION, UNSPECIFIED (HCC): Primary | ICD-10-CM

## 2024-02-29 LAB
POC INR: 2.6
PROTHROMBIN TIME, POC: NORMAL

## 2024-02-29 PROCEDURE — 85610 PROTHROMBIN TIME: CPT | Performed by: NURSE PRACTITIONER

## 2024-02-29 NOTE — PATIENT INSTRUCTIONS
Mr. Ishan Camacho is here today for anticoagulation monitoring for the diagnosis of atrial fibrillation.  His INR goal is 2.0-3.0 and his current Coumadin dose is 7.5 mg daily except Wednesdays and Saturdays 5 mg .     Today's findings include an INR of 2.6      Considering Mr. Camacho's past history, todays findings, and per the coumadin policy/protocol, Mr. Camacho was instructed to take Coumadin as follows,  7.5 mg daily except Wednesdays and Saturdays 5 mg.  He was also instructed to come back in 4 weeks for an INR check.    A full discussion of the nature of anticoagulants has been carried out.  A full discussion of the need for frequent and regular monitoring, precise dosage adjustment and compliance was stressed.  Side effects of potential bleeding were discussed and Mr. Camacho was instructed to call [phone] if there are any signs of abnormal bleeding.  Mr. Camacho was instructed to avoid any OTC items containing aspirin or ibuprofen and prior to starting any new OTC products to consult with his physician or pharmacist to ensure no drug interactions are present.  Mr. Camacho was instructed to avoid any major changes in his general diet and to avoid alcohol consumption.  .      Mr. Camacho verbalized his understanding of all instructions and will call the office with any questions, concerns, or signs of abnormal bleeding or blood clot.

## 2024-03-11 ENCOUNTER — HOSPITAL ENCOUNTER (OUTPATIENT)
Facility: HOSPITAL | Age: 69
Discharge: HOME | End: 2024-03-14
Payer: MEDICARE

## 2024-03-11 DIAGNOSIS — I48.20 CHRONIC ATRIAL FIBRILLATION, UNSPECIFIED (HCC): ICD-10-CM

## 2024-03-11 LAB
ANION GAP SERPL CALC-SCNC: 5 MMOL/L (ref 3–18)
BUN SERPL-MCNC: 27 MG/DL (ref 7–18)
BUN/CREAT SERPL: 17 (ref 12–20)
CALCIUM SERPL-MCNC: 9.1 MG/DL (ref 8.5–10.1)
CHLORIDE SERPL-SCNC: 100 MMOL/L (ref 100–111)
CO2 SERPL-SCNC: 35 MMOL/L (ref 21–32)
CREAT SERPL-MCNC: 1.63 MG/DL (ref 0.6–1.3)
GLUCOSE SERPL-MCNC: 188 MG/DL (ref 74–99)
POTASSIUM SERPL-SCNC: 3.7 MMOL/L (ref 3.5–5.5)
SODIUM SERPL-SCNC: 140 MMOL/L (ref 136–145)

## 2024-03-11 PROCEDURE — 36415 COLL VENOUS BLD VENIPUNCTURE: CPT

## 2024-03-11 PROCEDURE — 80048 BASIC METABOLIC PNL TOTAL CA: CPT

## 2024-03-12 ENCOUNTER — TELEPHONE (OUTPATIENT)
Age: 69
End: 2024-03-12

## 2024-03-12 DIAGNOSIS — I50.32 CHRONIC DIASTOLIC (CONGESTIVE) HEART FAILURE (HCC): Primary | ICD-10-CM

## 2024-03-12 NOTE — TELEPHONE ENCOUNTER
Patient made aware of message below    Ania Garcia APRN - NP    Please call the patient regarding his abnormal result.    Grabiel de jesusd.  Discontinue metolazone.  Repeat BMP in 2 week.

## 2024-03-26 ENCOUNTER — HOSPITAL ENCOUNTER (OUTPATIENT)
Facility: HOSPITAL | Age: 69
Discharge: HOME | End: 2024-03-29
Payer: MEDICARE

## 2024-03-26 ENCOUNTER — ANTI-COAG VISIT (OUTPATIENT)
Age: 69
End: 2024-03-26
Payer: MEDICARE

## 2024-03-26 DIAGNOSIS — I48.91 A-FIB (HCC): Primary | ICD-10-CM

## 2024-03-26 DIAGNOSIS — I50.32 CHRONIC DIASTOLIC (CONGESTIVE) HEART FAILURE (HCC): ICD-10-CM

## 2024-03-26 LAB
ANION GAP SERPL CALC-SCNC: 3 MMOL/L (ref 3–18)
BUN SERPL-MCNC: 26 MG/DL (ref 7–18)
BUN/CREAT SERPL: 17 (ref 12–20)
CALCIUM SERPL-MCNC: 9.4 MG/DL (ref 8.5–10.1)
CHLORIDE SERPL-SCNC: 103 MMOL/L (ref 100–111)
CO2 SERPL-SCNC: 33 MMOL/L (ref 21–32)
CREAT SERPL-MCNC: 1.53 MG/DL (ref 0.6–1.3)
GLUCOSE SERPL-MCNC: 108 MG/DL (ref 74–99)
POC INR: 2.3
POTASSIUM SERPL-SCNC: 3.9 MMOL/L (ref 3.5–5.5)
PROTHROMBIN TIME, POC: NORMAL
SODIUM SERPL-SCNC: 139 MMOL/L (ref 136–145)

## 2024-03-26 PROCEDURE — 80048 BASIC METABOLIC PNL TOTAL CA: CPT

## 2024-03-26 PROCEDURE — 85610 PROTHROMBIN TIME: CPT | Performed by: INTERNAL MEDICINE

## 2024-03-26 PROCEDURE — 36415 COLL VENOUS BLD VENIPUNCTURE: CPT

## 2024-03-26 NOTE — PROGRESS NOTES
Mr. Ishan Camacho is here today for anticoagulation monitoring for the diagnosis of atrial fibrillation.  His INR goal is 2.0-3.0 and his current Coumadin dose is 7.5 mg qd 5 mg wed/ san.     Today's findings include an INR of 2.3     Considering Mr. Camacho's past history, todays findings, and per the coumadin policy/protocol, Mr. Camacho was instructed to take Coumadin as follows,  same dose.  He was also instructed to come back in 4 weeks for an INR check.    A full discussion of the nature of anticoagulants has been carried out.  A full discussion of the need for frequent and regular monitoring, precise dosage adjustment and compliance was stressed.  Side effects of potential bleeding were discussed and Mr. Camacho was instructed to call [phone] if there are any signs of abnormal bleeding.  Mr. Camacho was instructed to avoid any OTC items containing aspirin or ibuprofen and prior to starting any new OTC products to consult with his physician or pharmacist to ensure no drug interactions are present.  Mr. Camacho was instructed to avoid any major changes in his general diet and to avoid alcohol consumption.  .      Mr. Camacho verbalized his understanding of all instructions and will call the office with any questions, concerns, or signs of abnormal bleeding or blood clot.

## 2024-03-28 ENCOUNTER — TELEPHONE (OUTPATIENT)
Age: 69
End: 2024-03-28

## 2024-03-28 NOTE — TELEPHONE ENCOUNTER
Tried contacting patient regarding message below.     Unable to leave voicemail as it is full     Ania Garcia, APRN - NP    Please contact patient and do the following ASAP    Labs abnormal, improved from prior.

## 2024-04-01 DIAGNOSIS — I50.9 ACUTE ON CHRONIC CONGESTIVE HEART FAILURE, UNSPECIFIED HEART FAILURE TYPE (HCC): ICD-10-CM

## 2024-04-01 RX ORDER — SACUBITRIL AND VALSARTAN 49; 51 MG/1; MG/1
1 TABLET, FILM COATED ORAL 2 TIMES DAILY
Qty: 60 TABLET | Refills: 2 | Status: SHIPPED | OUTPATIENT
Start: 2024-04-01

## 2024-05-01 ENCOUNTER — ANTI-COAG VISIT (OUTPATIENT)
Age: 69
End: 2024-05-01
Payer: MEDICARE

## 2024-05-01 DIAGNOSIS — I48.20 CHRONIC ATRIAL FIBRILLATION, UNSPECIFIED (HCC): Primary | ICD-10-CM

## 2024-05-01 LAB
POC INR: 2.9
PROTHROMBIN TIME, POC: NORMAL

## 2024-05-01 PROCEDURE — 85610 PROTHROMBIN TIME: CPT | Performed by: NURSE PRACTITIONER

## 2024-05-01 NOTE — PATIENT INSTRUCTIONS
Mr. Ishan Camacho is here today for anticoagulation monitoring for the diagnosis of atrial fibrillation.  His INR goal is 2.0-3.0 and his current Coumadin dose is 7.5 mg qd 5 mg wed/ sat.      Today's findings include an INR of 2.9     Considering Mr. Camacho's past history, todays findings, and per the coumadin policy/protocol, Mr. Camacho was instructed to take Coumadin as follows,  same dose.  He was also instructed to come back in 4 weeks for an INR check.     A full discussion of the nature of anticoagulants has been carried out.  A full discussion of the need for frequent and regular monitoring, precise dosage adjustment and compliance was stressed.  Side effects of potential bleeding were discussed and Mr. Camacho was instructed to call [phone] if there are any signs of abnormal bleeding.  Mr. Camacho was instructed to avoid any OTC items containing aspirin or ibuprofen and prior to starting any new OTC products to consult with his physician or pharmacist to ensure no drug interactions are present.  Mr. Camacho was instructed to avoid any major changes in his general diet and to avoid alcohol consumption.  .        Mr. Camacho verbalized his understanding of all instructions and will call the office with any questions, concerns, or signs of abnormal bleeding or blood clot.

## 2024-05-20 ENCOUNTER — OFFICE VISIT (OUTPATIENT)
Age: 69
End: 2024-05-20
Payer: MEDICARE

## 2024-05-20 VITALS
BODY MASS INDEX: 41.91 KG/M2 | WEIGHT: 309.4 LBS | OXYGEN SATURATION: 98 % | HEART RATE: 76 BPM | SYSTOLIC BLOOD PRESSURE: 126 MMHG | DIASTOLIC BLOOD PRESSURE: 73 MMHG | HEIGHT: 72 IN

## 2024-05-20 DIAGNOSIS — I10 ESSENTIAL (PRIMARY) HYPERTENSION: ICD-10-CM

## 2024-05-20 DIAGNOSIS — I48.20 CHRONIC ATRIAL FIBRILLATION, UNSPECIFIED (HCC): Primary | ICD-10-CM

## 2024-05-20 DIAGNOSIS — I50.32 CHRONIC DIASTOLIC (CONGESTIVE) HEART FAILURE (HCC): ICD-10-CM

## 2024-05-20 DIAGNOSIS — E78.2 MIXED HYPERLIPIDEMIA: ICD-10-CM

## 2024-05-20 PROCEDURE — 99214 OFFICE O/P EST MOD 30 MIN: CPT | Performed by: NURSE PRACTITIONER

## 2024-05-20 PROCEDURE — 3078F DIAST BP <80 MM HG: CPT | Performed by: NURSE PRACTITIONER

## 2024-05-20 PROCEDURE — 1124F ACP DISCUSS-NO DSCNMKR DOCD: CPT | Performed by: NURSE PRACTITIONER

## 2024-05-20 PROCEDURE — G8417 CALC BMI ABV UP PARAM F/U: HCPCS | Performed by: NURSE PRACTITIONER

## 2024-05-20 PROCEDURE — G8427 DOCREV CUR MEDS BY ELIG CLIN: HCPCS | Performed by: NURSE PRACTITIONER

## 2024-05-20 PROCEDURE — 1036F TOBACCO NON-USER: CPT | Performed by: NURSE PRACTITIONER

## 2024-05-20 PROCEDURE — 3074F SYST BP LT 130 MM HG: CPT | Performed by: NURSE PRACTITIONER

## 2024-05-20 PROCEDURE — 3017F COLORECTAL CA SCREEN DOC REV: CPT | Performed by: NURSE PRACTITIONER

## 2024-05-20 RX ORDER — WARFARIN SODIUM 5 MG/1
7.5 TABLET ORAL DAILY
Qty: 30 TABLET | Refills: 5 | Status: SHIPPED | OUTPATIENT
Start: 2024-05-20 | End: 2024-05-20 | Stop reason: SDUPTHER

## 2024-05-20 RX ORDER — WARFARIN SODIUM 5 MG/1
7.5 TABLET ORAL DAILY
Qty: 90 TABLET | Refills: 3 | Status: SHIPPED | OUTPATIENT
Start: 2024-05-20

## 2024-05-20 NOTE — PROGRESS NOTES
1. Have you been to the ER, urgent care clinic since your last visit?  Hospitalized since your last visit?No    2. Have you seen or consulted any other health care providers outside of the [facility] since your last visit?  Include any pap smears or colon screening. No    3. Do you need any refills? Yes

## 2024-05-20 NOTE — PROGRESS NOTES
HISTORY OF PRESENT ILLNESS  Ishan Camacho Sr. is a 67 y.o. male.    Patient with a fib,chf,pulmonary htn,mr.On follow up patient denies any chest pains,sob, palpitation or other significant symptoms.  7/6/2023  Patient seen for pre-op evaluation.  He is anticipating right cataract surgery on 7/11/2023.  He denies chest pain, shortness of breath or palpitations.  He has chronic dependent edema.   7/20/2023  Patient seen In f/u for Afib.  He denies chest pain, shortness of breath or palpitations.  He has chronic dependent edema, controlled with compression stockings and lasix.  1/2024  Patient complaining of increased bilateral lower extremity edema with weight gain and abdominal distention.  He complains of mild shortness of breath, denies chest pain or palpitations.  2/2024.  Patient seen in follow-up for acute diastolic CHF.  Lower extremity edema has improved with 25 pound weight loss.  He reports improvement in shortness of breath.  5/20/2024  Patient seen in follow-up for acute on chronic diastolic CHF.  Bilateral lower extremity edema remains.  He denies shortness of breath palpitations or chest pain    Follow-up  Pertinent negatives include no chest pain, no abdominal pain, no headaches and no shortness of breath.   Valvular Heart Disease  The history is provided by the Patient. This is a chronic problem. The problem occurs constantly. The problem has not changed since onset.Pertinent negatives include no chest pain, no abdominal pain, no headaches and no shortness of breath.   CHF  The history is provided by the Patient. This is a chronic problem. The problem occurs constantly. The problem has not changed since onset.Pertinent negatives include no chest pain, no abdominal pain, no headaches and no shortness of breath.   Palpitations   The history is provided by the Patient. This is a chronic problem. The problem has not changed since onset.Associated symptoms include lower extremity edema. Pertinent

## 2024-06-14 ENCOUNTER — ANTI-COAG VISIT (OUTPATIENT)
Age: 69
End: 2024-06-14
Payer: MEDICARE

## 2024-06-14 DIAGNOSIS — I48.20 CHRONIC ATRIAL FIBRILLATION, UNSPECIFIED (HCC): Primary | ICD-10-CM

## 2024-06-14 LAB
POC INR: 4.6
PROTHROMBIN TIME, POC: NORMAL

## 2024-06-14 PROCEDURE — 85610 PROTHROMBIN TIME: CPT | Performed by: NURSE PRACTITIONER

## 2024-06-14 NOTE — PATIENT INSTRUCTIONS
Mr. Ishan Camacho is here today for anticoagulation monitoring for the diagnosis of atrial fibrillation.  His INR goal is 2.0-3.0 and his current Coumadin dose is .     Today's findings include an INR of 4.6     Considering Mr. Camacho's past history, todays findings, and per the coumadin policy/protocol, Mr. Camacho was instructed to take Coumadin as follows,  Hold tonight and tomorrow then 7.5 mg daily except Wednesday 5 mg .  He was also instructed to come back in 1 weeks for an INR check.    A full discussion of the nature of anticoagulants has been carried out.  A full discussion of the need for frequent and regular monitoring, precise dosage adjustment and compliance was stressed.  Side effects of potential bleeding were discussed and Mr. Camacho was instructed to call [phone] if there are any signs of abnormal bleeding.  Mr. Camacho was instructed to avoid any OTC items containing aspirin or ibuprofen and prior to starting any new OTC products to consult with his physician or pharmacist to ensure no drug interactions are present.  Mr. Camacho was instructed to avoid any major changes in his general diet and to avoid alcohol consumption.  .      Mr. Camacho verbalized his understanding of all instructions and will call the office with any questions, concerns, or signs of abnormal bleeding or blood clot.

## 2024-06-21 ENCOUNTER — ANTI-COAG VISIT (OUTPATIENT)
Age: 69
End: 2024-06-21
Payer: MEDICARE

## 2024-06-21 DIAGNOSIS — I48.20 CHRONIC ATRIAL FIBRILLATION, UNSPECIFIED (HCC): Primary | ICD-10-CM

## 2024-06-21 LAB
POC INR: 3
PROTHROMBIN TIME, POC: NORMAL

## 2024-06-21 PROCEDURE — 85610 PROTHROMBIN TIME: CPT | Performed by: NURSE PRACTITIONER

## 2024-06-21 NOTE — PATIENT INSTRUCTIONS
Spoke with patient.    Mr. Ishan Camacho is here today for anticoagulation monitoring for the diagnosis of atrial fibrillation.  His INR goal is 2.0-3.0 and his current Coumadin dose is 5 mg tab.     Today's findings include an INR of 3.0     Considering Mr. Camacho's past history, todays findings, and per the coumadin policy/protocol, Mr. Camacho was instructed to take Coumadin as follows,  7.5 mg daily except Wednesday and Saturday 5 mg.  He was also instructed to come back in 2 weeks for an INR check.    A full discussion of the nature of anticoagulants has been carried out.  A full discussion of the need for frequent and regular monitoring, precise dosage adjustment and compliance was stressed.  Side effects of potential bleeding were discussed and Mr. Camacho was instructed to call [phone] if there are any signs of abnormal bleeding.  Mr. Camacho was instructed to avoid any OTC items containing aspirin or ibuprofen and prior to starting any new OTC products to consult with his physician or pharmacist to ensure no drug interactions are present.  Mr. Camacho was instructed to avoid any major changes in his general diet and to avoid alcohol consumption.  .      Mr. Camacho verbalized his understanding of all instructions and will call the office with any questions, concerns, or signs of abnormal bleeding or blood clot.

## 2024-06-27 NOTE — ASSESSMENT & PLAN NOTE
[facility] Hospitalist Progress Note    Admitting Date and Time: 6/23/2024  3:23 PM  Admit Dx: Hypokalemia [E87.6]  Metabolic acidosis [E87.20]  Heme positive stool [R19.5]  Acute cystitis without hematuria [N30.00]  Accidental aspirin overdose, initial encounter [T39.011A]  Salicylate intoxication, accidental or unintentional, initial encounter [T39.091A]  Altered mental status, unspecified altered mental status type [R41.82]  Anemia, unspecified type [D64.9]      Subjective:  Patient is being followed for Hypokalemia [E87.6]  Metabolic acidosis [E87.20]  Heme positive stool [R19.5]  Acute cystitis without hematuria [N30.00]  Accidental aspirin overdose, initial encounter [T39.011A]  Salicylate intoxication, accidental or unintentional, initial encounter [T39.091A]  Altered mental status, unspecified altered mental status type [R41.82]  Anemia, unspecified type [D64.9]     No acute events overnight    ROS: denies fever, chills, cp, sob, n/v, HA unless stated above.      potassium chloride  40 mEq Oral Once    melatonin  3 mg Oral Nightly    Vitamin D  4,000 Units Oral Daily    amLODIPine  5 mg Oral Nightly    sodium chloride flush  5-40 mL IntraVENous 2 times per day    pantoprazole (PROTONIX) 40 mg in sodium chloride (PF) 0.9 % 10 mL injection  40 mg IntraVENous Q12H    cefTRIAXone (ROCEPHIN) IV  1,000 mg IntraVENous Q24H     white petrolatum, , BID PRN  sodium chloride flush, 5-40 mL, PRN  sodium chloride, , PRN  potassium chloride, 40 mEq, PRN   Or  potassium alternative oral replacement, 40 mEq, PRN   Or  potassium chloride, 10 mEq, PRN  magnesium sulfate, 2,000 mg, PRN  ondansetron, 4 mg, Q8H PRN   Or  ondansetron, 4 mg, Q6H PRN  polyethylene glycol, 17 g, Daily PRN  acetaminophen, 650 mg, Q6H PRN   Or  acetaminophen, 650 mg, Q6H PRN  traMADol, 50 mg, Q6H PRN  prochlorperazine, 10 mg, Q6H PRN         Objective:    BP (!) 145/90   Pulse 74   Temp 98.8 °F (37.1 °C) (Oral)   Resp 18   Ht 1.651 m (5'  Heart rate under control with carvedilol 12.5 mg twice a day  Prophylaxis with Coumadin 7.5 mg every day

## 2024-07-09 ENCOUNTER — TELEPHONE (OUTPATIENT)
Age: 69
End: 2024-07-09

## 2024-07-09 ENCOUNTER — ANTI-COAG VISIT (OUTPATIENT)
Age: 69
End: 2024-07-09
Payer: MEDICARE

## 2024-07-09 DIAGNOSIS — I50.9 ACUTE ON CHRONIC CONGESTIVE HEART FAILURE, UNSPECIFIED HEART FAILURE TYPE (HCC): ICD-10-CM

## 2024-07-09 DIAGNOSIS — I48.20 CHRONIC ATRIAL FIBRILLATION, UNSPECIFIED (HCC): Primary | ICD-10-CM

## 2024-07-09 LAB
POC INR: 3.1
PROTHROMBIN TIME, POC: NORMAL

## 2024-07-09 PROCEDURE — 85610 PROTHROMBIN TIME: CPT | Performed by: NURSE PRACTITIONER

## 2024-07-09 RX ORDER — SACUBITRIL AND VALSARTAN 49; 51 MG/1; MG/1
1 TABLET, FILM COATED ORAL 2 TIMES DAILY
Qty: 60 TABLET | Refills: 5 | Status: SHIPPED | OUTPATIENT
Start: 2024-07-09

## 2024-07-09 RX ORDER — SACUBITRIL AND VALSARTAN 49; 51 MG/1; MG/1
1 TABLET, FILM COATED ORAL 2 TIMES DAILY
Qty: 60 TABLET | Refills: 5 | Status: SHIPPED | OUTPATIENT
Start: 2024-07-09 | End: 2024-07-09 | Stop reason: SDUPTHER

## 2024-07-09 NOTE — PATIENT INSTRUCTIONS
Mr. Ishan Camacho is here today for anticoagulation monitoring for the diagnosis of atrial fibrillation.  His INR goal is 2.0-3.0 and his current Coumadin dose is .     Today's findings include an INR of 3.1     Considering Mr. Camacho's past history, todays findings, and per the coumadin policy/protocol, Mr. Camacho was instructed to take Coumadin as follows,  5MG WEDNESDAY, SATURDAY OTHERWISE; 7.5MG A.O.D.  He was also instructed to come back in 2 weeks for an INR check.    A full discussion of the nature of anticoagulants has been carried out.  A full discussion of the need for frequent and regular monitoring, precise dosage adjustment and compliance was stressed.  Side effects of potential bleeding were discussed and Mr. Camacho was instructed to call [phone] if there are any signs of abnormal bleeding.  Mr. Camacho was instructed to avoid any OTC items containing aspirin or ibuprofen and prior to starting any new OTC products to consult with his physician or pharmacist to ensure no drug interactions are present.  Mr. Camacho was instructed to avoid any major changes in his general diet and to avoid alcohol consumption.  .      Mr. Camacho verbalized his understanding of all instructions and will call the office with any questions, concerns, or signs of abnormal bleeding or blood clot.

## 2024-07-15 DIAGNOSIS — I50.9 ACUTE ON CHRONIC CONGESTIVE HEART FAILURE, UNSPECIFIED HEART FAILURE TYPE (HCC): ICD-10-CM

## 2024-07-15 RX ORDER — SACUBITRIL AND VALSARTAN 49; 51 MG/1; MG/1
1 TABLET, FILM COATED ORAL 2 TIMES DAILY
Qty: 180 TABLET | Refills: 1 | Status: SHIPPED | OUTPATIENT
Start: 2024-07-15

## 2024-07-22 ENCOUNTER — ANTI-COAG VISIT (OUTPATIENT)
Age: 69
End: 2024-07-22
Payer: MEDICARE

## 2024-07-22 DIAGNOSIS — I48.20 CHRONIC ATRIAL FIBRILLATION, UNSPECIFIED (HCC): Primary | ICD-10-CM

## 2024-07-22 LAB
POC INR: 3.2
PROTHROMBIN TIME, POC: ABNORMAL

## 2024-07-22 PROCEDURE — 85610 PROTHROMBIN TIME: CPT | Performed by: NURSE PRACTITIONER

## 2024-07-22 NOTE — PATIENT INSTRUCTIONS
Mr. Ishan Camacho is here today for anticoagulation monitoring for the diagnosis of atrial fibrillation.  His INR goal is 2.0-3.0 and his current Coumadin dose is 5 mg.     Today's findings include an INR of 3.2     Considering Mr. Camacho's past history, todays findings, and per the coumadin policy/protocol, Mr. Camacho was instructed to take Coumadin as follows,  5 mg on Monday,Wednesday and Saturday 7 mg all other days.  He was also instructed to come back in 1 weeks for an INR check.    A full discussion of the nature of anticoagulants has been carried out.  A full discussion of the need for frequent and regular monitoring, precise dosage adjustment and compliance was stressed.  Side effects of potential bleeding were discussed and Mr. Camacho was instructed to call [phone] if there are any signs of abnormal bleeding.  Mr. Camacho was instructed to avoid any OTC items containing aspirin or ibuprofen and prior to starting any new OTC products to consult with his physician or pharmacist to ensure no drug interactions are present.  Mr. Camacho was instructed to avoid any major changes in his general diet and to avoid alcohol consumption.  .      Mr. Camacho verbalized his understanding of all instructions and will call the office with any questions, concerns, or signs of abnormal bleeding or blood clot.

## 2024-09-30 ENCOUNTER — OFFICE VISIT (OUTPATIENT)
Age: 69
End: 2024-09-30
Payer: MEDICARE

## 2024-09-30 ENCOUNTER — ANTI-COAG VISIT (OUTPATIENT)
Age: 69
End: 2024-09-30
Payer: MEDICARE

## 2024-09-30 VITALS
BODY MASS INDEX: 42.66 KG/M2 | OXYGEN SATURATION: 96 % | DIASTOLIC BLOOD PRESSURE: 74 MMHG | SYSTOLIC BLOOD PRESSURE: 128 MMHG | HEIGHT: 72 IN | HEART RATE: 91 BPM | WEIGHT: 315 LBS

## 2024-09-30 DIAGNOSIS — I48.20 CHRONIC ATRIAL FIBRILLATION, UNSPECIFIED (HCC): Primary | ICD-10-CM

## 2024-09-30 DIAGNOSIS — I10 ESSENTIAL (PRIMARY) HYPERTENSION: ICD-10-CM

## 2024-09-30 DIAGNOSIS — E78.2 MIXED HYPERLIPIDEMIA: ICD-10-CM

## 2024-09-30 DIAGNOSIS — E66.01 MORBID (SEVERE) OBESITY DUE TO EXCESS CALORIES: ICD-10-CM

## 2024-09-30 DIAGNOSIS — I48.20 CHRONIC ATRIAL FIBRILLATION, UNSPECIFIED (HCC): ICD-10-CM

## 2024-09-30 DIAGNOSIS — I50.33 ACUTE ON CHRONIC DIASTOLIC CONGESTIVE HEART FAILURE (HCC): Primary | ICD-10-CM

## 2024-09-30 DIAGNOSIS — R06.02 SHORTNESS OF BREATH: ICD-10-CM

## 2024-09-30 LAB
POC INR: 2.8
PROTHROMBIN TIME, POC: NORMAL

## 2024-09-30 PROCEDURE — 1124F ACP DISCUSS-NO DSCNMKR DOCD: CPT | Performed by: INTERNAL MEDICINE

## 2024-09-30 PROCEDURE — G8427 DOCREV CUR MEDS BY ELIG CLIN: HCPCS | Performed by: INTERNAL MEDICINE

## 2024-09-30 PROCEDURE — 99214 OFFICE O/P EST MOD 30 MIN: CPT | Performed by: INTERNAL MEDICINE

## 2024-09-30 PROCEDURE — 3078F DIAST BP <80 MM HG: CPT | Performed by: INTERNAL MEDICINE

## 2024-09-30 PROCEDURE — G8417 CALC BMI ABV UP PARAM F/U: HCPCS | Performed by: INTERNAL MEDICINE

## 2024-09-30 PROCEDURE — 93000 ELECTROCARDIOGRAM COMPLETE: CPT | Performed by: INTERNAL MEDICINE

## 2024-09-30 PROCEDURE — 3074F SYST BP LT 130 MM HG: CPT | Performed by: INTERNAL MEDICINE

## 2024-09-30 PROCEDURE — 85610 PROTHROMBIN TIME: CPT | Performed by: INTERNAL MEDICINE

## 2024-09-30 PROCEDURE — 3017F COLORECTAL CA SCREEN DOC REV: CPT | Performed by: INTERNAL MEDICINE

## 2024-09-30 PROCEDURE — 1036F TOBACCO NON-USER: CPT | Performed by: INTERNAL MEDICINE

## 2024-09-30 RX ORDER — METOLAZONE 5 MG/1
5 TABLET ORAL DAILY
Qty: 30 TABLET | Refills: 4 | Status: SHIPPED | OUTPATIENT
Start: 2024-09-30

## 2024-09-30 RX ORDER — FUROSEMIDE 80 MG
80 TABLET ORAL 2 TIMES DAILY
Qty: 180 TABLET | Refills: 1 | Status: SHIPPED | OUTPATIENT
Start: 2024-09-30

## 2024-09-30 ASSESSMENT — ENCOUNTER SYMPTOMS
SHORTNESS OF BREATH: 1
GASTROINTESTINAL NEGATIVE: 1
EYES NEGATIVE: 1

## 2024-09-30 NOTE — PATIENT INSTRUCTIONS
Mr. Ishan Camacho is here today for anticoagulation monitoring for the diagnosis of atrial fibrillation.  His INR goal is 2.0-3.0 and his current Coumadin dose is 5 mg every Wed, Sat; 7.5 mg all other days  .     Today's findings include an INR of 2.8  Considering Mr. Camacho's past history, todays findings, and per the coumadin policy/protocol, Mr. Camacho was instructed to take Coumadin as follows, 5 mg every Wed, Sat; 7.5 mg all other days  .  He was also instructed to come back in 4 weeks for an INR check.    A full discussion of the nature of anticoagulants has been carried out.  A full discussion of the need for frequent and regular monitoring, precise dosage adjustment and compliance was stressed.  Side effects of potential bleeding were discussed and Mr. Camacho was instructed to call [phone] if there are any signs of abnormal bleeding.  Mr. Camacho was instructed to avoid any OTC items containing aspirin or ibuprofen and prior to starting any new OTC products to consult with his physician or pharmacist to ensure no drug interactions are present.  Mr. Camacho was instructed to avoid any major changes in his general diet and to avoid alcohol consumption.  .      Mr. Camacho verbalized his understanding of all instructions and will call the office with any questions, concerns, or signs of abnormal bleeding or blood clot.

## 2024-10-04 ENCOUNTER — HOSPITAL ENCOUNTER (OUTPATIENT)
Facility: HOSPITAL | Age: 69
Discharge: HOME | End: 2024-10-07
Attending: INTERNAL MEDICINE
Payer: MEDICARE

## 2024-10-04 ENCOUNTER — HOSPITAL ENCOUNTER (OUTPATIENT)
Facility: HOSPITAL | Age: 69
Discharge: HOME | End: 2024-10-07
Payer: MEDICARE

## 2024-10-04 ENCOUNTER — HOSPITAL ENCOUNTER (OUTPATIENT)
Facility: HOSPITAL | Age: 69
Discharge: HOME | End: 2024-10-06
Attending: INTERNAL MEDICINE
Payer: MEDICARE

## 2024-10-04 VITALS
HEIGHT: 72 IN | WEIGHT: 315 LBS | HEART RATE: 95 BPM | DIASTOLIC BLOOD PRESSURE: 69 MMHG | BODY MASS INDEX: 42.66 KG/M2 | SYSTOLIC BLOOD PRESSURE: 135 MMHG

## 2024-10-04 DIAGNOSIS — I50.33 ACUTE ON CHRONIC DIASTOLIC CONGESTIVE HEART FAILURE (HCC): ICD-10-CM

## 2024-10-04 DIAGNOSIS — R06.02 SHORTNESS OF BREATH: ICD-10-CM

## 2024-10-04 LAB
ANION GAP SERPL CALC-SCNC: 7 MMOL/L (ref 3–18)
BUN SERPL-MCNC: 29 MG/DL (ref 7–18)
BUN/CREAT SERPL: 13 (ref 12–20)
CALCIUM SERPL-MCNC: 9.1 MG/DL (ref 8.5–10.1)
CHLORIDE SERPL-SCNC: 103 MMOL/L (ref 100–111)
CO2 SERPL-SCNC: 31 MMOL/L (ref 21–32)
CREAT SERPL-MCNC: 2.19 MG/DL (ref 0.6–1.3)
ECHO BSA: 2.71 M2
EKG DIAGNOSIS: NORMAL
GLUCOSE SERPL-MCNC: 106 MG/DL (ref 74–99)
NUC STRESS EJECTION FRACTION: 67 %
POTASSIUM SERPL-SCNC: 3.6 MMOL/L (ref 3.5–5.5)
SODIUM SERPL-SCNC: 141 MMOL/L (ref 136–145)
STRESS BASELINE DIAS BP: 69 MMHG
STRESS BASELINE HR: 96 BPM
STRESS BASELINE SYS BP: 135 MMHG
STRESS ESTIMATED WORKLOAD: 1 METS
STRESS PEAK DIAS BP: 70 MMHG
STRESS PEAK SYS BP: 135 MMHG
STRESS PERCENT HR ACHIEVED: 73 %
STRESS POST PEAK HR: 109 BPM
STRESS RATE PRESSURE PRODUCT: NORMAL BPM*MMHG
STRESS TARGET HR: 149 BPM
TID: 1.03

## 2024-10-04 PROCEDURE — 78452 HT MUSCLE IMAGE SPECT MULT: CPT | Performed by: INTERNAL MEDICINE

## 2024-10-04 PROCEDURE — 6360000002 HC RX W HCPCS: Performed by: INTERNAL MEDICINE

## 2024-10-04 PROCEDURE — 2580000003 HC RX 258: Performed by: INTERNAL MEDICINE

## 2024-10-04 PROCEDURE — 3430000000 HC RX DIAGNOSTIC RADIOPHARMACEUTICAL: Performed by: INTERNAL MEDICINE

## 2024-10-04 PROCEDURE — 36415 COLL VENOUS BLD VENIPUNCTURE: CPT

## 2024-10-04 PROCEDURE — A9502 TC99M TETROFOSMIN: HCPCS | Performed by: INTERNAL MEDICINE

## 2024-10-04 PROCEDURE — 93018 CV STRESS TEST I&R ONLY: CPT | Performed by: INTERNAL MEDICINE

## 2024-10-04 PROCEDURE — 80048 BASIC METABOLIC PNL TOTAL CA: CPT

## 2024-10-04 PROCEDURE — 93016 CV STRESS TEST SUPVJ ONLY: CPT | Performed by: INTERNAL MEDICINE

## 2024-10-04 RX ORDER — REGADENOSON 0.08 MG/ML
0.4 INJECTION, SOLUTION INTRAVENOUS
Status: COMPLETED | OUTPATIENT
Start: 2024-10-04 | End: 2024-10-04

## 2024-10-04 RX ORDER — 0.9 % SODIUM CHLORIDE 0.9 %
250 INTRAVENOUS SOLUTION INTRAVENOUS
Status: COMPLETED | OUTPATIENT
Start: 2024-10-04 | End: 2024-10-04

## 2024-10-04 RX ADMIN — REGADENOSON 0.4 MG: 0.08 INJECTION, SOLUTION INTRAVENOUS at 10:49

## 2024-10-04 RX ADMIN — TETROFOSMIN 11 MILLICURIE: 1.38 INJECTION, POWDER, LYOPHILIZED, FOR SOLUTION INTRAVENOUS at 08:45

## 2024-10-04 RX ADMIN — TETROFOSMIN 33 MILLICURIE: 1.38 INJECTION, POWDER, LYOPHILIZED, FOR SOLUTION INTRAVENOUS at 10:49

## 2024-10-04 RX ADMIN — SODIUM CHLORIDE 250 ML: 9 INJECTION, SOLUTION INTRAVENOUS at 10:49

## 2024-10-07 ENCOUNTER — TELEPHONE (OUTPATIENT)
Age: 69
End: 2024-10-07

## 2024-10-07 DIAGNOSIS — R79.89 ELEVATED SERUM CREATININE: ICD-10-CM

## 2024-10-07 DIAGNOSIS — I50.33 ACUTE ON CHRONIC DIASTOLIC CONGESTIVE HEART FAILURE (HCC): Primary | ICD-10-CM

## 2024-10-07 NOTE — TELEPHONE ENCOUNTER
Called patient regarding lab/test per Dr. Sal. Lab reviewed. Please contact patient and do the following asap  Let patient know that he likely has CKD and may need to see a kidney doctor.  Rpt BMP  in 5-7 days.   Wasn't able to reach patient due to voicemail not set up. Will try to reach patient later.

## 2024-10-07 NOTE — TELEPHONE ENCOUNTER
----- Message from Dr. Mary Sal MD sent at 10/7/2024 11:02 AM EDT -----  Please contact patient and do the following asap  Let patient know that he likely has CKD and may need to see a kidney doctor.  Rpt BMP  in 5-7 days

## 2024-10-07 NOTE — RESULT ENCOUNTER NOTE
Please contact patient and do the following asap  Let patient know that he likely has CKD and may need to see a kidney doctor.  Rpt BMP  in 5-7 days

## 2024-10-09 NOTE — TELEPHONE ENCOUNTER
Spoke to patient per Dr. Sal regarding lab/test. Test reviewed.   Let patient know that he likely has CKD and may need to see a kidney doctor.  Rpt BMP  in 5-7 days.   Wasn't able to reach patient due to voicemail not set up. Will try to reach patient later. Patient states he don't have a kidney doctor.

## 2024-10-09 NOTE — TELEPHONE ENCOUNTER
Spoke to patient regarding appointment to see Dr. George Bishop per Dr. Sal. Patient appointment has been schedule for 11/5/2024 at 3:40 PM. He voices understanding and acceptance of this advice and will call back if any further questions or concerns.

## 2024-10-18 ENCOUNTER — HOSPITAL ENCOUNTER (OUTPATIENT)
Facility: HOSPITAL | Age: 69
Discharge: HOME | End: 2024-10-21
Payer: MEDICARE

## 2024-10-18 DIAGNOSIS — I50.33 ACUTE ON CHRONIC DIASTOLIC CONGESTIVE HEART FAILURE (HCC): ICD-10-CM

## 2024-10-18 LAB
ANION GAP SERPL CALC-SCNC: 4 MMOL/L (ref 3–18)
BUN SERPL-MCNC: 36 MG/DL (ref 7–18)
BUN/CREAT SERPL: 17 (ref 12–20)
CALCIUM SERPL-MCNC: 8.9 MG/DL (ref 8.5–10.1)
CHLORIDE SERPL-SCNC: 100 MMOL/L (ref 100–111)
CO2 SERPL-SCNC: 33 MMOL/L (ref 21–32)
CREAT SERPL-MCNC: 2.08 MG/DL (ref 0.6–1.3)
GLUCOSE SERPL-MCNC: 87 MG/DL (ref 74–99)
POTASSIUM SERPL-SCNC: 3.2 MMOL/L (ref 3.5–5.5)
SODIUM SERPL-SCNC: 137 MMOL/L (ref 136–145)

## 2024-10-18 PROCEDURE — 80048 BASIC METABOLIC PNL TOTAL CA: CPT

## 2024-10-18 PROCEDURE — 36415 COLL VENOUS BLD VENIPUNCTURE: CPT

## 2024-10-21 DIAGNOSIS — I50.33 ACUTE ON CHRONIC DIASTOLIC CONGESTIVE HEART FAILURE (HCC): Primary | ICD-10-CM

## 2024-10-21 RX ORDER — POTASSIUM CHLORIDE 750 MG/1
10 TABLET, EXTENDED RELEASE ORAL DAILY
Qty: 90 TABLET | Refills: 1 | Status: SHIPPED | OUTPATIENT
Start: 2024-10-21

## 2024-10-21 NOTE — TELEPHONE ENCOUNTER
----- Message from Dr. Mary Sal MD sent at 10/21/2024 12:21 PM EDT -----  Please contact patient and do the following asap  KCl 10 mEq a day  Rpt BMP  in 30 days

## 2024-10-21 NOTE — TELEPHONE ENCOUNTER
Spoke with patient per Dr. Sal regarding lab/test. Lab reviewed. Please contact patient and do the following asap  KCl 10 mEq a day  Rpt BMP  in 30 days. He voices understanding and acceptance of this advice and will call back if any further questions or concerns.

## 2024-10-23 ENCOUNTER — OFFICE VISIT (OUTPATIENT)
Age: 69
End: 2024-10-23
Payer: MEDICARE

## 2024-10-23 VITALS
BODY MASS INDEX: 41.72 KG/M2 | HEIGHT: 72 IN | SYSTOLIC BLOOD PRESSURE: 127 MMHG | OXYGEN SATURATION: 97 % | DIASTOLIC BLOOD PRESSURE: 64 MMHG | HEART RATE: 95 BPM | WEIGHT: 308 LBS

## 2024-10-23 DIAGNOSIS — E78.2 MIXED HYPERLIPIDEMIA: ICD-10-CM

## 2024-10-23 DIAGNOSIS — I10 ESSENTIAL (PRIMARY) HYPERTENSION: ICD-10-CM

## 2024-10-23 DIAGNOSIS — I50.33 ACUTE ON CHRONIC DIASTOLIC CONGESTIVE HEART FAILURE (HCC): Primary | ICD-10-CM

## 2024-10-23 DIAGNOSIS — Z79.01 LONG TERM (CURRENT) USE OF ANTICOAGULANTS: ICD-10-CM

## 2024-10-23 DIAGNOSIS — I27.20 PULMONARY HYPERTENSION (HCC): ICD-10-CM

## 2024-10-23 DIAGNOSIS — I48.20 CHRONIC ATRIAL FIBRILLATION, UNSPECIFIED (HCC): ICD-10-CM

## 2024-10-23 DIAGNOSIS — I05.9 MITRAL VALVE DISORDER: ICD-10-CM

## 2024-10-23 DIAGNOSIS — E66.01 MORBID (SEVERE) OBESITY DUE TO EXCESS CALORIES: ICD-10-CM

## 2024-10-23 PROCEDURE — 1159F MED LIST DOCD IN RCRD: CPT | Performed by: NURSE PRACTITIONER

## 2024-10-23 PROCEDURE — 3017F COLORECTAL CA SCREEN DOC REV: CPT | Performed by: NURSE PRACTITIONER

## 2024-10-23 PROCEDURE — 3074F SYST BP LT 130 MM HG: CPT | Performed by: NURSE PRACTITIONER

## 2024-10-23 PROCEDURE — 1160F RVW MEDS BY RX/DR IN RCRD: CPT | Performed by: NURSE PRACTITIONER

## 2024-10-23 PROCEDURE — G8417 CALC BMI ABV UP PARAM F/U: HCPCS | Performed by: NURSE PRACTITIONER

## 2024-10-23 PROCEDURE — 3078F DIAST BP <80 MM HG: CPT | Performed by: NURSE PRACTITIONER

## 2024-10-23 PROCEDURE — G8484 FLU IMMUNIZE NO ADMIN: HCPCS | Performed by: NURSE PRACTITIONER

## 2024-10-23 PROCEDURE — G8427 DOCREV CUR MEDS BY ELIG CLIN: HCPCS | Performed by: NURSE PRACTITIONER

## 2024-10-23 PROCEDURE — 1036F TOBACCO NON-USER: CPT | Performed by: NURSE PRACTITIONER

## 2024-10-23 PROCEDURE — 99214 OFFICE O/P EST MOD 30 MIN: CPT | Performed by: NURSE PRACTITIONER

## 2024-10-23 PROCEDURE — 1124F ACP DISCUSS-NO DSCNMKR DOCD: CPT | Performed by: NURSE PRACTITIONER

## 2024-10-23 ASSESSMENT — ENCOUNTER SYMPTOMS
SHORTNESS OF BREATH: 1
GASTROINTESTINAL NEGATIVE: 1
EYES NEGATIVE: 1

## 2024-10-23 NOTE — PROGRESS NOTES
associated with digoxin and Coreg use.  Currently off digoxin.  Also LDL of 126 and 10/2018.  Continue with diet and weight loss  9/2019  Cardiac status stable continue current treatment  2/2020  Cardiac status stable.  Continue treatment lab with PCP.  Last LDL controlled  8/2020  Cardiac status stable continue current treatment check labs  4 2021  Blood pressure elevated I have added hydralazine.  Did not increase Coreg as patient had bradycardia with Coreg and digoxin use in the past.  LDL level controlled in last lab of 9/2020.  Continue dietary modification.  Currently not on Metformin due to E. coli advised him to discuss with PCP alternate  3/2022  Cardiac status stable.  INR monitored.  Rate controlled.  Blood pressure is elevated I will increase hydralazine to 50 mg 3 times a day  9/2022  Blood pressure remains elevated increase carvedilol to 25 mg twice a day.  Rate controlled.  Continue dietary modification  7/6/2023  Cardiac status is stable.  May proceed with cataract surgery with low cardiac risk.  Will check INR today - advised compliance with INR checks in future.   7/20/2023  Patient seen in f/u for persistent afib.  Cardiac status is stable.  May proceed with cataract surgery with low cardiac risk.  Will check INR today -   1/2024  Patient with persistent A-fib seen for complaint of increased bilateral lower extremity edema with weight gain shortness of breath.  Will add metolazone 5 mg/day check BMP in 1 week.  Will check INR today patient has been noncompliant with checks.  Encouraged to check INR consistently.  Follow-up in 2 weeks or sooner if needed  2/14/2024  Patient seen in follow-up for acute on chronic diastolic CHF.  Edema and shortness of breath with 20 pound weight loss since addition of metolazone.  However continues to have 2+ bilateral lower extremity edema.  BMP reviewed and discussed with patient - mild increase in creat 1.56 will decrease metolazone to every other day and check

## 2024-10-23 NOTE — PATIENT INSTRUCTIONS
Continue current medications    Have blood work drawn and see nephrology as scheduled    To ER for new or worsening symptoms.

## 2024-11-13 ENCOUNTER — TRANSCRIBE ORDERS (OUTPATIENT)
Facility: HOSPITAL | Age: 69
End: 2024-11-13

## 2024-11-13 DIAGNOSIS — N18.31 CHRONIC KIDNEY DISEASE (CKD) STAGE G3A/A1, MODERATELY DECREASED GLOMERULAR FILTRATION RATE (GFR) BETWEEN 45-59 ML/MIN/1.73 SQUARE METER AND ALBUMINURIA CREATININE RATIO LESS THAN 30 MG/G (HCC): Primary | ICD-10-CM

## 2024-11-22 ENCOUNTER — HOSPITAL ENCOUNTER (INPATIENT)
Facility: HOSPITAL | Age: 69
LOS: 2 days | Discharge: HOME | DRG: 683 | End: 2024-11-24
Attending: STUDENT IN AN ORGANIZED HEALTH CARE EDUCATION/TRAINING PROGRAM | Admitting: HOSPITALIST
Payer: MEDICARE

## 2024-11-22 ENCOUNTER — HOSPITAL ENCOUNTER (OUTPATIENT)
Facility: HOSPITAL | Age: 69
Discharge: HOME | DRG: 683 | End: 2024-11-22
Payer: MEDICARE

## 2024-11-22 ENCOUNTER — HOSPITAL ENCOUNTER (INPATIENT)
Facility: HOSPITAL | Age: 69
Discharge: HOME | DRG: 683 | End: 2024-11-25
Payer: MEDICARE

## 2024-11-22 ENCOUNTER — TELEPHONE (OUTPATIENT)
Age: 69
End: 2024-11-22

## 2024-11-22 ENCOUNTER — APPOINTMENT (OUTPATIENT)
Facility: HOSPITAL | Age: 69
DRG: 683 | End: 2024-11-22
Payer: MEDICARE

## 2024-11-22 DIAGNOSIS — I50.33 ACUTE ON CHRONIC DIASTOLIC CONGESTIVE HEART FAILURE (HCC): ICD-10-CM

## 2024-11-22 PROBLEM — N17.9 ACUTE RENAL FAILURE (ARF) (HCC): Status: ACTIVE | Noted: 2024-11-22

## 2024-11-22 PROBLEM — N17.9 AKI (ACUTE KIDNEY INJURY) (HCC): Status: ACTIVE | Noted: 2024-11-22

## 2024-11-22 LAB
ALBUMIN SERPL-MCNC: 3 G/DL (ref 3.4–5)
ALBUMIN/GLOB SERPL: 0.5 (ref 0.8–1.7)
ALP SERPL-CCNC: 38 U/L (ref 45–117)
ALT SERPL-CCNC: 13 U/L (ref 16–61)
ANION GAP SERPL CALC-SCNC: 5 MMOL/L (ref 3–18)
ANION GAP SERPL CALC-SCNC: 7 MMOL/L (ref 3–18)
APPEARANCE UR: CLEAR
AST SERPL-CCNC: 24 U/L (ref 10–38)
BASOPHILS # BLD: 0 K/UL (ref 0–0.1)
BASOPHILS NFR BLD: 1 % (ref 0–2)
BILIRUB SERPL-MCNC: 1 MG/DL (ref 0.2–1)
BILIRUB UR QL: NEGATIVE
BUN SERPL-MCNC: 74 MG/DL (ref 7–18)
BUN SERPL-MCNC: 75 MG/DL (ref 7–18)
BUN/CREAT SERPL: 17 (ref 12–20)
BUN/CREAT SERPL: 18 (ref 12–20)
CALCIUM SERPL-MCNC: 9.2 MG/DL (ref 8.5–10.1)
CALCIUM SERPL-MCNC: 9.4 MG/DL (ref 8.5–10.1)
CHLORIDE SERPL-SCNC: 96 MMOL/L (ref 100–111)
CHLORIDE SERPL-SCNC: 96 MMOL/L (ref 100–111)
CO2 SERPL-SCNC: 34 MMOL/L (ref 21–32)
CO2 SERPL-SCNC: 36 MMOL/L (ref 21–32)
COLOR UR: YELLOW
CREAT SERPL-MCNC: 4.08 MG/DL (ref 0.6–1.3)
CREAT SERPL-MCNC: 4.38 MG/DL (ref 0.6–1.3)
CREAT UR-MCNC: 74 MG/DL (ref 30–125)
DIFFERENTIAL METHOD BLD: ABNORMAL
EOSINOPHIL # BLD: 0.2 K/UL (ref 0–0.4)
EOSINOPHIL NFR BLD: 4 % (ref 0–5)
ERYTHROCYTE [DISTWIDTH] IN BLOOD BY AUTOMATED COUNT: 15.1 % (ref 11.6–14.5)
GLOBULIN SER CALC-MCNC: 5.6 G/DL (ref 2–4)
GLUCOSE SERPL-MCNC: 137 MG/DL (ref 74–99)
GLUCOSE SERPL-MCNC: 95 MG/DL (ref 74–99)
GLUCOSE UR STRIP.AUTO-MCNC: NEGATIVE MG/DL
HCT VFR BLD AUTO: 36.9 % (ref 36–48)
HGB BLD-MCNC: 12.2 G/DL (ref 13–16)
HGB UR QL STRIP: NEGATIVE
IMM GRANULOCYTES # BLD AUTO: 0 K/UL (ref 0–0.04)
IMM GRANULOCYTES NFR BLD AUTO: 0 % (ref 0–0.5)
INR PPP: 4.2 (ref 0.9–1.1)
KETONES UR QL STRIP.AUTO: NEGATIVE MG/DL
LEUKOCYTE ESTERASE UR QL STRIP.AUTO: NEGATIVE
LYMPHOCYTES # BLD: 1.1 K/UL (ref 0.9–3.6)
LYMPHOCYTES NFR BLD: 32 % (ref 21–52)
MCH RBC QN AUTO: 30.6 PG (ref 24–34)
MCHC RBC AUTO-ENTMCNC: 33.1 G/DL (ref 31–37)
MCV RBC AUTO: 92.5 FL (ref 78–100)
MONOCYTES # BLD: 0.5 K/UL (ref 0.05–1.2)
MONOCYTES NFR BLD: 15 % (ref 3–10)
NEUTS SEG # BLD: 1.6 K/UL (ref 1.8–8)
NEUTS SEG NFR BLD: 47 % (ref 40–73)
NITRITE UR QL STRIP.AUTO: NEGATIVE
NRBC # BLD: 0 K/UL (ref 0–0.01)
NRBC BLD-RTO: 0 PER 100 WBC
NT PRO BNP: 1570 PG/ML (ref 0–900)
PH UR STRIP: 6 (ref 5–8)
PLATELET # BLD AUTO: 154 K/UL (ref 135–420)
PMV BLD AUTO: 11.4 FL (ref 9.2–11.8)
POTASSIUM SERPL-SCNC: 3.4 MMOL/L (ref 3.5–5.5)
POTASSIUM SERPL-SCNC: 3.7 MMOL/L (ref 3.5–5.5)
PROT SERPL-MCNC: 8.6 G/DL (ref 6.4–8.2)
PROT UR STRIP-MCNC: NEGATIVE MG/DL
PROT UR-MCNC: 11 MG/DL
PROTHROMBIN TIME: 40.6 SEC (ref 11.9–14.9)
RBC # BLD AUTO: 3.99 M/UL (ref 4.35–5.65)
SODIUM SERPL-SCNC: 137 MMOL/L (ref 136–145)
SODIUM SERPL-SCNC: 137 MMOL/L (ref 136–145)
SP GR UR REFRACTOMETRY: 1.01 (ref 1–1.03)
UROBILINOGEN UR QL STRIP.AUTO: 0.2 EU/DL (ref 0.2–1)
WBC # BLD AUTO: 3.5 K/UL (ref 4.6–13.2)

## 2024-11-22 PROCEDURE — 86334 IMMUNOFIX E-PHORESIS SERUM: CPT

## 2024-11-22 PROCEDURE — 83521 IG LIGHT CHAINS FREE EACH: CPT

## 2024-11-22 PROCEDURE — 83880 ASSAY OF NATRIURETIC PEPTIDE: CPT

## 2024-11-22 PROCEDURE — 36415 COLL VENOUS BLD VENIPUNCTURE: CPT

## 2024-11-22 PROCEDURE — 85610 PROTHROMBIN TIME: CPT

## 2024-11-22 PROCEDURE — 81003 URINALYSIS AUTO W/O SCOPE: CPT

## 2024-11-22 PROCEDURE — 84165 PROTEIN E-PHORESIS SERUM: CPT

## 2024-11-22 PROCEDURE — 85025 COMPLETE CBC W/AUTO DIFF WBC: CPT

## 2024-11-22 PROCEDURE — 82570 ASSAY OF URINE CREATININE: CPT

## 2024-11-22 PROCEDURE — 99285 EMERGENCY DEPT VISIT HI MDM: CPT

## 2024-11-22 PROCEDURE — 71045 X-RAY EXAM CHEST 1 VIEW: CPT

## 2024-11-22 PROCEDURE — 82784 ASSAY IGA/IGD/IGG/IGM EACH: CPT

## 2024-11-22 PROCEDURE — 1100000000 HC RM PRIVATE

## 2024-11-22 PROCEDURE — 99223 1ST HOSP IP/OBS HIGH 75: CPT | Performed by: HOSPITALIST

## 2024-11-22 PROCEDURE — 76770 US EXAM ABDO BACK WALL COMP: CPT

## 2024-11-22 PROCEDURE — 84156 ASSAY OF PROTEIN URINE: CPT

## 2024-11-22 PROCEDURE — 80053 COMPREHEN METABOLIC PANEL: CPT

## 2024-11-22 RX ORDER — ONDANSETRON 4 MG/1
4 TABLET, ORALLY DISINTEGRATING ORAL EVERY 8 HOURS PRN
Status: DISCONTINUED | OUTPATIENT
Start: 2024-11-22 | End: 2024-11-24 | Stop reason: HOSPADM

## 2024-11-22 RX ORDER — POLYETHYLENE GLYCOL 3350 17 G/17G
17 POWDER, FOR SOLUTION ORAL DAILY PRN
Status: DISCONTINUED | OUTPATIENT
Start: 2024-11-22 | End: 2024-11-24 | Stop reason: HOSPADM

## 2024-11-22 RX ORDER — ACETAMINOPHEN 325 MG/1
650 TABLET ORAL EVERY 6 HOURS PRN
Status: DISCONTINUED | OUTPATIENT
Start: 2024-11-22 | End: 2024-11-24 | Stop reason: HOSPADM

## 2024-11-22 RX ORDER — ACETAMINOPHEN 650 MG/1
650 SUPPOSITORY RECTAL EVERY 6 HOURS PRN
Status: DISCONTINUED | OUTPATIENT
Start: 2024-11-22 | End: 2024-11-24 | Stop reason: HOSPADM

## 2024-11-22 RX ORDER — FERROUS SULFATE 325(65) MG
325 TABLET ORAL 2 TIMES DAILY
Status: DISCONTINUED | OUTPATIENT
Start: 2024-11-22 | End: 2024-11-24 | Stop reason: HOSPADM

## 2024-11-22 RX ORDER — SODIUM CHLORIDE 9 MG/ML
INJECTION, SOLUTION INTRAVENOUS CONTINUOUS
Status: DISCONTINUED | OUTPATIENT
Start: 2024-11-22 | End: 2024-11-24

## 2024-11-22 RX ORDER — SODIUM CHLORIDE 0.9 % (FLUSH) 0.9 %
5-40 SYRINGE (ML) INJECTION PRN
Status: DISCONTINUED | OUTPATIENT
Start: 2024-11-22 | End: 2024-11-24 | Stop reason: HOSPADM

## 2024-11-22 RX ORDER — CARVEDILOL 25 MG/1
25 TABLET ORAL 2 TIMES DAILY WITH MEALS
Status: DISCONTINUED | OUTPATIENT
Start: 2024-11-23 | End: 2024-11-24 | Stop reason: HOSPADM

## 2024-11-22 RX ORDER — SODIUM CHLORIDE 9 MG/ML
INJECTION, SOLUTION INTRAVENOUS CONTINUOUS
Status: DISCONTINUED | OUTPATIENT
Start: 2024-11-22 | End: 2024-11-22

## 2024-11-22 RX ORDER — SODIUM CHLORIDE 0.9 % (FLUSH) 0.9 %
5-40 SYRINGE (ML) INJECTION EVERY 12 HOURS SCHEDULED
Status: DISCONTINUED | OUTPATIENT
Start: 2024-11-22 | End: 2024-11-24 | Stop reason: HOSPADM

## 2024-11-22 RX ORDER — HYDRALAZINE HYDROCHLORIDE 50 MG/1
50 TABLET, FILM COATED ORAL 3 TIMES DAILY
Status: DISCONTINUED | OUTPATIENT
Start: 2024-11-22 | End: 2024-11-24 | Stop reason: HOSPADM

## 2024-11-22 RX ORDER — 0.9 % SODIUM CHLORIDE 0.9 %
1000 INTRAVENOUS SOLUTION INTRAVENOUS ONCE
Status: DISCONTINUED | OUTPATIENT
Start: 2024-11-22 | End: 2024-11-22

## 2024-11-22 RX ORDER — ONDANSETRON 2 MG/ML
4 INJECTION INTRAMUSCULAR; INTRAVENOUS EVERY 6 HOURS PRN
Status: DISCONTINUED | OUTPATIENT
Start: 2024-11-22 | End: 2024-11-24 | Stop reason: HOSPADM

## 2024-11-22 RX ORDER — SODIUM CHLORIDE 9 MG/ML
INJECTION, SOLUTION INTRAVENOUS PRN
Status: DISCONTINUED | OUTPATIENT
Start: 2024-11-22 | End: 2024-11-24 | Stop reason: HOSPADM

## 2024-11-22 ASSESSMENT — PAIN - FUNCTIONAL ASSESSMENT: PAIN_FUNCTIONAL_ASSESSMENT: NONE - DENIES PAIN

## 2024-11-22 NOTE — TELEPHONE ENCOUNTER
Patient wife made aware of Dr. Sal's message below as patient mailbox is full. Stated she will notify patient f results and let him decide whether to report to [facility] or [facility].     Radha Mccoy, Yuliya Cobian LPN     ---- Message -----  From: Mary Sal MD  Please contact patient and do the following asap    Patient has acute on chronic kidney failure  Send him to emergency room.  Tell him to discontinue metolazone and Entresto for now.

## 2024-11-22 NOTE — ED TRIAGE NOTES
Pt ambulatory to triage c/o \"fluid in legs\" for a while and Dr. Bishop (cards) sent pt for abnormal labs.

## 2024-11-22 NOTE — RESULT ENCOUNTER NOTE
Please contact patient and do the following asap    Patient has acute on chronic kidney failure  Send him to emergency room.  Tell him to discontinue metolazone and Entresto for now.

## 2024-11-23 LAB
ALBUMIN SERPL-MCNC: 2.7 G/DL (ref 3.4–5)
ANION GAP SERPL CALC-SCNC: 4 MMOL/L (ref 3–18)
ANION GAP SERPL CALC-SCNC: 6 MMOL/L (ref 3–18)
BASOPHILS # BLD: 0 K/UL (ref 0–0.1)
BASOPHILS NFR BLD: 1 % (ref 0–2)
BUN SERPL-MCNC: 72 MG/DL (ref 7–18)
BUN SERPL-MCNC: 76 MG/DL (ref 7–18)
BUN/CREAT SERPL: 19 (ref 12–20)
BUN/CREAT SERPL: 20 (ref 12–20)
CALCIUM SERPL-MCNC: 8.9 MG/DL (ref 8.5–10.1)
CALCIUM SERPL-MCNC: 9.2 MG/DL (ref 8.5–10.1)
CHLORIDE SERPL-SCNC: 102 MMOL/L (ref 100–111)
CHLORIDE SERPL-SCNC: 99 MMOL/L (ref 100–111)
CO2 SERPL-SCNC: 32 MMOL/L (ref 21–32)
CO2 SERPL-SCNC: 34 MMOL/L (ref 21–32)
CREAT SERPL-MCNC: 3.52 MG/DL (ref 0.6–1.3)
CREAT SERPL-MCNC: 4 MG/DL (ref 0.6–1.3)
DIFFERENTIAL METHOD BLD: ABNORMAL
EOSINOPHIL # BLD: 0.1 K/UL (ref 0–0.4)
EOSINOPHIL NFR BLD: 3 % (ref 0–5)
ERYTHROCYTE [DISTWIDTH] IN BLOOD BY AUTOMATED COUNT: 15 % (ref 11.6–14.5)
GLUCOSE BLD STRIP.AUTO-MCNC: 102 MG/DL (ref 70–110)
GLUCOSE BLD STRIP.AUTO-MCNC: 159 MG/DL (ref 70–110)
GLUCOSE SERPL-MCNC: 105 MG/DL (ref 74–99)
GLUCOSE SERPL-MCNC: 111 MG/DL (ref 74–99)
HCT VFR BLD AUTO: 35.2 % (ref 36–48)
HGB BLD-MCNC: 11.8 G/DL (ref 13–16)
IMM GRANULOCYTES # BLD AUTO: 0 K/UL (ref 0–0.04)
IMM GRANULOCYTES NFR BLD AUTO: 0 % (ref 0–0.5)
INR PPP: 3.6 (ref 0.9–1.1)
LYMPHOCYTES # BLD: 1.4 K/UL (ref 0.9–3.6)
LYMPHOCYTES NFR BLD: 38 % (ref 21–52)
MCH RBC QN AUTO: 30.4 PG (ref 24–34)
MCHC RBC AUTO-ENTMCNC: 33.5 G/DL (ref 31–37)
MCV RBC AUTO: 90.7 FL (ref 78–100)
MONOCYTES # BLD: 0.5 K/UL (ref 0.05–1.2)
MONOCYTES NFR BLD: 13 % (ref 3–10)
NEUTS SEG # BLD: 1.7 K/UL (ref 1.8–8)
NEUTS SEG NFR BLD: 45 % (ref 40–73)
NRBC # BLD: 0 K/UL (ref 0–0.01)
NRBC BLD-RTO: 0 PER 100 WBC
PHOSPHATE SERPL-MCNC: 3.1 MG/DL (ref 2.5–4.9)
PLATELET # BLD AUTO: 146 K/UL (ref 135–420)
PMV BLD AUTO: 11.7 FL (ref 9.2–11.8)
POTASSIUM SERPL-SCNC: 3.1 MMOL/L (ref 3.5–5.5)
POTASSIUM SERPL-SCNC: 3.5 MMOL/L (ref 3.5–5.5)
PROTHROMBIN TIME: 36.4 SEC (ref 11.9–14.9)
RBC # BLD AUTO: 3.88 M/UL (ref 4.35–5.65)
SODIUM SERPL-SCNC: 137 MMOL/L (ref 136–145)
SODIUM SERPL-SCNC: 140 MMOL/L (ref 136–145)
WBC # BLD AUTO: 3.6 K/UL (ref 4.6–13.2)

## 2024-11-23 PROCEDURE — 2580000003 HC RX 258: Performed by: HOSPITALIST

## 2024-11-23 PROCEDURE — 6370000000 HC RX 637 (ALT 250 FOR IP): Performed by: INTERNAL MEDICINE

## 2024-11-23 PROCEDURE — 80069 RENAL FUNCTION PANEL: CPT

## 2024-11-23 PROCEDURE — 85025 COMPLETE CBC W/AUTO DIFF WBC: CPT

## 2024-11-23 PROCEDURE — 99232 SBSQ HOSP IP/OBS MODERATE 35: CPT | Performed by: STUDENT IN AN ORGANIZED HEALTH CARE EDUCATION/TRAINING PROGRAM

## 2024-11-23 PROCEDURE — 36415 COLL VENOUS BLD VENIPUNCTURE: CPT

## 2024-11-23 PROCEDURE — 80048 BASIC METABOLIC PNL TOTAL CA: CPT

## 2024-11-23 PROCEDURE — 1100000003 HC PRIVATE W/ TELEMETRY

## 2024-11-23 PROCEDURE — 82962 GLUCOSE BLOOD TEST: CPT

## 2024-11-23 PROCEDURE — 6370000000 HC RX 637 (ALT 250 FOR IP): Performed by: HOSPITALIST

## 2024-11-23 PROCEDURE — 94761 N-INVAS EAR/PLS OXIMETRY MLT: CPT

## 2024-11-23 PROCEDURE — 2580000003 HC RX 258: Performed by: INTERNAL MEDICINE

## 2024-11-23 PROCEDURE — 85610 PROTHROMBIN TIME: CPT

## 2024-11-23 RX ORDER — POTASSIUM CHLORIDE 1500 MG/1
40 TABLET, EXTENDED RELEASE ORAL 2 TIMES DAILY
Status: COMPLETED | OUTPATIENT
Start: 2024-11-23 | End: 2024-11-23

## 2024-11-23 RX ADMIN — SODIUM CHLORIDE: 9 INJECTION, SOLUTION INTRAVENOUS at 02:47

## 2024-11-23 RX ADMIN — SODIUM CHLORIDE, PRESERVATIVE FREE 10 ML: 5 INJECTION INTRAVENOUS at 20:32

## 2024-11-23 RX ADMIN — HYDRALAZINE HYDROCHLORIDE 50 MG: 50 TABLET ORAL at 09:42

## 2024-11-23 RX ADMIN — CARVEDILOL 25 MG: 25 TABLET, FILM COATED ORAL at 09:42

## 2024-11-23 RX ADMIN — FERROUS SULFATE TAB 325 MG (65 MG ELEMENTAL FE) 325 MG: 325 (65 FE) TAB at 20:31

## 2024-11-23 RX ADMIN — FERROUS SULFATE TAB 325 MG (65 MG ELEMENTAL FE) 325 MG: 325 (65 FE) TAB at 09:42

## 2024-11-23 RX ADMIN — CARVEDILOL 25 MG: 25 TABLET, FILM COATED ORAL at 18:16

## 2024-11-23 RX ADMIN — POTASSIUM CHLORIDE 40 MEQ: 1500 TABLET, EXTENDED RELEASE ORAL at 12:45

## 2024-11-23 RX ADMIN — HYDRALAZINE HYDROCHLORIDE 50 MG: 50 TABLET ORAL at 20:31

## 2024-11-23 RX ADMIN — POTASSIUM CHLORIDE 40 MEQ: 1500 TABLET, EXTENDED RELEASE ORAL at 20:30

## 2024-11-23 ASSESSMENT — PAIN SCALES - GENERAL
PAINLEVEL_OUTOF10: 0
PAINLEVEL_OUTOF10: 0

## 2024-11-23 NOTE — H&P
extremities.    Labs Reviewed:    Recent Results (from the past 24 hour(s))   Basic Metabolic Panel    Collection Time: 11/22/24  7:13 AM   Result Value Ref Range    Sodium 137 136 - 145 mmol/L    Potassium 3.4 (L) 3.5 - 5.5 mmol/L    Chloride 96 (L) 100 - 111 mmol/L    CO2 34 (H) 21 - 32 mmol/L    Anion Gap 7 3.0 - 18 mmol/L    Glucose 95 74 - 99 mg/dL    BUN 75 (H) 7.0 - 18 MG/DL    Creatinine 4.08 (H) 0.6 - 1.3 MG/DL    BUN/Creatinine Ratio 18 12 - 20      Est, Glom Filt Rate 15 (L) >60 ml/min/1.73m2    Calcium 9.2 8.5 - 10.1 MG/DL   CBC with Auto Differential    Collection Time: 11/22/24  8:04 PM   Result Value Ref Range    WBC 3.5 (L) 4.6 - 13.2 K/uL    RBC 3.99 (L) 4.35 - 5.65 M/uL    Hemoglobin 12.2 (L) 13.0 - 16.0 g/dL    Hematocrit 36.9 36.0 - 48.0 %    MCV 92.5 78.0 - 100.0 FL    MCH 30.6 24.0 - 34.0 PG    MCHC 33.1 31.0 - 37.0 g/dL    RDW 15.1 (H) 11.6 - 14.5 %    Platelets 154 135 - 420 K/uL    MPV 11.4 9.2 - 11.8 FL    Nucleated RBCs 0.0 0  WBC    nRBC 0.00 0.00 - 0.01 K/uL    Neutrophils % 47 40 - 73 %    Lymphocytes % 32 21 - 52 %    Monocytes % 15 (H) 3 - 10 %    Eosinophils % 4 0 - 5 %    Basophils % 1 0 - 2 %    Immature Granulocytes % 0 0.0 - 0.5 %    Neutrophils Absolute 1.6 (L) 1.8 - 8.0 K/UL    Lymphocytes Absolute 1.1 0.9 - 3.6 K/UL    Monocytes Absolute 0.5 0.05 - 1.2 K/UL    Eosinophils Absolute 0.2 0.0 - 0.4 K/UL    Basophils Absolute 0.0 0.0 - 0.1 K/UL    Immature Granulocytes Absolute 0.0 0.00 - 0.04 K/UL    Differential Type AUTOMATED     CMP    Collection Time: 11/22/24  8:04 PM   Result Value Ref Range    Sodium 137 136 - 145 mmol/L    Potassium 3.7 3.5 - 5.5 mmol/L    Chloride 96 (L) 100 - 111 mmol/L    CO2 36 (H) 21 - 32 mmol/L    Anion Gap 5 3.0 - 18 mmol/L    Glucose 137 (H) 74 - 99 mg/dL    BUN 74 (H) 7.0 - 18 MG/DL    Creatinine 4.38 (H) 0.6 - 1.3 MG/DL    BUN/Creatinine Ratio 17 12 - 20      Est, Glom Filt Rate 14 (L) >60 ml/min/1.73m2    Calcium 9.4 8.5 - 10.1 MG/DL     Total Bilirubin 1.0 0.2 - 1.0 MG/DL    ALT 13 (L) 16 - 61 U/L    AST 24 10 - 38 U/L    Alk Phosphatase 38 (L) 45 - 117 U/L    Total Protein 8.6 (H) 6.4 - 8.2 g/dL    Albumin 3.0 (L) 3.4 - 5.0 g/dL    Globulin 5.6 (H) 2.0 - 4.0 g/dL    Albumin/Globulin Ratio 0.5 (L) 0.8 - 1.7     Brain Natriuretic Peptide    Collection Time: 11/22/24  8:04 PM   Result Value Ref Range    NT Pro-BNP 1,570 (H) 0 - 900 PG/ML   Protime-INR    Collection Time: 11/22/24  8:04 PM   Result Value Ref Range    Protime 40.6 (H) 11.9 - 14.9 sec    INR 4.2 (H) 0.9 - 1.1     Urinalysis    Collection Time: 11/22/24 11:05 PM   Result Value Ref Range    Color, UA YELLOW      Appearance CLEAR      Specific Gravity, UA 1.010 1.005 - 1.030      pH, Urine 6.0 5.0 - 8.0      Protein, UA Negative NEG mg/dL    Glucose, Ur Negative NEG mg/dL    Ketones, Urine Negative NEG mg/dL    Bilirubin, Urine Negative NEG      Blood, Urine Negative NEG      Urobilinogen, Urine 0.2 0.2 - 1.0 EU/dL    Nitrite, Urine Negative NEG      Leukocyte Esterase, Urine Negative NEG     Protein, urine, random    Collection Time: 11/22/24 11:05 PM   Result Value Ref Range    Protein, Urine, Random 11 <11.9 mg/dL   Creatinine, Random Urine    Collection Time: 11/22/24 11:05 PM   Result Value Ref Range    Creatinine, Ur 74.00 30 - 125 mg/dL       Imaging Reviewed:  No results found.           Nito Jara MD  11/22/2024, 11:47 PM        Disclaimer: Sections of this note are dictated using utilizing voice recognition software.  Minor typographical errors may be present. If questions arise, please do not hesitate to contact me or call our department.

## 2024-11-23 NOTE — ED NOTES
TRANSFER - OUT REPORT:    Verbal report given to IVÁN Dee on Ishan Camacho Sr.  being transferred to [address] [place] [address] for routine progression of patient care       Report consisted of patient's Situation, Background, Assessment and   Recommendations(SBAR).     Information from the following report(s) Nurse Handoff Report, ED Encounter Summary, and Adult Overview was reviewed with the receiving nurse.    [place] Fall Assessment:    Presents to emergency department  because of falls (Syncope, seizure, or loss of consciousness): No  Age > 70: Yes  Altered Mental Status, Intoxication with alcohol or substance confusion (Disorientation, impaired judgment, poor safety awaremess, or inability to follow instructions): No  Impaired Mobility: Ambulates or transfers with assistive devices or assistance; Unable to ambulate or transer.: No  Nursing Judgement: No          Lines:   Peripheral IV 11/22/24 Left Antecubital (Active)            Kelechi Galicia RN     Opportunity for questions and clarification was provided.      Patient transported with: N/A

## 2024-11-23 NOTE — PROGRESS NOTES
met patient and wife at bedside for an initial visit.    Patient said he was \"hanging in there.\" Patient said he was in the hospital for a kidney injury. Patient and wife thanked  for the visit.     provided presence and support for patient and wife.     Chaplains will provide follow-up care for patient and family as needed.    Spiritual Health History and Assessment/Progress Note  [facility]    Spiritual/Emotional Needs,  ,  ,      Name: Ishan Camacho Sr. MRN: [id number]    Age: 69 y.o.     Sex: male   Language: English   Christianity: Hinduism   ERICA (acute kidney injury) ([facility])     Date: 11/23/2024            Total Time Calculated: 5 min              Spiritual Assessment began in [facility] MEDICAL            Encounter Overview/Reason: Spiritual/Emotional Needs  Service Provided For: Patient, Family    Inez, Belief, Meaning:   Patient identifies as spiritual, is connected with a inez tradition or spiritual practice, and has beliefs or practices that help with coping during difficult times  Family/Friends identify as spiritual, are connected with a inez tradition or spiritual practice, and have beliefs or practices that help with coping during difficult times      Importance and Influence:  Patient has spiritual/personal beliefs that influence decisions regarding their health  Family/Friends have spiritual/personal beliefs that influence decisions regarding the patient's health    Community:  Patient feels well-supported. Support system includes: Spouse/Partner and In[place] Community  Family/Friends feel well-supported. Support system includes: Spouse/Partner and [place] Community    Assessment and Plan of Care:     Patient Interventions include: Facilitated expression of thoughts and feelings, Explored spiritual coping/struggle/distress, and Affirmed coping skills/support systems  Family/Friends Interventions include: Facilitated expression of thoughts and feelings, Explored  spiritual coping/struggle/distress, and Affirmed coping skills/support systems    Patient Plan of Care: No spiritual needs identified for follow-up  Family/Friends Plan of Care: No spiritual needs identified for follow-up    Electronically signed by Chaplain Jorge on 11/23/2024 at 12:29 PM

## 2024-11-23 NOTE — ED PROVIDER NOTES
EMERGENCY DEPARTMENT HISTORY AND PHYSICAL EXAM    7:46 PM      Date: 11/22/2024  Patient Name: Ishan Camacho Sr.    History of Presenting Illness     Chief Complaint   Patient presents with    Leg Swelling    Abnormal Lab       History From: Patient    Ishan Camacho Sr. is a 69 y.o. male   HPI  69-year-old male with history of A-fib, mitral valve disorder, CHF, hyperlipidemia, CKD presents with abnormal labs with creatinine of 4.08.  Patient was told by cardiology to come to the emergency department for further evaluation.  Patient denies any changes in meds, sick contacts, or any other changes.  No aggravating or alleviating factors.  When assessing ROS he has no headache, nausea, vomiting, shortness of breath abdominal pain, or any other changes.       Nursing Notes were all reviewed and agreed with or any disagreements were addressed in the HPI.    PCP: Delmer Hernandez MD    No current facility-administered medications for this encounter.     Current Outpatient Medications   Medication Sig Dispense Refill    furosemide (LASIX) 80 MG tablet Take 0.5 tablets by mouth 2 times daily 180 tablet 1    potassium chloride (KLOR-CON M) 10 MEQ extended release tablet Take 1 tablet by mouth daily 90 tablet 1    warfarin (COUMADIN) 5 MG tablet Take 1.5 tablets by mouth daily 90 tablet 3    carvedilol (COREG) 25 MG tablet Take 1 tablet by mouth 2 times daily (with meals) 180 tablet 2    hydrALAZINE (APRESOLINE) 50 MG tablet TAKE ONE TABLET BY MOUTH THREE TIMES DAILY 270 tablet 2    ferrous sulfate (FE TABS) 325 (65 Fe) MG EC tablet Take 1 tablet by mouth 2 times daily 90 tablet 3       Past History     Past Medical History:  Past Medical History:   Diagnosis Date    Atrial fibrillation (HCC)     Chronic diastolic heart failure (HCC)     Hypertension     Mitral valve disorders(424.0)     Prediabetes        Past Surgical History:  Past Surgical History:   Procedure Laterality Date    ADENOIDECTOMY      COLONOSCOPY N/A        CONSULTS: (Who and What was discussed)  IP CONSULT TO PHARMACY    Chronic Conditions: none     Social Determinants affecting Dx or Tx: None    Records Reviewed (source and summary of external notes):  none     Procedures    Critical Care Time: none     SCREENING TOOLS:  None    CLINICAL MANAGEMENT TOOLS:  Not Applicable      Diagnosis     Clinical Impression:   1. Acute on chronic diastolic congestive heart failure (HCC)        Disposition: home     No follow-up provider specified.     Disclaimer: Sections of this note are dictated using utilizing voice recognition software.  Minor typographical errors may be present. If questions arise, please do not hesitate to contact me or call our department.          Dawood Orantes MD  11/24/24 0758

## 2024-11-23 NOTE — PLAN OF CARE
Problem: Chronic Conditions and Co-morbidities  Goal: Patient's chronic conditions and co-morbidity symptoms are monitored and maintained or improved  Recent Flowsheet Documentation  Taken 11/23/2024 0215 by Luiz Phan RN  Care Plan - Patient's Chronic Conditions and Co-Morbidity Symptoms are Monitored and Maintained or Improved:   Monitor and assess patient's chronic conditions and comorbid symptoms for stability, deterioration, or improvement   Collaborate with multidisciplinary team to address chronic and comorbid conditions and prevent exacerbation or deterioration   Update acute care plan with appropriate goals if chronic or comorbid symptoms are exacerbated and prevent overall improvement and discharge     Problem: Discharge Planning  Goal: Discharge to home or other facility with appropriate resources  Recent Flowsheet Documentation  Taken 11/23/2024 0215 by Luiz Phan RN  Discharge to home or other facility with appropriate resources:   Identify barriers to discharge with patient and caregiver   Arrange for needed discharge resources and transportation as appropriate   Identify discharge learning needs (meds, wound care, etc)

## 2024-11-23 NOTE — PROGRESS NOTES
[facility]  Progress Note    Patient: Ishan Camacho Sr. Age: 69 y.o. : 1955 MR#: [id number] SSN: [id number]  Date/Time: 2024    Subjective:   Subjective   No acute events overnight, no new concerns or complaints, vitals stable.      Assessment/Plan:   ERICA on CKD 4  CHF  Hypertension  CAD  A-fib    Plan  Patient overall feels well however we will continue to monitor recovery of his renal function.  Given significant CKD, concerned about use of diuretics.  Review of records does show that patient did have significant heart failure and likely was on diuretics for such, will need to find balance prior to discharge.    Disposition: Expected location: Home     Expected discharge date: 2024      Case discussed with:  [x]Patient  []Family  [x]Nursing  [x]Case Management  DVT Prophylaxis:  []Lovenox  [x]Hep SQ  [x]SCDs  []Coumadin   []On Heparin gtt   [] DOAC    Objective:   Objective:  General Appearance:  Comfortable, well-appearing, in no acute distress and not in pain.    Vital signs: (most recent): Blood pressure (!) 108/59, pulse 77, temperature 98.4 °F (36.9 °C), temperature source Oral, resp. rate 18, height 1.829 m (6'), weight 133.4 kg (294 lb), SpO2 99%.  Vital signs are normal.  No fever.    HEENT: Normal HEENT exam.    Lungs:  Normal effort and normal respiratory rate.  Breath sounds clear to auscultation.    Heart: Normal rate.  Regular rhythm.  S1 normal and S2 normal.  No murmur, gallop or friction rub.   Chest: Symmetric chest wall expansion.   Abdomen: Abdomen is soft and non-distended.  Bowel sounds are normal.   There is no abdominal tenderness.     Extremities: Normal range of motion.    Pulses: Distal pulses are intact.    Neurological: Patient is alert and oriented to person, place and time.  Normal strength.    Pupils:  Pupils are equal, round, and reactive to light.    Skin:  Warm and dry.         Labs:    Recent Results (from the past

## 2024-11-23 NOTE — PROGRESS NOTES
Warfarin dosing - Pharmacy consult note    Indication: Atrial Fibrillation  Warfarin dose prior to admission: 7.5 mg daily per Rx navigator  Significant drug interactions: none    Recent Labs     11/22/24 2004 11/23/24  0849   INR 4.2* 3.6*   HGB 12.2* 11.8*    146     Date INR Dose (mg)   11/22 4.2 -   11/23 3.6 -     Assessment/Plan  Goal INR: 2 - 3  Warfarin on hold today    INR order daily. Pharmacy will continue to follow.     Nayan Lara RPH

## 2024-11-23 NOTE — PROGRESS NOTES
Warfarin Dosing - Pharmacy Consult Note  Consulting Provider: Nito Jara MD   Indication: Atrial Fibrillation  Warfarin Dose prior to admission: 5mg Wed & Sat, 7.5mg all other days   Concurrent anticoagulants/antiplatelets:   Significant Drug Interactions: No obvious interactions  Recent Labs     11/22/24 2004   INR 4.2*   HGB 12.2*          Date INR Dose (mg)   11/22 4.2 hold       Assessment/Plan  Goal INR: 2 - 3  No dose today, warfarin to be held    Active problem list reviewed.  INR orders are placed.  Chart reviewed for pertinent labs, drug/diet interactions, and past doses.  Documentation of patient's clinical condition was reviewed.    Pharmacy Dosing:  Pharmacy will continue to follow.

## 2024-11-23 NOTE — PLAN OF CARE
Problem: Chronic Conditions and Co-morbidities  Goal: Patient's chronic conditions and co-morbidity symptoms are monitored and maintained or improved  Outcome: Progressing  Flowsheets  Taken 11/23/2024 1258 by Chong Evangelista RN  Care Plan - Patient's Chronic Conditions and Co-Morbidity Symptoms are Monitored and Maintained or Improved:   Monitor and assess patient's chronic conditions and comorbid symptoms for stability, deterioration, or improvement   Collaborate with multidisciplinary team to address chronic and comorbid conditions and prevent exacerbation or deterioration   Update acute care plan with appropriate goals if chronic or comorbid symptoms are exacerbated and prevent overall improvement and discharge  Taken 11/23/2024 0215 by Luiz Phan RN  Care Plan - Patient's Chronic Conditions and Co-Morbidity Symptoms are Monitored and Maintained or Improved:   Monitor and assess patient's chronic conditions and comorbid symptoms for stability, deterioration, or improvement   Collaborate with multidisciplinary team to address chronic and comorbid conditions and prevent exacerbation or deterioration   Update acute care plan with appropriate goals if chronic or comorbid symptoms are exacerbated and prevent overall improvement and discharge  Note: Will monitor labs and administer prescribed medications. Educated patient on electrolyte replacement administered     Problem: Discharge Planning  Goal: Discharge to home or other facility with appropriate resources  Outcome: Progressing  Flowsheets (Taken 11/23/2024 0215 by Luiz Phan, RN)  Discharge to home or other facility with appropriate resources:   Identify barriers to discharge with patient and caregiver   Arrange for needed discharge resources and transportation as appropriate   Identify discharge learning needs (meds, wound care, etc)     Problem: ABCDS Injury Assessment  Goal: Absence of physical injury  Outcome: Progressing     Problem: Safety -

## 2024-11-23 NOTE — PROGRESS NOTES
Called by ED for consult . He was just in our office few days ago for work up of ckd. However he hasn’t received any work up so far . His acute cr jump is probably related to his diuretic regimen.   Hold all diuretics for now  Hold entresto  Get renal panel , UA, urine pr/cr, spep , USG kidneys , pth , iron profile to start with   Gentle hydration over night

## 2024-11-24 VITALS
HEART RATE: 93 BPM | TEMPERATURE: 98.4 F | DIASTOLIC BLOOD PRESSURE: 74 MMHG | WEIGHT: 294 LBS | BODY MASS INDEX: 39.82 KG/M2 | OXYGEN SATURATION: 97 % | HEIGHT: 72 IN | RESPIRATION RATE: 18 BRPM | SYSTOLIC BLOOD PRESSURE: 114 MMHG

## 2024-11-24 LAB
ALBUMIN SERPL-MCNC: 2.5 G/DL (ref 3.4–5)
ANION GAP SERPL CALC-SCNC: 8 MMOL/L (ref 3–18)
BASOPHILS # BLD: 0 K/UL (ref 0–0.1)
BASOPHILS NFR BLD: 1 % (ref 0–2)
BUN SERPL-MCNC: 74 MG/DL (ref 7–18)
BUN/CREAT SERPL: 24 (ref 12–20)
CALCIUM SERPL-MCNC: 8.8 MG/DL (ref 8.5–10.1)
CHLORIDE SERPL-SCNC: 104 MMOL/L (ref 100–111)
CO2 SERPL-SCNC: 28 MMOL/L (ref 21–32)
CREAT SERPL-MCNC: 3.1 MG/DL (ref 0.6–1.3)
DIFFERENTIAL METHOD BLD: ABNORMAL
EOSINOPHIL # BLD: 0.2 K/UL (ref 0–0.4)
EOSINOPHIL NFR BLD: 4 % (ref 0–5)
ERYTHROCYTE [DISTWIDTH] IN BLOOD BY AUTOMATED COUNT: 15.1 % (ref 11.6–14.5)
GLUCOSE SERPL-MCNC: 93 MG/DL (ref 74–99)
HCT VFR BLD AUTO: 32.7 % (ref 36–48)
HGB BLD-MCNC: 10.9 G/DL (ref 13–16)
IMM GRANULOCYTES # BLD AUTO: 0 K/UL (ref 0–0.04)
IMM GRANULOCYTES NFR BLD AUTO: 0 % (ref 0–0.5)
INR PPP: 3.4 (ref 0.9–1.1)
LYMPHOCYTES # BLD: 1.4 K/UL (ref 0.9–3.6)
LYMPHOCYTES NFR BLD: 37 % (ref 21–52)
MCH RBC QN AUTO: 30.8 PG (ref 24–34)
MCHC RBC AUTO-ENTMCNC: 33.3 G/DL (ref 31–37)
MCV RBC AUTO: 92.4 FL (ref 78–100)
MONOCYTES # BLD: 0.6 K/UL (ref 0.05–1.2)
MONOCYTES NFR BLD: 17 % (ref 3–10)
NEUTS SEG # BLD: 1.5 K/UL (ref 1.8–8)
NEUTS SEG NFR BLD: 41 % (ref 40–73)
NRBC # BLD: 0 K/UL (ref 0–0.01)
NRBC BLD-RTO: 0 PER 100 WBC
PHOSPHATE SERPL-MCNC: 3.3 MG/DL (ref 2.5–4.9)
PLATELET # BLD AUTO: 128 K/UL (ref 135–420)
PMV BLD AUTO: 11.7 FL (ref 9.2–11.8)
POTASSIUM SERPL-SCNC: 3.8 MMOL/L (ref 3.5–5.5)
PROTHROMBIN TIME: 34.9 SEC (ref 11.9–14.9)
RBC # BLD AUTO: 3.54 M/UL (ref 4.35–5.65)
SODIUM SERPL-SCNC: 140 MMOL/L (ref 136–145)
WBC # BLD AUTO: 3.7 K/UL (ref 4.6–13.2)

## 2024-11-24 PROCEDURE — 36415 COLL VENOUS BLD VENIPUNCTURE: CPT

## 2024-11-24 PROCEDURE — 80069 RENAL FUNCTION PANEL: CPT

## 2024-11-24 PROCEDURE — 85610 PROTHROMBIN TIME: CPT

## 2024-11-24 PROCEDURE — 2580000003 HC RX 258: Performed by: HOSPITALIST

## 2024-11-24 PROCEDURE — 85025 COMPLETE CBC W/AUTO DIFF WBC: CPT

## 2024-11-24 PROCEDURE — 99239 HOSP IP/OBS DSCHRG MGMT >30: CPT | Performed by: STUDENT IN AN ORGANIZED HEALTH CARE EDUCATION/TRAINING PROGRAM

## 2024-11-24 PROCEDURE — 6370000000 HC RX 637 (ALT 250 FOR IP): Performed by: HOSPITALIST

## 2024-11-24 RX ORDER — FUROSEMIDE 80 MG/1
40 TABLET ORAL 2 TIMES DAILY
Qty: 180 TABLET | Refills: 1 | Status: SHIPPED | OUTPATIENT
Start: 2024-11-24

## 2024-11-24 RX ADMIN — CARVEDILOL 25 MG: 25 TABLET, FILM COATED ORAL at 08:11

## 2024-11-24 RX ADMIN — HYDRALAZINE HYDROCHLORIDE 50 MG: 50 TABLET ORAL at 08:11

## 2024-11-24 RX ADMIN — SODIUM CHLORIDE, PRESERVATIVE FREE 10 ML: 5 INJECTION INTRAVENOUS at 08:11

## 2024-11-24 RX ADMIN — FERROUS SULFATE TAB 325 MG (65 MG ELEMENTAL FE) 325 MG: 325 (65 FE) TAB at 08:11

## 2024-11-24 ASSESSMENT — PAIN SCALES - GENERAL: PAINLEVEL_OUTOF10: 0

## 2024-11-24 NOTE — PROGRESS NOTES
4 Eyes Skin Assessment     NAME:  Ishan Camacho Sr.  YOB: 1955  MEDICAL RECORD NUMBER:  [id number]    The patient is being assessed for  Shift Handoff    I agree that at least one RN has performed a thorough Head to Toe Skin Assessment on the patient. ALL assessment sites listed below have been assessed.      Areas assessed by both nurses:    Head, Face, Ears, Shoulders, Back, Chest, Arms, Elbows, Hands, Sacrum. Buttock, Coccyx, Ischium, Legs. Feet and Heels, and Under Medical Devices         Does the Patient have a Wound? No noted wound(s)       Madhu Prevention initiated by RN: No  Wound Care Orders initiated by RN: No    Pressure Injury (Stage 3,4, Unstageable, DTI, NWPT, and Complex wounds) if present, place Wound referral order by RN under : No    New Ostomies, if present place, Ostomy referral order under : No     Nurse 1 eSignature: Electronically signed by Chong Evangelista RN on 11/23/24 at 8:01 PM EST    **SHARE this note so that the co-signing nurse can place an eSignature**    Nurse 2 eSignature: {Esignature:[id number]}

## 2024-11-24 NOTE — CARE COORDINATION
CM spoke with patient's wife, aware of discharge order for today.   No CM needs at this time.  Patient's wife to transport patient home today for discharge.           Barbara Douglas RN  Case Management [phone]

## 2024-11-24 NOTE — CARE COORDINATION
D/C order noted for today. Orders reviewed. No needs identified at this time. Patient's wife at the bedside to transport patient home today for discharge. CM remains available if needed.           Barbara Douglas, [phone]

## 2024-11-24 NOTE — DISCHARGE SUMMARY
Discharge Summary    Patient: Ishan Camacho Sr. MRN: [id number]  CSN: [id number]    YOB: 1955  Age: 69 y.o.  Sex: male    DOA: 11/22/2024 LOS:  LOS: 2 days   Discharge Date:  11/24/2024      Admission Diagnosis: ERICA (acute kidney injury) (HCC) [N17.9]  Acute renal failure (ARF) (HCC) [N17.9]    Discharge Diagnosis: ERICA on CKD 4  HFpEF  Hypertension  CAD  A-fib    Discharge Condition: Stable    Discharge Disposition: Home    PHYSICAL EXAM    Visit Vitals  /74   Pulse 93   Temp 98.4 °F (36.9 °C) (Oral)   Resp 18   Ht 1.829 m (6')   Wt 133.4 kg (294 lb)   SpO2 97%   BMI 39.87 kg/m²       General: Alert, cooperative, no acute distress    HEENT: NC, Atraumatic.  PERRLA, EOMI. Anicteric sclerae.  Lungs:  CTA Bilaterally. No Wheezing/Rhonchi/Rales.  Heart:  Regular  rhythm,  No murmur, No Rubs, No Gallops  Abdomen: Soft, Non distended, Non tender.  +Bowel sounds, no HSM  Extremities: No c/c/e  Psych:   Good insight. Not anxious or agitated.  Neurologic:  CN 2-12 grossly intact, oriented X 4.  No acute neurological deficits,     Hospital Course By Problem:   Patient presented to the emergency room secondary to being sent in by cardiology.  Patient was worked up then during regular follow-up and noted to have a significantly elevated creatinine.  Patient subsequently sent in nephrology consulted and patient started on gentle IV hydration and admitted to the hospital.  During hospitalization, patient went gentle IV hydration and his diuretics were paused.  Thought was that this was likely due to overdiuresis.  Patient's creatinine improved significantly over the subsequent few days and was able to be discharged home with nephrology follow-up and restart of lower dose of diuretics.    Consults: Nephrology    Significant Diagnostic Studies: Xray Result (most recent):  XR CHEST PORTABLE 11/22/2024    Narrative  EXAM: Chest Radiograph    INDICATION:  sob    TECHNIQUE: AP view of the  within L4.  Disc spaces: Moderately severe narrowing and desiccation of L4-5. Milder  narrowing and desiccation of L3-4 and L5-S1.  Conus: Terminates at upper L1.  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    Axial imaging correlation:    T12-L1: Patent canal and foramina.    L1-2: Mild bilateral facet arthropathy. Patent canal and foramina.    L2-3: Mild bilateral facet arthropathy. Patent canal and foramina.    L3-4: Mild broad-based disc bulge. More prominent bilateral facet arthropathy  with trace inflammation. Mild concentric spinal stenosis. Adequately patent  foramina.    L4-5: Broad-based disc osteophyte complex. Bilateral facet arthropathy with mild  inflammation. Mild concentric spinal stenosis. Abutment of the crossing nerves  without overt impingement. Adequately patent foramina.    L5-S1: Mild disc bulge. Slight midline prominence. Bilateral facet arthropathy.  Patent canal and foramina.    Other structures: Unremarkable.    Impression  IMPRESSION:    1. Mild to moderate multilevel degenerative findings along the lumbar spine.  -Findings are most notable at L4-5  -No high-grade spinal or foraminal stenosis    Thank you for enabling us to participate in the care of this patient.      Discharge Medications:     Current Discharge Medication List        CONTINUE these medications which have CHANGED    Details   furosemide (LASIX) 80 MG tablet Take 0.5 tablets by mouth 2 times daily  Qty: 180 tablet, Refills: 1  Start date: 11/24/2024    Associated Diagnoses: Acute on chronic diastolic congestive heart failure (HCC)           CONTINUE these medications which have NOT CHANGED    Details   potassium chloride (KLOR-CON M) 10 MEQ extended release tablet Take 1 tablet by mouth daily  Qty: 90 tablet, Refills: 1    Associated Diagnoses: Acute on chronic diastolic congestive heart failure (HCC)      warfarin (COUMADIN) 5 MG

## 2024-11-24 NOTE — PROGRESS NOTES
Warfarin dosing - Pharmacy consult note    Indication: Atrial Fibrillation  Warfarin dose prior to admission: 7.5 mg daily per Rx navigator  Significant drug interactions: none    Recent Labs     11/22/24  2004 11/23/24  0849 11/24/24  0418 11/24/24  0725   INR 4.2* 3.6*  --  3.4*   HGB 12.2* 11.8* 10.9*  --     146 128*  --      Date INR Dose (mg)   11/22 4.2 -   11/23 3.6 -   11/24 3.4 -     Assessment/Plan  Goal INR: 2 - 3    Warfarin on hold today    INR order daily. Pharmacy will continue to follow.     WENCESLAO COMBS RPH

## 2024-11-24 NOTE — CARE COORDINATION
11/24/24 5239   Service Assessment   Patient Orientation Alert and Oriented;Person;Place;Situation;Self   Cognition Alert   History Provided By Significant Other   Primary Caregiver Self   Accompanied By/Relationship Patient's wife is currently at the bedside with the patient.   Support Systems Spouse/Significant Other   Patient's Healthcare Decision Maker is: Legal Next of Kin   PCP Verified by CM Yes   Last Visit to PCP Within last 3 months   Prior Functional Level Independent in ADLs/IADLs   Current Functional Level Independent in ADLs/IADLs   Can patient return to prior living arrangement Yes   Ability to make needs known: Good   Family able to assist with home care needs: Yes   Would you like for me to discuss the discharge plan with any other family members/significant others, and if so, who? Yes  (Patient's wife, listed in patient's contact list.)   Financial Resources Medicare   Community Resources None   CM/PETER Referral Other (see comment)  (Discharge planning.)   Social/Functional History   Lives With Alone   Type of Home Apartment   Home Layout One level   Home Access Level entry   Bathroom Shower/Tub Tub/Shower unit   Bathroom Toilet Standard   Bathroom Equipment Grab bars in shower   Bathroom Accessibility Accessible   Home Equipment None   Receives Help From Family   ADL Assistance Independent   Homemaking Assistance Independent   Homemaking Responsibilities Yes   Ambulation Assistance Independent   Transfer Assistance Independent   Active  Yes   Mode of Transportation SUV   Occupation Retired   Type of Occupation Not applicable.   Discharge Planning   Type of Residence Apartment   Living Arrangements Alone   Current Services Prior To Admission None   Potential Assistance Needed N/A   DME Ordered? No   Potential Assistance Purchasing Medications No   Type of Home Care Services None   Patient expects to be discharged to: Apartment   Follow Up Appointment: Best Day/Time  Monday AM   One/Two Story

## 2024-11-24 NOTE — PROGRESS NOTES
RENAL DAILY PROGRESS NOTE    Subjective:       Complaint:   Overnight events noted  no nausea, vomiting, chest pain, short of breath, cough, seizure.       IMPRESSION:   ERICA in setting of over diuresis. His echo in October showed significant right heart failure with ivc dilatation. Metolozone daily, lasix 80 mg po bid since has swung the pendulum the other way. UA is neg. USG kidneys is negative. No proteinuria found. SPEP, gammopathy eval is pending  Underlying CKD stage 3b due to hypertension  Right heart failure with severe  TR and moderate pulmonary hypertension  LE edema  Hypertension      PLAN:   Ok for discharge from my end. Will have him see our NP in 2 weeks for follow up with labs  I and O  Daily weights  Avoid nephrotoxins   Hold metolazone on discharge and resume lasix at 40 mg po bid .          Current Facility-Administered Medications   Medication Dose Route Frequency    0.9 % sodium chloride infusion   IntraVENous Continuous    carvedilol (COREG) tablet 25 mg  25 mg Oral BID WC    ferrous sulfate (IRON 325) tablet 325 mg  325 mg Oral BID    hydrALAZINE (APRESOLINE) tablet 50 mg  50 mg Oral TID    sodium chloride flush 0.9 % injection 5-40 mL  5-40 mL IntraVENous 2 times per day    sodium chloride flush 0.9 % injection 5-40 mL  5-40 mL IntraVENous PRN    0.9 % sodium chloride infusion   IntraVENous PRN    ondansetron (ZOFRAN-ODT) disintegrating tablet 4 mg  4 mg Oral Q8H PRN    Or    ondansetron (ZOFRAN) injection 4 mg  4 mg IntraVENous Q6H PRN    polyethylene glycol (GLYCOLAX) packet 17 g  17 g Oral Daily PRN    acetaminophen (TYLENOL) tablet 650 mg  650 mg Oral Q6H PRN    Or    acetaminophen (TYLENOL) suppository 650 mg  650 mg Rectal Q6H PRN    warfarin placeholder: dosing by pharmacy   Other RX Placeholder       Review of Symptoms: comprehensive ROS negative except above.   Objective:   Patient Vitals for the past 24 hrs:   Temp Pulse Resp BP SpO2   11/24/24 0800 98.4 °F (36.9 °C) 93 18

## 2024-11-25 ENCOUNTER — CARE COORDINATION (OUTPATIENT)
Facility: CLINIC | Age: 69
End: 2024-11-25

## 2024-11-25 DIAGNOSIS — N17.9 AKI (ACUTE KIDNEY INJURY) (HCC): Primary | ICD-10-CM

## 2024-11-25 PROCEDURE — 1111F DSCHRG MED/CURRENT MED MERGE: CPT | Performed by: INTERNAL MEDICINE

## 2024-11-28 NOTE — PROGRESS NOTES
Physician Progress Note      PATIENT:               ESTHELA AN  CSN #:                  [id number]  :                       1955  ADMIT DATE:       2024 7:00 PM  DISCH DATE:        2024 2:46 PM  RESPONDING  PROVIDER #:        Eduar Regalado MD          QUERY TEXT:    Patient admitted with ERICA. Noted documentation of Acute on chronic diastolic   congestive heart failure in ED Provider note on . In order to support the   diagnosis of Acute on chronic diastolic congestive heart failure, please   include additional clinical indicators in your documentation.  Or please   document if the diagnosis of Acute on chronic diastolic congestive heart   failure has been ruled out after further study.    The medical record reflects the following:  Risk Factors: HTN, ERICA, CKD    Clinical Indicators: ED Provider note  \"Leg Swelling, Right lower leg:   Edema present. Left lower leg: Edema present; Low suspicion for CHF   exacerbation, Acute on chronic diastolic congestive heart failure\".    H&P , History of CHF-hold all diuretics including Entresto, History of   CHF-hold all diuretics including Entresto, 2+ chronic lower extremity pitting   pretibial edema.    Chest Xray  \"No pneumothorax identified. Mild interstitial thickening is   noted. No effusions identified. The cardiomediastinal silhouette is   unremarkable\".    NT pro-BNP (PG/ML) - 1570    Treatment: Carvedilol tablet, BNP, I and O, Daily weights, Chest Xray    Thank you  Tony Ribera [facility] CDS  Options provided:  -- Acute on chronic diastolic congestive heart failure present as evidenced   by, Please document evidence.  -- Acute diastolic congestive heart failure was ruled out, pt have chronic   diastolic congestive heart failure  -- Other - I will add my own diagnosis  -- Disagree - Not applicable / Not valid  -- Disagree - Clinically unable to determine / Unknown  -- Refer to Clinical Documentation Reviewer    PROVIDER RESPONSE  TEXT:    Acute diastolic congestive heart failure was ruled out, pt have chronic   diastolic congestive heart failure    Query created by: Tony Lassiter on 11/27/2024 11:36 PM      Electronically signed by:  Eduar Regalado MD 11/28/2024 2:21 PM

## 2024-12-01 LAB
ALBUMIN SERPL ELPH-MCNC: 3.3 G/DL (ref 2.9–4.4)
ALBUMIN/GLOB SERPL: 0.8 (ref 0.7–1.7)
ALPHA1 GLOB SERPL ELPH-MCNC: 0.3 G/DL (ref 0–0.4)
ALPHA2 GLOB SERPL ELPH-MCNC: 0.6 G/DL (ref 0.4–1)
B-GLOBULIN SERPL ELPH-MCNC: 1.3 G/DL (ref 0.7–1.3)
GAMMA GLOB SERPL ELPH-MCNC: 2.4 G/DL (ref 0.4–1.8)
GLOBULIN SER-MCNC: 4.6 G/DL (ref 2.2–3.9)
IGA SERPL-MCNC: 547 MG/DL (ref 61–437)
IGG SERPL-MCNC: 3149 MG/DL (ref 603–1613)
IGM SERPL-MCNC: 72 MG/DL (ref 15–143)
INTERPRETATION SERPL IEP-IMP: ABNORMAL
KAPPA LC FREE SER-MCNC: 198.4 MG/L (ref 3.3–19.4)
KAPPA LC FREE/LAMBDA FREE SER: 2.56 (ref 0.26–1.65)
LAMBDA LC FREE SERPL-MCNC: 77.4 MG/L (ref 5.7–26.3)
M PROTEIN SERPL ELPH-MCNC: ABNORMAL G/DL
PROT SERPL-MCNC: 7.9 G/DL (ref 6–8.5)

## 2024-12-03 ENCOUNTER — CARE COORDINATION (OUTPATIENT)
Facility: CLINIC | Age: 69
End: 2024-12-03

## 2024-12-03 NOTE — CARE COORDINATION
Care Transitions Note    Follow Up Call     Attempted to reach patient for transitions of care follow up.  Unable to reach patient.      Outreach Attempts:   Unable to leave message.       Follow Up Appointment:   Per chart review, nephrology appointment is scheduled for 12/5/2024.   Future Appointments         Provider Specialty Dept Phone    1/6/2025 9:00 AM [facility] 1 Radiology [phone]    2/5/2025 10:45 AM Mary Sal MD Cardiology [phone]            Plan for follow-up call in 6-10 days based on severity of symptoms and risk factors. Plan for next call:   - Follow up with condition of patient   - Attempt to follow up with advanced care planning   - Assess for needs and offer assistance/ referrals as needed or able to do so.      Melanie Schultz RN

## 2024-12-10 ENCOUNTER — CARE COORDINATION (OUTPATIENT)
Facility: CLINIC | Age: 69
End: 2024-12-10

## 2024-12-10 NOTE — CARE COORDINATION
Care Transitions Note    Follow Up Call     Attempted to reach patient for transitions of care follow up.  Unable to reach patient.      Outreach Attempts:   Unable to leave message.     Care Summary Note: No changes noted    Follow Up Appointment:   Future Appointments         Provider Specialty Dept Phone    1/6/2025 9:00 AM [facility] 1 Radiology [phone]    2/5/2025 10:45 AM Mary Sal MD Cardiology [phone]            Plan for follow-up call in 6-10 days based on severity of symptoms and risk factors. Plan for next call:  ACP, assess for any needs    Radha Cardona LPN

## 2024-12-17 ENCOUNTER — CARE COORDINATION (OUTPATIENT)
Facility: CLINIC | Age: 69
End: 2024-12-17

## 2024-12-17 NOTE — CARE COORDINATION
Care Transitions Note    Follow Up Call     Attempted to reach patient for transitions of care follow up.  Unable to reach patient.      Outreach Attempts:   HIPAA compliant voicemail left for patient, spouse/partner .     Care Summary Note: Noted patient followed up with nephrology on 12/12/24.    Follow Up Appointment:   Future Appointments         Provider Specialty Dept Phone    1/6/2025 9:00 AM [facility] 1 Radiology [phone]    2/5/2025 10:45 AM Mary Sal MD Cardiology [phone]            No further follow-up call indicated based on severity of symptoms and risk factors. Plan for next call:  Unable to reach patient times 3 consecutive attempts    Radha Cardona LPN

## 2024-12-20 RX ORDER — CARVEDILOL 25 MG/1
25 TABLET ORAL 2 TIMES DAILY WITH MEALS
Qty: 180 TABLET | Refills: 2 | Status: SHIPPED | OUTPATIENT
Start: 2024-12-20

## 2025-01-17 DIAGNOSIS — I50.33 ACUTE ON CHRONIC DIASTOLIC CONGESTIVE HEART FAILURE (HCC): ICD-10-CM

## 2025-01-17 RX ORDER — WARFARIN SODIUM 5 MG/1
TABLET ORAL
Qty: 90 TABLET | Refills: 2 | Status: SHIPPED | OUTPATIENT
Start: 2025-01-17

## 2025-01-17 RX ORDER — FUROSEMIDE 80 MG/1
80 TABLET ORAL 2 TIMES DAILY
Qty: 180 TABLET | Refills: 0 | Status: SHIPPED | OUTPATIENT
Start: 2025-01-17

## 2025-01-17 RX ORDER — METOLAZONE 5 MG/1
TABLET ORAL
Qty: 30 TABLET | Refills: 3 | Status: SHIPPED | OUTPATIENT
Start: 2025-01-17

## 2025-01-17 RX ORDER — POTASSIUM CHLORIDE 750 MG/1
10 TABLET, EXTENDED RELEASE ORAL DAILY
Qty: 90 TABLET | Refills: 0 | Status: SHIPPED | OUTPATIENT
Start: 2025-01-17

## 2025-02-04 ENCOUNTER — TELEPHONE (OUTPATIENT)
Dept: CARDIOTHORACIC SURGERY | Age: 70
End: 2025-02-04

## 2025-02-04 NOTE — TELEPHONE ENCOUNTER
Wife called on the behalf of the pt stating that he's went to see kidney Dr (ARI Bishop), which he discussed in his case with Dr. Sal. Wife is concerned about fluid on legs that's been going on for 3w. Pt is prescribed a generic brand of Lasix. Wants to see about the different mg of the medication. Doesn't want pt to go to [facility] other than [facility] due to sitting for so long. Wife also states that if nothing changes they may change to another cardiologist. Wife wants to see if Dr. Sal can call the wife while pt is in for appt on 2/5 or afterwards.

## 2025-02-05 ENCOUNTER — ANTI-COAG VISIT (OUTPATIENT)
Age: 70
End: 2025-02-05
Payer: MEDICARE

## 2025-02-05 ENCOUNTER — APPOINTMENT (OUTPATIENT)
Facility: HOSPITAL | Age: 70
DRG: 286 | End: 2025-02-05
Payer: MEDICARE

## 2025-02-05 ENCOUNTER — HOSPITAL ENCOUNTER (INPATIENT)
Facility: HOSPITAL | Age: 70
LOS: 7 days | Discharge: HOME | DRG: 286 | End: 2025-02-12
Attending: EMERGENCY MEDICINE | Admitting: INTERNAL MEDICINE
Payer: MEDICARE

## 2025-02-05 ENCOUNTER — OFFICE VISIT (OUTPATIENT)
Age: 70
End: 2025-02-05
Payer: MEDICARE

## 2025-02-05 VITALS
HEART RATE: 94 BPM | HEIGHT: 72 IN | BODY MASS INDEX: 42.66 KG/M2 | DIASTOLIC BLOOD PRESSURE: 83 MMHG | WEIGHT: 315 LBS | OXYGEN SATURATION: 98 % | SYSTOLIC BLOOD PRESSURE: 142 MMHG

## 2025-02-05 DIAGNOSIS — I10 PRIMARY HYPERTENSION: ICD-10-CM

## 2025-02-05 DIAGNOSIS — I50.9 CHF (CONGESTIVE HEART FAILURE) (HCC): ICD-10-CM

## 2025-02-05 DIAGNOSIS — I50.9 ACUTE ON CHRONIC CONGESTIVE HEART FAILURE, UNSPECIFIED HEART FAILURE TYPE (HCC): ICD-10-CM

## 2025-02-05 DIAGNOSIS — I48.20 CHRONIC ATRIAL FIBRILLATION, UNSPECIFIED (HCC): ICD-10-CM

## 2025-02-05 DIAGNOSIS — I48.91 ATRIAL FIBRILLATION (HCC): Primary | ICD-10-CM

## 2025-02-05 DIAGNOSIS — E87.70 HYPERVOLEMIA, UNSPECIFIED HYPERVOLEMIA TYPE: Primary | ICD-10-CM

## 2025-02-05 DIAGNOSIS — R60.9 EDEMA, UNSPECIFIED TYPE: ICD-10-CM

## 2025-02-05 DIAGNOSIS — M79.604 RIGHT LEG PAIN: ICD-10-CM

## 2025-02-05 DIAGNOSIS — I34.0 NONRHEUMATIC MITRAL VALVE REGURGITATION: ICD-10-CM

## 2025-02-05 DIAGNOSIS — E66.01 SEVERE OBESITY (BMI >= 40): ICD-10-CM

## 2025-02-05 DIAGNOSIS — I50.33 ACUTE ON CHRONIC DIASTOLIC CONGESTIVE HEART FAILURE (HCC): Primary | ICD-10-CM

## 2025-02-05 DIAGNOSIS — Z79.01 ANTICOAGULANT LONG-TERM USE: ICD-10-CM

## 2025-02-05 PROBLEM — R60.1 ANASARCA: Status: ACTIVE | Noted: 2025-02-05

## 2025-02-05 PROBLEM — E78.5 HYPERLIPIDEMIA: Status: RESOLVED | Noted: 2023-08-25 | Resolved: 2025-02-05

## 2025-02-05 PROBLEM — I50.31 ACUTE DIASTOLIC CHF (CONGESTIVE HEART FAILURE) (HCC): Status: RESOLVED | Noted: 2023-08-23 | Resolved: 2025-02-05

## 2025-02-05 PROBLEM — N17.9 ACUTE RENAL FAILURE (ARF) (HCC): Status: RESOLVED | Noted: 2024-11-22 | Resolved: 2025-02-05

## 2025-02-05 PROBLEM — E87.6 HYPOKALEMIA: Status: ACTIVE | Noted: 2025-02-05

## 2025-02-05 PROBLEM — R79.1 SUPRATHERAPEUTIC INR: Status: ACTIVE | Noted: 2025-02-05

## 2025-02-05 LAB
ALBUMIN SERPL-MCNC: 3.1 G/DL (ref 3.4–5)
ALBUMIN/GLOB SERPL: 0.6 (ref 0.8–1.7)
ALP SERPL-CCNC: 42 U/L (ref 45–117)
ALT SERPL-CCNC: 19 U/L (ref 16–61)
ANION GAP SERPL CALC-SCNC: 4 MMOL/L (ref 3–18)
AST SERPL-CCNC: 27 U/L (ref 10–38)
BASOPHILS # BLD: 0.03 K/UL (ref 0–0.1)
BASOPHILS NFR BLD: 0.9 % (ref 0–2)
BILIRUB SERPL-MCNC: 2.5 MG/DL (ref 0.2–1)
BUN SERPL-MCNC: 30 MG/DL (ref 7–18)
BUN/CREAT SERPL: 13 (ref 12–20)
CALCIUM SERPL-MCNC: 9.1 MG/DL (ref 8.5–10.1)
CHLORIDE SERPL-SCNC: 102 MMOL/L (ref 100–111)
CO2 SERPL-SCNC: 34 MMOL/L (ref 21–32)
CREAT SERPL-MCNC: 2.3 MG/DL (ref 0.6–1.3)
DIFFERENTIAL METHOD BLD: ABNORMAL
EKG ATRIAL RATE: 102 BPM
EKG DIAGNOSIS: NORMAL
EKG Q-T INTERVAL: 362 MS
EKG QRS DURATION: 96 MS
EKG QTC CALCULATION (BAZETT): 462 MS
EKG R AXIS: 6 DEGREES
EKG T AXIS: 56 DEGREES
EKG VENTRICULAR RATE: 98 BPM
EOSINOPHIL # BLD: 0.1 K/UL (ref 0–0.4)
EOSINOPHIL NFR BLD: 3.1 % (ref 0–5)
ERYTHROCYTE [DISTWIDTH] IN BLOOD BY AUTOMATED COUNT: 15.8 % (ref 11.6–14.5)
GLOBULIN SER CALC-MCNC: 5.4 G/DL (ref 2–4)
GLUCOSE SERPL-MCNC: 127 MG/DL (ref 74–99)
HCT VFR BLD AUTO: 30.4 % (ref 36–48)
HGB BLD-MCNC: 10.1 G/DL (ref 13–16)
IMM GRANULOCYTES # BLD AUTO: 0.02 K/UL (ref 0–0.04)
IMM GRANULOCYTES NFR BLD AUTO: 0.6 % (ref 0–0.5)
LYMPHOCYTES # BLD: 0.63 K/UL (ref 0.9–3.6)
LYMPHOCYTES NFR BLD: 19.3 % (ref 21–52)
MAGNESIUM SERPL-MCNC: 2 MG/DL (ref 1.6–2.6)
MCH RBC QN AUTO: 32 PG (ref 24–34)
MCHC RBC AUTO-ENTMCNC: 33.2 G/DL (ref 31–37)
MCV RBC AUTO: 96.2 FL (ref 78–100)
MONOCYTES # BLD: 0.47 K/UL (ref 0.05–1.2)
MONOCYTES NFR BLD: 14.4 % (ref 3–10)
NEUTS SEG # BLD: 2.02 K/UL (ref 1.8–8)
NEUTS SEG NFR BLD: 61.7 % (ref 40–73)
NRBC # BLD: 0 K/UL (ref 0–0.01)
NRBC BLD-RTO: 0 PER 100 WBC
NT PRO BNP: 1237 PG/ML (ref 0–900)
PLATELET # BLD AUTO: 100 K/UL (ref 135–420)
PMV BLD AUTO: 11.5 FL (ref 9.2–11.8)
POC INR: 4.2
POTASSIUM SERPL-SCNC: 3.4 MMOL/L (ref 3.5–5.5)
PROT SERPL-MCNC: 8.5 G/DL (ref 6.4–8.2)
PROTHROMBIN TIME, POC: NORMAL
RBC # BLD AUTO: 3.16 M/UL (ref 4.35–5.65)
SODIUM SERPL-SCNC: 140 MMOL/L (ref 136–145)
TROPONIN I SERPL HS-MCNC: 26 NG/L (ref 0–78)
WBC # BLD AUTO: 3.3 K/UL (ref 4.6–13.2)

## 2025-02-05 PROCEDURE — 99214 OFFICE O/P EST MOD 30 MIN: CPT | Performed by: INTERNAL MEDICINE

## 2025-02-05 PROCEDURE — 85610 PROTHROMBIN TIME: CPT | Performed by: INTERNAL MEDICINE

## 2025-02-05 PROCEDURE — 80053 COMPREHEN METABOLIC PANEL: CPT

## 2025-02-05 PROCEDURE — G8427 DOCREV CUR MEDS BY ELIG CLIN: HCPCS | Performed by: INTERNAL MEDICINE

## 2025-02-05 PROCEDURE — 3017F COLORECTAL CA SCREEN DOC REV: CPT | Performed by: INTERNAL MEDICINE

## 2025-02-05 PROCEDURE — 1126F AMNT PAIN NOTED NONE PRSNT: CPT | Performed by: INTERNAL MEDICINE

## 2025-02-05 PROCEDURE — 96374 THER/PROPH/DIAG INJ IV PUSH: CPT

## 2025-02-05 PROCEDURE — 1159F MED LIST DOCD IN RCRD: CPT | Performed by: INTERNAL MEDICINE

## 2025-02-05 PROCEDURE — 83735 ASSAY OF MAGNESIUM: CPT

## 2025-02-05 PROCEDURE — 99223 1ST HOSP IP/OBS HIGH 75: CPT | Performed by: INTERNAL MEDICINE

## 2025-02-05 PROCEDURE — 1036F TOBACCO NON-USER: CPT | Performed by: INTERNAL MEDICINE

## 2025-02-05 PROCEDURE — 3079F DIAST BP 80-89 MM HG: CPT | Performed by: INTERNAL MEDICINE

## 2025-02-05 PROCEDURE — 3077F SYST BP >= 140 MM HG: CPT | Performed by: INTERNAL MEDICINE

## 2025-02-05 PROCEDURE — 85025 COMPLETE CBC W/AUTO DIFF WBC: CPT

## 2025-02-05 PROCEDURE — 84484 ASSAY OF TROPONIN QUANT: CPT

## 2025-02-05 PROCEDURE — 1123F ACP DISCUSS/DSCN MKR DOCD: CPT | Performed by: INTERNAL MEDICINE

## 2025-02-05 PROCEDURE — 1100000003 HC PRIVATE W/ TELEMETRY

## 2025-02-05 PROCEDURE — 83880 ASSAY OF NATRIURETIC PEPTIDE: CPT

## 2025-02-05 PROCEDURE — 71045 X-RAY EXAM CHEST 1 VIEW: CPT

## 2025-02-05 PROCEDURE — 1160F RVW MEDS BY RX/DR IN RCRD: CPT | Performed by: INTERNAL MEDICINE

## 2025-02-05 PROCEDURE — 99285 EMERGENCY DEPT VISIT HI MDM: CPT

## 2025-02-05 PROCEDURE — 6360000002 HC RX W HCPCS: Performed by: EMERGENCY MEDICINE

## 2025-02-05 PROCEDURE — G8417 CALC BMI ABV UP PARAM F/U: HCPCS | Performed by: INTERNAL MEDICINE

## 2025-02-05 PROCEDURE — 93010 ELECTROCARDIOGRAM REPORT: CPT | Performed by: INTERNAL MEDICINE

## 2025-02-05 PROCEDURE — 93005 ELECTROCARDIOGRAM TRACING: CPT | Performed by: EMERGENCY MEDICINE

## 2025-02-05 RX ORDER — POTASSIUM CHLORIDE 7.45 MG/ML
10 INJECTION INTRAVENOUS PRN
Status: DISCONTINUED | OUTPATIENT
Start: 2025-02-05 | End: 2025-02-12 | Stop reason: HOSPADM

## 2025-02-05 RX ORDER — POLYETHYLENE GLYCOL 3350 17 G/17G
17 POWDER, FOR SOLUTION ORAL DAILY PRN
Status: DISCONTINUED | OUTPATIENT
Start: 2025-02-05 | End: 2025-02-12 | Stop reason: HOSPADM

## 2025-02-05 RX ORDER — ONDANSETRON 2 MG/ML
4 INJECTION INTRAMUSCULAR; INTRAVENOUS EVERY 6 HOURS PRN
Status: DISCONTINUED | OUTPATIENT
Start: 2025-02-05 | End: 2025-02-12 | Stop reason: HOSPADM

## 2025-02-05 RX ORDER — IPRATROPIUM BROMIDE AND ALBUTEROL SULFATE 2.5; .5 MG/3ML; MG/3ML
1 SOLUTION RESPIRATORY (INHALATION) EVERY 4 HOURS PRN
Status: DISCONTINUED | OUTPATIENT
Start: 2025-02-05 | End: 2025-02-12 | Stop reason: HOSPADM

## 2025-02-05 RX ORDER — SODIUM CHLORIDE 0.9 % (FLUSH) 0.9 %
5-40 SYRINGE (ML) INJECTION PRN
Status: DISCONTINUED | OUTPATIENT
Start: 2025-02-05 | End: 2025-02-12 | Stop reason: HOSPADM

## 2025-02-05 RX ORDER — SODIUM CHLORIDE 9 MG/ML
INJECTION, SOLUTION INTRAVENOUS PRN
Status: DISCONTINUED | OUTPATIENT
Start: 2025-02-05 | End: 2025-02-12 | Stop reason: HOSPADM

## 2025-02-05 RX ORDER — BUMETANIDE 1 MG/1
1 TABLET ORAL 2 TIMES DAILY
Status: ON HOLD | COMMUNITY
Start: 2025-02-03 | End: 2026-02-03

## 2025-02-05 RX ORDER — ERGOCALCIFEROL 1.25 MG/1
50000 CAPSULE ORAL WEEKLY
Status: ON HOLD | COMMUNITY
Start: 2024-12-12 | End: 2025-03-12

## 2025-02-05 RX ORDER — ACETAMINOPHEN 650 MG/1
650 SUPPOSITORY RECTAL EVERY 6 HOURS PRN
Status: DISCONTINUED | OUTPATIENT
Start: 2025-02-05 | End: 2025-02-12 | Stop reason: HOSPADM

## 2025-02-05 RX ORDER — ONDANSETRON 4 MG/1
4 TABLET, ORALLY DISINTEGRATING ORAL EVERY 8 HOURS PRN
Status: DISCONTINUED | OUTPATIENT
Start: 2025-02-05 | End: 2025-02-12 | Stop reason: HOSPADM

## 2025-02-05 RX ORDER — MAGNESIUM SULFATE IN WATER 40 MG/ML
2000 INJECTION, SOLUTION INTRAVENOUS PRN
Status: DISCONTINUED | OUTPATIENT
Start: 2025-02-05 | End: 2025-02-12 | Stop reason: HOSPADM

## 2025-02-05 RX ORDER — ACETAMINOPHEN 325 MG/1
650 TABLET ORAL EVERY 6 HOURS PRN
Status: DISCONTINUED | OUTPATIENT
Start: 2025-02-05 | End: 2025-02-12 | Stop reason: HOSPADM

## 2025-02-05 RX ORDER — POTASSIUM CHLORIDE 1500 MG/1
40 TABLET, EXTENDED RELEASE ORAL PRN
Status: DISCONTINUED | OUTPATIENT
Start: 2025-02-05 | End: 2025-02-12 | Stop reason: HOSPADM

## 2025-02-05 RX ORDER — SODIUM CHLORIDE 0.9 % (FLUSH) 0.9 %
5-40 SYRINGE (ML) INJECTION EVERY 12 HOURS SCHEDULED
Status: DISCONTINUED | OUTPATIENT
Start: 2025-02-05 | End: 2025-02-12 | Stop reason: HOSPADM

## 2025-02-05 RX ORDER — MECOBALAMIN 5000 MCG
5 TABLET,DISINTEGRATING ORAL NIGHTLY PRN
Status: DISCONTINUED | OUTPATIENT
Start: 2025-02-05 | End: 2025-02-12 | Stop reason: HOSPADM

## 2025-02-05 RX ORDER — METOPROLOL TARTRATE 1 MG/ML
5 INJECTION, SOLUTION INTRAVENOUS
Status: DISCONTINUED | OUTPATIENT
Start: 2025-02-05 | End: 2025-02-12 | Stop reason: HOSPADM

## 2025-02-05 RX ADMIN — BUMETANIDE 0.5 MG/HR: 0.25 INJECTION INTRAMUSCULAR; INTRAVENOUS at 13:45

## 2025-02-05 ASSESSMENT — ENCOUNTER SYMPTOMS
SHORTNESS OF BREATH: 1
GASTROINTESTINAL NEGATIVE: 1
EYES NEGATIVE: 1

## 2025-02-05 ASSESSMENT — PAIN - FUNCTIONAL ASSESSMENT: PAIN_FUNCTIONAL_ASSESSMENT: NONE - DENIES PAIN

## 2025-02-05 NOTE — ED NOTES
Received pt to bed 06 via triage. Pt awake and alert. Placed on cardiac monitor.     Vs stable. NAD att.

## 2025-02-05 NOTE — H&P
BLE Edema  (+) Dyspnea on Exertion  (-) Chest Pain  (-) Nausea  (-) Vomiting  (-) Diarrhea  (-) Dysuria  All other systems have been reviewed and are negative    Objective:     Vitals:    02/05/25 1658 02/05/25 1901 02/05/25 1955 02/05/25 2352   BP:   122/70 131/77   Pulse: 95 88 90 88   Resp: 25 20 18   Temp:   97.6 °F (36.4 °C) 97.9 °F (36.6 °C)   TempSrc:   Oral Oral   SpO2: 98%  98% 98%   Weight:       Height:            Physical Exam:  General:  Older adult male lying in bed in no acute distress  HEENT:  Atraumatic, normocephalic; (+) Left Lateral Strabismus; Pupils equally round; Right Extraocular muscles intact; (+) Oropharynx is neither moist nor dry without erythema, edema, or exudates  Chest:  No pectus carinatum; No pectus excavatum  Cardiovascular:  Regular rate and rhythm with (+) III/VI Murmur that changes with intrathoracic pressure (i.e. breathing) without rubs or gallops  Respiratory:  No wheezes, rales, or rhonchi; normal effort of breathing on Room Air  Abdominal:  (+) Morbidly Obese, soft, non-tense, non-tender abdomen; BS present without guarding; No pitting edema appreciated  :  Deferred  Extremities:  Pulses 2+ x4 with (+) Somewhat Brawny Pitting Edema Inferiorly and (+) Trace to Mild Pitting Edema in Bilateral Proximal-most Thigh without clubbing or cyanosis  Musculoskeletal:  Strength 5/5 in BUE  Integument:  No rash on face, forearms, or legs  Neurological:  Alert & Ostensibly Oriented x4/4; (+) Left Lateral Strabismus; No gross deficits of Visual Acuity, Jaw Opening, Facial Expression, Hearing, Phonation, or Head Movement; No gross deficits of Tongue Movement or Slurring of Speech  Psychiatric:  Affect is appropriate; Language is present and fluent; Behavior is appropriate      Laboratory Studies:  All labs obtained in [facility] ER in the last 24 hours have been reviewed.     Images Reviewed:  XR CHEST 1 VIEW  Order: [id number]  Status: Final result       Visible to patient: No (not released)

## 2025-02-05 NOTE — PATIENT INSTRUCTIONS
Mr. Ishan Camacho is here today for anticoagulation monitoring for the diagnosis of atrial fibrillation.  His INR goal is 2.0-3.0 and his current Coumadin dose is 7.5MG / 5MG.     Today's findings include an INR of 4.2     Considering Mr. Camacho's past history, todays findings, and per the coumadin policy/protocol, Mr. Camacho was instructed to take Coumadin as follows,  HOLD 2/5/25 -2/6/25 OTHERWISE; 5MG NIGHTLY.  He was also instructed to come back in 1 weeks for an INR check.    A full discussion of the nature of anticoagulants has been carried out.  A full discussion of the need for frequent and regular monitoring, precise dosage adjustment and compliance was stressed.  Side effects of potential bleeding were discussed and Mr. Camacho was instructed to call [phone] if there are any signs of abnormal bleeding.  Mr. Camacho was instructed to avoid any OTC items containing aspirin or ibuprofen and prior to starting any new OTC products to consult with his physician or pharmacist to ensure no drug interactions are present.  Mr. Camacho was instructed to avoid any major changes in his general diet and to avoid alcohol consumption.  .      Mr. Camacho verbalized his understanding of all instructions and will call the office with any questions, concerns, or signs of abnormal bleeding or blood clot.

## 2025-02-05 NOTE — ED PROVIDER NOTES
Value Ref Range    Magnesium 2.0 1.6 - 2.6 mg/dL   Troponin    Collection Time: 02/05/25  1:03 PM   Result Value Ref Range    Troponin, High Sensitivity 26 0 - 78 ng/L   Brain Natriuretic Peptide    Collection Time: 02/05/25  1:03 PM   Result Value Ref Range    NT Pro-BNP 1,237 (H) 0 - 900 PG/ML   EKG 12 Lead    Collection Time: 02/05/25  1:04 PM   Result Value Ref Range    Ventricular Rate 98 BPM    Atrial Rate 102 BPM    QRS Duration 96 ms    Q-T Interval 362 ms    QTc Calculation (Bazett) 462 ms    R Axis 6 degrees    T Axis 56 degrees    Diagnosis       Atrial fibrillation with premature ventricular or aberrantly conducted   complexes  Nonspecific T wave abnormality , probably digitalis effect  Prolonged QT  Abnormal ECG  When compared with ECG of 04-OCT-2024 10:37,  No significant change was found  Confirmed by Mary Sal MD, ----- ([id number]) on 2/5/2025 3:05:32 PM         Radiologic Studies -   XR CHEST 1 VIEW   Final Result      1.  Enlargement of the cardiomediastinal silhouette with central vascular   engorgement and prominence of the interstitial markings. Hazy patchy opacities   bilaterally. Constellation of findings are favored to represent at least mild   pulmonary edema. Superimposed infectious or inflammatory process would be   difficult to exclude. Findings appear overall similar to slightly worsened when   compared to 11/22/2024.         Electronically signed by Carlotta Goode            Medical Decision Making   I am the first provider for this patient.    I reviewed the vital signs, available nursing notes, past medical history, past surgical history, family history and social history.    Vital Signs-Reviewed the patient's vital signs.      EKG: Atrial fibrillation with a rate of 98, PVCs, no STEMI, interpreted by me      ED Course: Progress Notes, Reevaluation, and Consults:    Provider Notes (Medical Decision Making):     MDM  Number of Diagnoses or Management Options  Acute on chronic

## 2025-02-05 NOTE — PROGRESS NOTES
Ishan ARGUETA Camacho . is a 69 y.o. year old male.    Follow-up of CHF, A-fib, hypertension, hyperlipidemia, mitral regurgitation, pulmonary hypertension        7/6/2023  Patient seen for pre-op evaluation.  He is anticipating right cataract surgery on 7/11/2023.  He denies chest pain, shortness of breath or palpitations.  He has chronic dependent edema.   7/20/2023  Patient seen In f/u for Afib.  He denies chest pain, shortness of breath or palpitations.  He has chronic dependent edema, controlled with compression stockings and lasix.  1/2024  Patient complaining of increased bilateral lower extremity edema with weight gain and abdominal distention.  He complains of mild shortness of breath, denies chest pain or palpitations.  2/2024.  Patient seen in follow-up for acute diastolic CHF.  Lower extremity edema has improved with 25 pound weight loss.  He reports improvement in shortness of breath.  5/20/2024  Patient seen in follow-up for acute on chronic diastolic CHF.  Bilateral lower extremity edema remains.  He denies shortness of breath palpitations or chest pain  9/30/2024 continues with edema.  Has dyspnea on exertion walking but cannot define very well.  Denies any chest pain.  Is aware of irregular heartbeat.  10/23/2024  Patient seen in f/u for testing results.  Complains of ongoing lower extremity edema and mild dyspnea on exertion.  He denies shortness of breath palpitations or dizziness  2/5/2025 has had edema chronically and is getting worse for last 3 weeks.  Not responding to generic Lasix but seems to possibly respond some to tradename Lasix.  Getting dyspnea on exertion walking about 100 feet.  Occasionally feels dizzy but no chest pain or loss of consciousness.  Has been sleeping in the recliner for the last couple of months.  Was given Bumex and metolazone 3 to 4 days ago by nephrology but has not been diuresing very well so far.          Review of Systems   Constitutional: Negative.    HENT:

## 2025-02-05 NOTE — ED TRIAGE NOTES
Pt presented to triage with BLE swelling x 3 weeks. Per pt, he was sent by Dr. Sal to be admitted.    Hx of Afib,CHF, HTN    Denies chest pain or SOB at this time.

## 2025-02-05 NOTE — PROGRESS NOTES
1. Have you been to the ER, urgent care clinic since your last visit?  Hospitalized since your last visit? YES, [facility] 11/22/24 - 1/24/24 FOR ERICA    2.  Where do you normally have your labs drawn?  [facility]    3. Do you need any refills today? NO    4. Which local pharmacy do you use (enter pharmacy)?   [facility]    5. Which mail order pharmacy do you use (enter pharmacy)?   N/A     6. Are you here for surgical clearance and if so who will be doing your     procedure/surgery (care team)?   NO

## 2025-02-05 NOTE — ED NOTES
TRANSFER - OUT REPORT:    Verbal report given to IVÁN Huffman on Ishan ARGUETA Camacho Sr.  being transferred to  for routine progression of patient care       Report consisted of patient's Situation, Background, Assessment and   Recommendations(SBAR).     Information from the following report(s) Nurse Handoff Report, ED Encounter Summary, ED SBAR, and Recent Results was reviewed with the receiving nurse.    [place] Fall Assessment:                           Lines:   Peripheral IV 02/05/25 Distal;Left;Anterior Cephalic (Active)        Opportunity for questions and clarification was provided.      Patient transported with:  Monitor and medic

## 2025-02-06 PROBLEM — R79.89 ELEVATED TROPONIN: Status: ACTIVE | Noted: 2025-02-06

## 2025-02-06 PROBLEM — E66.01 MORBID OBESITY WITH BMI OF 40.0-44.9, ADULT: Status: ACTIVE | Noted: 2018-04-16

## 2025-02-06 PROBLEM — E66.01 MORBID OBESITY WITH BMI OF 40.0-44.9, ADULT: Chronic | Status: ACTIVE | Noted: 2018-04-16

## 2025-02-06 PROBLEM — Z79.01 CURRENT USE OF LONG TERM ANTICOAGULATION: Status: ACTIVE | Noted: 2017-02-08

## 2025-02-06 PROBLEM — E78.49 OTHER HYPERLIPIDEMIA: Status: ACTIVE | Noted: 2023-08-25

## 2025-02-06 PROBLEM — D68.69 SECONDARY HYPERCOAGULABLE STATE (HCC): Chronic | Status: ACTIVE | Noted: 2023-07-17

## 2025-02-06 PROBLEM — E87.70 HYPERVOLEMIA: Status: ACTIVE | Noted: 2025-02-06

## 2025-02-06 PROBLEM — Z79.01 CURRENT USE OF LONG TERM ANTICOAGULATION: Chronic | Status: ACTIVE | Noted: 2017-02-08

## 2025-02-06 LAB
ALBUMIN SERPL-MCNC: 2.8 G/DL (ref 3.4–5)
ALBUMIN/GLOB SERPL: 0.5 (ref 0.8–1.7)
ALP SERPL-CCNC: 35 U/L (ref 45–117)
ALT SERPL-CCNC: 14 U/L (ref 16–61)
ANION GAP SERPL CALC-SCNC: 4 MMOL/L (ref 3–18)
AST SERPL-CCNC: 27 U/L (ref 10–38)
B PERT DNA SPEC QL NAA+PROBE: NOT DETECTED
BASOPHILS # BLD: 0.02 K/UL (ref 0–0.1)
BASOPHILS NFR BLD: 0.6 % (ref 0–2)
BILIRUB SERPL-MCNC: 1.9 MG/DL (ref 0.2–1)
BORDETELLA PARAPERTUSSIS BY PCR: NOT DETECTED
BUN SERPL-MCNC: 31 MG/DL (ref 7–18)
BUN/CREAT SERPL: 14 (ref 12–20)
C PNEUM DNA SPEC QL NAA+PROBE: NOT DETECTED
CALCIUM SERPL-MCNC: 9.6 MG/DL (ref 8.5–10.1)
CHLORIDE SERPL-SCNC: 100 MMOL/L (ref 100–111)
CO2 SERPL-SCNC: 33 MMOL/L (ref 21–32)
CREAT SERPL-MCNC: 2.23 MG/DL (ref 0.6–1.3)
DIFFERENTIAL METHOD BLD: ABNORMAL
EOSINOPHIL # BLD: 0.13 K/UL (ref 0–0.4)
EOSINOPHIL NFR BLD: 3.7 % (ref 0–5)
ERYTHROCYTE [DISTWIDTH] IN BLOOD BY AUTOMATED COUNT: 15.6 % (ref 11.6–14.5)
FLUAV SUBTYP SPEC NAA+PROBE: NOT DETECTED
FLUBV RNA SPEC QL NAA+PROBE: NOT DETECTED
GLOBULIN SER CALC-MCNC: 5.3 G/DL (ref 2–4)
GLUCOSE SERPL-MCNC: 101 MG/DL (ref 74–99)
HADV DNA SPEC QL NAA+PROBE: NOT DETECTED
HCOV 229E RNA SPEC QL NAA+PROBE: NOT DETECTED
HCOV HKU1 RNA SPEC QL NAA+PROBE: NOT DETECTED
HCOV NL63 RNA SPEC QL NAA+PROBE: NOT DETECTED
HCOV OC43 RNA SPEC QL NAA+PROBE: NOT DETECTED
HCT VFR BLD AUTO: 28.2 % (ref 36–48)
HGB BLD-MCNC: 9.3 G/DL (ref 13–16)
HMPV RNA SPEC QL NAA+PROBE: NOT DETECTED
HPIV1 RNA SPEC QL NAA+PROBE: NOT DETECTED
HPIV2 RNA SPEC QL NAA+PROBE: NOT DETECTED
HPIV3 RNA SPEC QL NAA+PROBE: NOT DETECTED
HPIV4 RNA SPEC QL NAA+PROBE: NOT DETECTED
IMM GRANULOCYTES # BLD AUTO: 0.01 K/UL (ref 0–0.04)
IMM GRANULOCYTES NFR BLD AUTO: 0.3 % (ref 0–0.5)
INR PPP: 4.3 (ref 0.9–1.1)
LYMPHOCYTES # BLD: 0.75 K/UL (ref 0.9–3.6)
LYMPHOCYTES NFR BLD: 21.2 % (ref 21–52)
M PNEUMO DNA SPEC QL NAA+PROBE: NOT DETECTED
MCH RBC QN AUTO: 31.3 PG (ref 24–34)
MCHC RBC AUTO-ENTMCNC: 33 G/DL (ref 31–37)
MCV RBC AUTO: 94.9 FL (ref 78–100)
MONOCYTES # BLD: 0.53 K/UL (ref 0.05–1.2)
MONOCYTES NFR BLD: 15 % (ref 3–10)
NEUTS SEG # BLD: 2.1 K/UL (ref 1.8–8)
NEUTS SEG NFR BLD: 59.2 % (ref 40–73)
NRBC # BLD: 0 K/UL (ref 0–0.01)
NRBC BLD-RTO: 0 PER 100 WBC
PLATELET # BLD AUTO: 94 K/UL (ref 135–420)
PMV BLD AUTO: 11.9 FL (ref 9.2–11.8)
POTASSIUM SERPL-SCNC: 3.1 MMOL/L (ref 3.5–5.5)
PROT SERPL-MCNC: 8.1 G/DL (ref 6.4–8.2)
PROTHROMBIN TIME: 41.8 SEC (ref 11.9–14.9)
RBC # BLD AUTO: 2.97 M/UL (ref 4.35–5.65)
RSV RNA SPEC QL NAA+PROBE: NOT DETECTED
RV+EV RNA SPEC QL NAA+PROBE: NOT DETECTED
SARS-COV-2 RNA RESP QL NAA+PROBE: NOT DETECTED
SODIUM SERPL-SCNC: 137 MMOL/L (ref 136–145)
WBC # BLD AUTO: 3.5 K/UL (ref 4.6–13.2)

## 2025-02-06 PROCEDURE — 99232 SBSQ HOSP IP/OBS MODERATE 35: CPT | Performed by: INTERNAL MEDICINE

## 2025-02-06 PROCEDURE — 97162 PT EVAL MOD COMPLEX 30 MIN: CPT

## 2025-02-06 PROCEDURE — 0202U NFCT DS 22 TRGT SARS-COV-2: CPT

## 2025-02-06 PROCEDURE — 85610 PROTHROMBIN TIME: CPT

## 2025-02-06 PROCEDURE — 97535 SELF CARE MNGMENT TRAINING: CPT

## 2025-02-06 PROCEDURE — 6370000000 HC RX 637 (ALT 250 FOR IP): Performed by: INTERNAL MEDICINE

## 2025-02-06 PROCEDURE — 99223 1ST HOSP IP/OBS HIGH 75: CPT | Performed by: INTERNAL MEDICINE

## 2025-02-06 PROCEDURE — 85025 COMPLETE CBC W/AUTO DIFF WBC: CPT

## 2025-02-06 PROCEDURE — 2500000003 HC RX 250 WO HCPCS: Performed by: INTERNAL MEDICINE

## 2025-02-06 PROCEDURE — 36415 COLL VENOUS BLD VENIPUNCTURE: CPT

## 2025-02-06 PROCEDURE — 80053 COMPREHEN METABOLIC PANEL: CPT

## 2025-02-06 PROCEDURE — 1100000003 HC PRIVATE W/ TELEMETRY

## 2025-02-06 PROCEDURE — 97165 OT EVAL LOW COMPLEX 30 MIN: CPT

## 2025-02-06 PROCEDURE — 94761 N-INVAS EAR/PLS OXIMETRY MLT: CPT

## 2025-02-06 RX ORDER — CARVEDILOL 25 MG/1
25 TABLET ORAL 2 TIMES DAILY WITH MEALS
Status: DISCONTINUED | OUTPATIENT
Start: 2025-02-06 | End: 2025-02-12 | Stop reason: HOSPADM

## 2025-02-06 RX ORDER — FERROUS SULFATE 325(65) MG
325 TABLET ORAL 2 TIMES DAILY
Status: DISCONTINUED | OUTPATIENT
Start: 2025-02-06 | End: 2025-02-12 | Stop reason: HOSPADM

## 2025-02-06 RX ORDER — ERGOCALCIFEROL 1.25 MG/1
50000 CAPSULE, LIQUID FILLED ORAL WEEKLY
Status: DISCONTINUED | OUTPATIENT
Start: 2025-02-06 | End: 2025-02-12 | Stop reason: HOSPADM

## 2025-02-06 RX ORDER — POTASSIUM CHLORIDE 750 MG/1
10 TABLET, EXTENDED RELEASE ORAL DAILY
Status: DISCONTINUED | OUTPATIENT
Start: 2025-02-06 | End: 2025-02-06

## 2025-02-06 RX ORDER — POTASSIUM CHLORIDE 1500 MG/1
40 TABLET, EXTENDED RELEASE ORAL 2 TIMES DAILY
Status: DISCONTINUED | OUTPATIENT
Start: 2025-02-06 | End: 2025-02-07

## 2025-02-06 RX ORDER — HYDRALAZINE HYDROCHLORIDE 50 MG/1
50 TABLET, FILM COATED ORAL 3 TIMES DAILY
Status: DISCONTINUED | OUTPATIENT
Start: 2025-02-06 | End: 2025-02-12 | Stop reason: HOSPADM

## 2025-02-06 RX ADMIN — POTASSIUM CHLORIDE 40 MEQ: 1500 TABLET, EXTENDED RELEASE ORAL at 17:59

## 2025-02-06 RX ADMIN — HYDRALAZINE HYDROCHLORIDE 50 MG: 50 TABLET ORAL at 09:47

## 2025-02-06 RX ADMIN — ERGOCALCIFEROL 50000 UNITS: 1.25 CAPSULE ORAL at 09:47

## 2025-02-06 RX ADMIN — HYDRALAZINE HYDROCHLORIDE 50 MG: 50 TABLET ORAL at 20:33

## 2025-02-06 RX ADMIN — HYDRALAZINE HYDROCHLORIDE 50 MG: 50 TABLET ORAL at 17:59

## 2025-02-06 RX ADMIN — SODIUM CHLORIDE, PRESERVATIVE FREE 10 ML: 5 INJECTION INTRAVENOUS at 20:33

## 2025-02-06 RX ADMIN — CARVEDILOL 25 MG: 25 TABLET, FILM COATED ORAL at 18:00

## 2025-02-06 RX ADMIN — FERROUS SULFATE TAB 325 MG (65 MG ELEMENTAL FE) 325 MG: 325 (65 FE) TAB at 20:33

## 2025-02-06 RX ADMIN — POTASSIUM CHLORIDE 40 MEQ: 1500 TABLET, EXTENDED RELEASE ORAL at 20:33

## 2025-02-06 RX ADMIN — FERROUS SULFATE TAB 325 MG (65 MG ELEMENTAL FE) 325 MG: 325 (65 FE) TAB at 09:47

## 2025-02-06 RX ADMIN — POTASSIUM CHLORIDE 10 MEQ: 750 TABLET, EXTENDED RELEASE ORAL at 09:47

## 2025-02-06 RX ADMIN — CARVEDILOL 25 MG: 25 TABLET, FILM COATED ORAL at 09:47

## 2025-02-06 NOTE — CONSULTS
fibrillation, EKG 2/5/2025 atrial fibrillation with premature ventricular or aberrantly conducted complexes, nonspecific T wave abnormality, prolonged QT interval  Morbid obesity    PLAN  Monitor fluid status, adjust as necessary, fluid restriction, salt intake <2g per day.  Currently on Bumex gtt. 0.5 mg/h, home dose of Bumex is 1 mg p.o. twice daily, home dose of metolazone is 5 mg p.o. every 48 hours.  Ins and outs -8.6 L on 2/6/2025  Recommend Guideline Directed medical therapy as can tolerate.  Monitor blood pressure, adjust antihypertensive medications as necessary.  Continue hydralazine 50 mg p.o. 3 times daily, continue Coreg 25 mg p.o. twice daily  Statin if can tolerate prior to discharge.  Patient currently on Coumadin pharmacy to dose for atrial fibrillation primary prevention of stroke  Can consider for right heart catheterization possibly on Monday after further diuresis evaluate to what extent physical exam findings correlate to pressure as well.      Thank you for this consultation and for allowing us to participate in this patient's care.    Primary cardiologist Dr. Sal     History of Present Illness:     This is a 69 y.o. male PMH as above, was seen in clinic on 2/5/2025 has had edema chronically getting worse for the last 3 weeks not responding to Lasix dyspnea when walking about 100 feet occasionally feels dizzy no chest pain or loss of consciousness.  Has been sleeping in recliner for the last couple months.  Patient was stated to have been given Bumex and metolazone for 3 to 4 days ago by nephrology but has not been diuresing very well so far.  On cardiac review of systems, reported shortness of breath with exertion, orthopnea PND leg swelling weight gain denies palpitations syncopal episodes denies chest discomfort.         Review of Symptoms:  Except as stated above include:  Constitutional:  negative  Respiratory:  negative  Cardiovascular:  negative  Gastrointestinal: 
thyromegaly  CVS: S1S2 heard,  no rub  RS: + air entry b/l,   Abd: Soft, Non tender, Not distended, Positive bowel sounds, no organomegaly, no CVA / supra pubic tenderness  Neuro: non focal, awake, alert , CN II-XII are grossly intact  Extrm: plus 3 edema, no cyanosis, clubbing   Skin: no visible  Rash  Musculoskeletal: No gross joints or bone deformities     Procedures/imaging: see electronic medical records for all procedures, Xrays and details which were not copied into this note but were reviewed prior to creation of Plan      Impression & Plan:   IMPRESSION:   ERICA on CKD Stage 3b due to cardiorenal syndrome  Hx hypertension  Diastolic CHF  Hypokalemia due to diuretics  Metabolic alkalosis due to ?diuresis  Pancytopenia   PLAN:    IV bumex and iv diuresis to continue  Add Scheduled kcl for 2 days for now  Might need diamox if alkalosis worsens  Monitor UO. If adequate volume response not obtained then will need HD.  BP is stable currently  Check mag in am  I and O  Daily weights  Low salt diet  Avoid nephrotoxins including entresto.         MAYA CHILDERS MD  February 6, 2025  [facility]  Office [phone]

## 2025-02-06 NOTE — CARE COORDINATION
Met with Patient at bedside. CM introduces self and explains role. Patient is alert and oriented x 4. HIPAA verified. Patients' demographics verified and updated accordingly. Patient agrees to complete assessment.    Patient is not medically ready for discharge. Cardiology is following.     DCP: Likely home with self- care, no new DME indicated at this time. Wife will transport him home via POV.      02/06/25 0928   Service Assessment   Patient Orientation Alert and Oriented;Person;Place;Situation;Self   Cognition Alert   History Provided By Patient   Primary Caregiver Self   Accompanied By/Relationship Wife   Support Systems Spouse/Significant Other;/   Patient's Healthcare Decision Maker is: Legal Next of Kin  (Patient declines assistance with and Advanced Directives at this time.)   PCP Verified by CM Yes   Last Visit to PCP Within last 3 months   Prior Functional Level Independent in ADLs/IADLs   Current Functional Level Independent in ADLs/IADLs   Can patient return to prior living arrangement Yes   Ability to make needs known: Good   Family able to assist with home care needs: Yes   Would you like for me to discuss the discharge plan with any other family members/significant others, and if so, who? Yes  (Patient gives permission to discuss with his Wife present.)   Financial Resources Medicare   Community Resources None   Social/Functional History   Lives With Alone  (Patient and his wife are seperated and living in seperate residences.)   Type of Home Apartment   Home Layout One level   Home Access Level entry   Bathroom Shower/Tub Tub/Shower unit   Bathroom Toilet Standard   Bathroom Equipment None   Bathroom Accessibility Accessible   Home Equipment None   Receives Help From Family   Prior Level of Assist for ADLs Independent   Prior Level of Assist for Homemaking Independent   Homemaking Responsibilities Yes   Meal Prep Responsibility Primary   Laundry Responsibility Primary

## 2025-02-06 NOTE — NURSE NAVIGATOR
HF Navigator attempted education this day; however, pt unable to be seen due to:    [  ] lethargy/confusion  [  ] off floor for testing   [  ] RN care  [  ] Pain   [  4] Other- bathing    Will follow up next day as indicated.    Patricia Howard, IVÁN  2/6/2025

## 2025-02-07 LAB
ANION GAP SERPL CALC-SCNC: 5 MMOL/L (ref 3–18)
BASOPHILS # BLD: 0.03 K/UL (ref 0–0.1)
BASOPHILS NFR BLD: 0.5 % (ref 0–2)
BUN SERPL-MCNC: 35 MG/DL (ref 7–18)
BUN/CREAT SERPL: 14 (ref 12–20)
CALCIUM SERPL-MCNC: 9.8 MG/DL (ref 8.5–10.1)
CHLORIDE SERPL-SCNC: 96 MMOL/L (ref 100–111)
CO2 SERPL-SCNC: 34 MMOL/L (ref 21–32)
CREAT SERPL-MCNC: 2.44 MG/DL (ref 0.6–1.3)
DIFFERENTIAL METHOD BLD: ABNORMAL
EOSINOPHIL # BLD: 0.1 K/UL (ref 0–0.4)
EOSINOPHIL NFR BLD: 1.7 % (ref 0–5)
ERYTHROCYTE [DISTWIDTH] IN BLOOD BY AUTOMATED COUNT: 15.8 % (ref 11.6–14.5)
GLUCOSE SERPL-MCNC: 109 MG/DL (ref 74–99)
HCT VFR BLD AUTO: 29.6 % (ref 36–48)
HGB BLD-MCNC: 10 G/DL (ref 13–16)
IMM GRANULOCYTES # BLD AUTO: 0.03 K/UL (ref 0–0.04)
IMM GRANULOCYTES NFR BLD AUTO: 0.5 % (ref 0–0.5)
INR PPP: 3.5 (ref 0.9–1.1)
LYMPHOCYTES # BLD: 0.78 K/UL (ref 0.9–3.6)
LYMPHOCYTES NFR BLD: 13.1 % (ref 21–52)
MCH RBC QN AUTO: 32.3 PG (ref 24–34)
MCHC RBC AUTO-ENTMCNC: 33.8 G/DL (ref 31–37)
MCV RBC AUTO: 95.5 FL (ref 78–100)
MONOCYTES # BLD: 0.82 K/UL (ref 0.05–1.2)
MONOCYTES NFR BLD: 13.8 % (ref 3–10)
NEUTS SEG # BLD: 4.2 K/UL (ref 1.8–8)
NEUTS SEG NFR BLD: 70.4 % (ref 40–73)
NRBC # BLD: 0 K/UL (ref 0–0.01)
NRBC BLD-RTO: 0 PER 100 WBC
PLATELET # BLD AUTO: 113 K/UL (ref 135–420)
PMV BLD AUTO: 11.8 FL (ref 9.2–11.8)
POTASSIUM SERPL-SCNC: 3.6 MMOL/L (ref 3.5–5.5)
PROTHROMBIN TIME: 35.7 SEC (ref 11.9–14.9)
RBC # BLD AUTO: 3.1 M/UL (ref 4.35–5.65)
SODIUM SERPL-SCNC: 135 MMOL/L (ref 136–145)
WBC # BLD AUTO: 6 K/UL (ref 4.6–13.2)

## 2025-02-07 PROCEDURE — 99222 1ST HOSP IP/OBS MODERATE 55: CPT | Performed by: INTERNAL MEDICINE

## 2025-02-07 PROCEDURE — 6370000000 HC RX 637 (ALT 250 FOR IP): Performed by: INTERNAL MEDICINE

## 2025-02-07 PROCEDURE — 80048 BASIC METABOLIC PNL TOTAL CA: CPT

## 2025-02-07 PROCEDURE — 85610 PROTHROMBIN TIME: CPT

## 2025-02-07 PROCEDURE — 1100000003 HC PRIVATE W/ TELEMETRY

## 2025-02-07 PROCEDURE — 36415 COLL VENOUS BLD VENIPUNCTURE: CPT

## 2025-02-07 PROCEDURE — 85025 COMPLETE CBC W/AUTO DIFF WBC: CPT

## 2025-02-07 PROCEDURE — 99232 SBSQ HOSP IP/OBS MODERATE 35: CPT | Performed by: INTERNAL MEDICINE

## 2025-02-07 PROCEDURE — 2500000003 HC RX 250 WO HCPCS: Performed by: INTERNAL MEDICINE

## 2025-02-07 RX ORDER — POTASSIUM CHLORIDE 1500 MG/1
40 TABLET, EXTENDED RELEASE ORAL 2 TIMES DAILY
Status: COMPLETED | OUTPATIENT
Start: 2025-02-07 | End: 2025-02-08

## 2025-02-07 RX ADMIN — SODIUM CHLORIDE, PRESERVATIVE FREE 10 ML: 5 INJECTION INTRAVENOUS at 21:08

## 2025-02-07 RX ADMIN — POTASSIUM CHLORIDE 40 MEQ: 1500 TABLET, EXTENDED RELEASE ORAL at 21:08

## 2025-02-07 RX ADMIN — ACETAMINOPHEN 650 MG: 325 TABLET ORAL at 02:43

## 2025-02-07 RX ADMIN — CARVEDILOL 25 MG: 25 TABLET, FILM COATED ORAL at 08:33

## 2025-02-07 RX ADMIN — Medication 5 MG: at 21:08

## 2025-02-07 RX ADMIN — HYDRALAZINE HYDROCHLORIDE 50 MG: 50 TABLET ORAL at 21:08

## 2025-02-07 RX ADMIN — FERROUS SULFATE TAB 325 MG (65 MG ELEMENTAL FE) 325 MG: 325 (65 FE) TAB at 21:08

## 2025-02-07 RX ADMIN — HYDRALAZINE HYDROCHLORIDE 50 MG: 50 TABLET ORAL at 08:33

## 2025-02-07 RX ADMIN — POTASSIUM CHLORIDE 40 MEQ: 1500 TABLET, EXTENDED RELEASE ORAL at 08:33

## 2025-02-07 RX ADMIN — FERROUS SULFATE TAB 325 MG (65 MG ELEMENTAL FE) 325 MG: 325 (65 FE) TAB at 08:33

## 2025-02-07 RX ADMIN — CARVEDILOL 25 MG: 25 TABLET, FILM COATED ORAL at 18:09

## 2025-02-07 RX ADMIN — HYDRALAZINE HYDROCHLORIDE 50 MG: 50 TABLET ORAL at 14:50

## 2025-02-07 ASSESSMENT — PAIN SCALES - GENERAL: PAINLEVEL_OUTOF10: 0

## 2025-02-08 LAB
ANION GAP SERPL CALC-SCNC: 4 MMOL/L (ref 3–18)
BASOPHILS # BLD: 0.04 K/UL (ref 0–0.1)
BASOPHILS NFR BLD: 0.8 % (ref 0–2)
BUN SERPL-MCNC: 39 MG/DL (ref 7–18)
BUN/CREAT SERPL: 15 (ref 12–20)
CALCIUM SERPL-MCNC: 9.3 MG/DL (ref 8.5–10.1)
CHLORIDE SERPL-SCNC: 98 MMOL/L (ref 100–111)
CO2 SERPL-SCNC: 33 MMOL/L (ref 21–32)
CREAT SERPL-MCNC: 2.59 MG/DL (ref 0.6–1.3)
DIFFERENTIAL METHOD BLD: ABNORMAL
EOSINOPHIL # BLD: 0.11 K/UL (ref 0–0.4)
EOSINOPHIL NFR BLD: 2.3 % (ref 0–5)
ERYTHROCYTE [DISTWIDTH] IN BLOOD BY AUTOMATED COUNT: 15.8 % (ref 11.6–14.5)
GLUCOSE SERPL-MCNC: 95 MG/DL (ref 74–99)
HCT VFR BLD AUTO: 28.9 % (ref 36–48)
HGB BLD-MCNC: 9.9 G/DL (ref 13–16)
IMM GRANULOCYTES # BLD AUTO: 0.02 K/UL (ref 0–0.04)
IMM GRANULOCYTES NFR BLD AUTO: 0.4 % (ref 0–0.5)
INR PPP: 2.6 (ref 0.9–1.1)
LYMPHOCYTES # BLD: 0.91 K/UL (ref 0.9–3.6)
LYMPHOCYTES NFR BLD: 18.9 % (ref 21–52)
MCH RBC QN AUTO: 32.4 PG (ref 24–34)
MCHC RBC AUTO-ENTMCNC: 34.3 G/DL (ref 31–37)
MCV RBC AUTO: 94.4 FL (ref 78–100)
MONOCYTES # BLD: 0.8 K/UL (ref 0.05–1.2)
MONOCYTES NFR BLD: 16.6 % (ref 3–10)
NEUTS SEG # BLD: 2.94 K/UL (ref 1.8–8)
NEUTS SEG NFR BLD: 61 % (ref 40–73)
NRBC # BLD: 0 K/UL (ref 0–0.01)
NRBC BLD-RTO: 0 PER 100 WBC
PLATELET # BLD AUTO: 115 K/UL (ref 135–420)
PMV BLD AUTO: 12 FL (ref 9.2–11.8)
POTASSIUM SERPL-SCNC: 3.9 MMOL/L (ref 3.5–5.5)
PROTHROMBIN TIME: 28.3 SEC (ref 11.9–14.9)
RBC # BLD AUTO: 3.06 M/UL (ref 4.35–5.65)
SODIUM SERPL-SCNC: 135 MMOL/L (ref 136–145)
WBC # BLD AUTO: 4.8 K/UL (ref 4.6–13.2)

## 2025-02-08 PROCEDURE — 6370000000 HC RX 637 (ALT 250 FOR IP): Performed by: INTERNAL MEDICINE

## 2025-02-08 PROCEDURE — 99232 SBSQ HOSP IP/OBS MODERATE 35: CPT | Performed by: STUDENT IN AN ORGANIZED HEALTH CARE EDUCATION/TRAINING PROGRAM

## 2025-02-08 PROCEDURE — 85610 PROTHROMBIN TIME: CPT

## 2025-02-08 PROCEDURE — 80048 BASIC METABOLIC PNL TOTAL CA: CPT

## 2025-02-08 PROCEDURE — 6370000000 HC RX 637 (ALT 250 FOR IP): Performed by: STUDENT IN AN ORGANIZED HEALTH CARE EDUCATION/TRAINING PROGRAM

## 2025-02-08 PROCEDURE — 2500000003 HC RX 250 WO HCPCS: Performed by: INTERNAL MEDICINE

## 2025-02-08 PROCEDURE — 6360000002 HC RX W HCPCS: Performed by: INTERNAL MEDICINE

## 2025-02-08 PROCEDURE — 85025 COMPLETE CBC W/AUTO DIFF WBC: CPT

## 2025-02-08 PROCEDURE — 1100000003 HC PRIVATE W/ TELEMETRY

## 2025-02-08 PROCEDURE — 94761 N-INVAS EAR/PLS OXIMETRY MLT: CPT

## 2025-02-08 PROCEDURE — 36415 COLL VENOUS BLD VENIPUNCTURE: CPT

## 2025-02-08 RX ORDER — WARFARIN SODIUM 5 MG/1
5 TABLET ORAL
Status: COMPLETED | OUTPATIENT
Start: 2025-02-08 | End: 2025-02-08

## 2025-02-08 RX ADMIN — FERROUS SULFATE TAB 325 MG (65 MG ELEMENTAL FE) 325 MG: 325 (65 FE) TAB at 09:32

## 2025-02-08 RX ADMIN — CARVEDILOL 25 MG: 25 TABLET, FILM COATED ORAL at 09:32

## 2025-02-08 RX ADMIN — POTASSIUM CHLORIDE 40 MEQ: 1500 TABLET, EXTENDED RELEASE ORAL at 09:33

## 2025-02-08 RX ADMIN — SODIUM CHLORIDE, PRESERVATIVE FREE 10 ML: 5 INJECTION INTRAVENOUS at 20:06

## 2025-02-08 RX ADMIN — FERROUS SULFATE TAB 325 MG (65 MG ELEMENTAL FE) 325 MG: 325 (65 FE) TAB at 20:04

## 2025-02-08 RX ADMIN — WARFARIN SODIUM 5 MG: 5 TABLET ORAL at 17:50

## 2025-02-08 RX ADMIN — CARVEDILOL 25 MG: 25 TABLET, FILM COATED ORAL at 17:50

## 2025-02-08 RX ADMIN — HYDRALAZINE HYDROCHLORIDE 50 MG: 50 TABLET ORAL at 09:32

## 2025-02-08 RX ADMIN — BUMETANIDE 0.5 MG/HR: 0.25 INJECTION INTRAMUSCULAR; INTRAVENOUS at 21:41

## 2025-02-08 RX ADMIN — BUMETANIDE 0.5 MG/HR: 0.25 INJECTION INTRAMUSCULAR; INTRAVENOUS at 00:28

## 2025-02-08 ASSESSMENT — PAIN SCALES - GENERAL
PAINLEVEL_OUTOF10: 0

## 2025-02-09 LAB
ALBUMIN SERPL-MCNC: 2.8 G/DL (ref 3.4–5)
ALBUMIN/GLOB SERPL: 0.6 (ref 0.8–1.7)
ALP SERPL-CCNC: 35 U/L (ref 45–117)
ALT SERPL-CCNC: 15 U/L (ref 16–61)
ANION GAP SERPL CALC-SCNC: 7 MMOL/L (ref 3–18)
AST SERPL-CCNC: 22 U/L (ref 10–38)
BASOPHILS # BLD: 0.03 K/UL (ref 0–0.1)
BASOPHILS NFR BLD: 0.6 % (ref 0–2)
BILIRUB SERPL-MCNC: 2.2 MG/DL (ref 0.2–1)
BUN SERPL-MCNC: 41 MG/DL (ref 7–18)
BUN/CREAT SERPL: 16 (ref 12–20)
CALCIUM SERPL-MCNC: 9.2 MG/DL (ref 8.5–10.1)
CHLORIDE SERPL-SCNC: 96 MMOL/L (ref 100–111)
CO2 SERPL-SCNC: 36 MMOL/L (ref 21–32)
CREAT SERPL-MCNC: 2.5 MG/DL (ref 0.6–1.3)
DIFFERENTIAL METHOD BLD: ABNORMAL
EOSINOPHIL # BLD: 0.12 K/UL (ref 0–0.4)
EOSINOPHIL NFR BLD: 2.6 % (ref 0–5)
ERYTHROCYTE [DISTWIDTH] IN BLOOD BY AUTOMATED COUNT: 15.9 % (ref 11.6–14.5)
GLOBULIN SER CALC-MCNC: 5 G/DL (ref 2–4)
GLUCOSE SERPL-MCNC: 104 MG/DL (ref 74–99)
HCT VFR BLD AUTO: 29.4 % (ref 36–48)
HGB BLD-MCNC: 9.9 G/DL (ref 13–16)
IMM GRANULOCYTES # BLD AUTO: 0.02 K/UL (ref 0–0.04)
IMM GRANULOCYTES NFR BLD AUTO: 0.4 % (ref 0–0.5)
INR PPP: 2.1 (ref 0.9–1.1)
LYMPHOCYTES # BLD: 0.87 K/UL (ref 0.9–3.6)
LYMPHOCYTES NFR BLD: 18.6 % (ref 21–52)
MCH RBC QN AUTO: 31.8 PG (ref 24–34)
MCHC RBC AUTO-ENTMCNC: 33.7 G/DL (ref 31–37)
MCV RBC AUTO: 94.5 FL (ref 78–100)
MONOCYTES # BLD: 0.85 K/UL (ref 0.05–1.2)
MONOCYTES NFR BLD: 18.2 % (ref 3–10)
NEUTS SEG # BLD: 2.79 K/UL (ref 1.8–8)
NEUTS SEG NFR BLD: 59.6 % (ref 40–73)
NRBC # BLD: 0 K/UL (ref 0–0.01)
NRBC BLD-RTO: 0 PER 100 WBC
PLATELET # BLD AUTO: 115 K/UL (ref 135–420)
PMV BLD AUTO: 12.3 FL (ref 9.2–11.8)
POTASSIUM SERPL-SCNC: 3.3 MMOL/L (ref 3.5–5.5)
PROT SERPL-MCNC: 7.8 G/DL (ref 6.4–8.2)
PROTHROMBIN TIME: 24.1 SEC (ref 11.9–14.9)
RBC # BLD AUTO: 3.11 M/UL (ref 4.35–5.65)
SODIUM SERPL-SCNC: 139 MMOL/L (ref 136–145)
WBC # BLD AUTO: 4.7 K/UL (ref 4.6–13.2)

## 2025-02-09 PROCEDURE — 6360000002 HC RX W HCPCS: Performed by: INTERNAL MEDICINE

## 2025-02-09 PROCEDURE — 85610 PROTHROMBIN TIME: CPT

## 2025-02-09 PROCEDURE — 99232 SBSQ HOSP IP/OBS MODERATE 35: CPT | Performed by: STUDENT IN AN ORGANIZED HEALTH CARE EDUCATION/TRAINING PROGRAM

## 2025-02-09 PROCEDURE — 6370000000 HC RX 637 (ALT 250 FOR IP): Performed by: INTERNAL MEDICINE

## 2025-02-09 PROCEDURE — 80053 COMPREHEN METABOLIC PANEL: CPT

## 2025-02-09 PROCEDURE — 2500000003 HC RX 250 WO HCPCS: Performed by: INTERNAL MEDICINE

## 2025-02-09 PROCEDURE — 85025 COMPLETE CBC W/AUTO DIFF WBC: CPT

## 2025-02-09 PROCEDURE — 36415 COLL VENOUS BLD VENIPUNCTURE: CPT

## 2025-02-09 PROCEDURE — 1100000003 HC PRIVATE W/ TELEMETRY

## 2025-02-09 PROCEDURE — 6370000000 HC RX 637 (ALT 250 FOR IP): Performed by: STUDENT IN AN ORGANIZED HEALTH CARE EDUCATION/TRAINING PROGRAM

## 2025-02-09 RX ORDER — POTASSIUM CHLORIDE 1500 MG/1
20 TABLET, EXTENDED RELEASE ORAL 2 TIMES DAILY
Status: DISCONTINUED | OUTPATIENT
Start: 2025-02-09 | End: 2025-02-11

## 2025-02-09 RX ORDER — WARFARIN SODIUM 5 MG/1
5 TABLET ORAL
Status: COMPLETED | OUTPATIENT
Start: 2025-02-09 | End: 2025-02-09

## 2025-02-09 RX ORDER — BUMETANIDE 0.25 MG/ML
0.5 INJECTION, SOLUTION INTRAMUSCULAR; INTRAVENOUS 2 TIMES DAILY
Status: DISCONTINUED | OUTPATIENT
Start: 2025-02-09 | End: 2025-02-11

## 2025-02-09 RX ADMIN — WARFARIN SODIUM 5 MG: 5 TABLET ORAL at 18:23

## 2025-02-09 RX ADMIN — FERROUS SULFATE TAB 325 MG (65 MG ELEMENTAL FE) 325 MG: 325 (65 FE) TAB at 09:12

## 2025-02-09 RX ADMIN — SODIUM CHLORIDE, PRESERVATIVE FREE 10 ML: 5 INJECTION INTRAVENOUS at 20:43

## 2025-02-09 RX ADMIN — HYDRALAZINE HYDROCHLORIDE 50 MG: 50 TABLET ORAL at 20:43

## 2025-02-09 RX ADMIN — ACETAMINOPHEN 650 MG: 325 TABLET ORAL at 20:46

## 2025-02-09 RX ADMIN — POTASSIUM CHLORIDE 20 MEQ: 1500 TABLET, EXTENDED RELEASE ORAL at 20:43

## 2025-02-09 RX ADMIN — POTASSIUM CHLORIDE 40 MEQ: 1500 TABLET, EXTENDED RELEASE ORAL at 07:48

## 2025-02-09 RX ADMIN — HYDRALAZINE HYDROCHLORIDE 50 MG: 50 TABLET ORAL at 14:55

## 2025-02-09 RX ADMIN — BUMETANIDE 0.5 MG: 0.25 INJECTION INTRAMUSCULAR; INTRAVENOUS at 12:06

## 2025-02-09 RX ADMIN — FERROUS SULFATE TAB 325 MG (65 MG ELEMENTAL FE) 325 MG: 325 (65 FE) TAB at 20:43

## 2025-02-09 RX ADMIN — HYDRALAZINE HYDROCHLORIDE 50 MG: 50 TABLET ORAL at 09:12

## 2025-02-09 RX ADMIN — CARVEDILOL 25 MG: 25 TABLET, FILM COATED ORAL at 09:12

## 2025-02-09 RX ADMIN — CARVEDILOL 25 MG: 25 TABLET, FILM COATED ORAL at 17:15

## 2025-02-09 ASSESSMENT — PAIN SCALES - GENERAL
PAINLEVEL_OUTOF10: 0

## 2025-02-10 ENCOUNTER — APPOINTMENT (OUTPATIENT)
Facility: HOSPITAL | Age: 70
DRG: 286 | End: 2025-02-10
Payer: MEDICARE

## 2025-02-10 LAB
ALBUMIN SERPL-MCNC: 2.6 G/DL (ref 3.4–5)
ALBUMIN/GLOB SERPL: 0.5 (ref 0.8–1.7)
ALP SERPL-CCNC: 37 U/L (ref 45–117)
ALT SERPL-CCNC: 11 U/L (ref 16–61)
ANION GAP SERPL CALC-SCNC: 7 MMOL/L (ref 3–18)
AST SERPL-CCNC: 18 U/L (ref 10–38)
BILIRUB SERPL-MCNC: 2.1 MG/DL (ref 0.2–1)
BUN SERPL-MCNC: 43 MG/DL (ref 7–18)
BUN/CREAT SERPL: 16 (ref 12–20)
CALCIUM SERPL-MCNC: 8.8 MG/DL (ref 8.5–10.1)
CHLORIDE SERPL-SCNC: 96 MMOL/L (ref 100–111)
CO2 SERPL-SCNC: 35 MMOL/L (ref 21–32)
CREAT SERPL-MCNC: 2.62 MG/DL (ref 0.6–1.3)
ECHO BSA: 2.78 M2
ERYTHROCYTE [DISTWIDTH] IN BLOOD BY AUTOMATED COUNT: 15.8 % (ref 11.6–14.5)
GLOBULIN SER CALC-MCNC: 5.3 G/DL (ref 2–4)
GLUCOSE SERPL-MCNC: 99 MG/DL (ref 74–99)
HCT VFR BLD AUTO: 29.1 % (ref 36–48)
HGB BLD-MCNC: 9.8 G/DL (ref 13–16)
MAGNESIUM SERPL-MCNC: 1.9 MG/DL (ref 1.6–2.6)
MCH RBC QN AUTO: 31.7 PG (ref 24–34)
MCHC RBC AUTO-ENTMCNC: 33.7 G/DL (ref 31–37)
MCV RBC AUTO: 94.2 FL (ref 78–100)
NRBC # BLD: 0 K/UL (ref 0–0.01)
NRBC BLD-RTO: 0 PER 100 WBC
PHOSPHATE SERPL-MCNC: 3.2 MG/DL (ref 2.5–4.9)
PLATELET # BLD AUTO: 110 K/UL (ref 135–420)
PMV BLD AUTO: 11.4 FL (ref 9.2–11.8)
POTASSIUM SERPL-SCNC: 3.4 MMOL/L (ref 3.5–5.5)
PROT SERPL-MCNC: 7.9 G/DL (ref 6.4–8.2)
RBC # BLD AUTO: 3.09 M/UL (ref 4.35–5.65)
SODIUM SERPL-SCNC: 138 MMOL/L (ref 136–145)
WBC # BLD AUTO: 5.6 K/UL (ref 4.6–13.2)

## 2025-02-10 PROCEDURE — C1725 CATH, TRANSLUMIN NON-LASER: HCPCS | Performed by: INTERNAL MEDICINE

## 2025-02-10 PROCEDURE — 71045 X-RAY EXAM CHEST 1 VIEW: CPT

## 2025-02-10 PROCEDURE — 76000 FLUOROSCOPY <1 HR PHYS/QHP: CPT | Performed by: INTERNAL MEDICINE

## 2025-02-10 PROCEDURE — 87040 BLOOD CULTURE FOR BACTERIA: CPT

## 2025-02-10 PROCEDURE — C1769 GUIDE WIRE: HCPCS | Performed by: INTERNAL MEDICINE

## 2025-02-10 PROCEDURE — 2500000003 HC RX 250 WO HCPCS: Performed by: INTERNAL MEDICINE

## 2025-02-10 PROCEDURE — 85027 COMPLETE CBC AUTOMATED: CPT

## 2025-02-10 PROCEDURE — 6360000002 HC RX W HCPCS: Performed by: INTERNAL MEDICINE

## 2025-02-10 PROCEDURE — 94761 N-INVAS EAR/PLS OXIMETRY MLT: CPT

## 2025-02-10 PROCEDURE — 6370000000 HC RX 637 (ALT 250 FOR IP): Performed by: STUDENT IN AN ORGANIZED HEALTH CARE EDUCATION/TRAINING PROGRAM

## 2025-02-10 PROCEDURE — C1713 ANCHOR/SCREW BN/BN,TIS/BN: HCPCS | Performed by: INTERNAL MEDICINE

## 2025-02-10 PROCEDURE — 4A023N6 MEASUREMENT OF CARDIAC SAMPLING AND PRESSURE, RIGHT HEART, PERCUTANEOUS APPROACH: ICD-10-PCS | Performed by: INTERNAL MEDICINE

## 2025-02-10 PROCEDURE — C1894 INTRO/SHEATH, NON-LASER: HCPCS | Performed by: INTERNAL MEDICINE

## 2025-02-10 PROCEDURE — 2709999900 HC NON-CHARGEABLE SUPPLY: Performed by: INTERNAL MEDICINE

## 2025-02-10 PROCEDURE — 83735 ASSAY OF MAGNESIUM: CPT

## 2025-02-10 PROCEDURE — 84100 ASSAY OF PHOSPHORUS: CPT

## 2025-02-10 PROCEDURE — 1100000003 HC PRIVATE W/ TELEMETRY

## 2025-02-10 PROCEDURE — 99232 SBSQ HOSP IP/OBS MODERATE 35: CPT | Performed by: INTERNAL MEDICINE

## 2025-02-10 PROCEDURE — 93451 RIGHT HEART CATH: CPT | Performed by: INTERNAL MEDICINE

## 2025-02-10 PROCEDURE — 36415 COLL VENOUS BLD VENIPUNCTURE: CPT

## 2025-02-10 PROCEDURE — 99232 SBSQ HOSP IP/OBS MODERATE 35: CPT | Performed by: STUDENT IN AN ORGANIZED HEALTH CARE EDUCATION/TRAINING PROGRAM

## 2025-02-10 PROCEDURE — 7100000011 HC PHASE II RECOVERY - ADDTL 15 MIN: Performed by: INTERNAL MEDICINE

## 2025-02-10 PROCEDURE — 80053 COMPREHEN METABOLIC PANEL: CPT

## 2025-02-10 PROCEDURE — 6370000000 HC RX 637 (ALT 250 FOR IP): Performed by: INTERNAL MEDICINE

## 2025-02-10 PROCEDURE — 76937 US GUIDE VASCULAR ACCESS: CPT | Performed by: INTERNAL MEDICINE

## 2025-02-10 PROCEDURE — 7100000010 HC PHASE II RECOVERY - FIRST 15 MIN: Performed by: INTERNAL MEDICINE

## 2025-02-10 RX ORDER — TRAMADOL HYDROCHLORIDE 50 MG/1
50 TABLET ORAL EVERY 6 HOURS PRN
Status: DISCONTINUED | OUTPATIENT
Start: 2025-02-10 | End: 2025-02-12 | Stop reason: HOSPADM

## 2025-02-10 RX ORDER — THEOPHYLLINE ANHYDROUS 80 MG/15ML
SOLUTION ORAL PRN
Status: DISCONTINUED | OUTPATIENT
Start: 2025-02-10 | End: 2025-02-10 | Stop reason: HOSPADM

## 2025-02-10 RX ORDER — ADENOSINE 3 MG/ML
INJECTION, SOLUTION INTRAVENOUS CONTINUOUS PRN
Status: DISCONTINUED | OUTPATIENT
Start: 2025-02-10 | End: 2025-02-10 | Stop reason: HOSPADM

## 2025-02-10 RX ORDER — HEPARIN SODIUM 1000 [USP'U]/ML
INJECTION, SOLUTION INTRAVENOUS; SUBCUTANEOUS PRN
Status: DISCONTINUED | OUTPATIENT
Start: 2025-02-10 | End: 2025-02-10 | Stop reason: HOSPADM

## 2025-02-10 RX ORDER — AMINOPHYLLINE 25 MG/ML
INJECTION, SOLUTION INTRAVENOUS PRN
Status: DISCONTINUED | OUTPATIENT
Start: 2025-02-10 | End: 2025-02-10 | Stop reason: HOSPADM

## 2025-02-10 RX ADMIN — SODIUM CHLORIDE, PRESERVATIVE FREE 10 ML: 5 INJECTION INTRAVENOUS at 20:58

## 2025-02-10 RX ADMIN — CARVEDILOL 25 MG: 25 TABLET, FILM COATED ORAL at 18:02

## 2025-02-10 RX ADMIN — BUMETANIDE 0.5 MG: 0.25 INJECTION INTRAMUSCULAR; INTRAVENOUS at 09:30

## 2025-02-10 RX ADMIN — HYDRALAZINE HYDROCHLORIDE 50 MG: 50 TABLET ORAL at 09:26

## 2025-02-10 RX ADMIN — POTASSIUM CHLORIDE 20 MEQ: 1500 TABLET, EXTENDED RELEASE ORAL at 09:26

## 2025-02-10 RX ADMIN — FERROUS SULFATE TAB 325 MG (65 MG ELEMENTAL FE) 325 MG: 325 (65 FE) TAB at 09:25

## 2025-02-10 RX ADMIN — CARVEDILOL 25 MG: 25 TABLET, FILM COATED ORAL at 09:26

## 2025-02-10 RX ADMIN — Medication 5 MG: at 20:58

## 2025-02-10 RX ADMIN — BUMETANIDE 0.5 MG: 0.25 INJECTION INTRAMUSCULAR; INTRAVENOUS at 18:02

## 2025-02-10 RX ADMIN — ACETAMINOPHEN 650 MG: 325 TABLET ORAL at 09:26

## 2025-02-10 RX ADMIN — FERROUS SULFATE TAB 325 MG (65 MG ELEMENTAL FE) 325 MG: 325 (65 FE) TAB at 20:57

## 2025-02-10 RX ADMIN — POTASSIUM CHLORIDE 20 MEQ: 1500 TABLET, EXTENDED RELEASE ORAL at 20:57

## 2025-02-10 RX ADMIN — TRAMADOL HYDROCHLORIDE 50 MG: 50 TABLET, FILM COATED ORAL at 10:20

## 2025-02-10 ASSESSMENT — PAIN - FUNCTIONAL ASSESSMENT: PAIN_FUNCTIONAL_ASSESSMENT: ACTIVITIES ARE NOT PREVENTED

## 2025-02-10 ASSESSMENT — PAIN SCALES - GENERAL
PAINLEVEL_OUTOF10: 0
PAINLEVEL_OUTOF10: 0
PAINLEVEL_OUTOF10: 6
PAINLEVEL_OUTOF10: 8
PAINLEVEL_OUTOF10: 0

## 2025-02-10 ASSESSMENT — PAIN DESCRIPTION - FREQUENCY: FREQUENCY: INTERMITTENT

## 2025-02-10 ASSESSMENT — PAIN DESCRIPTION - LOCATION: LOCATION: OTHER (COMMENT);ANKLE

## 2025-02-10 ASSESSMENT — PAIN DESCRIPTION - ORIENTATION: ORIENTATION: RIGHT

## 2025-02-10 NOTE — NURSE NAVIGATOR
HF Navigator attempted education this day; however, pt unable to be seen due to:    [  ] lethargy/confusion  [ x ] off floor for testing   [  ] RN care  [  ] Pain   [  ] Other    Will follow up next day as indicated.    Patricia Howard, IVÁN  2/10/2025

## 2025-02-11 PROBLEM — R65.10 SIRS (SYSTEMIC INFLAMMATORY RESPONSE SYNDROME) (HCC): Status: ACTIVE | Noted: 2025-02-11

## 2025-02-11 LAB
ANION GAP SERPL CALC-SCNC: 7 MMOL/L (ref 3–18)
BASOPHILS # BLD: 0.03 K/UL (ref 0–0.1)
BASOPHILS NFR BLD: 0.6 % (ref 0–2)
BUN SERPL-MCNC: 44 MG/DL (ref 7–18)
BUN/CREAT SERPL: 16 (ref 12–20)
CALCIUM SERPL-MCNC: 8.7 MG/DL (ref 8.5–10.1)
CHLORIDE SERPL-SCNC: 98 MMOL/L (ref 100–111)
CO2 SERPL-SCNC: 33 MMOL/L (ref 21–32)
CREAT SERPL-MCNC: 2.71 MG/DL (ref 0.6–1.3)
DIFFERENTIAL METHOD BLD: ABNORMAL
EOSINOPHIL # BLD: 0.06 K/UL (ref 0–0.4)
EOSINOPHIL NFR BLD: 1.2 % (ref 0–5)
ERYTHROCYTE [DISTWIDTH] IN BLOOD BY AUTOMATED COUNT: 15.8 % (ref 11.6–14.5)
GLUCOSE SERPL-MCNC: 98 MG/DL (ref 74–99)
HCT VFR BLD AUTO: 28.9 % (ref 36–48)
HGB BLD-MCNC: 9.8 G/DL (ref 13–16)
IMM GRANULOCYTES # BLD AUTO: 0.03 K/UL (ref 0–0.04)
IMM GRANULOCYTES NFR BLD AUTO: 0.6 % (ref 0–0.5)
INR PPP: 2 (ref 0.9–1.1)
LYMPHOCYTES # BLD: 0.84 K/UL (ref 0.9–3.6)
LYMPHOCYTES NFR BLD: 16.5 % (ref 21–52)
MAGNESIUM SERPL-MCNC: 2 MG/DL (ref 1.6–2.6)
MCH RBC QN AUTO: 32.2 PG (ref 24–34)
MCHC RBC AUTO-ENTMCNC: 33.9 G/DL (ref 31–37)
MCV RBC AUTO: 95.1 FL (ref 78–100)
MONOCYTES # BLD: 1.08 K/UL (ref 0.05–1.2)
MONOCYTES NFR BLD: 21.3 % (ref 3–10)
NEUTS SEG # BLD: 3.04 K/UL (ref 1.8–8)
NEUTS SEG NFR BLD: 59.8 % (ref 40–73)
NRBC # BLD: 0 K/UL (ref 0–0.01)
NRBC BLD-RTO: 0 PER 100 WBC
PLATELET # BLD AUTO: 117 K/UL (ref 135–420)
PMV BLD AUTO: 12.1 FL (ref 9.2–11.8)
POTASSIUM SERPL-SCNC: 3.6 MMOL/L (ref 3.5–5.5)
PROTHROMBIN TIME: 23.2 SEC (ref 11.9–14.9)
RBC # BLD AUTO: 3.04 M/UL (ref 4.35–5.65)
SODIUM SERPL-SCNC: 138 MMOL/L (ref 136–145)
WBC # BLD AUTO: 5.1 K/UL (ref 4.6–13.2)

## 2025-02-11 PROCEDURE — 99232 SBSQ HOSP IP/OBS MODERATE 35: CPT | Performed by: STUDENT IN AN ORGANIZED HEALTH CARE EDUCATION/TRAINING PROGRAM

## 2025-02-11 PROCEDURE — 2500000003 HC RX 250 WO HCPCS: Performed by: INTERNAL MEDICINE

## 2025-02-11 PROCEDURE — 99232 SBSQ HOSP IP/OBS MODERATE 35: CPT | Performed by: INTERNAL MEDICINE

## 2025-02-11 PROCEDURE — 94761 N-INVAS EAR/PLS OXIMETRY MLT: CPT

## 2025-02-11 PROCEDURE — 6370000000 HC RX 637 (ALT 250 FOR IP): Performed by: INTERNAL MEDICINE

## 2025-02-11 PROCEDURE — 6370000000 HC RX 637 (ALT 250 FOR IP): Performed by: STUDENT IN AN ORGANIZED HEALTH CARE EDUCATION/TRAINING PROGRAM

## 2025-02-11 PROCEDURE — 36415 COLL VENOUS BLD VENIPUNCTURE: CPT

## 2025-02-11 PROCEDURE — 80048 BASIC METABOLIC PNL TOTAL CA: CPT

## 2025-02-11 PROCEDURE — 85025 COMPLETE CBC W/AUTO DIFF WBC: CPT

## 2025-02-11 PROCEDURE — 85610 PROTHROMBIN TIME: CPT

## 2025-02-11 PROCEDURE — 83735 ASSAY OF MAGNESIUM: CPT

## 2025-02-11 PROCEDURE — 6360000002 HC RX W HCPCS: Performed by: INTERNAL MEDICINE

## 2025-02-11 PROCEDURE — 1100000003 HC PRIVATE W/ TELEMETRY

## 2025-02-11 RX ORDER — BUMETANIDE 1 MG/1
1 TABLET ORAL 2 TIMES DAILY
Status: DISCONTINUED | OUTPATIENT
Start: 2025-02-11 | End: 2025-02-12 | Stop reason: HOSPADM

## 2025-02-11 RX ORDER — POTASSIUM CHLORIDE 1500 MG/1
20 TABLET, EXTENDED RELEASE ORAL DAILY
Status: DISCONTINUED | OUTPATIENT
Start: 2025-02-11 | End: 2025-02-12

## 2025-02-11 RX ORDER — WARFARIN SODIUM 5 MG/1
5 TABLET ORAL
Status: COMPLETED | OUTPATIENT
Start: 2025-02-11 | End: 2025-02-11

## 2025-02-11 RX ORDER — SENNA AND DOCUSATE SODIUM 50; 8.6 MG/1; MG/1
1 TABLET, FILM COATED ORAL 2 TIMES DAILY
Status: DISCONTINUED | OUTPATIENT
Start: 2025-02-11 | End: 2025-02-12 | Stop reason: HOSPADM

## 2025-02-11 RX ORDER — ACETAZOLAMIDE 500 MG/5ML
250 INJECTION, POWDER, LYOPHILIZED, FOR SOLUTION INTRAVENOUS EVERY 6 HOURS
Status: DISCONTINUED | OUTPATIENT
Start: 2025-02-11 | End: 2025-02-11 | Stop reason: CLARIF

## 2025-02-11 RX ADMIN — HYDRALAZINE HYDROCHLORIDE 50 MG: 50 TABLET ORAL at 08:56

## 2025-02-11 RX ADMIN — SODIUM CHLORIDE, PRESERVATIVE FREE 10 ML: 5 INJECTION INTRAVENOUS at 21:34

## 2025-02-11 RX ADMIN — ACETAZOLAMIDE SODIUM 250 MG: 500 INJECTION, POWDER, LYOPHILIZED, FOR SOLUTION INTRAVENOUS at 21:27

## 2025-02-11 RX ADMIN — SODIUM CHLORIDE, PRESERVATIVE FREE 10 ML: 5 INJECTION INTRAVENOUS at 08:57

## 2025-02-11 RX ADMIN — FERROUS SULFATE TAB 325 MG (65 MG ELEMENTAL FE) 325 MG: 325 (65 FE) TAB at 21:19

## 2025-02-11 RX ADMIN — HYDRALAZINE HYDROCHLORIDE 50 MG: 50 TABLET ORAL at 21:19

## 2025-02-11 RX ADMIN — BUMETANIDE 1 MG: 1 TABLET ORAL at 08:56

## 2025-02-11 RX ADMIN — POTASSIUM CHLORIDE 20 MEQ: 1500 TABLET, EXTENDED RELEASE ORAL at 08:56

## 2025-02-11 RX ADMIN — SENNOSIDES AND DOCUSATE SODIUM 1 TABLET: 50; 8.6 TABLET ORAL at 21:19

## 2025-02-11 RX ADMIN — WARFARIN SODIUM 5 MG: 5 TABLET ORAL at 18:07

## 2025-02-11 RX ADMIN — FERROUS SULFATE TAB 325 MG (65 MG ELEMENTAL FE) 325 MG: 325 (65 FE) TAB at 08:56

## 2025-02-11 RX ADMIN — ACETAZOLAMIDE SODIUM 250 MG: 500 INJECTION, POWDER, LYOPHILIZED, FOR SOLUTION INTRAVENOUS at 14:48

## 2025-02-11 RX ADMIN — CARVEDILOL 25 MG: 25 TABLET, FILM COATED ORAL at 08:56

## 2025-02-11 RX ADMIN — BUMETANIDE 1 MG: 1 TABLET ORAL at 18:07

## 2025-02-11 ASSESSMENT — PAIN SCALES - GENERAL
PAINLEVEL_OUTOF10: 0

## 2025-02-11 NOTE — FLOWSHEET NOTE
02/10/25 2029   Vital Signs   Temp (!) 100.6 °F (38.1 °C)  (notified nurse)   Temp Source Oral   Pulse 94   Heart Rate Source Monitor   Respirations 18   /61   MAP (Calculated) 76   Pain Assessment   Pain Assessment None - Denies Pain   Pain Level 0   Opioid-Induced Sedation   POSS Score 1   Oxygen Therapy   SpO2 99 %   O2 Device None (Room air)     Pt had multiple blankets and heat at max in room. Recheck temp 98.8 oral after reducing heat.

## 2025-02-12 ENCOUNTER — APPOINTMENT (OUTPATIENT)
Facility: HOSPITAL | Age: 70
DRG: 286 | End: 2025-02-12
Attending: STUDENT IN AN ORGANIZED HEALTH CARE EDUCATION/TRAINING PROGRAM
Payer: MEDICARE

## 2025-02-12 ENCOUNTER — APPOINTMENT (OUTPATIENT)
Facility: HOSPITAL | Age: 70
DRG: 286 | End: 2025-02-12
Payer: MEDICARE

## 2025-02-12 VITALS
BODY MASS INDEX: 39.17 KG/M2 | HEART RATE: 97 BPM | HEIGHT: 75 IN | WEIGHT: 315 LBS | SYSTOLIC BLOOD PRESSURE: 131 MMHG | OXYGEN SATURATION: 99 % | RESPIRATION RATE: 17 BRPM | DIASTOLIC BLOOD PRESSURE: 66 MMHG | TEMPERATURE: 97.7 F

## 2025-02-12 LAB
ALBUMIN SERPL-MCNC: 2.8 G/DL (ref 3.4–5)
ANION GAP SERPL CALC-SCNC: 5 MMOL/L (ref 3–18)
BASOPHILS # BLD: 0.03 K/UL (ref 0–0.1)
BASOPHILS NFR BLD: 0.6 % (ref 0–2)
BUN SERPL-MCNC: 45 MG/DL (ref 7–18)
BUN/CREAT SERPL: 18 (ref 12–20)
CALCIUM SERPL-MCNC: 9 MG/DL (ref 8.5–10.1)
CHLORIDE SERPL-SCNC: 97 MMOL/L (ref 100–111)
CO2 SERPL-SCNC: 31 MMOL/L (ref 21–32)
CREAT SERPL-MCNC: 2.56 MG/DL (ref 0.6–1.3)
DIFFERENTIAL METHOD BLD: ABNORMAL
ECHO BSA: 2.78 M2
EOSINOPHIL # BLD: 0.09 K/UL (ref 0–0.4)
EOSINOPHIL NFR BLD: 1.9 % (ref 0–5)
ERYTHROCYTE [DISTWIDTH] IN BLOOD BY AUTOMATED COUNT: 15.7 % (ref 11.6–14.5)
GLUCOSE SERPL-MCNC: 95 MG/DL (ref 74–99)
HCT VFR BLD AUTO: 30.2 % (ref 36–48)
HGB BLD-MCNC: 9.9 G/DL (ref 13–16)
IMM GRANULOCYTES # BLD AUTO: 0.02 K/UL (ref 0–0.04)
IMM GRANULOCYTES NFR BLD AUTO: 0.4 % (ref 0–0.5)
INR PPP: 2 (ref 0.9–1.1)
LYMPHOCYTES # BLD: 0.89 K/UL (ref 0.9–3.6)
LYMPHOCYTES NFR BLD: 18.6 % (ref 21–52)
MAGNESIUM SERPL-MCNC: 2.2 MG/DL (ref 1.6–2.6)
MCH RBC QN AUTO: 31.4 PG (ref 24–34)
MCHC RBC AUTO-ENTMCNC: 32.8 G/DL (ref 31–37)
MCV RBC AUTO: 95.9 FL (ref 78–100)
MONOCYTES # BLD: 0.85 K/UL (ref 0.05–1.2)
MONOCYTES NFR BLD: 17.8 % (ref 3–10)
NEUTS SEG # BLD: 2.9 K/UL (ref 1.8–8)
NEUTS SEG NFR BLD: 60.7 % (ref 40–73)
NRBC # BLD: 0 K/UL (ref 0–0.01)
NRBC BLD-RTO: 0 PER 100 WBC
PHOSPHATE SERPL-MCNC: 2.8 MG/DL (ref 2.5–4.9)
PLATELET # BLD AUTO: 126 K/UL (ref 135–420)
PMV BLD AUTO: 12.3 FL (ref 9.2–11.8)
POTASSIUM SERPL-SCNC: 3.3 MMOL/L (ref 3.5–5.5)
PROTHROMBIN TIME: 22.6 SEC (ref 11.9–14.9)
RBC # BLD AUTO: 3.15 M/UL (ref 4.35–5.65)
SODIUM SERPL-SCNC: 133 MMOL/L (ref 136–145)
WBC # BLD AUTO: 4.8 K/UL (ref 4.6–13.2)

## 2025-02-12 PROCEDURE — 97164 PT RE-EVAL EST PLAN CARE: CPT

## 2025-02-12 PROCEDURE — 97535 SELF CARE MNGMENT TRAINING: CPT

## 2025-02-12 PROCEDURE — 6360000002 HC RX W HCPCS: Performed by: INTERNAL MEDICINE

## 2025-02-12 PROCEDURE — 36415 COLL VENOUS BLD VENIPUNCTURE: CPT

## 2025-02-12 PROCEDURE — 83735 ASSAY OF MAGNESIUM: CPT

## 2025-02-12 PROCEDURE — 6370000000 HC RX 637 (ALT 250 FOR IP): Performed by: INTERNAL MEDICINE

## 2025-02-12 PROCEDURE — 97116 GAIT TRAINING THERAPY: CPT

## 2025-02-12 PROCEDURE — 85025 COMPLETE CBC W/AUTO DIFF WBC: CPT

## 2025-02-12 PROCEDURE — 85610 PROTHROMBIN TIME: CPT

## 2025-02-12 PROCEDURE — 80069 RENAL FUNCTION PANEL: CPT

## 2025-02-12 PROCEDURE — 6370000000 HC RX 637 (ALT 250 FOR IP): Performed by: STUDENT IN AN ORGANIZED HEALTH CARE EDUCATION/TRAINING PROGRAM

## 2025-02-12 PROCEDURE — 99232 SBSQ HOSP IP/OBS MODERATE 35: CPT | Performed by: INTERNAL MEDICINE

## 2025-02-12 PROCEDURE — 97168 OT RE-EVAL EST PLAN CARE: CPT

## 2025-02-12 PROCEDURE — 73630 X-RAY EXAM OF FOOT: CPT

## 2025-02-12 PROCEDURE — 94761 N-INVAS EAR/PLS OXIMETRY MLT: CPT

## 2025-02-12 PROCEDURE — 2500000003 HC RX 250 WO HCPCS: Performed by: INTERNAL MEDICINE

## 2025-02-12 PROCEDURE — 99239 HOSP IP/OBS DSCHRG MGMT >30: CPT | Performed by: STUDENT IN AN ORGANIZED HEALTH CARE EDUCATION/TRAINING PROGRAM

## 2025-02-12 PROCEDURE — 93971 EXTREMITY STUDY: CPT

## 2025-02-12 RX ORDER — WARFARIN SODIUM 5 MG/1
TABLET ORAL
Qty: 30 TABLET | Refills: 0
Start: 2025-02-12

## 2025-02-12 RX ORDER — WARFARIN SODIUM 5 MG/1
5 TABLET ORAL
Status: COMPLETED | OUTPATIENT
Start: 2025-02-12 | End: 2025-02-12

## 2025-02-12 RX ORDER — POTASSIUM CHLORIDE 1500 MG/1
20 TABLET, EXTENDED RELEASE ORAL 2 TIMES DAILY
Status: DISCONTINUED | OUTPATIENT
Start: 2025-02-12 | End: 2025-02-12 | Stop reason: HOSPADM

## 2025-02-12 RX ADMIN — HYDRALAZINE HYDROCHLORIDE 50 MG: 50 TABLET ORAL at 09:30

## 2025-02-12 RX ADMIN — POTASSIUM CHLORIDE 20 MEQ: 1500 TABLET, EXTENDED RELEASE ORAL at 09:30

## 2025-02-12 RX ADMIN — CARVEDILOL 25 MG: 25 TABLET, FILM COATED ORAL at 17:25

## 2025-02-12 RX ADMIN — ACETAZOLAMIDE SODIUM 250 MG: 500 INJECTION, POWDER, LYOPHILIZED, FOR SOLUTION INTRAVENOUS at 07:19

## 2025-02-12 RX ADMIN — WARFARIN SODIUM 5 MG: 5 TABLET ORAL at 17:31

## 2025-02-12 RX ADMIN — BUMETANIDE 1 MG: 1 TABLET ORAL at 09:30

## 2025-02-12 RX ADMIN — SENNOSIDES AND DOCUSATE SODIUM 1 TABLET: 50; 8.6 TABLET ORAL at 09:30

## 2025-02-12 RX ADMIN — BUMETANIDE 1 MG: 1 TABLET ORAL at 17:26

## 2025-02-12 RX ADMIN — ACETAZOLAMIDE SODIUM 250 MG: 500 INJECTION, POWDER, LYOPHILIZED, FOR SOLUTION INTRAVENOUS at 01:46

## 2025-02-12 RX ADMIN — CARVEDILOL 25 MG: 25 TABLET, FILM COATED ORAL at 09:30

## 2025-02-12 RX ADMIN — POTASSIUM CHLORIDE 40 MEQ: 1500 TABLET, EXTENDED RELEASE ORAL at 06:32

## 2025-02-12 RX ADMIN — SODIUM CHLORIDE, PRESERVATIVE FREE 10 ML: 5 INJECTION INTRAVENOUS at 09:33

## 2025-02-12 RX ADMIN — FERROUS SULFATE TAB 325 MG (65 MG ELEMENTAL FE) 325 MG: 325 (65 FE) TAB at 09:30

## 2025-02-12 ASSESSMENT — PAIN SCALES - GENERAL
PAINLEVEL_OUTOF10: 0

## 2025-02-12 NOTE — PLAN OF CARE
Problem: Cardiovascular - Adult  Goal: Maintains optimal cardiac output and hemodynamic stability  Outcome: Progressing  Flowsheets (Taken 2/7/2025 2000 by Sumaya Cruz RN)  Maintains optimal cardiac output and hemodynamic stability:   Monitor blood pressure and heart rate   Monitor urine output and notify Licensed Independent Practitioner for values outside of normal range   Assess for signs of decreased cardiac output  Note: Ongoing bumex drip, monitor blood pressure and heart rate     Problem: Pain  Goal: Verbalizes/displays adequate comfort level or baseline comfort level  Outcome: Progressing  Flowsheets (Taken 2/6/2025 0313 by Devora Walker, RN)  Verbalizes/displays adequate comfort level or baseline comfort level:   Encourage patient to monitor pain and request assistance   Assess pain using appropriate pain scale   Administer analgesics based on type and severity of pain and evaluate response  Note: Patient educated on pain medication availability per MAR for pain management. Pt verbalized understanding and denies pain.  Pt tolerating pain with prn pain medication.       Problem: Safety - Adult  Goal: Free from fall injury  Outcome: Progressing  Flowsheets (Taken 2/6/2025 0313 by Devora Walker, RN)  Free From Fall Injury:   Instruct family/caregiver on patient safety   Based on caregiver fall risk screen, instruct family/caregiver to ask for assistance with transferring infant if caregiver noted to have fall risk factors  Note: Educate patient/family to use call bell, bed alarm on, bed on lowest position       
  Problem: Chronic Conditions and Co-morbidities  Goal: Patient's chronic conditions and co-morbidity symptoms are monitored and maintained or improved  2/10/2025 2047 by Chong Evangelista RN  Outcome: Progressing  2/10/2025 0807 by Sumaya Cruz RN  Outcome: Progressing  Flowsheets (Taken 2/9/2025 1930)  Care Plan - Patient's Chronic Conditions and Co-Morbidity Symptoms are Monitored and Maintained or Improved:   Monitor and assess patient's chronic conditions and comorbid symptoms for stability, deterioration, or improvement   Collaborate with multidisciplinary team to address chronic and comorbid conditions and prevent exacerbation or deterioration   Update acute care plan with appropriate goals if chronic or comorbid symptoms are exacerbated and prevent overall improvement and discharge  Note: Chronic conditions stable     Problem: Discharge Planning  Goal: Discharge to home or other facility with appropriate resources  2/10/2025 2047 by Chong Evangelista RN  Outcome: Progressing  2/10/2025 0807 by Sumaya Cruz RN  Outcome: Progressing  Flowsheets (Taken 2/9/2025 1930)  Discharge to home or other facility with appropriate resources:   Identify barriers to discharge with patient and caregiver   Arrange for needed discharge resources and transportation as appropriate   Identify discharge learning needs (meds, wound care, etc)   Refer to discharge planning if patient needs post-hospital services based on physician order or complex needs related to functional status, cognitive ability or social support system  Note: D/c planning in progress     Problem: Respiratory - Adult  Goal: Achieves optimal ventilation and oxygenation  2/10/2025 2047 by Chong Evangelista RN  Outcome: Progressing  2/10/2025 0807 by Sumaya Cruz RN  Outcome: Progressing  Flowsheets (Taken 2/9/2025 1930)  Achieves optimal ventilation and oxygenation:   Assess for changes in respiratory status   Assess for changes in mentation and 
  Problem: Chronic Conditions and Co-morbidities  Goal: Patient's chronic conditions and co-morbidity symptoms are monitored and maintained or improved  2/12/2025 1143 by Pito Taylor RN  Outcome: Progressing  Flowsheets (Taken 2/9/2025 1930 by Sumaya Cruz RN)  Care Plan - Patient's Chronic Conditions and Co-Morbidity Symptoms are Monitored and Maintained or Improved:   Monitor and assess patient's chronic conditions and comorbid symptoms for stability, deterioration, or improvement   Collaborate with multidisciplinary team to address chronic and comorbid conditions and prevent exacerbation or deterioration   Update acute care plan with appropriate goals if chronic or comorbid symptoms are exacerbated and prevent overall improvement and discharge  Note: Medicate patient per MAR; Monitor labs and vitals.  2/12/2025 0412 by Sharon Conde RN  Outcome: Progressing  Flowsheets (Taken 2/9/2025 1930 by Sumaya Cruz RN)  Care Plan - Patient's Chronic Conditions and Co-Morbidity Symptoms are Monitored and Maintained or Improved:   Monitor and assess patient's chronic conditions and comorbid symptoms for stability, deterioration, or improvement   Collaborate with multidisciplinary team to address chronic and comorbid conditions and prevent exacerbation or deterioration   Update acute care plan with appropriate goals if chronic or comorbid symptoms are exacerbated and prevent overall improvement and discharge     Problem: Discharge Planning  Goal: Discharge to home or other facility with appropriate resources  Outcome: Progressing  Note: Identify barriers prior to discharge.     Problem: Respiratory - Adult  Goal: Achieves optimal ventilation and oxygenation  2/12/2025 1143 by Pito Taylor RN  Outcome: Progressing  Flowsheets (Taken 2/12/2025 0412 by Sharon Codne RN)  Achieves optimal ventilation and oxygenation: Assess for changes in respiratory status  Note: Assess for change in respiratory 
  Problem: Chronic Conditions and Co-morbidities  Goal: Patient's chronic conditions and co-morbidity symptoms are monitored and maintained or improved  2/12/2025 1831 by Pito Taylor RN  Outcome: Progressing  2/12/2025 1143 by Pito Taylor RN  Outcome: Progressing  Flowsheets (Taken 2/9/2025 1930 by Sumaya Cruz RN)  Care Plan - Patient's Chronic Conditions and Co-Morbidity Symptoms are Monitored and Maintained or Improved:   Monitor and assess patient's chronic conditions and comorbid symptoms for stability, deterioration, or improvement   Collaborate with multidisciplinary team to address chronic and comorbid conditions and prevent exacerbation or deterioration   Update acute care plan with appropriate goals if chronic or comorbid symptoms are exacerbated and prevent overall improvement and discharge  Note: Medicate patient per MAR; Monitor labs and vitals.     Problem: Discharge Planning  Goal: Discharge to home or other facility with appropriate resources  2/12/2025 1831 by Pito Taylor RN  Outcome: Progressing  2/12/2025 1143 by Pito Taylor RN  Outcome: Progressing  Note: Identify barriers prior to discharge.     Problem: Respiratory - Adult  Goal: Achieves optimal ventilation and oxygenation  2/12/2025 1831 by Pito Taylor RN  Outcome: Progressing  2/12/2025 1143 by Pito Taylor RN  Outcome: Progressing  Flowsheets (Taken 2/12/2025 0412 by Sharon Conde RN)  Achieves optimal ventilation and oxygenation: Assess for changes in respiratory status  Note: Assess for change in respiratory status.     Problem: Cardiovascular - Adult  Goal: Maintains optimal cardiac output and hemodynamic stability  2/12/2025 1831 by Pito Taylor RN  Outcome: Progressing  2/12/2025 1143 by Pito Taylor RN  Outcome: Progressing  Flowsheets (Taken 2/12/2025 0412 by Sharon Conde RN)  Maintains optimal cardiac output and hemodynamic stability:   Monitor blood pressure and heart rate   Monitor urine 
  Problem: Chronic Conditions and Co-morbidities  Goal: Patient's chronic conditions and co-morbidity symptoms are monitored and maintained or improved  2/6/2025 1652 by Arianna Castro RN  Outcome: Progressing  2/6/2025 0311 by Devora Walker RN  Note: Monitor CHF and HTN     Problem: Discharge Planning  Goal: Discharge to home or other facility with appropriate resources  2/6/2025 1652 by Arianna Castro RN  Outcome: Progressing  2/6/2025 0311 by Devora Walker RN  Note: Discharge planning as needed     Problem: Respiratory - Adult  Goal: Achieves optimal ventilation and oxygenation  2/6/2025 1652 by Arianna Castro RN  Outcome: Progressing  2/6/2025 0311 by Devora Walker RN  Note: Assess breath sounds, sats, and administer as needed     Problem: Cardiovascular - Adult  Goal: Maintains optimal cardiac output and hemodynamic stability  Outcome: Progressing  Note: Chf teaching  Strict intake and out put  Fluid restrictions 1500 ml per 24 hour  Daily weight  Decrease sodium intake     Problem: Cardiovascular - Adult  Goal: Absence of cardiac dysrhythmias or at baseline  Outcome: Progressing     Problem: Pain  Goal: Verbalizes/displays adequate comfort level or baseline comfort level  2/6/2025 1652 by Arianna Castro RN  Outcome: Progressing  2/6/2025 0313 by Devora Walker RN  Note: Monitor pain levels q shift and prn  Has remained pain free this shift     Problem: Safety - Adult  Goal: Free from fall injury  2/6/2025 1652 by Arianna Castro RN  Outcome: Progressing  2/6/2025 0313 by Devora Walker RN  Note: Fall precaution protocol     
  Problem: Chronic Conditions and Co-morbidities  Goal: Patient's chronic conditions and co-morbidity symptoms are monitored and maintained or improved  2/7/2025 1133 by Magalys Culp RN  Outcome: Progressing  Note: Monitor patient for pain  2/7/2025 0525 by Sumaya Cruz RN  Outcome: Progressing  Flowsheets (Taken 2/6/2025 1930)  Care Plan - Patient's Chronic Conditions and Co-Morbidity Symptoms are Monitored and Maintained or Improved:   Monitor and assess patient's chronic conditions and comorbid symptoms for stability, deterioration, or improvement   Collaborate with multidisciplinary team to address chronic and comorbid conditions and prevent exacerbation or deterioration   Update acute care plan with appropriate goals if chronic or comorbid symptoms are exacerbated and prevent overall improvement and discharge     Problem: Discharge Planning  Goal: Discharge to home or other facility with appropriate resources  2/7/2025 1133 by Magalys Culp RN  Outcome: Progressing  Note: Patient will discharge home with wife to transport. Wife is at the bedside.  2/7/2025 0525 by Sumaya Cruz RN  Outcome: Progressing  Flowsheets (Taken 2/6/2025 1930)  Discharge to home or other facility with appropriate resources:   Identify barriers to discharge with patient and caregiver   Arrange for needed discharge resources and transportation as appropriate   Identify discharge learning needs (meds, wound care, etc)   Refer to discharge planning if patient needs post-hospital services based on physician order or complex needs related to functional status, cognitive ability or social support system  Note: D/C planning is on going.     Problem: Respiratory - Adult  Goal: Achieves optimal ventilation and oxygenation  2/7/2025 1133 by Magalys Culp RN  Outcome: Progressing  Note: Patient sat is within normal range. Patient is room air.  2/7/2025 0525 by Sumaya Cruz, RN  Outcome: Progressing  Flowsheets (Taken 
  Problem: Chronic Conditions and Co-morbidities  Goal: Patient's chronic conditions and co-morbidity symptoms are monitored and maintained or improved  Outcome: Progressing  Flowsheets (Taken 2/9/2025 1930 by Sumaya Cruz, RN)  Care Plan - Patient's Chronic Conditions and Co-Morbidity Symptoms are Monitored and Maintained or Improved:   Monitor and assess patient's chronic conditions and comorbid symptoms for stability, deterioration, or improvement   Collaborate with multidisciplinary team to address chronic and comorbid conditions and prevent exacerbation or deterioration   Update acute care plan with appropriate goals if chronic or comorbid symptoms are exacerbated and prevent overall improvement and discharge     Problem: Respiratory - Adult  Goal: Achieves optimal ventilation and oxygenation  Outcome: Progressing  Flowsheets  Taken 2/12/2025 0412  Achieves optimal ventilation and oxygenation: Assess for changes in respiratory status  Taken 2/11/2025 2000  Achieves optimal ventilation and oxygenation: Assess for changes in respiratory status     Problem: Pain  Goal: Verbalizes/displays adequate comfort level or baseline comfort level  Outcome: Progressing  Flowsheets (Taken 2/6/2025 0313 by Devora Walker, RN)  Verbalizes/displays adequate comfort level or baseline comfort level:   Encourage patient to monitor pain and request assistance   Assess pain using appropriate pain scale   Administer analgesics based on type and severity of pain and evaluate response     Problem: Safety - Adult  Goal: Free from fall injury  Outcome: Progressing  Flowsheets (Taken 2/6/2025 0313 by Devora Walker, RN)  Free From Fall Injury:   Instruct family/caregiver on patient safety   Based on caregiver fall risk screen, instruct family/caregiver to ask for assistance with transferring infant if caregiver noted to have fall risk factors     Problem: Cardiovascular - Adult  Goal: Maintains optimal cardiac output and 
  Problem: Chronic Conditions and Co-morbidities  Goal: Patient's chronic conditions and co-morbidity symptoms are monitored and maintained or improved  Outcome: Progressing  Flowsheets (Taken 2/9/2025 1930)  Care Plan - Patient's Chronic Conditions and Co-Morbidity Symptoms are Monitored and Maintained or Improved:   Monitor and assess patient's chronic conditions and comorbid symptoms for stability, deterioration, or improvement   Collaborate with multidisciplinary team to address chronic and comorbid conditions and prevent exacerbation or deterioration   Update acute care plan with appropriate goals if chronic or comorbid symptoms are exacerbated and prevent overall improvement and discharge  Note: Chronic conditions stable     Problem: Discharge Planning  Goal: Discharge to home or other facility with appropriate resources  Outcome: Progressing  Flowsheets (Taken 2/9/2025 1930)  Discharge to home or other facility with appropriate resources:   Identify barriers to discharge with patient and caregiver   Arrange for needed discharge resources and transportation as appropriate   Identify discharge learning needs (meds, wound care, etc)   Refer to discharge planning if patient needs post-hospital services based on physician order or complex needs related to functional status, cognitive ability or social support system  Note: D/c planning in progress     Problem: Respiratory - Adult  Goal: Achieves optimal ventilation and oxygenation  Outcome: Progressing  Flowsheets (Taken 2/9/2025 1930)  Achieves optimal ventilation and oxygenation:   Assess for changes in respiratory status   Assess for changes in mentation and behavior   Position to facilitate oxygenation and minimize respiratory effort   Oxygen supplementation based on oxygen saturation or arterial blood gases   Encourage broncho-pulmonary hygiene including cough, deep breathe, incentive spirometry   Assess the need for suctioning and aspirate as needed   Assess 
  Problem: Chronic Conditions and Co-morbidities  Goal: Patient's chronic conditions and co-morbidity symptoms are monitored and maintained or improved  Outcome: Progressing  Note: Medicate patient per MAR. Monitor Labs and vitals.     Problem: Discharge Planning  Goal: Discharge to home or other facility with appropriate resources  Outcome: Progressing  Flowsheets (Taken 2/9/2025 1930 by Sumaya Cruz, RN)  Discharge to home or other facility with appropriate resources:   Identify barriers to discharge with patient and caregiver   Arrange for needed discharge resources and transportation as appropriate   Identify discharge learning needs (meds, wound care, etc)   Refer to discharge planning if patient needs post-hospital services based on physician order or complex needs related to functional status, cognitive ability or social support system  Note: Identify barriers prior to discharge.     Problem: Respiratory - Adult  Goal: Achieves optimal ventilation and oxygenation  Outcome: Progressing  Flowsheets (Taken 2/9/2025 1930 by Sumaya Cruz, RN)  Achieves optimal ventilation and oxygenation:   Assess for changes in respiratory status   Assess for changes in mentation and behavior   Position to facilitate oxygenation and minimize respiratory effort   Oxygen supplementation based on oxygen saturation or arterial blood gases   Encourage broncho-pulmonary hygiene including cough, deep breathe, incentive spirometry   Assess the need for suctioning and aspirate as needed   Assess and instruct to report shortness of breath or any respiratory difficulty   Respiratory therapy support as indicated  Note: Monitor for changes in respiratory status.     Problem: Cardiovascular - Adult  Goal: Maintains optimal cardiac output and hemodynamic stability  Outcome: Progressing  Flowsheets (Taken 2/10/2025 0720 by Chong Evangelista, RN)  Maintains optimal cardiac output and hemodynamic stability:   Monitor blood pressure and 
  Problem: Chronic Conditions and Co-morbidities  Goal: Patient's chronic conditions and co-morbidity symptoms are monitored and maintained or improved  Outcome: Progressing  Note: Patient hypertension managed      Problem: Respiratory - Adult  Goal: Achieves optimal ventilation and oxygenation  Outcome: Progressing  Flowsheets (Taken 2/9/2025 0755)  Achieves optimal ventilation and oxygenation: Assess for changes in respiratory status  Note: Patient respiratory status remains stable      Problem: Pain  Goal: Verbalizes/displays adequate comfort level or baseline comfort level  Outcome: Progressing  Note: Patient has no pain on shift      Problem: Cardiovascular - Adult  Goal: Maintains optimal cardiac output and hemodynamic stability  Outcome: Progressing  Note: Patient still has swollen limbs      Problem: Metabolic/Fluid and Electrolytes - Adult  Goal: Electrolytes maintained within normal limits  Outcome: Progressing  Flowsheets (Taken 2/9/2025 0755)  Electrolytes maintained within normal limits: Monitor labs and assess patient for signs and symptoms of electrolyte imbalances  Note: Labs replaced during shift      
  Problem: Discharge Planning  Goal: Discharge to home or other facility with appropriate resources  2/7/2025 0525 by Sumaya Cruz RN  Outcome: Progressing  Flowsheets (Taken 2/6/2025 1930)  Discharge to home or other facility with appropriate resources:   Identify barriers to discharge with patient and caregiver   Arrange for needed discharge resources and transportation as appropriate   Identify discharge learning needs (meds, wound care, etc)   Refer to discharge planning if patient needs post-hospital services based on physician order or complex needs related to functional status, cognitive ability or social support system  Note: D/C planning is on going.  2/6/2025 1652 by Arianna Castro, RN  Outcome: Progressing  Flowsheets (Taken 2/6/2025 0741)  Discharge to home or other facility with appropriate resources: Identify barriers to discharge with patient and caregiver     Problem: Pain  Goal: Verbalizes/displays adequate comfort level or baseline comfort level  2/7/2025 0525 by Sumaya Cruz RN  Outcome: Progressing  Flowsheets (Taken 2/6/2025 0313 by Devora Walker, RN)  Verbalizes/displays adequate comfort level or baseline comfort level:   Encourage patient to monitor pain and request assistance   Assess pain using appropriate pain scale   Administer analgesics based on type and severity of pain and evaluate response  Note: Pt denies pain this shift.  2/6/2025 1652 by Arianna Castro RN  Outcome: Progressing     Problem: Safety - Adult  Goal: Free from fall injury  2/7/2025 0525 by Sumaya Cruz RN  Outcome: Progressing  Flowsheets (Taken 2/6/2025 0313 by Devora Walker RN)  Free From Fall Injury:   Instruct family/caregiver on patient safety   Based on caregiver fall risk screen, instruct family/caregiver to ask for assistance with transferring infant if caregiver noted to have fall risk factors  Note: Fall preventioon protocol in place.  2/6/2025 1652 by 
  Problem: Discharge Planning  Goal: Discharge to home or other facility with appropriate resources  Outcome: Progressing     
Optimize nutritional status  2/12/2025 1831 by Pito Taylor, RN  Outcome: Completed  2/12/2025 1831 by Pito Taylor, RN  Outcome: Progressing     
2/6/2025 0313)  Free From Fall Injury:   Instruct family/caregiver on patient safety   Based on caregiver fall risk screen, instruct family/caregiver to ask for assistance with transferring infant if caregiver noted to have fall risk factors  Note: Fall precaution protocol     
Braulio COLMENARES MD at [facility] ENDOSCOPY    OTHER SURGICAL HISTORY  1967    infection drained from under chin    TONSILLECTOMY         Home Situation:  Social/Functional History  Lives With: Alone  Type of Home: Apartment  Home Layout: One level  Home Access: Level entry  Bathroom Shower/Tub: Tub/Shower unit  Bathroom Toilet: Standard  Bathroom Equipment: None  Bathroom Accessibility: Accessible  Home Equipment: None  Has the patient had two or more falls in the past year or any fall with injury in the past year?: No  Receives Help From: Family  Prior Level of Assist for ADLs: Independent  Prior Level of Assist for Homemaking: Independent  Homemaking Responsibilities: Yes  Meal Prep Responsibility: Primary  Laundry Responsibility: Primary  Cleaning Responsibility: Primary  Bill Paying/Finance Responsibility: Primary  Shopping Responsibility: Primary  Dependent Care Responsibility: No  Health Care Management: Primary  Prior Level of Assist for Ambulation: Independent community ambulator, with or without device  Prior Level of Assist for Transfers: Independent  Active : Yes  Mode of Transportation: Other (POV)  Occupation: Retired  Critical Behavior:  Orientation  Overall Orientation Status: Within Normal Limits  Orientation Level: Oriented X4  Cognition  Overall Cognitive Status: WNL    Strength:    Strength: Within functional limits    Tone & Sensation:   Tone: Normal  Sensation: Intact    Range Of Motion:  AROM: Within functional limits       Functional Mobility:  Bed Mobility:     Bed Mobility Training  Bed Mobility Training: Yes  Supine to Sit: Modified independent  Sit to Supine: Modified independent  Scooting: Modified independent  Transfers:     Transfer Training  Transfer Training: Yes  Sit to Stand: Supervision  Stand to Sit: Supervision  Balance:     Balance  Sitting: Intact  Standing: Intact;With support  Standing - Static: Good  Standing - Dynamic: Fair (+)    Ambulation/Gait Training:    Gait  Overall Level 
Practitioner for values outside of normal range   Assess for signs of decreased cardiac output  2/7/2025 1133 by Magalys Culp RN  Outcome: Progressing  Note: IV bumex and monitoring output  Goal: Absence of cardiac dysrhythmias or at baseline  2/7/2025 2146 by Sumaya Cruz RN  Outcome: Progressing  Flowsheets (Taken 2/7/2025 2000)  Absence of cardiac dysrhythmias or at baseline:   Monitor cardiac rate and rhythm   Assess for signs of decreased cardiac output  2/7/2025 1133 by Magalys Culp RN  Outcome: Progressing  Note: Monitor heartrate     Problem: Metabolic/Fluid and Electrolytes - Adult  Goal: Electrolytes maintained within normal limits  2/7/2025 2146 by Sumaya Cruz RN  Outcome: Progressing  Flowsheets (Taken 2/7/2025 2000)  Electrolytes maintained within normal limits:   Monitor labs and assess patient for signs and symptoms of electrolyte imbalances   Administer electrolyte replacement as ordered   Monitor response to electrolyte replacements, including repeat lab results as appropriate   Instruct patient on fluid and nutrition restrictions as appropriate  2/7/2025 1133 by Magalys Culp RN  Outcome: Progressing  Note: Monitor labs

## 2025-02-12 NOTE — CARE COORDINATION
Met with Patient at bedside. CM introduces self and explains role. Patient is alert and oriented x 4. HIPAA verified. This CM reviews PT/OT recommendations with him, both are home with no needs.Though this CM advises the Attending is ordering Home Health Services for a Visiting Nurse for Congestive Heart Failure for Disease Education and Medication Management. This CM provides patient with a Star Rated HHS Choice Letter and List. He states\" I don't thing a visiting nurse is necessary.\" He declines HHS.    DCP: Return home with Self-care and Supportive Wife. Patient declines HHS. No New DME Indicated, Wife will transport him home via POV.    White board in room updated. Patient denies any immediate needs or concerns. Left in bed, lowest locked position, call bell in reach.    Star Rated HHS Choice Letter and List, completed as declining and placed on hard chart.

## 2025-02-12 NOTE — TELEPHONE ENCOUNTER
Requested Prescriptions     Pending Prescriptions Disp Refills    sacubitril-valsartan (ENTRESTO) 49-51 MG per tablet 60 tablet 3     Sig: Take 1 tablet by mouth 2 times daily        12-Feb-2025 21:08

## 2025-02-13 ENCOUNTER — CARE COORDINATION (OUTPATIENT)
Facility: CLINIC | Age: 70
End: 2025-02-13

## 2025-02-13 ENCOUNTER — TELEPHONE (OUTPATIENT)
Age: 70
End: 2025-02-13

## 2025-02-13 DIAGNOSIS — I48.0 PAROXYSMAL ATRIAL FIBRILLATION (HCC): Chronic | ICD-10-CM

## 2025-02-13 DIAGNOSIS — N18.2 CKD (CHRONIC KIDNEY DISEASE), STAGE II: ICD-10-CM

## 2025-02-13 DIAGNOSIS — D64.9 ANEMIA, UNSPECIFIED TYPE: ICD-10-CM

## 2025-02-13 DIAGNOSIS — N17.9 AKI (ACUTE KIDNEY INJURY) (HCC): Primary | ICD-10-CM

## 2025-02-13 DIAGNOSIS — I27.20 MODERATE PULMONARY HYPERTENSION (HCC): ICD-10-CM

## 2025-02-13 DIAGNOSIS — E78.49 OTHER HYPERLIPIDEMIA: ICD-10-CM

## 2025-02-13 DIAGNOSIS — I34.0 NONRHEUMATIC MITRAL VALVE REGURGITATION: ICD-10-CM

## 2025-02-13 DIAGNOSIS — I50.32 CHRONIC DIASTOLIC HEART FAILURE (HCC): ICD-10-CM

## 2025-02-13 DIAGNOSIS — I10 PRIMARY HYPERTENSION: ICD-10-CM

## 2025-02-13 DIAGNOSIS — I50.33 ACUTE ON CHRONIC HEART FAILURE WITH PRESERVED EJECTION FRACTION (HCC): ICD-10-CM

## 2025-02-13 PROBLEM — M79.604 ACUTE PAIN OF RIGHT LOWER EXTREMITY: Status: ACTIVE | Noted: 2025-02-13

## 2025-02-13 PROCEDURE — 1111F DSCHRG MED/CURRENT MED MERGE: CPT | Performed by: INTERNAL MEDICINE

## 2025-02-13 NOTE — DISCHARGE SUMMARY
Hospitalist Discharge Summary      Patient: Ishan Camacho Sr. MRN: [id number]  CSN: [id number]    YOB: 1955  Age: 69 y.o.  Sex: male    DOA: 2/5/2025 LOS:  LOS: 7 days   Discharge Date:2/12/2025     Admission Diagnoses: Anasarca [R60.1]  Hypervolemia, unspecified hypervolemia type [E87.70]  Edema, unspecified type [R60.9]  Acute on chronic congestive heart failure, unspecified heart failure type (HCC) [I50.9]    Discharge Diagnoses:    Acute chronic heart failure with infection  Severe tricuspid regurgitation   Chronic kidney disease  Pulmonary hypertension  Systemic inflammatory response syndrome  Paroxysmal atrial fibrillation  Supratherapeutic INR    Discharge Condition: Fair    Discharge To: Home    CODE STATUS: Prior         PHYSICAL EXAM  Visit Vitals  /66   Pulse 97   Temp 97.7 °F (36.5 °C) (Oral)   Resp 17   Ht 1.905 m (6' 3\")   Wt (!) 145 kg (319 lb 10.7 oz)   SpO2 99%   BMI 39.96 kg/m²     General Appearance: No acute distress; obese appearing  HENT: normocephalic/atraumatic, moist mucus membranes  Neck: No JVD, supple  Lungs: CTA with normal respiratory effort  CV: Irregular rhythm  Abdomen: soft,   : no suprapubic tenderness   Extremities: Moves extremities spontaneously; mild tenderness on palpation of the right foot around the toes; bilateral feet edema [right greater than left]  Neuro: Alert and oriented but appears to have mild cognitive impairment  Skin: Warm and dry  Psych: appropriate mood and affect                                 HPI:    Ishan Camacho Sr. is a 69 y.o. male who presents to [facility] ER with complaint of Referred by Physician.  Patient reports that he has been having lower extremity swelling for approximately 3 weeks.  Patient states that he was previously on PO Furosemide 80 mg and that was reduced to 40 mg some time back.  Patient reports that he gradually became more swollen, short of breath with exertion, and heavier as time went on.  Patient reports

## 2025-02-13 NOTE — PROGRESS NOTES
OCCUPATIONAL THERAPY EVALUATION/DISCHARGE    Patient: Ishan Camacho . (69 y.o. male)  Date: 2/6/2025  Primary Diagnosis: Anasarca [R60.1]  Hypervolemia, unspecified hypervolemia type [E87.70]  Edema, unspecified type [R60.9]  Acute on chronic congestive heart failure, unspecified heart failure type (HCC) [I50.9]  Precautions: Fall Risk, General Precautions  PLOF: Pt lives alone in apartment and was independent with self-care and functional mobility.      ASSESSMENT AND RECOMMENDATIONS:  Pt cleared to participate in OT evaluation by RN. Pt supine in bed, alert, and agreeable to participate with visitor \"Sandra\" present. Pt educated on the role of OT, evaluation process, and safety during this admission with pt verbalizing understanding. Patient mod (I) for supine > sit and able to sit EOB for objective assessment with good sitting balance. Patient mod (I) for donning BLE socks via bend fwd method, additional time needed. Patient reports improvement in BLE swelling, educated on elevation and movement. Patient supervision for sit > stand and functional mobility in preparation for ADLs in room using no AD. Patient independent simulating UB ADLs standing and mod (I) for stand > sit. Pt encouraged to participate in OOB activity throughout the day with staff members to restroom and sitting up in chair at least 3x/day to maximize PLOF. Based on the objective data described below, pt presents with no deficits that impede pt function with ADLs. Pt left all needs met and call bell in reach.    Pt 6'3 and would benefit from bed extender, RN notified and reports staffing has been working on obtaining one.       Maximum therapeutic gains met at current level of care and patient will be discharged from occupational therapy at this time.    Further Equipment Recommendations for Discharge: None    [facility]: AM-PAC Inpatient Daily Activity Raw Score: 24    At this time and based on an AM-PAC score, no further OT is recommended 
    RENAL DAILY PROGRESS NOTE    Patient: Ishan Camacho Sr.               Sex: male          DOA: 2/5/2025 12:58 PM        YOB: 1955      Age:  69 y.o.        LOS:  LOS: 2 days     Subjective:     Ishan Camacho Sr. is a 69 y.o.  who presents with Anasarca [R60.1]  Hypervolemia, unspecified hypervolemia type [E87.70]  Edema, unspecified type [R60.9]  Acute on chronic congestive heart failure, unspecified heart failure type (HCC) [I50.9].   Asked to evaluate for renal failure  Chief complains: Patient denies nausea, vomiting, chest pain, dizziness, shortness of breath or headache.  - Reviewed last 24 hrs events     Current Facility-Administered Medications   Medication Dose Route Frequency    carvedilol (COREG) tablet 25 mg  25 mg Oral BID WC    vitamin D (ERGOCALCIFEROL) capsule 50,000 Units  50,000 Units Oral Weekly    ferrous sulfate (IRON 325) tablet 325 mg  325 mg Oral BID    hydrALAZINE (APRESOLINE) tablet 50 mg  50 mg Oral TID    potassium chloride (KLOR-CON M) extended release tablet 40 mEq  40 mEq Oral BID    bumetanide (BUMEX) 12.5 mg in 50 mL infusion  0.5 mg/hr IntraVENous Continuous    sodium chloride flush 0.9 % injection 5-40 mL  5-40 mL IntraVENous 2 times per day    sodium chloride flush 0.9 % injection 5-40 mL  5-40 mL IntraVENous PRN    0.9 % sodium chloride infusion   IntraVENous PRN    potassium chloride (KLOR-CON M) extended release tablet 40 mEq  40 mEq Oral PRN    Or    potassium bicarb-citric acid (EFFER-K) effervescent tablet 40 mEq  40 mEq Oral PRN    Or    potassium chloride 10 mEq/100 mL IVPB (Peripheral Line)  10 mEq IntraVENous PRN    magnesium sulfate 2000 mg in 50 mL IVPB premix  2,000 mg IntraVENous PRN    ondansetron (ZOFRAN-ODT) disintegrating tablet 4 mg  4 mg Oral Q8H PRN    Or    ondansetron (ZOFRAN) injection 4 mg  4 mg IntraVENous Q6H PRN    polyethylene glycol (GLYCOLAX) packet 17 g  17 g Oral Daily PRN    acetaminophen (TYLENOL) tablet 
    RENAL DAILY PROGRESS NOTE    Patient: Ishan Camacho Sr.               Sex: male          DOA: 2/5/2025 12:58 PM        YOB: 1955      Age:  69 y.o.        LOS:  LOS: 3 days     Subjective:     Ishan Camacho Sr. is a 69 y.o.  who presents with Anasarca [R60.1]  Hypervolemia, unspecified hypervolemia type [E87.70]  Edema, unspecified type [R60.9]  Acute on chronic congestive heart failure, unspecified heart failure type (HCC) [I50.9].   Asked to evaluate for renal failure  Chief complains: Patient denies nausea, vomiting, chest pain, dizziness, shortness of breath or headache.  - Reviewed last 24 hrs events     Current Facility-Administered Medications   Medication Dose Route Frequency    carvedilol (COREG) tablet 25 mg  25 mg Oral BID WC    vitamin D (ERGOCALCIFEROL) capsule 50,000 Units  50,000 Units Oral Weekly    ferrous sulfate (IRON 325) tablet 325 mg  325 mg Oral BID    hydrALAZINE (APRESOLINE) tablet 50 mg  50 mg Oral TID    bumetanide (BUMEX) 12.5 mg in 50 mL infusion  0.5 mg/hr IntraVENous Continuous    sodium chloride flush 0.9 % injection 5-40 mL  5-40 mL IntraVENous 2 times per day    sodium chloride flush 0.9 % injection 5-40 mL  5-40 mL IntraVENous PRN    0.9 % sodium chloride infusion   IntraVENous PRN    potassium chloride (KLOR-CON M) extended release tablet 40 mEq  40 mEq Oral PRN    Or    potassium bicarb-citric acid (EFFER-K) effervescent tablet 40 mEq  40 mEq Oral PRN    Or    potassium chloride 10 mEq/100 mL IVPB (Peripheral Line)  10 mEq IntraVENous PRN    magnesium sulfate 2000 mg in 50 mL IVPB premix  2,000 mg IntraVENous PRN    ondansetron (ZOFRAN-ODT) disintegrating tablet 4 mg  4 mg Oral Q8H PRN    Or    ondansetron (ZOFRAN) injection 4 mg  4 mg IntraVENous Q6H PRN    polyethylene glycol (GLYCOLAX) packet 17 g  17 g Oral Daily PRN    acetaminophen (TYLENOL) tablet 650 mg  650 mg Oral Q6H PRN    Or    acetaminophen (TYLENOL) suppository 650 mg  650 
    RENAL DAILY PROGRESS NOTE    Patient: Ishan Camacho Sr.               Sex: male          DOA: 2/5/2025 12:58 PM        YOB: 1955      Age:  69 y.o.        LOS:  LOS: 4 days     Subjective:     Ishan Camacho Sr. is a 69 y.o.  who presents with Anasarca [R60.1]  Hypervolemia, unspecified hypervolemia type [E87.70]  Edema, unspecified type [R60.9]  Acute on chronic congestive heart failure, unspecified heart failure type (HCC) [I50.9].   Asked to evaluate for renal failure  Chief complains: Patient denies nausea, vomiting, chest pain, dizziness, shortness of breath or headache.  - Reviewed last 24 hrs events     Current Facility-Administered Medications   Medication Dose Route Frequency    potassium chloride (KLOR-CON M) extended release tablet 20 mEq  20 mEq Oral BID    bumetanide (BUMEX) injection 0.5 mg  0.5 mg IntraVENous BID    warfarin (COUMADIN) tablet 5 mg  5 mg Oral Once    carvedilol (COREG) tablet 25 mg  25 mg Oral BID WC    vitamin D (ERGOCALCIFEROL) capsule 50,000 Units  50,000 Units Oral Weekly    ferrous sulfate (IRON 325) tablet 325 mg  325 mg Oral BID    hydrALAZINE (APRESOLINE) tablet 50 mg  50 mg Oral TID    sodium chloride flush 0.9 % injection 5-40 mL  5-40 mL IntraVENous 2 times per day    sodium chloride flush 0.9 % injection 5-40 mL  5-40 mL IntraVENous PRN    0.9 % sodium chloride infusion   IntraVENous PRN    potassium chloride (KLOR-CON M) extended release tablet 40 mEq  40 mEq Oral PRN    Or    potassium bicarb-citric acid (EFFER-K) effervescent tablet 40 mEq  40 mEq Oral PRN    Or    potassium chloride 10 mEq/100 mL IVPB (Peripheral Line)  10 mEq IntraVENous PRN    magnesium sulfate 2000 mg in 50 mL IVPB premix  2,000 mg IntraVENous PRN    ondansetron (ZOFRAN-ODT) disintegrating tablet 4 mg  4 mg Oral Q8H PRN    Or    ondansetron (ZOFRAN) injection 4 mg  4 mg IntraVENous Q6H PRN    polyethylene glycol (GLYCOLAX) packet 17 g  17 g Oral Daily PRN    
    RENAL DAILY PROGRESS NOTE    Patient: Ishan Camacho Sr.               Sex: male          DOA: 2/5/2025 12:58 PM        YOB: 1955      Age:  69 y.o.        LOS:  LOS: 5 days     Subjective:     Ishan Camacho Sr. is a 69 y.o.  who presents with Anasarca [R60.1]  Hypervolemia, unspecified hypervolemia type [E87.70]  Edema, unspecified type [R60.9]  Acute on chronic congestive heart failure, unspecified heart failure type (HCC) [I50.9].   Asked to evaluate for renal failure  Chief complains: Patient denies nausea, vomiting, chest pain, dizziness, shortness of breath or headache.  - Reviewed last 24 hrs events     Current Facility-Administered Medications   Medication Dose Route Frequency    traMADol (ULTRAM) tablet 50 mg  50 mg Oral Q6H PRN    potassium chloride (KLOR-CON M) extended release tablet 20 mEq  20 mEq Oral BID    bumetanide (BUMEX) injection 0.5 mg  0.5 mg IntraVENous BID    carvedilol (COREG) tablet 25 mg  25 mg Oral BID WC    vitamin D (ERGOCALCIFEROL) capsule 50,000 Units  50,000 Units Oral Weekly    ferrous sulfate (IRON 325) tablet 325 mg  325 mg Oral BID    hydrALAZINE (APRESOLINE) tablet 50 mg  50 mg Oral TID    sodium chloride flush 0.9 % injection 5-40 mL  5-40 mL IntraVENous 2 times per day    sodium chloride flush 0.9 % injection 5-40 mL  5-40 mL IntraVENous PRN    0.9 % sodium chloride infusion   IntraVENous PRN    potassium chloride (KLOR-CON M) extended release tablet 40 mEq  40 mEq Oral PRN    Or    potassium bicarb-citric acid (EFFER-K) effervescent tablet 40 mEq  40 mEq Oral PRN    Or    potassium chloride 10 mEq/100 mL IVPB (Peripheral Line)  10 mEq IntraVENous PRN    magnesium sulfate 2000 mg in 50 mL IVPB premix  2,000 mg IntraVENous PRN    ondansetron (ZOFRAN-ODT) disintegrating tablet 4 mg  4 mg Oral Q8H PRN    Or    ondansetron (ZOFRAN) injection 4 mg  4 mg IntraVENous Q6H PRN    polyethylene glycol (GLYCOLAX) packet 17 g  17 g Oral Daily PRN 
    RENAL DAILY PROGRESS NOTE    Patient: Ishan Camacho Sr.               Sex: male          DOA: 2/5/2025 12:58 PM        YOB: 1955      Age:  69 y.o.        LOS:  LOS: 6 days     Subjective:     Ishan Camacho Sr. is a 69 y.o.  who presents with Anasarca [R60.1]  Hypervolemia, unspecified hypervolemia type [E87.70]  Edema, unspecified type [R60.9]  Acute on chronic congestive heart failure, unspecified heart failure type (HCC) [I50.9].   Asked to evaluate for renal failure  Chief complains: Patient denies nausea, vomiting, chest pain, dizziness, shortness of breath or headache.  - Reviewed last 24 hrs events     Current Facility-Administered Medications   Medication Dose Route Frequency    bumetanide (BUMEX) tablet 1 mg  1 mg Oral BID    potassium chloride (KLOR-CON M) extended release tablet 20 mEq  20 mEq Oral Daily    acetaZOLAMIDE (DIAMOX) 250 mg in sterile water 2.5 mL injection  250 mg IntraVENous Q6H    traMADol (ULTRAM) tablet 50 mg  50 mg Oral Q6H PRN    carvedilol (COREG) tablet 25 mg  25 mg Oral BID WC    vitamin D (ERGOCALCIFEROL) capsule 50,000 Units  50,000 Units Oral Weekly    ferrous sulfate (IRON 325) tablet 325 mg  325 mg Oral BID    hydrALAZINE (APRESOLINE) tablet 50 mg  50 mg Oral TID    sodium chloride flush 0.9 % injection 5-40 mL  5-40 mL IntraVENous 2 times per day    sodium chloride flush 0.9 % injection 5-40 mL  5-40 mL IntraVENous PRN    0.9 % sodium chloride infusion   IntraVENous PRN    potassium chloride (KLOR-CON M) extended release tablet 40 mEq  40 mEq Oral PRN    Or    potassium bicarb-citric acid (EFFER-K) effervescent tablet 40 mEq  40 mEq Oral PRN    Or    potassium chloride 10 mEq/100 mL IVPB (Peripheral Line)  10 mEq IntraVENous PRN    magnesium sulfate 2000 mg in 50 mL IVPB premix  2,000 mg IntraVENous PRN    ondansetron (ZOFRAN-ODT) disintegrating tablet 4 mg  4 mg Oral Q8H PRN    Or    ondansetron (ZOFRAN) injection 4 mg  4 mg 
    RENAL DAILY PROGRESS NOTE    Patient: Ishan Camacho Sr.               Sex: male          DOA: 2/5/2025 12:58 PM        YOB: 1955      Age:  69 y.o.        LOS:  LOS: 7 days     Subjective:     Ishan Camacho Sr. is a 69 y.o.  who presents with Anasarca [R60.1]  Hypervolemia, unspecified hypervolemia type [E87.70]  Edema, unspecified type [R60.9]  Acute on chronic congestive heart failure, unspecified heart failure type (HCC) [I50.9].   Asked to evaluate for renal failure  Chief complains: Patient denies nausea, vomiting, chest pain, dizziness, shortness of breath or headache.  - Reviewed last 24 hrs events     Current Facility-Administered Medications   Medication Dose Route Frequency    potassium chloride (KLOR-CON M) extended release tablet 20 mEq  20 mEq Oral BID    warfarin (COUMADIN) tablet 5 mg  5 mg Oral Once    bumetanide (BUMEX) tablet 1 mg  1 mg Oral BID    sennosides-docusate sodium (SENOKOT-S) 8.6-50 MG tablet 1 tablet  1 tablet Oral BID    traMADol (ULTRAM) tablet 50 mg  50 mg Oral Q6H PRN    carvedilol (COREG) tablet 25 mg  25 mg Oral BID WC    vitamin D (ERGOCALCIFEROL) capsule 50,000 Units  50,000 Units Oral Weekly    ferrous sulfate (IRON 325) tablet 325 mg  325 mg Oral BID    hydrALAZINE (APRESOLINE) tablet 50 mg  50 mg Oral TID    sodium chloride flush 0.9 % injection 5-40 mL  5-40 mL IntraVENous 2 times per day    sodium chloride flush 0.9 % injection 5-40 mL  5-40 mL IntraVENous PRN    0.9 % sodium chloride infusion   IntraVENous PRN    potassium chloride (KLOR-CON M) extended release tablet 40 mEq  40 mEq Oral PRN    Or    potassium bicarb-citric acid (EFFER-K) effervescent tablet 40 mEq  40 mEq Oral PRN    Or    potassium chloride 10 mEq/100 mL IVPB (Peripheral Line)  10 mEq IntraVENous PRN    magnesium sulfate 2000 mg in 50 mL IVPB premix  2,000 mg IntraVENous PRN    ondansetron (ZOFRAN-ODT) disintegrating tablet 4 mg  4 mg Oral Q8H PRN    Or    
  [facility] Hospitalist [facility]  Progress Note    Patient: Ishan Camacho Sr. Age: 69 y.o. : 1955 MR#: [id number] SSN: [id number]  Date: 2025                 DVT Prophylaxis:  []Lovenox  []Hep SQ  []SCDs  []Coumadin   []On Heparin gtt [x]PO anticoagulant    Anticipated discharge: 2025 to home, provide the patient is afebrile for 24 hours and blood cultures are negative    Subjective:     The patient was seen and examined at the bedside in follow-up for diastolic CHF exacerbation and stage IV CKD among other issues.  His daughter was also in the room.  The patient said that the pain in his feet has improved.  However, he has not gotten out of bed in a few days.    Objective:   VS: /63   Pulse (!) 102   Temp 98.4 °F (36.9 °C) (Oral)   Resp 18   Ht 1.905 m (6' 3\")   Wt (!) 145 kg (319 lb 10.7 oz)   SpO2 98%   BMI 39.96 kg/m²    Tmax/24hrs: Temp (24hrs), Av.2 °F (37.3 °C), Min:98.1 °F (36.7 °C), Max:100.6 °F (38.1 °C)    Intake/Output Summary (Last 24 hours) at 2025 1610  Last data filed at 2025 1449  Gross per 24 hour   Intake 2205 ml   Output 1625 ml   Net 580 ml        PHYSICAL EXAM  General Appearance: No acute distress; obese appearing  HENT: normocephalic/atraumatic, moist mucus membranes  Neck: No JVD, supple  Lungs: CTA with normal respiratory effort  CV: Irregular rhythm  Abdomen: soft,   : no suprapubic tenderness   Extremities: Moves extremities spontaneously; 1+ bilateral leg and ankle edema  Neuro: Alert and oriented but appears to have mild cognitive impairment  Skin: Warm and dry  Psych: appropriate mood and affect    Current Facility-Administered Medications   Medication Dose Route Frequency    bumetanide (BUMEX) tablet 1 mg  1 mg Oral BID    potassium chloride (KLOR-CON M) extended release tablet 20 mEq  20 mEq Oral Daily    acetaZOLAMIDE (DIAMOX) 250 mg in sterile water 2.5 mL injection  250 mg IntraVENous Q6H    warfarin 
  [facility] Hospitalist Group  Progress Note    Patient: Ishan Camacho Sr. Age: 69 y.o. : 1955 MR#: [id number] SSN: [id number]  Date: 2/10/2025                 DVT Prophylaxis:  []Lovenox  []Hep SQ  []SCDs  []Coumadin   []On Heparin gtt [x]PO anticoagulant    Anticipated discharge: 2025 to home, after right heart catheterization    Subjective:     The patient was seen and examined at the bedside.  Her daughter was also in the room.  Patient was resting in bed comfortably.  He still has pain in his feet.  The pain is located chiefly below the right medial malleolus.  He said the pain started the day he came to the hospital.    The patient also had a temperature of 100.8 °F this morning.  Chest x-ray was negative for any infiltrates and UA was not consistent with a UTI.  Blood cultures have been ordered.    Objective:   VS: /66   Pulse 89   Temp 98.8 °F (37.1 °C) (Axillary)   Resp 16   Ht 1.905 m (6' 3\")   Wt (!) 145 kg (319 lb 10.7 oz)   SpO2 95%   BMI 39.96 kg/m²    Tmax/24hrs: Temp (24hrs), Av.8 °F (37.7 °C), Min:98.8 °F (37.1 °C), Max:100.8 °F (38.2 °C)    Intake/Output Summary (Last 24 hours) at 2/10/2025 1748  Last data filed at 2/10/2025 1715  Gross per 24 hour   Intake 120 ml   Output 1885 ml   Net -1765 ml        PHYSICAL EXAM  General Appearance: No acute distress; obese appearing  HENT: normocephalic/atraumatic, moist mucus membranes  Neck: No JVD, supple  Lungs: CTA with normal respiratory effort  CV: RRR,   Abdomen: soft,   : no suprapubic tenderness   Extremities: Moves extremities spontaneously; 2+ bilateral leg and ankle edema; tenderness on palpation of the right foot below the medial malleolus  Neuro: Alert and oriented  Skin: Warm and dry  Psych: appropriate mood and affect    Current Facility-Administered Medications   Medication Dose Route Frequency    traMADol (ULTRAM) tablet 50 mg  50 mg Oral Q6H PRN    potassium chloride 
  [facility] Hospitalist Group  Progress Note    Patient: Ishan Camacho Sr. Age: 69 y.o. : 1955 MR#: [id number] SSN: [id number]  Date: 2025                 DVT Prophylaxis:  []Lovenox  []Hep SQ  []SCDs  []Coumadin   []On Heparin gtt [x]PO anticoagulant    Anticipated discharge: 2025 to home    Subjective:     The patient is new to me today.  He was seen and examined at the bedside in follow-up for diastolic CHF exacerbation, severe tricuspid regurgitation and stage IV CKD among other issues.  Patient was sitting in a recliner and eating lunch.  He reported feeling well overall.  His lower extremity swelling has improved.  He denied any shortness of breath or chest pain.  His wife was also in the room.      Objective:   VS: /67   Pulse 86   Temp 99.3 °F (37.4 °C) (Oral)   Resp 18   Ht 1.905 m (6' 3\")   Wt (!) 145 kg (319 lb 10.7 oz)   SpO2 97%   BMI 39.96 kg/m²    Tmax/24hrs: Temp (24hrs), Av.9 °F (37.2 °C), Min:98.2 °F (36.8 °C), Max:99.9 °F (37.7 °C)    Intake/Output Summary (Last 24 hours) at 2025 1840  Last data filed at 2025 1453  Gross per 24 hour   Intake 600 ml   Output 5100 ml   Net -4500 ml        PHYSICAL EXAM  General Appearance: No acute distress; obese appearing  HENT: normocephalic/atraumatic, moist mucus membranes  Neck: No JVD, supple  Lungs: CTA with normal respiratory effort  CV: RRR,   Abdomen: soft,   : no suprapubic tenderness   Extremities: Moves extremities spontaneously; 3+ bilateral leg and ankle edema  Neuro: Alert and oriented  Skin: Warm and dry  Psych: appropriate mood and affect    Current Facility-Administered Medications   Medication Dose Route Frequency    carvedilol (COREG) tablet 25 mg  25 mg Oral BID WC    vitamin D (ERGOCALCIFEROL) capsule 50,000 Units  50,000 Units Oral Weekly    ferrous sulfate (IRON 325) tablet 325 mg  325 mg Oral BID    hydrALAZINE (APRESOLINE) tablet 50 mg  50 mg Oral TID    
  [facility] Hospitalist Group  Progress Note    Patient: Ishan Camacho Sr. Age: 69 y.o. : 1955 MR#: [id number] SSN: [id number]  Date: 2025                 DVT Prophylaxis:  []Lovenox  []Hep SQ  []SCDs  []Coumadin   []On Heparin gtt [x]PO anticoagulant    Anticipated discharge: 2025 to home, pending results of x-rays of right foot, lower extremity venous duplex and PT/OT evaluation    Subjective:     The patient was seen and examined at the bedside.  His daughter and his brother were also present.  The patient said that he now has pain on his right heel, and on the right great toe.  He also feels that the right foot is more swollen than the left.  He said that he was able to get out of bed and stand today but he experienced pain in his right leg when he did so.     X-rays of the right foot have been ordered.  Since the patient's Well's score for DVT is 2, a right lower extremity venous duplex has been ordered.    Objective:   VS: /62   Pulse 83   Temp 98.6 °F (37 °C) (Oral)   Resp 18   Ht 1.905 m (6' 3\")   Wt (!) 145 kg (319 lb 10.7 oz)   SpO2 99%   BMI 39.96 kg/m²    Tmax/24hrs: Temp (24hrs), Av.7 °F (37.1 °C), Min:98.1 °F (36.7 °C), Max:99.1 °F (37.3 °C)    Intake/Output Summary (Last 24 hours) at 2025 1255  Last data filed at 2025 1135  Gross per 24 hour   Intake 925 ml   Output 3300 ml   Net -2375 ml        PHYSICAL EXAM  General Appearance: No acute distress; obese appearing  HENT: normocephalic/atraumatic, moist mucus membranes  Neck: No JVD, supple  Lungs: CTA with normal respiratory effort  CV: Irregular rhythm  Abdomen: soft,   : no suprapubic tenderness   Extremities: Moves extremities spontaneously; tenderness on palpation of the right foot around the toes; bilateral feet edema [right greater than left]  Neuro: Alert and oriented but appears to have mild cognitive impairment  Skin: Warm and dry  Psych: appropriate mood and 
  [facility] Hospitalist Group  Progress Note    Patient: Ishan Camacho Sr. Age: 69 y.o. : 1955 MR#: [id number] SSN: [id number]  Date: 2025                 DVT Prophylaxis:  []Lovenox  []Hep SQ  []SCDs  []Coumadin   []On Heparin gtt [x]PO anticoagulant    Anticipated discharge: 2025 to home, after right heart catheterization    Subjective:     The patient was seen and examined at the bedside.  He was resting in bed.  Today, he complained of pain in both feet.  He described the pain as \"soreness \".  He denied any shortness of breath.  Lower extremity edema has improved.    On examination, the patient does not have any redness or warmth of his feet.  There is mild tenderness on palpation of the dorsum of the left foot.  I explained to the patient that his pain is likely because of fluid shifts caused by diuretics.  I recommended conservative management for now.  The patient verbalized understanding.      Objective:   VS: /67   Pulse 92   Temp 100 °F (37.8 °C) (Oral)   Resp 18   Ht 1.905 m (6' 3\")   Wt (!) 145 kg (319 lb 10.7 oz)   SpO2 97%   BMI 39.96 kg/m²    Tmax/24hrs: Temp (24hrs), Av °F (37.2 °C), Min:97.5 °F (36.4 °C), Max:100 °F (37.8 °C)    Intake/Output Summary (Last 24 hours) at 2025 1649  Last data filed at 2025 1456  Gross per 24 hour   Intake 960 ml   Output 3920 ml   Net -2960 ml        PHYSICAL EXAM  General Appearance: No acute distress; obese appearing  HENT: normocephalic/atraumatic, moist mucus membranes  Neck: No JVD, supple  Lungs: CTA with normal respiratory effort  CV: RRR,   Abdomen: soft,   : no suprapubic tenderness   Extremities: Moves extremities spontaneously; 3+ bilateral leg and ankle edema; mild tenderness on palpation of the dorsum of the left foot  Neuro: Alert and oriented  Skin: Warm and dry  Psych: appropriate mood and affect    Current Facility-Administered Medications   Medication Dose Route Frequency 
.4 Eyes Skin Assessment     NAME:  Ishan Camacho Sr.  YOB: 1955  MEDICAL RECORD NUMBER:  [id number]    The patient is being assessed for  {Reason for Assessment:48043}    I agree that at least one RN has performed a thorough Head to Toe Skin Assessment on the patient. ALL assessment sites listed below have been assessed.      Areas assessed by both nurses:    Head, Face, Ears, Shoulders, Back, Chest, Arms, Elbows, Hands, Sacrum. Buttock, Coccyx, Ischium, and Legs. Feet and Heels        Does the Patient have a Wound? No noted wound(s)       Madhu Prevention initiated by RN: Yes  Wound Care Orders initiated by RN: No    Pressure Injury (Stage 3,4, Unstageable, DTI, NWPT, and Complex wounds) if present, place Wound referral order by RN under : No    New Ostomies, if present place, Ostomy referral order under : No     Nurse 1 eSignature: Electronically signed by KAT ROSE RN on 2/6/25 at 8:48 AM EST    **SHARE this note so that the co-signing nurse can place an eSignature**    Nurse 2 eSignature: {Esignature:[id number]}   
4 Eyes Skin Assessment     NAME:  Isahn Camacho Sr.  YOB: 1955  MEDICAL RECORD NUMBER:  [id number]    The patient is being assessed for  Admission, shift    I agree that at least one RN has performed a thorough Head to Toe Skin Assessment on the patient. ALL assessment sites listed below have been assessed.      Areas assessed by both nurses:    Head, Face, Ears, Shoulders, Back, Chest, Arms, Elbows, Hands, Sacrum. Buttock, Coccyx, Ischium, Legs. Feet and Heels, and Under Medical Devices         Does the Patient have a Wound? No noted wound(s)       Madhu Prevention initiated by RN: No  Wound Care Orders initiated by RN: No    Pressure Injury (Stage 3,4, Unstageable, DTI, NWPT, and Complex wounds) if present, place Wound referral order by RN under : No    New Ostomies, if present place, Ostomy referral order under : No     Nurse 1 eSignature: Electronically signed by Armida Narayanan RN on 2/5/25 at 8:34 PM EST    **SHARE this note so that the co-signing nurse can place an eSignature**    Nurse 2 eSignature: Electronically signed by KAT ROSE RN on 2/6/25 at 8:20 AM EST    
4 Eyes Skin Assessment     NAME:  Ishan Camacho Sr.  YOB: 1955  MEDICAL RECORD NUMBER:  [id number]    The patient is being assessed for  Shift Handoff    I agree that at least one RN has performed a thorough Head to Toe Skin Assessment on the patient. ALL assessment sites listed below have been assessed.      Areas assessed by both nurses:    Sacrum. Buttock, Coccyx, Ischium and Legs. Feet and Heels        Does the Patient have a Wound? Yes wound(s) were present on assessment. LDA wound assessment was Initiated and completed by RN       Madhu Prevention initiated by RN: Yes  Wound Care Orders initiated by RN: No    Pressure Injury (Stage 3,4, Unstageable, DTI, NWPT, and Complex wounds) if present, place Wound referral order by RN under : No    New Ostomies, if present place, Ostomy referral order under : No     Nurse 1 eSignature: Electronically signed by Sharon Conde RN on 2/12/25 at 6:40 AM EST    **SHARE this note so that the co-signing nurse can place an eSignature**    Nurse 2 eSignature: Electronically signed by Pito Taylor RN on 2/12/25 at 8:15 PM EST   
4 Eyes Skin Assessment     NAME:  Ishan Camacho Sr.  YOB: 1955  MEDICAL RECORD NUMBER:  [id number]    The patient is being assessed for  Shift Handoff    I agree that at least one RN has performed a thorough Head to Toe Skin Assessment on the patient. ALL assessment sites listed below have been assessed.      Areas assessed by both nurses:    Head, Face, Ears, Shoulders, Back, Chest, Arms, Elbows, Hands, Sacrum. Buttock, Coccyx, Ischium, and Legs. Feet and Heels        Does the Patient have a Wound? No noted wound(s)       Madhu Prevention initiated by RN: No  Wound Care Orders initiated by RN: No    Pressure Injury (Stage 3,4, Unstageable, DTI, NWPT, and Complex wounds) if present, place Wound referral order by RN under : No    New Ostomies, if present place, Ostomy referral order under : No     Nurse 1 eSignature: Electronically signed by Magalys Culp RN on 2/7/25 at 7:35 AM EST    **SHARE this note so that the co-signing nurse can place an eSignature**    Nurse 2 eSignature: Electronically signed by Sumaya Cruz RN on 2/7/25 at 7:35 AM EST   
4 Eyes Skin Assessment     NAME:  Ishan Camacho Sr.  YOB: 1955  MEDICAL RECORD NUMBER:  [id number]    The patient is being assessed for  Shift Handoff    I agree that at least one RN has performed a thorough Head to Toe Skin Assessment on the patient. ALL assessment sites listed below have been assessed.      Areas assessed by both nurses:    Head, Face, Ears, Shoulders, Back, Chest, Arms, Elbows, Hands, Sacrum. Buttock, Coccyx, Ischium, Legs. Feet and Heels, Under Medical Devices , and Other ***        Does the Patient have a Wound? No noted wound(s)       Madhu Prevention initiated by RN: No  Wound Care Orders initiated by RN: No    Pressure Injury (Stage 3,4, Unstageable, DTI, NWPT, and Complex wounds) if present, place Wound referral order by RN under : {YES/NO:53869}    New Ostomies, if present place, Ostomy referral order under : No     Nurse 1 eSignature: Electronically signed by SHAYY SALAMANCA RN on 2/6/25 at 8:43 PM EST    **SHARE this note so that the co-signing nurse can place an eSignature**    Nurse 2 eSignature: {Esignature:[id number]}    
4 Eyes Skin Assessment     NAME:  Ishan Camacho Sr.  YOB: 1955  MEDICAL RECORD NUMBER:  [id number]    The patient is being assessed for  Shift Handoff    I agree that at least one RN has performed a thorough Head to Toe Skin Assessment on the patient. ALL assessment sites listed below have been assessed.      Areas assessed by both nurses:    Head, Face, Ears, Shoulders, Back, Chest, Arms, Elbows, Hands, Sacrum. Buttock, Coccyx, Ischium, Legs. Feet and Heels, and Under Medical Devices         Does the Patient have a Wound? No noted wound(s)       Madhu Prevention initiated by RN: Yes  Wound Care Orders initiated by RN: No    Pressure Injury (Stage 3,4, Unstageable, DTI, NWPT, and Complex wounds) if present, place Wound referral order by RN under : No    New Ostomies, if present place, Ostomy referral order under : No     Nurse 1 eSignature: Electronically signed by Markie Frederick RN on 2/8/25 at 7:48 PM EST    **SHARE this note so that the co-signing nurse can place an eSignature**    Nurse 2 eSignature: {Esignature:[id number]}    
4 Eyes Skin Assessment     NAME:  Ishan Camacho Sr.  YOB: 1955  MEDICAL RECORD NUMBER:  [id number]    The patient is being assessed for  Shift Handoff    I agree that at least one RN has performed a thorough Head to Toe Skin Assessment on the patient. ALL assessment sites listed below have been assessed.      Areas assessed by both nurses:    Head, Face, Ears, Shoulders, Back, Chest, Arms, Elbows, Hands, Sacrum. Buttock, Coccyx, Ischium, Legs. Feet and Heels, and Under Medical Devices         Does the Patient have a Wound? No noted wound(s)       Madhu Prevention initiated by RN: Yes  Wound Care Orders initiated by RN: No    Pressure Injury (Stage 3,4, Unstageable, DTI, NWPT, and Complex wounds) if present, place Wound referral order by RN under : No    New Ostomies, if present place, Ostomy referral order under : No     Nurse 1 eSignature: Electronically signed by Markie Frederick RN on 2/9/25 at 7:43 PM EST    **SHARE this note so that the co-signing nurse can place an eSignature**    Nurse 2 eSignature: {Esignature:[id number]}    
4 Eyes Skin Assessment     NAME:  Ishan Camacho Sr.  YOB: 1955  MEDICAL RECORD NUMBER:  [id number]    The patient is being assessed for  Shift Handoff    I agree that at least one RN has performed a thorough Head to Toe Skin Assessment on the patient. ALL assessment sites listed below have been assessed.      Areas assessed by both nurses:    Head, Face, Ears, Shoulders, Back, Chest, Arms, Elbows, Hands, Sacrum. Buttock, Coccyx, Ischium, Legs. Feet and Heels, and Under Medical Devices         Does the Patient have a Wound? No noted wound(s)       Madhu Prevention initiated by RN: Yes  Wound Care Orders initiated by RN: No    Pressure Injury (Stage 3,4, Unstageable, DTI, NWPT, and Complex wounds) if present, place Wound referral order by RN under : No    New Ostomies, if present place, Ostomy referral order under : No     Nurse 1 eSignature: Electronically signed by GREY CANO RN on 2/7/25 at 8:01 PM EST    **SHARE this note so that the co-signing nurse can place an eSignature**    Nurse 2 eSignature: {Esignature:[id number]}   
4 Eyes Skin Assessment     NAME:  Ishan Camacho Sr.  YOB: 1955  MEDICAL RECORD NUMBER:  [id number]    The patient is being assessed for  Shift Handoff    I agree that at least one RN has performed a thorough Head to Toe Skin Assessment on the patient. ALL assessment sites listed below have been assessed.      Areas assessed by both nurses:    Head, Face, Ears, Shoulders, Back, Chest, Arms, Elbows, Hands, Sacrum. Buttock, Coccyx, Ischium, and Legs. Feet and Heels        Does the Patient have a Wound? No noted wound(s)       Madhu Prevention initiated by RN: No  Wound Care Orders initiated by RN: No    Pressure Injury (Stage 3,4, Unstageable, DTI, NWPT, and Complex wounds) if present, place Wound referral order by RN under : No    New Ostomies, if present place, Ostomy referral order under : No     Nurse 1 eSignature: Electronically signed by Chong Evangelista RN on 2/10/25 at 8:47 PM EST    **SHARE this note so that the co-signing nurse can place an eSignature**    Nurse 2 eSignature: Electronically signed by Sharon Conde RN on 2/12/25 at 4:15 AM EST    
4 Eyes Skin Assessment     NAME:  Ishan Camacho Sr.  YOB: 1955  MEDICAL RECORD NUMBER:  [id number]    The patient is being assessed for  {Reason for Assessment:00914}    I agree that at least one RN has performed a thorough Head to Toe Skin Assessment on the patient. ALL assessment sites listed below have been assessed.      Areas assessed by both nurses:    Head, Face, Ears, Shoulders, Back, Chest, Arms, Elbows, Hands, Sacrum. Buttock, Coccyx, Ischium, Legs. Feet and Heels, and Under Medical Devices         Does the Patient have a Wound? {Action Wound:17429}       Madhu Prevention initiated by RN: {YES/NO:19726}  Wound Care Orders initiated by RN: {YES/NO:19726}    Pressure Injury (Stage 3,4, Unstageable, DTI, NWPT, and Complex wounds) if present, place Wound referral order by RN under : {YES/NO:19726}    New Ostomies, if present place, Ostomy referral order under : {YES/NO:19726}     Nurse 1 eSignature: Electronically signed by KAT ROSE RN on 2/6/25 at 8:23 AM EST    **SHARE this note so that the co-signing nurse can place an eSignature**    Nurse 2 eSignature: {Esignature:[id number]}   
Advance Care Planning   Healthcare Decision Maker:    Primary Decision Maker: Sandra Lynn - Spouse - [phone]    Today we documented Decision Maker(s) consistent with Legal Next of Kin hierarchy.       Spiritual Health History and Assessment/Progress Note  [facility]    (P) Spiritual/Emotional Needs, Advance Care Planning,  ,  ,      Name: Ishan Camacho Sr. MRN: [id number]    Age: 69 y.o.     Sex: male   Language: English   Episcopalian: Christian   Anasarca     Date: 2/6/2025            Total Time Calculated: (P) 7 min              Spiritual Assessment began in [facility] MEDICAL        Referral/Consult From: (P) Multi-disciplinary team   Encounter Overview/Reason: (P) Spiritual/Emotional Needs, Advance Care Planning  Service Provided For: (P) Patient    Inez, Belief, Meaning:   Patient has beliefs or practices that help with coping during difficult times  Family/Friends No family/friends present      Importance and Influence:  Patient has spiritual/personal beliefs that influence decisions regarding their health  Family/Friends No family/friends present    Community:  Patient feels well-supported. Support system includes: Spouse/Partner  Family/Friends No family/friends present    Assessment and Plan of Care:     Patient Interventions include: Facilitated expression of thoughts and feelings  Family/Friends Interventions include: No family/friends present    Patient Plan of Care: No spiritual needs identified for follow-up  Family/Friends Plan of Care: No family/friends present    Electronically signed by GRACIA Arias on 2/6/2025 at 1:54 PM      
[facility] - [facility]    Admit Date: 2/5/2025  Attending Cardiologist: Dr. Gonzalo Bishop    Assessment:     -Acute on chronic HFpEF  -Chronic afib  -Severe tricuspid regurgitation  -CKD  -HTN  -HLD    Primary cardiologist is Dr. Sal    Plan:       I saw, evaluated, interviewed and examined the patient personally.  Approaching baseline in terms of overall feeling  Dyspnea has improved.  Edema has improved  Approximately 27 L negative fluid balance.  Right heart catheterization with wedge pressure approximately 15 to 16 mmHg.  Agree with continuing Bumex maintenance dose  Would recommend metolazone 3 times a week upon discharge.  Discussed with primary attending.  Once discharged, follow-up with Dr. Sal.  Continue rest of the cardiac medication    Gonzalo Bishop MD       -Continue Coreg, Hydralazine.  Unable to add further GDMT for HFpEF secondary to CKD.  -Volume management per nephrology team, appreciate assistance.  Currently on PO Bumex 1 mg BID.    -Recommend to maintain K > 4, Mg > 2 from cardiac standpoint.  Replacement per primary team.  -No new recommendations from cardiac standpoint.  Will sign off and be available as needed if additional concerns arise.    Subjective:     No new complaints.  No recurrent fever overnight.    Objective:      Patient Vitals for the past 8 hrs:   Temp Pulse Resp BP SpO2   02/12/25 0930 98.1 °F (36.7 °C) 77 -- 122/68 --   02/12/25 0727 99.1 °F (37.3 °C) 86 17 134/66 98 %   02/12/25 0400 98.6 °F (37 °C) 96 18 118/89 99 %         No data found.    TELE: AFIB               Current Facility-Administered Medications   Medication Dose Route Frequency    potassium chloride (KLOR-CON M) extended release tablet 20 mEq  20 mEq Oral BID    bumetanide (BUMEX) tablet 1 mg  1 mg Oral BID    sennosides-docusate sodium (SENOKOT-S) 8.6-50 MG tablet 1 tablet  1 tablet Oral BID    traMADol (ULTRAM) tablet 50 mg  50 mg Oral Q6H PRN    carvedilol (COREG) tablet 25 mg  25 mg Oral 
Cardiology Progress Note    Admit Date: 2/5/2025  Attending Cardiologist: Dr. Levine    IMPRESSION  Acute on chronic heart failure preserved ejection fraction, as observed by dyspnea on exertion, bilateral lower extremity edema, elevated NT proBNP greater than 1200 on 2/5/2025, 2D echo October 2024 preserved ejection fraction 55 to 60%, nuclear chemical stress test October 2020 for lower stress test without significant reversible or fixed defects present, chest x-ray 2/5/2025 enlargement of the cardiomediastinal silhouette central vascular engorgement and prominence of the interstitial markings, hazy patchy opacities bilaterally, constellation of findings are favored to represent at least mild pulmonary edema, RHC 2/11/2025 PCWP 16mmHg mildly elevated  Elevated troponin 26 ng/L within intermediate range, nuclear chemical stress test October 2024 low risk stress test without significant reversible or fixed defects present  Tricuspid valve severe regurgitation by 2D echo October 2024  Moderate pulmonary hypertension by 2D echo October 2024  Hypertension -normotensive to stage II pressure  Hyperlipidemia  Pulmonary hypertension  Coronary risk equivalent CKD  Chronic atrial fibrillation, EKG 2/5/2025 atrial fibrillation with premature ventricular or aberrantly conducted complexes, nonspecific T wave abnormality, prolonged QT interval  Morbid obesity     PLAN  Monitor fluid status, adjust as necessary, fluid restriction, salt intake <2g per day.  Resume home dose of Bumex is 1 mg p.o. twice daily, home dose of metolazone is 5 mg p.o. every 48 hours currently held and resume on discharge.  Ins and outs - 22.4L on 2/11/2025.  Give Diamox 250 mg IV every 6 hours x 4 doses, discussed with nephrology.    Recommend Guideline Directed medical therapy as can tolerate.  Monitor blood pressure, adjust antihypertensive medications as necessary.  Continue hydralazine 50 mg p.o. 3 times daily, continue Coreg 25 mg p.o. twice 
Cath holding summary:    1140: Verbal report received from Chong MINOR on [facility]. being received from [address] [place] [address] for ordered procedure. Report consisted of patient's Situation, Background, Assessment and Recommendations (SBAR). Information from the following report(s) Nurse Handoff Report, MAR, Neuro Assessment, and Event Log was reviewed with the receiving nurse. Pt A&O x 4, no c/o pain. Opportunity for questions and clarification provided.    1240: Verbal report given to Jennifer on [facility]. being transferred to Cath Lab for ordered procedure. Report consisted of patient's Situation, Background, Assessment and Recommendations (SBAR). Information from the following report(s) Nurse Handoff Report, Neuro Assessment, and Event Log was reviewed with the receiving nurse. Opportunity for questions and clarification was provided.    1440: Verbal report received from Jennifer on [facility]. being received from Cath Lab for routine post-op. Report consisted of patient's Situation, Background, Assessment and Recommendations (SBAR). Information from the following report(s) Recent Results, Neuro Assessment, and Event Log was reviewed with the receiving nurse. Pt A&O x 4, no c/o pain. Opportunity for questions and clarification provided.  Procedure: Cardiac Cath  Intervention: No    Site: Right, Groin  Pain: 0    R femoral vein sheath removed @ 1455. No obvious signs of bleeding/oozing. Quickclot and tegaderm applied.    1615: Verbal report given to Chong on [facility].  being transferred to [address] [place] [address] for routine progression of patient care. Report consisted of patient's Situation, Background, Assessment and Recommendations (SBAR). Information from the following report(s) MAR, Neuro Assessment, and Event Log was reviewed with the receiving nurse. Opportunity for questions and clarification was provided. Patient transported with: Fitbit  
Comprehensive Nutrition Assessment    Type and Reason for Visit:  Initial, LOS    Nutrition Recommendations/Plan:   Continue current diet as tolerated.  Order Ensure Max Protein (each provides 150 kcal, 30g protein) BID  Continue to monitor tolerance of PO, compliance of oral supplements, weight, labs, and plan of care during admission.     Malnutrition Assessment:  Malnutrition Status:  At risk for malnutrition (02/12/25 1639)    Context:  Acute Illness     Findings of the 6 clinical characteristics of malnutrition:  Energy Intake:  Mild decrease in energy intake  Weight Loss:  No weight loss     Body Fat Loss:  Unable to assess     Muscle Mass Loss:  Unable to assess    Fluid Accumulation:  Unable to assess     Strength:  Not Performed    Nutrition History and Allergies:   PMH Afib, chrinic diastolic heart failure, HTN, mitral valve disorder, pre diabetes  Past Medical History:   Diagnosis Date    Atrial fibrillation (HCC)     Chronic diastolic heart failure (HCC)     Hypertension     Mitral valve disorders(424.0)     Prediabetes     PMHx: Afib, heart failure, HTN, mitral valve disorders, prediabetes    Weight Hx: Pt weight -6 kg weight loss within 1 year; weight loss is 3.9% body weight and non-significant for malnutrition  Wt Readings from Last 10 Encounters:   02/06/25 (!) 145 kg (319 lb 10.7 oz)   02/05/25 (!) 146.1 kg (322 lb)   11/22/24 133.4 kg (294 lb)   10/23/24 (!) 139.7 kg (308 lb)   10/11/24 (!) 144.2 kg (318 lb)   10/04/24 (!) 144.2 kg (318 lb)   09/30/24 (!) 144.2 kg (318 lb)   05/20/24 (!) 140.3 kg (309 lb 6.4 oz)   02/14/24 (!) 138.7 kg (305 lb 12.8 oz)   01/29/24 (!) 151.5 kg (334 lb)     NFPE: unable to perform NFPE. Food Allergies: NKFA    Nutrition Assessment:    Pt admitted for anasarca; RD screened for LOS. Pt with inpatient treatment for heart failure, severe tricuspid regurgitation, Stage IV CKD, paroxysmal fib, supreatherapeutic INR, essential HTN, systemic inflammatory response 
Discharge instructions given and reviewed with patient and family; Patient family verbalized understanding and had no further questions. Patient family preparing patient for discharge.  
[facility] Hospitalist Group  Progress Note    Patient: Ishan Camacho Sr. Age: 69 y.o. : 1955 MR#: [id number] SSN: [id number]  Date/Time: 2025     C/C: Leg edema       Subjective:   HPI : Patient with past medical history of chronic diastolic heart failure, now being admitted with bilateral lower extremity edema, mild elevation of troponin to 26. cardiology consulted renal consulted for CKD.  Patient started on Bumex          Review of Systems:  positive responses in bold type   Constitutional: Negative for fever, chills, diaphoresis and unexpected weight change.   HENT: Negative for ear pain, congestion, sore throat, rhinorrhea, drooling, trouble swallowing, neck pain and tinnitus.   Eyes: Negative for photophobia, pain, redness and visual disturbance.   Respiratory: negative for shortness of breath, cough, choking, chest tightness, wheezing or stridor.   Cardiovascular: Negative for chest pain, palpitations and leg swelling.   Gastrointestinal: Negative for nausea, vomiting, abdominal pain, diarrhea, constipation, blood in stool, abdominal distention and anal bleeding.   Genitourinary: Negative for dysuria, urgency, frequency, hematuria, flank pain and difficulty urinating.   Musculoskeletal: Negative for back pain and arthralgias.   Skin: Negative for color change, rash and wound.   Neurological: Negative for dizziness, seizures, syncope, speech difficulty, light-headedness or headaches.   Hematological: Does not bruise/bleed easily.   Psychiatric/Behavioral: Negative for suicidal ideas, hallucinations, behavioral problems, self-injury or agitation     Assessment/Plan:     1.  Acute on chronic diastolic heart failure  2 anasarca with bilateral lower leg edema  3 CKD 3  4 hypokalemia  5 anemia of chronic illness  6 secondary coagulopathy with very high INR of 4.3  7 permanent atrial fibrillation on Coumadin    Plan    2025  -Patient with past medical history of CHF now 
[facility]  Progress Note    Patient: Ishan Camacho Sr. Age: 69 y.o. : 1955 MR#: [id number] SSN: [id number]  Date/Time: 2025     C/C: Leg edema       Subjective:   HPI : Patient with past medical history of chronic diastolic heart failure, now being admitted with bilateral lower extremity edema, mild elevation of troponin to 26. cardiology consulted renal consulted for CKD.  Patient started on Bumex          Review of Systems:  positive responses in bold type   Constitutional: Negative for fever, chills, diaphoresis and unexpected weight change.   HENT: Negative for ear pain, congestion, sore throat, rhinorrhea, drooling, trouble swallowing, neck pain and tinnitus.   Eyes: Negative for photophobia, pain, redness and visual disturbance.   Respiratory: negative for shortness of breath, cough, choking, chest tightness, wheezing or stridor.   Cardiovascular: Negative for chest pain, palpitations and leg swelling.   Gastrointestinal: Negative for nausea, vomiting, abdominal pain, diarrhea, constipation, blood in stool, abdominal distention and anal bleeding.   Genitourinary: Negative for dysuria, urgency, frequency, hematuria, flank pain and difficulty urinating.   Musculoskeletal: Negative for back pain and arthralgias.   Skin: Negative for color change, rash and wound.   Neurological: Negative for dizziness, seizures, syncope, speech difficulty, light-headedness or headaches.   Hematological: Does not bruise/bleed easily.   Psychiatric/Behavioral: Negative for suicidal ideas, hallucinations, behavioral problems, self-injury or agitation     Assessment/Plan:     1.  Acute on chronic diastolic heart failure  2 anasarca with bilateral lower leg edema  3 CKD 3  4 hypokalemia  5 anemia of chronic illness  6 secondary coagulopathy with very high INR of 4.3  7 permanent atrial fibrillation on Coumadin    Plan    Patient is on Bumex gtt.  Continue warfarin pharmacy to 
INTERIM UPDATE - 1352 EST on 2/05/2025    [facility] ER Physician called requesting admission for a 69-year-old male who presents with complaint of Referred by Physician.    Per [facility] ER Physician, Patient was acutely hospitalized during which time his home Furosemide was decreased and Patient was discharged home.  However, over the last 3 weeks Patient was noted to have worsening bilateral lower extremity edema and reportedly gained 100 lbs.  Patient's Primary Cardiologist reportedly examined Patient and stated that Patient needed to come to ER and be admitted for IV 0.5 mg/hr Bumetanide gtt.    Notably, Patient's labs reportedly do not look as bad as Patient appears clinically (given weight increase).    Plan:  Will admit Patient to [facility] Telemetry Unit for management of Acute on Chronic Heart Failure with Preserved Ejection Fraction (a.k.a. HFpEF), Supratherapeutic INR, and Hypokalemia.            
Patient has left facility; CHF education provided. Patient and family verbalized understanding.  
Physical Therapy  PHYSICAL THERAPY EVALUATION/DISCHARGE    Patient: Ishan Camacho  (69 y.o. male)  Date: 2/6/2025  Primary Diagnosis: Anasarca [R60.1]  Hypervolemia, unspecified hypervolemia type [E87.70]  Edema, unspecified type [R60.9]  Acute on chronic congestive heart failure, unspecified heart failure type (HCC) [I50.9]       Precautions: General Precautions,  ,  ,  ,  ,  ,  ,    PLOF: Lies alone in a 1 story home, level entry. Ind prior to admission.     ASSESSMENT AND RECOMMENDATIONS:  Pt cleared by nursing. Pt received in bed, educated on PT role and evaluation process and agreeable. Pt mod I to EOB, good seated balance. 5/5 BLE for knee extension, hip flexion and ankle DF/PF. Reports intact symmetrical sensation. Sit to stand supervision, denies and lightheadedness or dizziness. He is able to complete standing marches without support without LOB. Ambulates within room without AD, this PT managed IV pole. Wide MICHELLE with increase postural sway, denies fatigue. Returns to EOB and request to sit up for a while. Educated pt on safe mobility during admission and verbalized understanding.  Call bell and all needs left within reach. Pt is at functional baseline and skilled acute care PT is not indicated at this time, will sign off.       Patient does not require further skilled physical therapy intervention at this level of care.    Further Equipment Recommendations for Discharge: None    AMPA: AM-PAC Inpatient Mobility Raw Score : 24      At this time and based on an AM-PAC score, no further PT is recommended upon discharge.  Recommend patient returns to prior setting with prior services.    This AMPA score should be considered in conjunction with interdisciplinary team recommendations to determine the most appropriate discharge setting. Patient's social support, diagnosis, medical stability, and prior level of function should also be taken into consideration.     SUBJECTIVE:   Patient stated \"this is 
TRANSFER - IN REPORT:    Verbal report received from Chong MINOR on Ishan Camacho Sr.  being received from [address] [place] [address] [id number] for ordered procedure      Report consisted of patient's Situation, Background, Assessment and   Recommendations(SBAR).     Information from the following report(s) Nurse Handoff Report, MAR, Neuro Assessment, and Event Log was reviewed with the receiving nurse.    Opportunity for questions and clarification was provided.      Assessment completed upon patient's arrival to unit and care assumed.     Transport request placed in bedwatch.  
[facility] Dosing Services    Consult for Warfarin Dosing by Pharmacy by Dr. Paulie Thompson  Consult provided for this 69 y.o.  male , for indication of A Fib/A Flutter    Dose to achieve an INR goal of 2.0 to 3.0  Home dose: 7.5 mg daily    Plan: hold warfarin today due to elevated INR    DATE INR DOSE   2/5 4.2 -       Significant drug interactions: None  PT/INR Lab Results   Component Value Date/Time    INR 4.2 02/05/2025 11:12 AM    INR 3.4 11/24/2024 07:25 AM    INR 3.3 01/31/2023 02:46 PM    INREXT 2.0 12/01/2021 12:00 AM       Platelets Lab Results   Component Value Date/Time     02/05/2025 01:03 PM      H/H Lab Results   Component Value Date/Time    HGB 10.1 02/05/2025 01:03 PM        Pharmacy to follow daily and will provide subsequent Warfarin dosing based on clinical status.     Signed Natty Emmanuel RPH     
Warfarin dosing - Pharmacy consult note    Indication: Atrial Fibrillation  Warfarin dose prior to admission: 7.5 mg daily reduced to 5 mg daily by cardiologist Mary Sal MD on 2/5 anticoag visit  Significant drug interactions: none    Recent Labs     02/05/25  1112 02/05/25  1303 02/06/25  0243   INR 4.2  --  4.3*   HGB  --  10.1* 9.3*   PLT  --  100* 94*     Date INR Dose (mg)   2/5 4.2 -   2/6 4.3 -     Assessment/Plan  Goal INR: 2 - 3  Hold warfarin today    INR order daily. Pharmacy will continue to follow.     Nayan Lara RPH   
Warfarin dosing - Pharmacy consult note    Indication: Atrial Fibrillation  Warfarin dose prior to admission: 7.5 mg daily reduced to 5 mg daily by cardiologist Mary Sal MD on 2/5 anticoag visit  Significant drug interactions: none    Recent Labs     02/05/25  1112 02/05/25  1303 02/06/25  0243 02/07/25  0147   INR 4.2  --  4.3* 3.5*   HGB  --  10.1* 9.3* 10.0*   PLT  --  100* 94* 113*     Date INR Dose (mg)   2/5 4.2 -   2/6 4.3 -   2/7 3.5 -     Assessment/Plan  Goal INR: 2 - 3  Hold warfarin today    INR order daily. Pharmacy will continue to follow.     Nayan Lara RPH   
Warfarin dosing - Pharmacy consult note    Indication: Atrial Fibrillation  Warfarin dose prior to admission: 7.5 mg daily reduced to 5 mg daily by cardiologist Mary Sal MD on 2/5 anticoag visit  Significant drug interactions: none    Recent Labs     02/06/25  0243 02/07/25  0147 02/08/25  0200   INR 4.3* 3.5* 2.6*   HGB 9.3* 10.0* 9.9*   PLT 94* 113* 115*     Date INR Dose (mg)   2/5 4.2 -   2/6 4.3 -   2/7 3.5 -   2/8 2.6 5 mg     Assessment/Plan  Goal INR: 2 - 3  Will order warfarin 5 mg to be administered tonight at 18:00.     INR order daily. Pharmacy will continue to follow.     Natty Emmanuel RPH     
Warfarin dosing - Pharmacy consult note    Indication: Atrial Fibrillation  Warfarin dose prior to admission: 7.5 mg daily reduced to 5 mg daily by cardiologist Mary Sal MD on 2/5 anticoag visit  Significant drug interactions: none    Recent Labs     02/07/25  0147 02/08/25  0200 02/09/25  0359   INR 3.5* 2.6* 2.1*   HGB 10.0* 9.9* 9.9*   * 115* 115*     Date INR Dose (mg)   2/5 4.2 -   2/6 4.3 -   2/7 3.5 -   2/8 2.6 5 mg   2/9 2.1 5 mg     Assessment/Plan  Goal INR: 2 - 3  Will order warfarin 5 mg to be administered tonight at 18:00.     INR order daily. Pharmacy will continue to follow.     Natty Emmanuel RPH     
Warfarin dosing - Pharmacy consult note    Indication: Atrial Fibrillation  Warfarin dose prior to admission: 7.5 mg daily reduced to 5 mg daily by cardiologist Mary Sal MD on 2/5 anticoag visit  Significant drug interactions: none    Recent Labs     02/10/25  0306 02/11/25  0415 02/11/25  1733 02/12/25  0031   INR  --   --  2.0* 2.0*   HGB 9.8* 9.8*  --  9.9*   * 117*  --  126*     Date INR Dose (mg)   2/5 4.2 -   2/6 4.3 -   2/7 3.5 -   2/8 2.6 5 mg   2/9 2.1 5 mg   2/11 2.0 5 mg   2/12 2.0 5 mg     Assessment/Plan  Goal INR: 2 - 3  Will order warfarin 5 mg to be administered tonight at 18:00.     INR order daily. Pharmacy will continue to follow.     KAILA ROSE RPH     
98.8 °F (37.1 °C) 86 19 (!) 106/58 97 %         No data found.        Intake/Output Summary (Last 24 hours) at 2/10/2025 0826  Last data filed at 2/10/2025 0753  Gross per 24 hour   Intake 600 ml   Output 2970 ml   Net -2370 ml       EXAMINATION:  General:  Alert, cooperative, no distress  Head:  Normocephalic, without obvious abnormality, atraumatic.  Eyes:  Conjunctivae/corneas clear  Lungs:   Slight rales in bases, no rhonchi ,no wheeze  Heart:  Regular rate and rhythm, S1, S2 normal, no murmur, click, rub or gallop.  Abdomen:   Soft, non-tender. Bowel sounds normal.   Extremities: Lymphedema lower extremities bilaterally with pitting component.    Skin:  No rashes or lesions visible extremities  Neurologic:  Normal strength, sensation      Visit Vitals  BP (!) 147/67   Pulse 93   Temp (!) 100.8 °F (38.2 °C) (Oral)   Resp 19   Ht 1.905 m (6' 3\")   Wt (!) 145 kg (319 lb 10.7 oz)   SpO2 94%   BMI 39.96 kg/m²       Data Review:     Labs: Results:       Chemistry Recent Labs     02/08/25  0200 02/09/25  0359 02/10/25  0306   * 139 138   K 3.9 3.3* 3.4*   CL 98* 96* 96*   CO2 33* 36* 35*   BUN 39* 41* 43*   MG  --   --  1.9   PHOS  --   --  3.2   GLOB  --  5.0* 5.3*      CBC w/Diff Recent Labs     02/08/25  0200 02/09/25  0359 02/10/25  0306   WBC 4.8 4.7 5.6   RBC 3.06* 3.11* 3.09*   HGB 9.9* 9.9* 9.8*   HCT 28.9* 29.4* 29.1*   * 115* 110*      Cardiac Enzymes @FQINLOIV86(CPK:*,CK:*,CPKMB:*,CKRMB:*,CKMMB:*,CKMB:*,RCK3:*,CKMBT:*,CKMBPC:*,CKSMB:*,CKNDX:*,CKND1:*,JUANITA:*,TROQR:*,TROPT:*,TROIQ:*,TROIP:*,SHERI:*,TROPIT:*,TROPT:*,TRPOIT:*,ITNL:*,TNIPOC:*,BNP:*,BNPP:*,PBNP:*)@   Coagulation Recent Labs     02/08/25  0200 02/09/25  0359   INR 2.6* 2.1*       Lipid Panel Lab Results   Component Value Date/Time    CHOL 121 10/10/2023 12:00 AM    CHOL 105 08/06/2019 10:13 AM    HDL 31 10/10/2023 12:00 AM    LDL 78 10/10/2023 12:00 AM    VLDL 12 10/10/2023 12:00 AM      BNP No results found for: \"BNP\", \"BNPPOC\"   Liver 
@[email or web address](CPK:*,CK:*,CPKMB:*,CKRMB:*,CKMMB:*,CKMB:*,RCK3:*,CKMBT:*,CKMBPC:*,CKSMB:*,CKNDX:*,CKND1:*,JUANITA:*,TROQR:*,TROPT:*,TROIQ:*,TROIP:*,SHERI:*,TROPIT:*,TROPT:*,TRPOIT:*,ITNL:*,TNIPOC:*,BNP:*,BNPP:*,PBNP:*)@   Coagulation Recent Labs     02/06/25  0243 02/07/25  0147   INR 4.3* 3.5*       Lipid Panel Lab Results   Component Value Date/Time    CHOL 121 10/10/2023 12:00 AM    CHOL 105 08/06/2019 10:13 AM    HDL 31 10/10/2023 12:00 AM    LDL 78 10/10/2023 12:00 AM    VLDL 12 10/10/2023 12:00 AM      BNP No results found for: \"BNP\", \"BNPPOC\"   Liver Enzymes No results for input(s): \"TP\" in the last 72 hours.    Invalid input(s): \"ALB\", \"TBIL\", \"AP\", \"SGOT\", \"GPT\", \"DBIL\"   Thyroid Studies Lab Results   Component Value Date/Time    TSH 1.62 08/24/2023 11:38 AM          Signed By: ALEXUS BUSH MD     February 7, 2025      
BUE  Strength: Within functional limits    Tone & Sensation: BUE  Tone: Normal       Range of Motion: BUE  AROM: Within functional limits       Functional Mobility and Transfers for ADLs:  Bed Mobility:     Bed Mobility Training  Bed Mobility Training: Yes  Supine to Sit: Modified independent  Sit to Supine: Modified independent  Scooting: Modified independent  Transfers:                 Transfer Training  Transfer Training: Yes  Sit to Stand: Supervision  Stand to Sit: Supervision    ADL Assessment:   Feeding: Supervision  Grooming: Supervision  UE Bathing: Supervision  LE Bathing: Supervision  UE Dressing: Supervision  LE Dressing: Supervision  Toileting: Supervision           Pain:  Intensity Pre-treatment: 9/10   Intensity Post-treatment: 9/10  Scale: Numeric Rating Scale  Location: Right Foot  Quality: Sharp and Aching  Intervention(s): Nurse notified, Repositioning , and Rest      Activity Tolerance:   Activity Tolerance: Patient Tolerated treatment well  Please refer to the flowsheet for vital signs taken during this treatment.    After treatment:   [] Patient left in no apparent distress sitting up in chair  [x] Patient left in no apparent distress in bed  [x] Call bell left within reach  [x] Nursing notified  [] Caregiver present  [x] Bed alarm activated    COMMUNICATION/EDUCATION:   Patient Education  Education Given To: Patient;Family  Education Provided: Role of Therapy;Plan of Care;Fall Prevention Strategies;Transfer Training;Equipment;Family Education;ADL Adaptive Strategies;Energy Conservation;Mobility Training  Education Method: Verbal;Demonstration;Teach Back  Barriers to Learning: None  Education Outcome: Verbalized understanding;Demonstrated understanding    Thank you for this referral.  Marjorie Valdes OT  Minutes: 20

## 2025-02-13 NOTE — CARE COORDINATION
Care Transitions Note    Initial Call - Call within 2 business days of discharge: Yes    Patient Current Location:  [place]    Care Transition Nurse contacted the patient by telephone to perform post hospital discharge assessment, verified name and  as identifiers. Provided introduction to self, and explanation of the Care Transition Nurse role. His spouse Sandra Lynn was present during this outreach.    Patient: Ishan Camacho Sr.    Patient : 1955   MRN: [id number]    Reason for Admission: Acute on Chronic HFpEF  Discharge Date: 25  RURS: Readmission Risk Score: 20.8      Last Discharge Facility       Date Complaint Diagnosis Description Type Department Provider    25 Leg Swelling Hypervolemia, unspecified hypervolemia type ... ED to Hosp-Admission (Discharged) (ADMITTED) [id number] Madhu Mendes MD; Shlomo...            Was this an external facility discharge? No    Additional needs identified to be addressed with provider   No needs identified             Method of communication with provider: none.    Patients top risk factors for readmission: medical condition-CHF, CKD, pulmonary HTN and difficulty assessing patient's level of knowledge and understanding, and level of motivation due to patient provided one word answers to questions    Interventions to address risk factors:   Education: Reviewed CHF red flags, daily weights and when to call cardiology for weight gain.  Review of patient management of conditions/medications: Confirmed patient has all medications at home. Reviewed and offered RPM for CHF management, but patient declined.    Care Summary Note: Spouse and patient report patient is doing well. Patient reports resolution of CHF symptoms, and denies edema, SOB, fatigue, or cough. He has not yet checked a weight this morning and confirmed he has a scale at home.    Care Transition Nurse reviewed discharge instructions, medical action plan, and red flags with patient. The

## 2025-02-14 DIAGNOSIS — D64.9 ANEMIA, UNSPECIFIED TYPE: ICD-10-CM

## 2025-02-14 NOTE — TELEPHONE ENCOUNTER
Patient requesting a refill on ferrous sulfate     [facility] - [place], [place] - [address] - P [phone] - F [phone]  [address] [address]  Phone: [phone]  Fax: [phone]

## 2025-02-16 LAB
BACTERIA SPEC CULT: NORMAL
BACTERIA SPEC CULT: NORMAL
SERVICE CMNT-IMP: NORMAL
SERVICE CMNT-IMP: NORMAL

## 2025-02-17 ENCOUNTER — OFFICE VISIT (OUTPATIENT)
Facility: CLINIC | Age: 70
End: 2025-02-17

## 2025-02-17 VITALS
TEMPERATURE: 97 F | SYSTOLIC BLOOD PRESSURE: 130 MMHG | DIASTOLIC BLOOD PRESSURE: 80 MMHG | HEART RATE: 87 BPM | OXYGEN SATURATION: 97 % | WEIGHT: 266 LBS | HEIGHT: 75 IN | RESPIRATION RATE: 18 BRPM | BODY MASS INDEX: 33.07 KG/M2

## 2025-02-17 DIAGNOSIS — I48.91 ON COUMADIN FOR ATRIAL FIBRILLATION (HCC): ICD-10-CM

## 2025-02-17 DIAGNOSIS — I89.0 LYMPHEDEMA: ICD-10-CM

## 2025-02-17 DIAGNOSIS — Z13.29 SCREENING FOR ENDOCRINE, METABOLIC AND IMMUNITY DISORDER: ICD-10-CM

## 2025-02-17 DIAGNOSIS — E55.9 VITAMIN D DEFICIENCY: ICD-10-CM

## 2025-02-17 DIAGNOSIS — Z79.01 ON COUMADIN FOR ATRIAL FIBRILLATION (HCC): ICD-10-CM

## 2025-02-17 DIAGNOSIS — Z09 HOSPITAL DISCHARGE FOLLOW-UP: Primary | ICD-10-CM

## 2025-02-17 DIAGNOSIS — Z13.228 SCREENING FOR ENDOCRINE, METABOLIC AND IMMUNITY DISORDER: ICD-10-CM

## 2025-02-17 DIAGNOSIS — Z13.0 SCREENING FOR ENDOCRINE, METABOLIC AND IMMUNITY DISORDER: ICD-10-CM

## 2025-02-17 DIAGNOSIS — D64.9 ANEMIA, UNSPECIFIED TYPE: ICD-10-CM

## 2025-02-17 RX ORDER — FERROUS SULFATE 325(65) MG
325 TABLET, DELAYED RELEASE (ENTERIC COATED) ORAL 2 TIMES DAILY
Qty: 90 TABLET | Refills: 3 | Status: SHIPPED | OUTPATIENT
Start: 2025-02-17 | End: 2025-02-17

## 2025-02-17 RX ORDER — FERROUS SULFATE 325(65) MG
325 TABLET, DELAYED RELEASE (ENTERIC COATED) ORAL 2 TIMES DAILY
Qty: 90 TABLET | Refills: 3 | Status: SHIPPED | OUTPATIENT
Start: 2025-02-17

## 2025-02-17 SDOH — ECONOMIC STABILITY: FOOD INSECURITY: WITHIN THE PAST 12 MONTHS, YOU WORRIED THAT YOUR FOOD WOULD RUN OUT BEFORE YOU GOT MONEY TO BUY MORE.: NEVER TRUE

## 2025-02-17 SDOH — ECONOMIC STABILITY: FOOD INSECURITY: WITHIN THE PAST 12 MONTHS, THE FOOD YOU BOUGHT JUST DIDN'T LAST AND YOU DIDN'T HAVE MONEY TO GET MORE.: NEVER TRUE

## 2025-02-17 ASSESSMENT — PATIENT HEALTH QUESTIONNAIRE - PHQ9
SUM OF ALL RESPONSES TO PHQ QUESTIONS 1-9: 0
2. FEELING DOWN, DEPRESSED OR HOPELESS: NOT AT ALL
SUM OF ALL RESPONSES TO PHQ QUESTIONS 1-9: 0
SUM OF ALL RESPONSES TO PHQ QUESTIONS 1-9: 0
SUM OF ALL RESPONSES TO PHQ9 QUESTIONS 1 & 2: 0
1. LITTLE INTEREST OR PLEASURE IN DOING THINGS: NOT AT ALL
SUM OF ALL RESPONSES TO PHQ QUESTIONS 1-9: 0

## 2025-02-17 NOTE — PROGRESS NOTES
\"Have you been to the ER, urgent care clinic since your last visit?  Hospitalized since your last visit?\"    YES - When: approximately 5 days ago.  Where and Why: [facility]  fluid build up.    “Have you seen or consulted any other health care providers outside of [facility] since your last visit?”    NO

## 2025-02-17 NOTE — TELEPHONE ENCOUNTER
Pt called to check on the status of refill request for FERROUS SULFATE   Wife asking to be notified when it gets sent   The pharmacy closes at 6:00pm   [facility]

## 2025-02-17 NOTE — PROGRESS NOTES
Post-Discharge Transitional Care  Follow Up      Ishan KENDALL Doug Singh.   YOB: 1955    Date of Office Visit:  2/17/2025  Date of Hospital Admission: 2/5/25  Date of Hospital Discharge: 2/12/25  Risk of hospital readmission (high >=14%. Medium >=10%) :Readmission Risk Score: 20.8      Care management risk score Rising risk (score 2-5) and Complex Care (Scores >=6): No Risk Score On File     Non face to face  following discharge, date last encounter closed (first attempt may have been earlier): 02/13/2025    Call initiated 2 business days of discharge: Yes    ASSESSMENT/PLAN:   Hospital discharge follow-up  -     GA DISCHARGE MEDS RECONCILED W/ CURRENT OUTPATIENT MED LIST    Medical Decision Making: high complexity  No follow-ups on file.           Subjective:   HPI:  Follow up of Hospital problems/diagnosis(es):  Congestive heart failure, severe tricuspid regurgitation, pulmonary hypertension, atrial fibrillation, systemic inflammatory response syndrome.    Inpatient course: Discharge summary reviewed- see chart.    Interval history/Current status: Patient feels much better.  No chest pain or palpitations or.  Patient continues to be hyperlipidemia.  Patient also has history of bilateral lymphedema lower extremity.  Patient has appointment cardiology on 3/11/25    As per discharge summary, the patient was admitted to the hospital for management of acute on chronic heart failure with preserved ejection fraction.  2D echo reported an EF of 55 to 60%.  The patient was started on a bumetanide infusion.  Intake and output were monitored closely.  Patient was also placed on a low-sodium diet.  Fluid restriction was implemented.  Patient was followed by cardiology during this hospitalization.  The patient also has pulmonary hypertension and severe tricuspid regurgitation.  He underwent a right heart catheterization during this hospitalization which revealed mild pulmonary hypertension.  Patient was eventually

## 2025-02-18 ENCOUNTER — TELEPHONE (OUTPATIENT)
Facility: CLINIC | Age: 70
End: 2025-02-18

## 2025-02-18 NOTE — TELEPHONE ENCOUNTER
Unable to leave name and call back number; mailbox is full. Please see message below.    Please inform the patient to get PT/INR done as soon as possible.  Order in place.

## 2025-02-18 NOTE — TELEPHONE ENCOUNTER
Tried to call patient voice mail full.  Spoke with Wife Sandra Lynn and she is aware    Wife states coumadin was stopped in the hospital and was given (lovenox?).   Wife is aware to have patient get PT/INR done as soon as possible.   Wife states she will take him as soon as possible.         Please be aware Cardiology is monitoring Coumadin please see 2/5/2025 note.   Wife is aware to call Cardiology to follow up with INR.

## 2025-02-20 ENCOUNTER — CARE COORDINATION (OUTPATIENT)
Facility: CLINIC | Age: 70
End: 2025-02-20

## 2025-02-20 NOTE — CARE COORDINATION
MD Delmer Internal Medicine [phone]    3/11/2025 10:30 AM Ladi Srivastava PA-C Cardiology [phone]            Care Transition Nurse provided contact information.  Plan for follow-up call in 6-10 days based on severity of symptoms and risk factors.  Plan for next call: symptom management-assess for CHF red flags  self management-review daily weights/VS, confirm continued compliance with sodium/fluid restrictions  ACP discussion    Maddie Ramirez RN

## 2025-02-24 ENCOUNTER — LAB (OUTPATIENT)
Facility: CLINIC | Age: 70
End: 2025-02-24

## 2025-02-24 DIAGNOSIS — Z79.01 ON COUMADIN FOR ATRIAL FIBRILLATION (HCC): ICD-10-CM

## 2025-02-24 DIAGNOSIS — Z13.0 SCREENING FOR ENDOCRINE, METABOLIC AND IMMUNITY DISORDER: ICD-10-CM

## 2025-02-24 DIAGNOSIS — I48.91 ON COUMADIN FOR ATRIAL FIBRILLATION (HCC): ICD-10-CM

## 2025-02-24 DIAGNOSIS — Z13.29 SCREENING FOR ENDOCRINE, METABOLIC AND IMMUNITY DISORDER: ICD-10-CM

## 2025-02-24 DIAGNOSIS — E55.9 VITAMIN D DEFICIENCY: ICD-10-CM

## 2025-02-24 DIAGNOSIS — Z13.228 SCREENING FOR ENDOCRINE, METABOLIC AND IMMUNITY DISORDER: ICD-10-CM

## 2025-02-24 DIAGNOSIS — D64.9 ANEMIA, UNSPECIFIED TYPE: ICD-10-CM

## 2025-02-25 ENCOUNTER — TELEPHONE (OUTPATIENT)
Facility: CLINIC | Age: 70
End: 2025-02-25

## 2025-02-25 DIAGNOSIS — I50.32 CHRONIC DIASTOLIC HEART FAILURE (HCC): Primary | ICD-10-CM

## 2025-02-25 LAB
25(OH)D3+25(OH)D2 SERPL-MCNC: 46.5 NG/ML (ref 30–100)
ALBUMIN SERPL-MCNC: 3.5 G/DL (ref 3.8–4.8)
ALP SERPL-CCNC: 44 IU/L (ref 44–121)
ALT SERPL-CCNC: 14 IU/L (ref 0–44)
AST SERPL-CCNC: 26 IU/L (ref 0–40)
BASOPHILS # BLD AUTO: 0.1 X10E3/UL (ref 0–0.2)
BASOPHILS NFR BLD AUTO: 1 %
BILIRUB SERPL-MCNC: 1.4 MG/DL (ref 0–1.2)
BUN SERPL-MCNC: 68 MG/DL (ref 8–27)
BUN/CREAT SERPL: 30 (ref 10–24)
CALCIUM SERPL-MCNC: 10.1 MG/DL (ref 8.6–10.2)
CHLORIDE SERPL-SCNC: 92 MMOL/L (ref 96–106)
CHOLEST SERPL-MCNC: 96 MG/DL (ref 100–199)
CO2 SERPL-SCNC: 33 MMOL/L (ref 20–29)
CREAT SERPL-MCNC: 2.29 MG/DL (ref 0.76–1.27)
EGFRCR SERPLBLD CKD-EPI 2021: 30 ML/MIN/1.73
EOSINOPHIL # BLD AUTO: 0.2 X10E3/UL (ref 0–0.4)
EOSINOPHIL NFR BLD AUTO: 4 %
ERYTHROCYTE [DISTWIDTH] IN BLOOD BY AUTOMATED COUNT: 13.3 % (ref 11.6–15.4)
GLOBULIN SER CALC-MCNC: 4.9 G/DL (ref 1.5–4.5)
GLUCOSE SERPL-MCNC: 112 MG/DL (ref 70–99)
HBA1C MFR BLD: 6 % (ref 4.8–5.6)
HCT VFR BLD AUTO: 36 % (ref 37.5–51)
HDLC SERPL-MCNC: 36 MG/DL
HGB BLD-MCNC: 12.3 G/DL (ref 13–17.7)
IMM GRANULOCYTES # BLD AUTO: 0 X10E3/UL (ref 0–0.1)
IMM GRANULOCYTES NFR BLD AUTO: 0 %
INR PPP: 1.7 (ref 0.9–1.2)
IRON SATN MFR SERPL: 39 % (ref 15–55)
IRON SERPL-MCNC: 112 UG/DL (ref 38–169)
LDLC SERPL CALC-MCNC: 46 MG/DL (ref 0–99)
LYMPHOCYTES # BLD AUTO: 1.1 X10E3/UL (ref 0.7–3.1)
LYMPHOCYTES NFR BLD AUTO: 30 %
MCH RBC QN AUTO: 31.4 PG (ref 26.6–33)
MCHC RBC AUTO-ENTMCNC: 34.2 G/DL (ref 31.5–35.7)
MCV RBC AUTO: 92 FL (ref 79–97)
MONOCYTES # BLD AUTO: 0.5 X10E3/UL (ref 0.1–0.9)
MONOCYTES NFR BLD AUTO: 14 %
NEUTROPHILS # BLD AUTO: 1.9 X10E3/UL (ref 1.4–7)
NEUTROPHILS NFR BLD AUTO: 51 %
PLATELET # BLD AUTO: 222 X10E3/UL (ref 150–450)
POTASSIUM SERPL-SCNC: 3.3 MMOL/L (ref 3.5–5.2)
PROT SERPL-MCNC: 8.4 G/DL (ref 6–8.5)
PROTHROMBIN TIME: 18.2 SEC (ref 9.1–12)
RBC # BLD AUTO: 3.92 X10E6/UL (ref 4.14–5.8)
SODIUM SERPL-SCNC: 142 MMOL/L (ref 134–144)
SPECIMEN STATUS REPORT: NORMAL
TIBC SERPL-MCNC: 285 UG/DL (ref 250–450)
TRIGL SERPL-MCNC: 61 MG/DL (ref 0–149)
UIBC SERPL-MCNC: 173 UG/DL (ref 111–343)
VLDLC SERPL CALC-MCNC: 14 MG/DL (ref 5–40)
WBC # BLD AUTO: 3.8 X10E3/UL (ref 3.4–10.8)

## 2025-02-25 NOTE — TELEPHONE ENCOUNTER
Pt and daughter called in states they need a referral for PT home health order.     Please advise.       Call back req to Pt

## 2025-02-27 ENCOUNTER — TELEPHONE (OUTPATIENT)
Facility: CLINIC | Age: 70
End: 2025-02-27

## 2025-02-27 ENCOUNTER — CARE COORDINATION (OUTPATIENT)
Facility: CLINIC | Age: 70
End: 2025-02-27

## 2025-02-27 NOTE — TELEPHONE ENCOUNTER
Ghislaine called from [facility] requesting an order to put in for start of care for Monday or Tuesday.      Please advise

## 2025-02-27 NOTE — CARE COORDINATION
Care Transitions Note    Follow Up Call     Attempted to reach patient for transitions of care follow up.  Unable to reach patient.      Outreach Attempts:   Unable to leave message.   3:18 pm- message left for emergency contact Sandra Lynn on [facility].  Care Summary Note: No changes noted    Follow Up Appointment:   Future Appointments         Provider Specialty Dept Phone    3/3/2025 1:40 PM Delmer Hernandez MD Internal Medicine [phone]    3/11/2025 10:30 AM Ladi Srivastava PA-C Cardiology [phone]    4/7/2025 1:00 PM Tatum Adams MD Vascular Surgery [phone]            Plan for follow-up call in 6-10 days based on severity of symptoms and risk factors. Plan for next call: symptom management-assess for CHF red flags  self management-review daily weights/VS, confirm continued compliance with sodium/fluid restrictions  ACP discussion    Radha Cardona LPN

## 2025-03-03 ENCOUNTER — ANTI-COAG VISIT (OUTPATIENT)
Age: 70
End: 2025-03-03
Payer: MEDICARE

## 2025-03-03 ENCOUNTER — OFFICE VISIT (OUTPATIENT)
Facility: CLINIC | Age: 70
End: 2025-03-03

## 2025-03-03 ENCOUNTER — TELEPHONE (OUTPATIENT)
Facility: CLINIC | Age: 70
End: 2025-03-03

## 2025-03-03 VITALS
RESPIRATION RATE: 18 BRPM | HEART RATE: 100 BPM | DIASTOLIC BLOOD PRESSURE: 61 MMHG | SYSTOLIC BLOOD PRESSURE: 114 MMHG | WEIGHT: 261 LBS | OXYGEN SATURATION: 97 % | HEIGHT: 75 IN | TEMPERATURE: 97.5 F | BODY MASS INDEX: 32.45 KG/M2

## 2025-03-03 DIAGNOSIS — Z13.0 SCREENING FOR ENDOCRINE, METABOLIC AND IMMUNITY DISORDER: ICD-10-CM

## 2025-03-03 DIAGNOSIS — E55.9 VITAMIN D DEFICIENCY: ICD-10-CM

## 2025-03-03 DIAGNOSIS — Z13.29 SCREENING FOR ENDOCRINE, METABOLIC AND IMMUNITY DISORDER: ICD-10-CM

## 2025-03-03 DIAGNOSIS — I48.0 PAROXYSMAL ATRIAL FIBRILLATION (HCC): Chronic | ICD-10-CM

## 2025-03-03 DIAGNOSIS — I89.0 LYMPHEDEMA: ICD-10-CM

## 2025-03-03 DIAGNOSIS — D68.69 SECONDARY HYPERCOAGULABLE STATE: Chronic | ICD-10-CM

## 2025-03-03 DIAGNOSIS — I50.32 CHRONIC DIASTOLIC HEART FAILURE (HCC): ICD-10-CM

## 2025-03-03 DIAGNOSIS — I48.91 ATRIAL FIBRILLATION (HCC): Primary | Chronic | ICD-10-CM

## 2025-03-03 DIAGNOSIS — Z13.228 SCREENING FOR ENDOCRINE, METABOLIC AND IMMUNITY DISORDER: ICD-10-CM

## 2025-03-03 DIAGNOSIS — N18.32 CKD STAGE 3B, GFR 30-44 ML/MIN (HCC): ICD-10-CM

## 2025-03-03 DIAGNOSIS — Z71.89 ACP (ADVANCE CARE PLANNING): ICD-10-CM

## 2025-03-03 DIAGNOSIS — D64.9 ANEMIA, UNSPECIFIED TYPE: ICD-10-CM

## 2025-03-03 DIAGNOSIS — I10 PRIMARY HYPERTENSION: ICD-10-CM

## 2025-03-03 DIAGNOSIS — I50.33 ACUTE ON CHRONIC DIASTOLIC CONGESTIVE HEART FAILURE (HCC): ICD-10-CM

## 2025-03-03 DIAGNOSIS — Z00.00 MEDICARE ANNUAL WELLNESS VISIT, SUBSEQUENT: Primary | ICD-10-CM

## 2025-03-03 DIAGNOSIS — R73.03 PREDIABETES: ICD-10-CM

## 2025-03-03 DIAGNOSIS — Z79.01 ON COUMADIN FOR ATRIAL FIBRILLATION (HCC): ICD-10-CM

## 2025-03-03 DIAGNOSIS — E87.6 HYPOKALEMIA: Primary | ICD-10-CM

## 2025-03-03 DIAGNOSIS — I48.20 CHRONIC ATRIAL FIBRILLATION (HCC): ICD-10-CM

## 2025-03-03 DIAGNOSIS — I48.91 ON COUMADIN FOR ATRIAL FIBRILLATION (HCC): ICD-10-CM

## 2025-03-03 PROBLEM — N18.2 CKD (CHRONIC KIDNEY DISEASE), STAGE II: Status: RESOLVED | Noted: 2023-08-26 | Resolved: 2025-03-03

## 2025-03-03 LAB
POC INR: 1.9
PROTHROMBIN TIME, POC: NORMAL

## 2025-03-03 PROCEDURE — 85610 PROTHROMBIN TIME: CPT | Performed by: NURSE PRACTITIONER

## 2025-03-03 RX ORDER — WARFARIN SODIUM 5 MG/1
TABLET ORAL
Qty: 30 TABLET | Refills: 3 | Status: SHIPPED | OUTPATIENT
Start: 2025-03-03

## 2025-03-03 RX ORDER — POTASSIUM CHLORIDE 750 MG/1
10 TABLET, EXTENDED RELEASE ORAL 2 TIMES DAILY
Qty: 90 TABLET | Refills: 0 | Status: SHIPPED | OUTPATIENT
Start: 2025-03-03 | End: 2025-03-05

## 2025-03-03 ASSESSMENT — PATIENT HEALTH QUESTIONNAIRE - PHQ9
SUM OF ALL RESPONSES TO PHQ QUESTIONS 1-9: 0
1. LITTLE INTEREST OR PLEASURE IN DOING THINGS: NOT AT ALL
SUM OF ALL RESPONSES TO PHQ QUESTIONS 1-9: 0
SUM OF ALL RESPONSES TO PHQ QUESTIONS 1-9: 0
2. FEELING DOWN, DEPRESSED OR HOPELESS: NOT AT ALL
SUM OF ALL RESPONSES TO PHQ QUESTIONS 1-9: 0

## 2025-03-03 ASSESSMENT — LIFESTYLE VARIABLES
HOW OFTEN DO YOU HAVE A DRINK CONTAINING ALCOHOL: NEVER
HOW MANY STANDARD DRINKS CONTAINING ALCOHOL DO YOU HAVE ON A TYPICAL DAY: PATIENT DOES NOT DRINK

## 2025-03-03 NOTE — ACP (ADVANCE CARE PLANNING)
Advance Care Planning   General Advance Care Planning (ACP) Conversation    Date of Conversation: 3/3/2025  Conducted with: Patient with Decision Making Capacity  Other persons present: Brother, Jorge Camacho    Healthcare Decision Maker:    Primary Decision Maker: Sandra Lynn - Spouse - [phone]      Content/Action Overview:  Has NO ACP documents-Information provided  Reviewed DNR/DNI and patient elects DNR order - completed portable DNR form & placed order        Length of Voluntary ACP Conversation in minutes:  20 minutes    Delmer Hernandez MD

## 2025-03-03 NOTE — PATIENT INSTRUCTIONS
your weight-loss goals.  A dietitian can help you make healthy changes in your diet.  An exercise specialist or  can help you develop a safe and effective exercise program.  A counselor or psychiatrist can help you cope with issues such as depression, anxiety, or family problems that can make it hard to focus on weight loss.  Consider joining a support group for people who are trying to lose weight. Your doctor can suggest groups in your area.  Where can you learn more?  Go to https://www.[employer].net/patientEd and enter U357 to learn more about \"Starting a Weight-Loss Plan: Care Instructions.\"  Current as of: April 30, 2024  Content Version: 14.3  © 2024 [employer].   Care instructions adapted under license by [employer]. If you have questions about a medical condition or this instruction, always ask your healthcare professional. [employer], disclaims any warranty or liability for your use of this information.         Advance Directives: Care Instructions  Overview  An advance directive is a legal way to state your wishes at the end of your life. It tells your family and your doctor what to do if you can't say what you want.  There are two main types of advance directives. You can change them any time your wishes change.  Living will.  This form tells your family and your doctor your wishes about life support and other treatment. The form is also called a declaration.  Medical power of .  This form lets you name a person to make treatment decisions for you when you can't speak for yourself. This person is called a health care agent (health care proxy, health care surrogate). The form is also called a durable power of  for health care.  If you do not have an advance directive, decisions about your medical care may be made by a family member, or by a doctor or a  who doesn't know you.  It may help to think of an advance directive as a gift to the people

## 2025-03-04 ENCOUNTER — TELEPHONE (OUTPATIENT)
Facility: CLINIC | Age: 70
End: 2025-03-04

## 2025-03-04 DIAGNOSIS — T14.8XXA WOUND OF SKIN: Primary | ICD-10-CM

## 2025-03-04 DIAGNOSIS — E87.6 HYPOKALEMIA: ICD-10-CM

## 2025-03-04 NOTE — PROGRESS NOTES
Ishan Camacho Sr. presents today for   Chief Complaint   Patient presents with    Medicare AWV           \"Have you been to the ER, urgent care clinic since your last visit?  Hospitalized since your last visit?\"    NO    “Have you seen or consulted any other health care providers outside of [facility] since your last visit?”    NO            
ordered.    Positive Risk Factor Screenings with Interventions:              Inactivity:  On average, how many days per week do you engage in moderate to strenuous exercise (like a brisk walk)?: 0 days (!) Abnormal  On average, how many minutes do you engage in exercise at this level?: 0 min  Interventions:  Patient advised to follow up in the office for further evaluation and treatment     Abnormal BMI (obese):  Body mass index is 32.62 kg/m². (!) Abnormal  Interventions:  Patient advised to follow-up in this office for further evaluation and treatment        Dentist Screen:  Have you seen the dentist within the past year?: (!) No    Intervention:  Advised to schedule with their dentist      Safety:  Do you have non-slip mats or non-slip surfaces or shower bars or grab bars in your shower or bathtub?: (!) No  Interventions:  Patient advised to follow up in the office for further evaluation and treatment    ADL's:   Patient reports needing help with:  Select all that apply: (!) Housekeeping, Food Preparation, Taking Medications, Laundry  Interventions:  Patient advised to follow up in the office for further evaluation and treatment    Advanced Directives:  Do you have a Living Will?: (!) No    Intervention:  has NO advanced directive - information provided                     Objective   Vitals:    03/03/25 1348   BP: 114/61   Site: Left Upper Arm   Position: Sitting   Cuff Size: Large Adult   Pulse: 100   Resp: 18   Temp: 97.5 °F (36.4 °C)   TempSrc: Temporal   SpO2: 97%   Weight: 118.4 kg (261 lb)   Height: 1.905 m (6' 3\")      Body mass index is 32.62 kg/m².                  No Known Allergies  Prior to Visit Medications    Medication Sig Taking? Authorizing Provider   ferrous sulfate (FE TABS) 325 (65 Fe) MG EC tablet Take 1 tablet by mouth 2 times daily Yes Delmer Hernandez MD   warfarin (COUMADIN) 5 MG tablet Take 1 tablet daily Yes Madhu Mendes MD   bumetanide (BUMEX) 1 MG tablet Take 1 tablet by 
electronic signature was used to authenticate this note.  -- Delmer Hernandez MD

## 2025-03-04 NOTE — PROGRESS NOTES
Mr. Ishan Camacho is here today for anticoagulation monitoring for the diagnosis of atrial fibrillation.  His INR goal is 2.0-3.0 and his current Coumadin dose is 5 mg daily.     Today's findings include an INR of 1.9     Considering Mr. Camacho's past history, todays findings, and per the coumadin policy/protocol, Mr. Camacho was instructed to take Coumadin as follows,  resume 5 mg daily.  He was also instructed to come back in 1 weeks for an INR check.    A full discussion of the nature of anticoagulants has been carried out.  A full discussion of the need for frequent and regular monitoring, precise dosage adjustment and compliance was stressed.  Side effects of potential bleeding were discussed and Mr. Camacho was instructed to call [phone] if there are any signs of abnormal bleeding.  Mr. Camacho was instructed to avoid any OTC items containing aspirin or ibuprofen and prior to starting any new OTC products to consult with his physician or pharmacist to ensure no drug interactions are present.  Mr. Camacho was instructed to avoid any major changes in his general diet and to avoid alcohol consumption.  .      Mr. Camacho verbalized his understanding of all instructions and will call the office with any questions, concerns, or signs of abnormal bleeding or blood clot.

## 2025-03-04 NOTE — TELEPHONE ENCOUNTER
FYI to provider- Allison called in upon meeting Pt today noticed and ulcer left lower leg.     Also they will be seeing Pt 2x week for 4 weeks.

## 2025-03-04 NOTE — TELEPHONE ENCOUNTER
Please inform the patient,  Take Coumadin 1.5 tablet equaling 7.5 mg on Monday and Wednesday. Continue 1 tablet equaling 5 mg rest of the 7 days.     To follow-up with cardiology for PT/INR in 1 week  Also to increase potassium supplement to 10 mEq twice a day.    To repeat BMP in 1 week    To make a follow-up appointment with me in about 10 days.      Also to reconfirm the medication list with the patient.      Please also inform cardiology office to follow-up with patient for PT/INR.  Thanks

## 2025-03-05 ENCOUNTER — CARE COORDINATION (OUTPATIENT)
Facility: CLINIC | Age: 70
End: 2025-03-05

## 2025-03-05 RX ORDER — POTASSIUM CHLORIDE 750 MG/1
10 TABLET, EXTENDED RELEASE ORAL DAILY
Qty: 90 TABLET | Refills: 0 | Status: SHIPPED | OUTPATIENT
Start: 2025-03-05

## 2025-03-05 NOTE — CARE COORDINATION
Care Transitions Note    Follow Up Call     Patient Current Location:  [place]    Care Transition Nurse contacted the patient by telephone. Verified name and  as identifiers.    Additional needs identified to be addressed with provider   No needs identified                 Method of communication with provider: none.    Care Summary Note: Patient attended PCP appointment on 3/3/25. Referral to wound clinic placed for LLE ulcer. He denies any pain or drainage from the ulcer. Patient reports his right foot/ankle joint has been bothering him ever since his recent discharge. Denies any swelling or discoloration of his foot/ankle. He told home health PT and they were not sure what's causing his pain. Denies any edema, SOB/PENDLETON, or weight gain. Continues adhere to fluid restriction.    Plan of care updates since last contact:  Review of patient management of conditions/medications: Encouraged patient to schedule an appt with PCP if right ankle/foot pain doesn't improve over the next few weeks.  Home Health: Confirmed that [facility] has seen the patient.   Referrals: Notified patient that a wound clinic referral was placed for his LLE ulcer.        Advance Care Planning:   Does patient have an Advance Directive: deferred at this time, will discuss on future follow up. .    Medication Review:  Did not address    Remote Patient Monitoring:  Offered patient enrollment in the Remote Patient Monitoring (RPM) program for in-home monitoring: Yes, but did not enroll at this time: declined to enroll in the program because patient is not interested .    Assessments:  Care Transitions Subsequent and Final Call    Subsequent and Final Calls  Do you have any ongoing symptoms?: No  Do you have any questions related to your medications?: No  Do you currently have any active services?: Yes  Are you currently active with any services?: Home Health  Do you have any needs or concerns that I can assist you with?:

## 2025-03-07 NOTE — TELEPHONE ENCOUNTER
Spoke with patient he states he will have to have his wife take him to get lab done.      Please advise if patient needs to follow up in 10 days or 3 months(according to note).

## 2025-03-08 PROBLEM — R79.89 ELEVATED TROPONIN: Status: RESOLVED | Noted: 2025-02-06 | Resolved: 2025-03-08

## 2025-03-11 ENCOUNTER — OFFICE VISIT (OUTPATIENT)
Age: 70
End: 2025-03-11
Payer: MEDICARE

## 2025-03-11 VITALS
WEIGHT: 256 LBS | HEART RATE: 74 BPM | SYSTOLIC BLOOD PRESSURE: 119 MMHG | OXYGEN SATURATION: 96 % | BODY MASS INDEX: 31.83 KG/M2 | HEIGHT: 75 IN | DIASTOLIC BLOOD PRESSURE: 53 MMHG

## 2025-03-11 DIAGNOSIS — I10 ESSENTIAL (PRIMARY) HYPERTENSION: ICD-10-CM

## 2025-03-11 DIAGNOSIS — I48.20 CHRONIC A-FIB (HCC): ICD-10-CM

## 2025-03-11 DIAGNOSIS — I50.32 CHRONIC HEART FAILURE WITH PRESERVED EJECTION FRACTION (HFPEF) (HCC): Primary | ICD-10-CM

## 2025-03-11 PROCEDURE — G8417 CALC BMI ABV UP PARAM F/U: HCPCS | Performed by: PHYSICIAN ASSISTANT

## 2025-03-11 PROCEDURE — 1123F ACP DISCUSS/DSCN MKR DOCD: CPT | Performed by: PHYSICIAN ASSISTANT

## 2025-03-11 PROCEDURE — 1036F TOBACCO NON-USER: CPT | Performed by: PHYSICIAN ASSISTANT

## 2025-03-11 PROCEDURE — 3078F DIAST BP <80 MM HG: CPT | Performed by: PHYSICIAN ASSISTANT

## 2025-03-11 PROCEDURE — G8427 DOCREV CUR MEDS BY ELIG CLIN: HCPCS | Performed by: PHYSICIAN ASSISTANT

## 2025-03-11 PROCEDURE — 3017F COLORECTAL CA SCREEN DOC REV: CPT | Performed by: PHYSICIAN ASSISTANT

## 2025-03-11 PROCEDURE — 1111F DSCHRG MED/CURRENT MED MERGE: CPT | Performed by: PHYSICIAN ASSISTANT

## 2025-03-11 PROCEDURE — 1126F AMNT PAIN NOTED NONE PRSNT: CPT | Performed by: PHYSICIAN ASSISTANT

## 2025-03-11 PROCEDURE — 1159F MED LIST DOCD IN RCRD: CPT | Performed by: PHYSICIAN ASSISTANT

## 2025-03-11 PROCEDURE — 99214 OFFICE O/P EST MOD 30 MIN: CPT | Performed by: PHYSICIAN ASSISTANT

## 2025-03-11 PROCEDURE — 3074F SYST BP LT 130 MM HG: CPT | Performed by: PHYSICIAN ASSISTANT

## 2025-03-11 ASSESSMENT — PATIENT HEALTH QUESTIONNAIRE - PHQ9
SUM OF ALL RESPONSES TO PHQ QUESTIONS 1-9: 0
1. LITTLE INTEREST OR PLEASURE IN DOING THINGS: NOT AT ALL
2. FEELING DOWN, DEPRESSED OR HOPELESS: NOT AT ALL
SUM OF ALL RESPONSES TO PHQ QUESTIONS 1-9: 0

## 2025-03-11 NOTE — PROGRESS NOTES
Identified pt with two pt identifiers(name and ). Reviewed record in preparation for visit and have obtained necessary documentation.    Ishan Camacho Sr. presents today for   Chief Complaint   Patient presents with    Follow-Up from Hospital     4 Week post Hospital        Pt denies DIZZINESS, SOB, CHEST PAIN/ PRESSURE, FATIGUE/WEAKNESS, HEADACHES, SWELLING.             Ishan Camacho Sr. preferred language for health care discussion is english/other.    Personal Protective Equipment:   Personal Protective Equipment was used including: mask-surgical and hands-gloves. Patient was placed on no precaution(s). Patient was not masked.    Precautions:   Patient currently on None  Patient currently roomed with door closed.    Is someone accompanying this pt? Wife    Is the patient using any DME equipment during OV? Walker    Depression Screening:      3/11/2025    10:31 AM 3/3/2025     1:45 PM 2025     4:08 PM 10/18/2023     7:18 AM 2023     2:52 PM 2023     2:33 PM 2023     2:24 PM   PHQ-9 Questionaire   Little interest or pleasure in doing things 0 0 0 0 0 0 0   Feeling down, depressed, or hopeless 0 0 0 0 0 0 0   PHQ-9 Total Score 0 0 0 0 0 0 0        Learning Assessment:  Who is the primary learner? Patient    What is the preferred language for health care of the primary learner? ENGLISH    How does the primary learner prefer to learn new concepts? READING    Answered By Pt    Relationship to Learner SELF        Abuse Screening:      3/11/2025    10:00 AM   AMB Abuse Screening   Do you ever feel afraid of your partner? N   Are you in a relationship with someone who physically or mentally threatens you? N   Is it safe for you to go home? Y          Fall Risk      3/11/2025    10:30 AM 3/3/2025     1:45 PM 2023     2:52 PM 2023     2:34 PM 2023     2:24 PM   Fall Risk   Do you feel unsteady or are you worried about falling?  no no no no no   2 or more falls in past year? no no

## 2025-03-11 NOTE — PROGRESS NOTES
[facility]    Ishan Camacho Sr. is a 69 y.o. male      Presents to the office today for post hospital follow up for CHF. He is an established patient of Dr. Sal. Patient admitted to [facility] for a/c HFpEF exacerbation 02/12/25. Patient markedly fluid overloaded and diuresed approximately 27 L during his admission. He also had a RHC which showed wedge pressure approximately 15 to 16 mmHg.     Since discharge he has been doing well from a cardiac standpoint. His wife is cooking his meals and weighing him daily. He limits his sodium and fluid intake. He is avoiding fast food and taking his medication as prescribed.     Denies CP, SOB, diaphoresis, N/V/D, weight gain, LE edema, orthopnea, PND, palpitations, near syncope or syncope, dizziness, melena    Past Medical History:   Diagnosis Date    Atrial fibrillation (HCC)     Chronic diastolic heart failure (HCC)     Hypertension     Mitral valve disorders(424.0)     Prediabetes        Past Surgical History:   Procedure Laterality Date    ADENOIDECTOMY      CARDIAC PROCEDURE N/A 2/10/2025    Right heart cath performed by Usama Levine MD at [facility] CARDIAC CATH LAB    COLONOSCOPY N/A 1/24/2020    COLONOSCOPY performed by Braulio Jim MD at [facility] ENDOSCOPY    OTHER SURGICAL HISTORY  1965    infection drained from under chin    TONSILLECTOMY         Current Outpatient Medications   Medication Sig    potassium chloride (KLOR-CON M) 10 MEQ extended release tablet TAKE 1 TABLET BY MOUTH DAILY    warfarin (COUMADIN) 5 MG tablet Take 1.5 tablet equaling 7.5 mg on Monday and Wednesday.  Continue 1 tablet equaling 5 mg rest of the 5 days.    ferrous sulfate (FE TABS) 325 (65 Fe) MG EC tablet Take 1 tablet by mouth 2 times daily    bumetanide (BUMEX) 1 MG tablet Take 1 tablet by mouth 2 times daily    ergocalciferol (ERGOCALCIFEROL) 1.25 MG (44348 UT) capsule Take 1 capsule by mouth once a week    metOLazone (ZAROXOLYN) 5 MG tablet TAKE ONE TABLET BY MOUTH ONCE EVERY

## 2025-03-12 ENCOUNTER — TELEPHONE (OUTPATIENT)
Facility: CLINIC | Age: 70
End: 2025-03-12

## 2025-03-12 NOTE — TELEPHONE ENCOUNTER
Please call Nano with [facility]. She has some medical concerns she needs to relay.  [phone]

## 2025-03-13 ENCOUNTER — APPOINTMENT (OUTPATIENT)
Facility: HOSPITAL | Age: 70
End: 2025-03-13
Payer: MEDICARE

## 2025-03-13 ENCOUNTER — HOSPITAL ENCOUNTER (EMERGENCY)
Facility: HOSPITAL | Age: 70
Discharge: HOME | End: 2025-03-13
Payer: MEDICARE

## 2025-03-13 VITALS
RESPIRATION RATE: 20 BRPM | BODY MASS INDEX: 31.83 KG/M2 | OXYGEN SATURATION: 99 % | DIASTOLIC BLOOD PRESSURE: 64 MMHG | WEIGHT: 256 LBS | HEART RATE: 96 BPM | SYSTOLIC BLOOD PRESSURE: 135 MMHG | TEMPERATURE: 99 F | HEIGHT: 75 IN

## 2025-03-13 DIAGNOSIS — S29.011A STRAIN OF TENDON OF LEFT HALF OF ANTERIOR CHEST WALL: ICD-10-CM

## 2025-03-13 DIAGNOSIS — M43.6 TORTICOLLIS: ICD-10-CM

## 2025-03-13 DIAGNOSIS — S16.1XXA STRAIN OF NECK MUSCLE, INITIAL ENCOUNTER: Primary | ICD-10-CM

## 2025-03-13 LAB
ALBUMIN SERPL-MCNC: 3.1 G/DL (ref 3.4–5)
ALBUMIN/GLOB SERPL: 0.4 (ref 0.8–1.7)
ALP SERPL-CCNC: 42 U/L (ref 45–117)
ALT SERPL-CCNC: 16 U/L (ref 16–61)
ANION GAP SERPL CALC-SCNC: 6 MMOL/L (ref 3–18)
AST SERPL-CCNC: 30 U/L (ref 10–38)
BASOPHILS # BLD: 0.04 K/UL (ref 0–0.1)
BASOPHILS NFR BLD: 0.7 % (ref 0–2)
BILIRUB SERPL-MCNC: 1.4 MG/DL (ref 0.2–1)
BUN SERPL-MCNC: 71 MG/DL (ref 7–18)
BUN/CREAT SERPL: 26 (ref 12–20)
CALCIUM SERPL-MCNC: 9.9 MG/DL (ref 8.5–10.1)
CHLORIDE SERPL-SCNC: 94 MMOL/L (ref 100–111)
CO2 SERPL-SCNC: 35 MMOL/L (ref 21–32)
CREAT SERPL-MCNC: 2.68 MG/DL (ref 0.6–1.3)
DIFFERENTIAL METHOD BLD: ABNORMAL
EOSINOPHIL # BLD: 0.11 K/UL (ref 0–0.4)
EOSINOPHIL NFR BLD: 1.8 % (ref 0–5)
ERYTHROCYTE [DISTWIDTH] IN BLOOD BY AUTOMATED COUNT: 13.8 % (ref 11.6–14.5)
GLOBULIN SER CALC-MCNC: 7.2 G/DL (ref 2–4)
GLUCOSE SERPL-MCNC: 112 MG/DL (ref 74–99)
HCT VFR BLD AUTO: 34.9 % (ref 36–48)
HGB BLD-MCNC: 12 G/DL (ref 13–16)
IMM GRANULOCYTES # BLD AUTO: 0.01 K/UL (ref 0–0.04)
IMM GRANULOCYTES NFR BLD AUTO: 0.2 % (ref 0–0.5)
INR PPP: 2.6 (ref 0.9–1.1)
LYMPHOCYTES # BLD: 1.33 K/UL (ref 0.9–3.6)
LYMPHOCYTES NFR BLD: 22.1 % (ref 21–52)
MCH RBC QN AUTO: 31.7 PG (ref 24–34)
MCHC RBC AUTO-ENTMCNC: 34.4 G/DL (ref 31–37)
MCV RBC AUTO: 92.3 FL (ref 78–100)
MONOCYTES # BLD: 0.84 K/UL (ref 0.05–1.2)
MONOCYTES NFR BLD: 14 % (ref 3–10)
NEUTS SEG # BLD: 3.69 K/UL (ref 1.8–8)
NEUTS SEG NFR BLD: 61.2 % (ref 40–73)
NRBC # BLD: 0 K/UL (ref 0–0.01)
NRBC BLD-RTO: 0 PER 100 WBC
PLATELET # BLD AUTO: 170 K/UL (ref 135–420)
PMV BLD AUTO: 11.8 FL (ref 9.2–11.8)
POTASSIUM SERPL-SCNC: 3.4 MMOL/L (ref 3.5–5.5)
PROT SERPL-MCNC: 10.3 G/DL (ref 6.4–8.2)
PROTHROMBIN TIME: 28.2 SEC (ref 11.9–14.9)
RBC # BLD AUTO: 3.78 M/UL (ref 4.35–5.65)
SODIUM SERPL-SCNC: 135 MMOL/L (ref 136–145)
TROPONIN I SERPL HS-MCNC: 27 NG/L (ref 0–78)
TROPONIN I SERPL HS-MCNC: 28 NG/L (ref 0–78)
WBC # BLD AUTO: 6 K/UL (ref 4.6–13.2)

## 2025-03-13 PROCEDURE — 80053 COMPREHEN METABOLIC PANEL: CPT

## 2025-03-13 PROCEDURE — 72040 X-RAY EXAM NECK SPINE 2-3 VW: CPT

## 2025-03-13 PROCEDURE — 99285 EMERGENCY DEPT VISIT HI MDM: CPT

## 2025-03-13 PROCEDURE — 85025 COMPLETE CBC W/AUTO DIFF WBC: CPT

## 2025-03-13 PROCEDURE — 71046 X-RAY EXAM CHEST 2 VIEWS: CPT

## 2025-03-13 PROCEDURE — 93005 ELECTROCARDIOGRAM TRACING: CPT | Performed by: EMERGENCY MEDICINE

## 2025-03-13 PROCEDURE — 85610 PROTHROMBIN TIME: CPT

## 2025-03-13 PROCEDURE — 84484 ASSAY OF TROPONIN QUANT: CPT

## 2025-03-13 RX ORDER — METHYLPREDNISOLONE 4 MG/1
TABLET ORAL
Qty: 1 KIT | Refills: 0 | Status: SHIPPED | OUTPATIENT
Start: 2025-03-13

## 2025-03-13 ASSESSMENT — ENCOUNTER SYMPTOMS
CONSTIPATION: 0
NAUSEA: 0
BACK PAIN: 0
WHEEZING: 0
SORE THROAT: 0
COUGH: 0
RHINORRHEA: 0
ABDOMINAL PAIN: 0
EYE DISCHARGE: 0
SHORTNESS OF BREATH: 0
VOMITING: 0
DIARRHEA: 0
EYE REDNESS: 0

## 2025-03-13 ASSESSMENT — PAIN SCALES - GENERAL: PAINLEVEL_OUTOF10: 6

## 2025-03-13 ASSESSMENT — PAIN - FUNCTIONAL ASSESSMENT: PAIN_FUNCTIONAL_ASSESSMENT: 0-10

## 2025-03-13 NOTE — DISCHARGE INSTRUCTIONS
Apply moist heat to your neck and chest. Expect soreness. Do some stretching exercises. F/U with your PCP and Cardiologist if no improvement.

## 2025-03-13 NOTE — ED NOTES
RN assisted the pt to get jacket on, pt ambulated out with walker w/o complications, he reports his brother is in the lobby to drive him home.

## 2025-03-13 NOTE — ED NOTES
In room at this time and reports he has had neck pain x 2 days. Pt is resting comfortably with Call bell in hand.

## 2025-03-13 NOTE — ED PROVIDER NOTES
145 mmol/L    Potassium 3.4 (L) 3.5 - 5.5 mmol/L    Chloride 94 (L) 100 - 111 mmol/L    CO2 35 (H) 21 - 32 mmol/L    Anion Gap 6 3.0 - 18 mmol/L    Glucose 112 (H) 74 - 99 mg/dL    BUN 71 (H) 7.0 - 18 MG/DL    Creatinine 2.68 (H) 0.6 - 1.3 MG/DL    BUN/Creatinine Ratio 26 (H) 12 - 20      Est, Glom Filt Rate 25 (L) >60 ml/min/1.73m2    Calcium 9.9 8.5 - 10.1 MG/DL    Total Bilirubin 1.4 (H) 0.2 - 1.0 MG/DL    ALT 16 16 - 61 U/L    AST 30 10 - 38 U/L    Alk Phosphatase 42 (L) 45 - 117 U/L    Total Protein 10.3 (H) 6.4 - 8.2 g/dL    Albumin 3.1 (L) 3.4 - 5.0 g/dL    Globulin 7.2 (H) 2.0 - 4.0 g/dL    Albumin/Globulin Ratio 0.4 (L) 0.8 - 1.7     CBC with Auto Differential    Collection Time: 03/13/25  1:53 PM   Result Value Ref Range    WBC 6.0 4.6 - 13.2 K/uL    RBC 3.78 (L) 4.35 - 5.65 M/uL    Hemoglobin 12.0 (L) 13.0 - 16.0 g/dL    Hematocrit 34.9 (L) 36.0 - 48.0 %    MCV 92.3 78.0 - 100.0 FL    MCH 31.7 24.0 - 34.0 PG    MCHC 34.4 31.0 - 37.0 g/dL    RDW 13.8 11.6 - 14.5 %    Platelets 170 135 - 420 K/uL    MPV 11.8 9.2 - 11.8 FL    Nucleated RBCs 0.0 0  WBC    nRBC 0.00 0.00 - 0.01 K/uL    Neutrophils % 61.2 40.0 - 73.0 %    Lymphocytes % 22.1 21.0 - 52.0 %    Monocytes % 14.0 (H) 3.0 - 10.0 %    Eosinophils % 1.8 0.0 - 5.0 %    Basophils % 0.7 0.0 - 2.0 %    Immature Granulocytes % 0.2 0.0 - 0.5 %    Neutrophils Absolute 3.69 1.80 - 8.00 K/UL    Lymphocytes Absolute 1.33 0.90 - 3.60 K/UL    Monocytes Absolute 0.84 0.05 - 1.20 K/UL    Eosinophils Absolute 0.11 0.00 - 0.40 K/UL    Basophils Absolute 0.04 0.00 - 0.10 K/UL    Immature Granulocytes Absolute 0.01 0.00 - 0.04 K/UL    Differential Type AUTOMATED     Troponin    Collection Time: 03/13/25  1:53 PM   Result Value Ref Range    Troponin, High Sensitivity 28 0 - 78 ng/L   Protime-INR    Collection Time: 03/13/25  1:53 PM   Result Value Ref Range    Protime 28.2 (H) 11.9 - 14.9 sec    INR 2.6 (H) 0.9 - 1.1     Troponin    Collection Time: 03/13/25  2:45

## 2025-03-14 ENCOUNTER — ANTI-COAG VISIT (OUTPATIENT)
Age: 70
End: 2025-03-14
Payer: MEDICARE

## 2025-03-14 ENCOUNTER — CARE COORDINATION (OUTPATIENT)
Facility: CLINIC | Age: 70
End: 2025-03-14

## 2025-03-14 DIAGNOSIS — I48.91 ATRIAL FIBRILLATION (HCC): Primary | Chronic | ICD-10-CM

## 2025-03-14 LAB
EKG DIAGNOSIS: NORMAL
EKG Q-T INTERVAL: 356 MS
EKG QRS DURATION: 88 MS
EKG QTC CALCULATION (BAZETT): 475 MS
EKG R AXIS: 34 DEGREES
EKG T AXIS: -37 DEGREES
EKG VENTRICULAR RATE: 107 BPM
POC INR: 3.1
PROTHROMBIN TIME, POC: NORMAL

## 2025-03-14 PROCEDURE — 93010 ELECTROCARDIOGRAM REPORT: CPT | Performed by: INTERNAL MEDICINE

## 2025-03-14 PROCEDURE — 85610 PROTHROMBIN TIME: CPT | Performed by: NURSE PRACTITIONER

## 2025-03-14 NOTE — PATIENT INSTRUCTIONS
Mr. Ishan Camacho is here today for anticoagulation monitoring for the diagnosis of atrial fibrillation.  His INR goal is 2.0-3.0 and his current Coumadin dose is 5MG.     Today's findings include an INR of 3.1     Considering Mr. Camacho's past history, todays findings, and per the coumadin policy/protocol, Mr. Camacho was instructed to take Coumadin as follows,  5MG DAILY.  He was also instructed to come back in 1 weeks for an INR check.    A full discussion of the nature of anticoagulants has been carried out.  A full discussion of the need for frequent and regular monitoring, precise dosage adjustment and compliance was stressed.  Side effects of potential bleeding were discussed and Mr. Camacho was instructed to call [phone] if there are any signs of abnormal bleeding.  Mr. Camacho was instructed to avoid any OTC items containing aspirin or ibuprofen and prior to starting any new OTC products to consult with his physician or pharmacist to ensure no drug interactions are present.  Mr. Camacho was instructed to avoid any major changes in his general diet and to avoid alcohol consumption.  .      Mr. Camacho verbalized his understanding of all instructions and will call the office with any questions, concerns, or signs of abnormal bleeding or blood clot.     "[facility], OCCUPATIONAL THERAPY  HOME EXERCISE PROGRAM     Use heat as needed    Complete the following massage for 3 minutes, 3x/day:                                  Using lotion and medium pressure, massage on and around scar in circles, side to side and up and down.    Complete the following exercises for 10 repetitions, 4-6x/day:       AROM: Isolated IPJ Flexion / Extension ("Hook")   Bend only your middle and end knuckles. Hold 3 seconds.   Straighten your fingers.       AROM: MCP and PIP Flexion / Extension ("Straight Fist")  Bend your bottom and middle knuckles, keeping the tips of your fingers straight.   Try to touch the pads of your fingers on your palm. Hold 3 seconds.   Straighten your fingers.       AROM: Composite Flexion / Extension ("Full Fist")  Bend every joint in your hand into a fist. Hold 3 seconds.   Straighten your fingers.     AROM: PIP (Middle Joint) Flexion / Extension  Pinch bottom knuckle  to prevent bending.   Actively bend middle knuckle until stretch is felt.   Hold 3 seconds. Relax. Straighten finger as far as possible.    AROM: DIP (Tip Joint) Flexion / Extension  Pinch middle knuckle to prevent bending.   Bend end knuckle until stretch is felt. Hold   3 seconds. Relax. Straighten finger as far as possible.      AROM: Abduction / Adduction  With hand flat on table, spread all fingers apart,   then bring them together as close as possible.  possible then straighten.     Therapist: Maggie Boasberg, OTR/CHT        "

## 2025-03-14 NOTE — CARE COORDINATION
Care Transitions Note    Final Call     Patient Current Location:  [place]    Care Transition Nurse contacted the patient by telephone. Verified name and  as identifiers.    Patient graduated from the Care Transitions program on 3/14/25.  Patient/family has the ability to self manage at this time..      Advance Care Planning:   Does patient have an Advance Directive: patient declined education.    Handoff:   Patient was not referred to the ACM team due to no additional needs identified.       Care Summary Note: Patient attended cardiology appointment on 3/11/25. He was seen in [facility] ED on 3/13/25 for neck muscle strain. Prescribed Medrol dose pack.    Patient reports he's \"coming along\". He's still having neck pain and will  the Medrol dose pack today. He's applying heat and ice to his neck, and the heat has been helpful. Denies any other bothersome symptoms. Reports he checks daily weights, and is not sure what his weight was today. Denies any recent weight gain and states weight has been stable since discharge. Potassium dose was initially going to be increased from daily to BID, but then cardiology placed a refill with instructions to take it daily. Patient confirmed he has been taking it once daily.    Assessments:  Care Transitions Subsequent and Final Call    Subsequent and Final Calls  Do you have any ongoing symptoms?: No  Have your medications changed?: No  Do you have any questions related to your medications?: No  Do you currently have any active services?: No  Are you currently active with any services?: Home Health  Do you have any needs or concerns that I can assist you with?: No  Identified Barriers: Other  Care Transitions Interventions  No Identified Needs  Other Interventions:              Upcoming Appointments:    Future Appointments         Provider Specialty Dept Phone    3/17/2025 10:15 AM [facility] LAB VISIT Internal Medicine [phone]    2025 1:00 PM Tatum Adams MD Vascular

## 2025-03-17 ENCOUNTER — LAB (OUTPATIENT)
Facility: CLINIC | Age: 70
End: 2025-03-17

## 2025-03-17 DIAGNOSIS — E87.6 HYPOKALEMIA: ICD-10-CM

## 2025-03-18 ENCOUNTER — RESULTS FOLLOW-UP (OUTPATIENT)
Facility: CLINIC | Age: 70
End: 2025-03-18

## 2025-03-18 DIAGNOSIS — E87.6 HYPOKALEMIA: ICD-10-CM

## 2025-03-18 LAB
BUN SERPL-MCNC: 78 MG/DL (ref 8–27)
BUN/CREAT SERPL: 31 (ref 10–24)
CHLORIDE SERPL-SCNC: 89 MMOL/L (ref 96–106)
CO2 SERPL-SCNC: 30 MMOL/L (ref 20–29)
CREAT SERPL-MCNC: 2.54 MG/DL (ref 0.76–1.27)
EGFRCR SERPLBLD CKD-EPI 2021: 26 ML/MIN/1.73
GLUCOSE SERPL-MCNC: 152 MG/DL (ref 70–99)
POTASSIUM SERPL-SCNC: 3.4 MMOL/L (ref 3.5–5.2)
SODIUM SERPL-SCNC: 137 MMOL/L (ref 134–144)

## 2025-03-18 RX ORDER — POTASSIUM CHLORIDE 750 MG/1
10 TABLET, EXTENDED RELEASE ORAL 2 TIMES DAILY
Qty: 90 TABLET | Refills: 0 | Status: SHIPPED | OUTPATIENT
Start: 2025-03-18

## 2025-03-18 NOTE — TELEPHONE ENCOUNTER
Pt scheduled.  Please inform the patient that labs show some irregularities.  Would like to see the patient as soon as possible.  May double book if needed anytime today or tomorrow before 3 PM .    In the meantime increase potassium supplement to 10 mEq twice a day.

## 2025-03-19 ENCOUNTER — TELEMEDICINE (OUTPATIENT)
Facility: CLINIC | Age: 70
End: 2025-03-19

## 2025-03-19 ENCOUNTER — TELEPHONE (OUTPATIENT)
Facility: CLINIC | Age: 70
End: 2025-03-19

## 2025-03-19 DIAGNOSIS — E87.6 HYPOKALEMIA: Primary | ICD-10-CM

## 2025-03-19 DIAGNOSIS — E86.0 DEHYDRATION: ICD-10-CM

## 2025-03-19 DIAGNOSIS — N18.32 CKD STAGE 3B, GFR 30-44 ML/MIN (HCC): ICD-10-CM

## 2025-03-19 NOTE — TELEPHONE ENCOUNTER
Please call the patient and help make an appointment to see me in person on Friday 3/28.  Labs to be done on 24th or 25th.    Thanks

## 2025-03-19 NOTE — PROGRESS NOTES
Ishan Camacho Sr. presents today for   Chief Complaint   Patient presents with    Follow-up           \"Have you been to the ER, urgent care clinic since your last visit?  Hospitalized since your last visit?\"    YES - When: approximately 5 days ago.  Where and Why: [facility] injury..    “Have you seen or consulted any other health care providers outside of [facility] since your last visit?”    NO            
Exam  [INSTRUCTIONS:  \"[x]\" Indicates a positive item  \"[]\" Indicates a negative item  -- DELETE ALL ITEMS NOT EXAMINED]    Constitutional: [x] Appears well-developed and well-nourished [x] No apparent distress      [] Abnormal -     Mental status: [x] Alert and awake  [x] Oriented to person/place/time [x] Able to follow commands    [] Abnormal -     Eyes:   EOM    [x]  Normal    [] Abnormal -   Sclera  [x]  Normal    [] Abnormal -          Discharge [x]  None visible   [] Abnormal -     HENT: [x] Normocephalic, atraumatic  [] Abnormal -   [x] Mouth/Throat: Mucous membranes are moist    External Ears [x] Normal  [] Abnormal -    Neck: [x] No visualized mass [] Abnormal -     Pulmonary/Chest: [x] Respiratory effort normal   [x] No visualized signs of difficulty breathing or respiratory distress        [] Abnormal -      Musculoskeletal:   [x] Normal gait with no signs of ataxia         [x] Normal range of motion of neck        [] Abnormal -     Neurological:        [x] No Facial Asymmetry (Cranial nerve 7 motor function) (limited exam due to video visit)          [x] No gaze palsy        [] Abnormal -          Skin:        [x] No significant exanthematous lesions or discoloration noted on facial skin         [] Abnormal -            Psychiatric:       [x] Normal Affect [] Abnormal -        [x] No Hallucinations    Other pertinent observable physical exam findings:-         On this date 3/19/2025 I have spent 20 minutes reviewing previous notes, test results and face to face (virtual) with the patient discussing the diagnosis and importance of compliance with the treatment plan as well as documenting on the day of the visit.    --Delmer Hernandez MD

## 2025-03-20 NOTE — ASSESSMENT & PLAN NOTE
To stop metolazone for now.  To continue Bumex.  Patient is suggested to seek help if starts to retain fluid.  Repeat electrolytes in 1 week.

## 2025-03-25 ENCOUNTER — HOSPITAL ENCOUNTER (EMERGENCY)
Facility: HOSPITAL | Age: 70
Discharge: HOME | End: 2025-03-25
Attending: STUDENT IN AN ORGANIZED HEALTH CARE EDUCATION/TRAINING PROGRAM
Payer: MEDICARE

## 2025-03-25 ENCOUNTER — TELEPHONE (OUTPATIENT)
Facility: CLINIC | Age: 70
End: 2025-03-25

## 2025-03-25 VITALS
DIASTOLIC BLOOD PRESSURE: 60 MMHG | BODY MASS INDEX: 31.83 KG/M2 | RESPIRATION RATE: 16 BRPM | OXYGEN SATURATION: 100 % | TEMPERATURE: 98.8 F | HEART RATE: 90 BPM | WEIGHT: 256 LBS | SYSTOLIC BLOOD PRESSURE: 107 MMHG | HEIGHT: 75 IN

## 2025-03-25 DIAGNOSIS — M62.838 LEG MUSCLE SPASM: Primary | ICD-10-CM

## 2025-03-25 LAB
ANION GAP SERPL CALC-SCNC: 4 MMOL/L (ref 3–18)
BASOPHILS # BLD: 0.04 K/UL (ref 0–0.1)
BASOPHILS NFR BLD: 0.8 % (ref 0–2)
BUN SERPL-MCNC: 63 MG/DL (ref 7–18)
BUN/CREAT SERPL: 29 (ref 12–20)
CALCIUM SERPL-MCNC: 9.6 MG/DL (ref 8.5–10.1)
CHLORIDE SERPL-SCNC: 94 MMOL/L (ref 100–111)
CO2 SERPL-SCNC: 38 MMOL/L (ref 21–32)
CREAT SERPL-MCNC: 2.17 MG/DL (ref 0.6–1.3)
DIFFERENTIAL METHOD BLD: ABNORMAL
EOSINOPHIL # BLD: 0.2 K/UL (ref 0–0.4)
EOSINOPHIL NFR BLD: 4.1 % (ref 0–5)
ERYTHROCYTE [DISTWIDTH] IN BLOOD BY AUTOMATED COUNT: 14.3 % (ref 11.6–14.5)
GLUCOSE SERPL-MCNC: 127 MG/DL (ref 74–99)
HCT VFR BLD AUTO: 34.1 % (ref 36–48)
HGB BLD-MCNC: 11.6 G/DL (ref 13–16)
IMM GRANULOCYTES # BLD AUTO: 0.02 K/UL (ref 0–0.04)
IMM GRANULOCYTES NFR BLD AUTO: 0.4 % (ref 0–0.5)
LYMPHOCYTES # BLD: 1.25 K/UL (ref 0.9–3.6)
LYMPHOCYTES NFR BLD: 25.6 % (ref 21–52)
MAGNESIUM SERPL-MCNC: 1.6 MG/DL (ref 1.6–2.6)
MCH RBC QN AUTO: 31.8 PG (ref 24–34)
MCHC RBC AUTO-ENTMCNC: 34 G/DL (ref 31–37)
MCV RBC AUTO: 93.4 FL (ref 78–100)
MONOCYTES # BLD: 0.86 K/UL (ref 0.05–1.2)
MONOCYTES NFR BLD: 17.6 % (ref 3–10)
NEUTS SEG # BLD: 2.52 K/UL (ref 1.8–8)
NEUTS SEG NFR BLD: 51.5 % (ref 40–73)
NRBC # BLD: 0 K/UL (ref 0–0.01)
NRBC BLD-RTO: 0 PER 100 WBC
NT PRO BNP: 1391 PG/ML (ref 0–900)
PLATELET # BLD AUTO: 178 K/UL (ref 135–420)
PMV BLD AUTO: 12.2 FL (ref 9.2–11.8)
POTASSIUM SERPL-SCNC: 3.5 MMOL/L (ref 3.5–5.5)
RBC # BLD AUTO: 3.65 M/UL (ref 4.35–5.65)
SODIUM SERPL-SCNC: 136 MMOL/L (ref 136–145)
TROPONIN I SERPL HS-MCNC: 51 NG/L (ref 0–78)
WBC # BLD AUTO: 4.9 K/UL (ref 4.6–13.2)

## 2025-03-25 PROCEDURE — 83880 ASSAY OF NATRIURETIC PEPTIDE: CPT

## 2025-03-25 PROCEDURE — 6370000000 HC RX 637 (ALT 250 FOR IP): Performed by: STUDENT IN AN ORGANIZED HEALTH CARE EDUCATION/TRAINING PROGRAM

## 2025-03-25 PROCEDURE — 84484 ASSAY OF TROPONIN QUANT: CPT

## 2025-03-25 PROCEDURE — 99284 EMERGENCY DEPT VISIT MOD MDM: CPT

## 2025-03-25 PROCEDURE — 85025 COMPLETE CBC W/AUTO DIFF WBC: CPT

## 2025-03-25 PROCEDURE — 83735 ASSAY OF MAGNESIUM: CPT

## 2025-03-25 PROCEDURE — 80048 BASIC METABOLIC PNL TOTAL CA: CPT

## 2025-03-25 RX ORDER — ALBUTEROL SULFATE 5 MG/ML
2.5 SOLUTION RESPIRATORY (INHALATION) EVERY 6 HOURS PRN
Qty: 120 EACH | Refills: 0 | Status: SHIPPED | OUTPATIENT
Start: 2025-03-25 | End: 2025-03-25 | Stop reason: CLARIF

## 2025-03-25 RX ORDER — METHOCARBAMOL 750 MG/1
750 TABLET, FILM COATED ORAL 4 TIMES DAILY
Qty: 40 TABLET | Refills: 0 | Status: SHIPPED | OUTPATIENT
Start: 2025-03-25 | End: 2025-04-04

## 2025-03-25 RX ORDER — 0.9 % SODIUM CHLORIDE 0.9 %
1000 INTRAVENOUS SOLUTION INTRAVENOUS ONCE
Status: DISCONTINUED | OUTPATIENT
Start: 2025-03-25 | End: 2025-03-25

## 2025-03-25 RX ORDER — METHOCARBAMOL 500 MG/1
1000 TABLET, FILM COATED ORAL
Status: COMPLETED | OUTPATIENT
Start: 2025-03-25 | End: 2025-03-25

## 2025-03-25 RX ORDER — 0.9 % SODIUM CHLORIDE 0.9 %
500 INTRAVENOUS SOLUTION INTRAVENOUS ONCE
Status: DISCONTINUED | OUTPATIENT
Start: 2025-03-25 | End: 2025-03-25

## 2025-03-25 RX ADMIN — METHOCARBAMOL 1000 MG: 500 TABLET ORAL at 08:45

## 2025-03-25 ASSESSMENT — ENCOUNTER SYMPTOMS
SORE THROAT: 0
ABDOMINAL PAIN: 0
CHEST TIGHTNESS: 0
NAUSEA: 0
DIARRHEA: 0
VOMITING: 0
SHORTNESS OF BREATH: 0

## 2025-03-25 ASSESSMENT — PAIN DESCRIPTION - DESCRIPTORS: DESCRIPTORS: ACHING

## 2025-03-25 ASSESSMENT — PAIN DESCRIPTION - PAIN TYPE: TYPE: ACUTE PAIN

## 2025-03-25 ASSESSMENT — PAIN DESCRIPTION - LOCATION: LOCATION: LEG

## 2025-03-25 ASSESSMENT — PAIN SCALES - GENERAL: PAINLEVEL_OUTOF10: 6

## 2025-03-25 ASSESSMENT — PAIN - FUNCTIONAL ASSESSMENT
PAIN_FUNCTIONAL_ASSESSMENT: 0-10
PAIN_FUNCTIONAL_ASSESSMENT: PREVENTS OR INTERFERES SOME ACTIVE ACTIVITIES AND ADLS

## 2025-03-25 NOTE — ED PROVIDER NOTES
EMERGENCY DEPARTMENT HISTORY AND PHYSICAL EXAM      Date: 3/25/2025  Patient Name: Ishan Camacho Sr.    History of Presenting Illness     Chief Complaint   Patient presents with    Leg Pain       69-year-old male with history of atrial fibrillation on Coumadin, stage IV CKD, CHF with preserved ejection fraction presenting to the emergency department for evaluation of bilateral leg pain and cramping.  Patient states that he has been on a fluid this direction and is not on diuretics because he was hypervolemic previously with lower extremity vomiting.  Because of this he has  had electrolyte deficiencies especially potassium and now is on daily oral potassium.  However, still thinks that his electrolytes may be deficient in the past causing muscle spasms and cramping in his legs.  He denies any chest pain or shortness of breath.  Denies any fevers or chills.  Denies any trauma.          PCP: Delmer Hernandez MD    Current Facility-Administered Medications   Medication Dose Route Frequency Provider Last Rate Last Admin    methocarbamol (ROBAXIN) tablet 1,000 mg  1,000 mg Oral NOW Antione Ko,          Current Outpatient Medications   Medication Sig Dispense Refill    methocarbamol (ROBAXIN-750) 750 MG tablet Take 1 tablet by mouth 4 times daily for 10 days 40 tablet 0    potassium chloride (KLOR-CON M) 10 MEQ extended release tablet Take 1 tablet by mouth 2 times daily 90 tablet 0    methylPREDNISolone (MEDROL DOSEPACK) 4 MG tablet Take by mouth. Follow 6 day taper instructions on the back of the blister pack 1 kit 0    warfarin (COUMADIN) 5 MG tablet Take 1.5 tablet equaling 7.5 mg on Monday and Wednesday.  Continue 1 tablet equaling 5 mg rest of the 5 days. 30 tablet 3    ferrous sulfate (FE TABS) 325 (65 Fe) MG EC tablet Take 1 tablet by mouth 2 times daily 90 tablet 3    bumetanide (BUMEX) 1 MG tablet Take 1 tablet by mouth 2 times daily      ergocalciferol (ERGOCALCIFEROL) 1.25 MG (05223 UT) capsule Take  1 capsule by mouth once a week      carvedilol (COREG) 25 MG tablet Take 1 tablet by mouth 2 times daily (with meals) 180 tablet 2    hydrALAZINE (APRESOLINE) 50 MG tablet TAKE ONE TABLET BY MOUTH THREE TIMES DAILY 270 tablet 2       Past History     Past Medical History:  Past Medical History:   Diagnosis Date    Atrial fibrillation (HCC)     Chronic diastolic heart failure (HCC)     Hypertension     Mitral valve disorders(424.0)     Prediabetes        Past Surgical History:  Past Surgical History:   Procedure Laterality Date    ADENOIDECTOMY      CARDIAC PROCEDURE N/A 2/10/2025    Right heart cath performed by Usama Levine MD at [facility] CARDIAC CATH LAB    COLONOSCOPY N/A 2020    COLONOSCOPY performed by Braulio Jim MD at [facility] ENDOSCOPY    OTHER SURGICAL HISTORY  1965    infection drained from under chin    TONSILLECTOMY         Family History:  Family History   Problem Relation Age of Onset    No Known Problems Brother     No Known Problems Sister     Hypertension Neg Hx     Kidney Disease Mother     Cancer Father     Diabetes Mother        Social History:  Social History     Tobacco Use    Smoking status: Former     Current packs/day: 0.00     Average packs/day: 0.5 packs/day for 3.0 years (1.5 ttl pk-yrs)     Types: Cigarettes     Start date: 3/21/1978     Quit date: 3/21/1981     Years since quittin.0     Passive exposure: Never    Smokeless tobacco: Never   Vaping Use    Vaping status: Never Used   Substance Use Topics    Alcohol use: Not Currently     Comment: socially    Drug use: No       Allergies:  No Known Allergies      Review of Systems       Review of Systems   Constitutional:  Negative for activity change, appetite change, fatigue and fever.   HENT:  Negative for congestion and sore throat.    Respiratory:  Negative for chest tightness and shortness of breath.    Cardiovascular:  Negative for chest pain.   Gastrointestinal:  Negative for abdominal pain, diarrhea, nausea and vomiting.    Genitourinary:  Negative for difficulty urinating, dysuria, flank pain, hematuria and urgency.   Musculoskeletal:  Positive for myalgias (bilateral leg cramping). Negative for arthralgias.   Skin:  Negative for rash.   Neurological:  Negative for dizziness, weakness, light-headedness, numbness and headaches.   Psychiatric/Behavioral:  Negative for agitation.          Physical Exam   /60   Pulse 90   Temp 98.8 °F (37.1 °C) (Oral)   Resp 16   Ht 1.905 m (6' 3\")   Wt 116.1 kg (256 lb)   SpO2 100%   BMI 32.00 kg/m²       Physical Exam  Constitutional:       General: He is not in acute distress.     Appearance: He is not toxic-appearing.   HENT:      Head: Normocephalic and atraumatic.      Mouth/Throat:      Mouth: Mucous membranes are moist.   Eyes:      Extraocular Movements: Extraocular movements intact.      Pupils: Pupils are equal, round, and reactive to light.   Cardiovascular:      Rate and Rhythm: Normal rate and regular rhythm.      Pulses: Normal pulses.      Heart sounds: Normal heart sounds.   Pulmonary:      Effort: Pulmonary effort is normal.      Breath sounds: Normal breath sounds.   Abdominal:      General: Abdomen is flat.      Palpations: Abdomen is soft.      Tenderness: There is no abdominal tenderness.   Musculoskeletal:         General: No swelling, tenderness, deformity or signs of injury. Normal range of motion.      Cervical back: Normal range of motion and neck supple.      Right lower leg: No edema.      Left lower leg: No edema.   Skin:     General: Skin is warm and dry.      Capillary Refill: Capillary refill takes less than 2 seconds.      Findings: No bruising, erythema, lesion or rash.   Neurological:      General: No focal deficit present.      Mental Status: He is alert and oriented to person, place, and time.      Sensory: No sensory deficit.      Motor: No weakness.   Psychiatric:         Mood and Affect: Mood normal.           Diagnostic Study Results     Labs

## 2025-03-25 NOTE — DISCHARGE INSTRUCTIONS
Continue with your medications at home.   the prescribed muscle relaxants and take them as needed for the cramping in the legs.  Make sure you are following up with your primary care doctor and your cardiologist.  Return to the emergency department for any new or worsening symptoms.

## 2025-03-25 NOTE — ED NOTES
Spoke with wife regarding pt status, as pt is poor historian. Pt weakness and speech delay is baseline. States pt is at baseline except for leg cramping and increased weakness. Pt hospitalized 2 weeks ago for hypokalemia and acute on chronic CHF.

## 2025-03-25 NOTE — TELEPHONE ENCOUNTER
Pt  is being discharged from physical therapy early because he has declined his appointments for the past 2 weeks

## 2025-03-27 ENCOUNTER — TELEPHONE (OUTPATIENT)
Facility: CLINIC | Age: 70
End: 2025-03-27

## 2025-03-27 DIAGNOSIS — I50.33 ACUTE ON CHRONIC DIASTOLIC CONGESTIVE HEART FAILURE (HCC): Primary | ICD-10-CM

## 2025-03-27 NOTE — TELEPHONE ENCOUNTER
Pt's wife Sandra Lynn (on HIPAA) called stating she spoke to Gordon about pt's home health , she was told pt is eligible to have home health 3 days a week for 2 hours a day    Pt wife said pt needs Home health  5 days a week and for a longer time during the day    She was advised to call  Pcp and have him adjust the amount of time pt needs .

## 2025-03-28 ENCOUNTER — TELEPHONE (OUTPATIENT)
Facility: CLINIC | Age: 70
End: 2025-03-28

## 2025-03-28 NOTE — TELEPHONE ENCOUNTER
Pt's brother Jorge said that pt had an appt today and was unable to stand on his legs. Pt's legs were giving out on him. Pt's brother believes that it could be the muscle relaxers that pcp just prescribed for him that's causing this. Spoke to pt and pt gave verbal permission to speak to Jorge. Advised pt that he would need to fill out HIPAA form when he comes in the office and add his brother. Pt verbalized understanding. Pt requested a follow up call to his brother Jorge on what can be done about this. [phone]  Please advise.

## 2025-03-28 NOTE — TELEPHONE ENCOUNTER
Brother states he is moving a little bit, but when he tries to stand patient up patient states his legs give out on him.  Brother and patient deny dizziness, lightheadedness, no chest pain and no blurred vision. Brother states patients legs are weak.  Brother states he last took a muscle relaxer Tuesday.    Brother states patient has an appointment on Monday with Dr. Hernandez.     Please advise.

## 2025-03-28 NOTE — TELEPHONE ENCOUNTER
Spoke with Chiqui at [facility](Ganesh Martínez) she states to send an order for Home health. Chiqui states the visits will be based on the skill need.  Chiqui states the patient more than likely will not be seen 5 days a week.      Please send order to Home Health for services that are needed. HH will contact office if anything is needed.

## 2025-03-31 ENCOUNTER — TELEPHONE (OUTPATIENT)
Facility: CLINIC | Age: 70
End: 2025-03-31

## 2025-04-01 ENCOUNTER — TELEPHONE (OUTPATIENT)
Facility: CLINIC | Age: 70
End: 2025-04-01

## 2025-04-02 ENCOUNTER — TELEPHONE (OUTPATIENT)
Facility: CLINIC | Age: 70
End: 2025-04-02

## 2025-04-02 NOTE — TELEPHONE ENCOUNTER
Patients daughter contacted the office stating she faxed fmla paperwork last week to be filled out, she also said she contacted our office twice to make sure it was received. I do not see anything scanned, please advise    Suzi Camacho: [phone]

## 2025-04-02 NOTE — TELEPHONE ENCOUNTER
Updated daughter she provided dates needed for FMLA. Will update when completed.Form placed in  office.

## 2025-04-02 NOTE — TELEPHONE ENCOUNTER
Pt daughter wants to be added to HIPAA. And will  form. Tried to call pt last week he is in the hospital.

## 2025-04-03 NOTE — TELEPHONE ENCOUNTER
Pts daughter came into office to check on the status of [place] paperwork.    Also she dropped of a DNR form for provider.       She states she is leaving tomorrow,     Please advise.

## 2025-04-09 ENCOUNTER — TELEPHONE (OUTPATIENT)
Facility: CLINIC | Age: 70
End: 2025-04-09

## 2025-04-09 NOTE — TELEPHONE ENCOUNTER
----- Message from Giselle DHILLON sent at 4/8/2025  4:03 PM EDT -----  Regarding: FW: ECC Message to Provider  Please let daughter know that Dr. Hernandez does not go to [facility] to see patients.  ----- Message -----  From: Shelby Archuleta  Sent: 4/8/2025   3:32 PM EDT  To: [facility] Clinical Staff  Subject: ECC Message to Provider                          ECC Message to Provider    Relationship to Patient: Third Party -  Gena Camacho - daughter     Additional Information: Daughter call to inform the PCP that patient is in the [facility] and she wanted to know if the PCP will go to the Center for the incoming appointment of her dad.   --------------------------------------------------------------------------------------------------------------------------    Call Back Information: OK to leave message on voicemail  Preferred Call Back Number: [phone]

## 2025-04-09 NOTE — TELEPHONE ENCOUNTER
Contacted patients daughter and made her aware that Dr Hernandez does not go to [facility] to see patients. vz

## 2025-04-16 ENCOUNTER — TELEPHONE (OUTPATIENT)
Facility: CLINIC | Age: 70
End: 2025-04-16

## 2025-04-18 PROBLEM — E86.0 DEHYDRATION: Status: RESOLVED | Noted: 2025-03-19 | Resolved: 2025-04-18

## 2025-04-28 ENCOUNTER — CARE COORDINATION (OUTPATIENT)
Facility: CLINIC | Age: 70
End: 2025-04-28

## 2025-04-28 NOTE — CARE COORDINATION
- diaper rash looks more as of contact irritation of skin rather than a fungal infection: so lets stop nystatin  - expect skin rash to improve in about few days to a week of discontinuing nystatin    Recommended gentle skin cares including:  -Change diaper immediately with any urine or stool on it (to avoid prolonged contact with irritants)  -Avoid baby wipes if possible. Instead use a soft cloth moistened with water or can use water wipes If desired  -Follow with a barrier product with each diaper change in a thick layer. Apply zinc oxide/ Desitin and follow with a thick layer of Vaseline/Aquaphor  - use prescribed steroid cream twice per day for the next 1-2 weeks (not more than 2 weeks at a time as can lead to skin thinning).  - use Benadryl PRN If with skin itching/fussiness from skin rash       Care Transitions Note    Initial Call - Call within 2 business days of discharge: No, Patient discharged to Skilled Nursing Facility.     Transition of care outreach postponed for 14 days due to patient's discharge to [facility] [phone]   .

## 2025-04-30 ENCOUNTER — TELEPHONE (OUTPATIENT)
Facility: CLINIC | Age: 70
End: 2025-04-30

## 2025-04-30 NOTE — TELEPHONE ENCOUNTER
Patients daughter trevin called in to make an urgent appointment for pt. Daughter is not listed on [employer] communication form. Daughter was not with patient so I could not get a verbal or ask patient his symptoms. I informed daughter that I was unable to discuss patient's care without his permission.   Trevin stated that she was here in office and was added to his chart. I was  unable to find any source of that.   After speaking to , we will schedule the appointment but inform Trevin that she will need to be added to the communication release form     I attempted to contact Trevin back, no answer. I was able to contact patient's spouse.     Patient was in hospital and rehab and needs to schedule a follow up visit. I offered appointment for next week and spouse was unable to schedule due to availability.     Spouse or daughter will call back to schedule.

## 2025-05-06 ENCOUNTER — OFFICE VISIT (OUTPATIENT)
Facility: CLINIC | Age: 70
End: 2025-05-06
Payer: MEDICARE

## 2025-05-06 VITALS
DIASTOLIC BLOOD PRESSURE: 58 MMHG | BODY MASS INDEX: 31.83 KG/M2 | HEART RATE: 73 BPM | SYSTOLIC BLOOD PRESSURE: 107 MMHG | OXYGEN SATURATION: 98 % | RESPIRATION RATE: 18 BRPM | TEMPERATURE: 97.9 F | WEIGHT: 256 LBS | HEIGHT: 75 IN

## 2025-05-06 DIAGNOSIS — D64.9 ANEMIA, UNSPECIFIED TYPE: ICD-10-CM

## 2025-05-06 DIAGNOSIS — U07.1 COVID-19: Primary | ICD-10-CM

## 2025-05-06 DIAGNOSIS — R77.0 LOW SERUM ALBUMIN: ICD-10-CM

## 2025-05-06 DIAGNOSIS — I48.0 PAROXYSMAL ATRIAL FIBRILLATION (HCC): Chronic | ICD-10-CM

## 2025-05-06 DIAGNOSIS — I50.32 CHRONIC DIASTOLIC HEART FAILURE (HCC): ICD-10-CM

## 2025-05-06 DIAGNOSIS — I10 PRIMARY HYPERTENSION: ICD-10-CM

## 2025-05-06 PROCEDURE — G8427 DOCREV CUR MEDS BY ELIG CLIN: HCPCS | Performed by: INTERNAL MEDICINE

## 2025-05-06 PROCEDURE — 1036F TOBACCO NON-USER: CPT | Performed by: INTERNAL MEDICINE

## 2025-05-06 PROCEDURE — 3078F DIAST BP <80 MM HG: CPT | Performed by: INTERNAL MEDICINE

## 2025-05-06 PROCEDURE — 3074F SYST BP LT 130 MM HG: CPT | Performed by: INTERNAL MEDICINE

## 2025-05-06 PROCEDURE — 99214 OFFICE O/P EST MOD 30 MIN: CPT | Performed by: INTERNAL MEDICINE

## 2025-05-06 PROCEDURE — 1159F MED LIST DOCD IN RCRD: CPT | Performed by: INTERNAL MEDICINE

## 2025-05-06 PROCEDURE — 3017F COLORECTAL CA SCREEN DOC REV: CPT | Performed by: INTERNAL MEDICINE

## 2025-05-06 PROCEDURE — 1160F RVW MEDS BY RX/DR IN RCRD: CPT | Performed by: INTERNAL MEDICINE

## 2025-05-06 PROCEDURE — G8417 CALC BMI ABV UP PARAM F/U: HCPCS | Performed by: INTERNAL MEDICINE

## 2025-05-06 PROCEDURE — 1124F ACP DISCUSS-NO DSCNMKR DOCD: CPT | Performed by: INTERNAL MEDICINE

## 2025-05-06 PROCEDURE — 1126F AMNT PAIN NOTED NONE PRSNT: CPT | Performed by: INTERNAL MEDICINE

## 2025-05-06 NOTE — PROGRESS NOTES
Ishan Camacho Sr. presents today for   Chief Complaint   Patient presents with    Follow-Up from Hospital           \"Have you been to the ER, urgent care clinic since your last visit?  Hospitalized since your last visit?\"    YES - When: approximately 2  weeks ago.  Where and Why: [facility] fluid build up and covid.    “Have you seen or consulted any other health care providers outside of [facility] since your last visit?”    NO

## 2025-05-07 ENCOUNTER — TELEPHONE (OUTPATIENT)
Facility: CLINIC | Age: 70
End: 2025-05-07

## 2025-05-07 PROBLEM — U07.1 COVID-19: Status: ACTIVE | Noted: 2025-05-07

## 2025-05-07 NOTE — TELEPHONE ENCOUNTER
Pt's spouse requested that the handicap placard form be mailed to pt's address after it's been filled out. Spouse verified address on file as correct address. Please advise.

## 2025-05-08 NOTE — PROGRESS NOTES
Ishan Camacho . (: 1955) is a 69 y.o. male, here for evaluation of the following chief complaint(s):  Follow-Up from Hospital       ASSESSMENT/PLAN:  Below is the assessment and plan developed based on review of pertinent history, physical exam, labs, studies, and medications.    1. COVID-19  Assessment & Plan:   Patient clinically stable, symptom-free.    2. Primary hypertension  Assessment & Plan:   Blood pressure stable on current medications.  3. Paroxysmal atrial fibrillation (HCC)  Assessment & Plan:   Heart rate under control on carvedilol.  Patient is on Coumadin for stroke prophylaxis.  Patient follows with cardiology clinic for PT/INR follow-up.  4. Chronic diastolic heart failure (HCC)  Assessment & Plan:   Stable on Bumex, potassium supplement.  Follows with cardiology.  5. Low serum albumin  Assessment & Plan:   Patient to follow-up with GI.  Orders:  -     Ambulatory referral to Gastroenterology  6. Anemia, unspecified type  -     Ambulatory referral to Gastroenterology      Return if symptoms worsen or fail to improve.     SUBJECTIVE/OBJECTIVE:  HPI  Patient was hospitalized on 2025.  Patient was discharged on 2025    69 years old male with past medical history significant for HFpEF, atrial fibrillation on warfarin, CKD stage IV, cirrhosis, iron deficiency anemia, CHF, hypertension, leukopenia presenting to the emergency room from [facility] and rehab for further evaluation of reported cough associated with pleuritic chest pain. Patient reports an ongoing nonproductive cough for unclear duration. Patient reports chest pain only with coughing. Patient denies active chest pain, dyspnea, fevers, chills or any further systemic complaint at bedside. Initial cardiac workup was reassuring however chest x-ray concerning for worsening cardiomegaly with follow-up echocardiogram recommended.     Problems Managed During Hospitalization:  Chest pain in the setting of COVID 19

## 2025-05-08 NOTE — ASSESSMENT & PLAN NOTE
Heart rate under control on carvedilol.  Patient is on Coumadin for stroke prophylaxis.  Patient follows with cardiology clinic for PT/INR follow-up.

## 2025-05-12 ENCOUNTER — CARE COORDINATION (OUTPATIENT)
Facility: CLINIC | Age: 70
End: 2025-05-12

## 2025-05-12 NOTE — CARE COORDINATION
Care Transitions Note    Initial Call    Attempted to reach patient for transitions of care follow up. Unable to reach patient.    Outreach Attempts:   Unable to leave message. Vm is full.     Noted Patient attended PCP appt. On 25.     Patient: Ishan Camacho Sr.    Patient : 1955   MRN: [id number]    Reason for Admission: Chest Pain  Discharge Date: 3/25/25  RURS: Readmission Risk Score: 20.8    Last Discharge Facility       Date Complaint Diagnosis Description Type Department Provider    3/25/25 Leg Pain Leg muscle spasm ED (DISCHARGE) [facility], DO            Was this an external facility discharge? Yes. Discharge Date: 25. Facility Name: [facility]/     Follow Up Appointment:   Already attended PCP appt. On 25.     Noted Patient attended PCP appt. On 25.     Future Appointments         Provider Specialty Dept Phone    2025 2:00 PM [facility] LAB VISIT Internal Medicine [phone]    2025 1:20 PM Delmer Hernandez MD Internal Medicine [phone]    2025 1:00 PM Tatum Adams MD Vascular Surgery [phone]    7/10/2025 8:30 AM Mary Sal MD Cardiology [phone]            Plan for follow-up on next business day.      Anyi Ma RN

## 2025-05-13 ENCOUNTER — TELEPHONE (OUTPATIENT)
Facility: CLINIC | Age: 70
End: 2025-05-13

## 2025-05-13 ENCOUNTER — CARE COORDINATION (OUTPATIENT)
Facility: CLINIC | Age: 70
End: 2025-05-13

## 2025-05-13 DIAGNOSIS — H93.8X9 SENSATION OF FULLNESS IN EAR, UNSPECIFIED LATERALITY: ICD-10-CM

## 2025-05-13 DIAGNOSIS — U07.1 COVID-19: Primary | ICD-10-CM

## 2025-05-13 PROCEDURE — 1111F DSCHRG MED/CURRENT MED MERGE: CPT | Performed by: INTERNAL MEDICINE

## 2025-05-13 RX ORDER — TRAZODONE HYDROCHLORIDE 50 MG/1
50 TABLET ORAL NIGHTLY
COMMUNITY

## 2025-05-13 NOTE — TELEPHONE ENCOUNTER
Please inform the spouse that we do not do permanent disability confirmation paperwork in this office.  Thanks

## 2025-05-13 NOTE — TELEPHONE ENCOUNTER
Pt's spouse said that pt was discharged from nursing home facility on 5/12/25. Spouse spoke to  with [employer] and currently, pt is under short term care, but needs something from pcp in order for pt to have long term care. Pt's spouse asked if pcp could be sure to state that pt is 100% disabled. Pt still can't walk and is in a wheelchair. Spouse provided information for . Erich Wright-[phone]. Please advise.

## 2025-05-13 NOTE — CARE COORDINATION
Care Transitions Note    Initial Call - Call within 2 business days of discharge: Yes    Patient Current Location:  Home: [address] [place] [address]   [facility] [address]    Care Transition Nurse contacted the patient by telephone to perform post hospital discharge assessment, verified name and  as identifiers.  Provided introduction to self, and explanation of the Care Transition Nurse role.      Patient: Ishan Camacho Sr.    Patient : 1955   MRN: [id number]    Reason for Admission: Chest Pain  Discharge Date: 3/25/25  RURS: Readmission Risk Score: 20.8    Last Discharge Facility       Date Complaint Diagnosis Description Type Department Provider    3/25/25 Leg Pain Leg muscle spasm ED (DISCHARGE) Antione Browne, DO            Was this an external facility discharge? Yes. Discharge Date: 25. Facility Name: [facility].    Additional needs identified to be addressed with provider      ADAMARIS Talley Dr. spoke with Patient and Pt. Spouse today for follow up. Patient reports that he is doing good. Pt. Spouse reported that  Pt. was discharged to Home from SNF yesterday, Pt. Spouse reports that Patient is complaining of ears feel clogged today. Upon medication review with Pt. Spouse, Pt. Spouse  reports the following;     1 Pt. Is now taking Coreg 12.5 mg twice daily instead of coreg 25mg .   2. Pt. Spouse states that Pt. Will start Taking iron  twice a week instead of everyday.  3. Pt. Spouse report that Pt. Is not taking Potassium.   4. Pt. Spouse reports that Pt. Was Prescribed trazadone nightly but currently not taking. Patient's spouse also mentioned that Pt. was prescribed Melatonin as well.  Pt. Spouse would like to verify if Patient needs to be taking both ?  5. Pt. Spouse reports that Pt.is taking  2.5mg of Coumadin once daily and not 5 mg.          Method of communication with provider: chart routing.    Patients top risk factors for readmission: recent hospital

## 2025-05-14 NOTE — TELEPHONE ENCOUNTER
Mrs. Lynn (pt's wife) wanted to check on the status of the paperwork requested. Advised spouse of pcp's last message. Spouse explained that pt is confined to the home. Pt can't walk or do anything on his own and is in need of long term home health care. Pt currently has short term home health care, but in order to switch to long term home health care, the  is requesting to have something that proves that the pt is disabled from pcp. Called the  to get more information and lvm to return call.

## 2025-05-14 NOTE — TELEPHONE ENCOUNTER
Pt spouse Mrs. Lynn called in to inquire about paperwork for spouse.     Line was disconnected. Please advise.

## 2025-05-15 ENCOUNTER — TELEPHONE (OUTPATIENT)
Facility: CLINIC | Age: 70
End: 2025-05-15

## 2025-05-15 NOTE — TELEPHONE ENCOUNTER
Pt's spouse called to check on the status of her request. Advised spouse that pcp doesn't do any permanent disability confirmation paperwork. Spouse was frustrated because she needs something from pcp. Spouse will try calling  and [facility].

## 2025-05-15 NOTE — TELEPHONE ENCOUNTER
Fax# [phone]  Rodger from [facility] called in and wants to know if provider will continue care with home health orders.     Please advise. Rodger will accept a verbal order if needed but has sent paperwork to provider.

## 2025-05-15 NOTE — TELEPHONE ENCOUNTER
Agent Erich Wright returned call and explained that spouse has to apply for long term care with necessity through .  would determine medical necessity and there's nothing that needs to be done through pt's pcp.

## 2025-05-16 ENCOUNTER — CARE COORDINATION (OUTPATIENT)
Facility: CLINIC | Age: 70
End: 2025-05-16

## 2025-05-16 NOTE — CARE COORDINATION
Care Transitions Note    Follow Up Call     Patient Current Location:  Home: [address] [place] [address]   [facility] [id number]    Care Transition Nurse contacted the patient by telephone to perform post hospital discharge assessment, verified name and  as identifiers.  Provided introduction to self, and explanation of the Care Transition Nurse role.      Patient: Ishan Camacho Sr.                                  Patient : 1955   MRN: [id number]                               Reason for Admission: Chest Pain  Discharge Date: 3/25/25       RURS: Readmission Risk Score: 20.8     Last Discharge Facility         Date Complaint Diagnosis Description Type Department Provider     3/25/25 Leg Pain Leg muscle spasm ED (DISCHARGE) [facility]aneda, DO                Was this an external facility discharge? Yes. Discharge Date: 25. Facility Name: [facility]    Additional needs identified to be addressed with provider   No needs identified             Method of communication with provider: none.    Care Summary Note:    Patient reports that he is doing good and coming along.   No new or worsening of symptoms as per Pt.   Pt. Denied Chest Pain,shortness of breath, fever, and chills at this time.    CTN made Pt. And Pt. Spouse aware of Dr. Hernandez Message below.   \"Please inform the patient to take medications as prescribed in the discharge summary from the hospital.  Patient to follow-up with cardiology clinic for PT/INR checkup and follow-up and adjustment of the dose of Coumadin.  Referral placed for ENT for patient's ear feeling clogged up.\" Pt.and Pt. Spouse thanked us.     No questions, concerns and/or needs at this time as per Pt. And Pt. Spouse.     Patient reminded that there is a physician on call 24 hours a day / 7 days a week (M-F 5pm to 8am and from Friday 5pm until Monday 8a for the weekend) should the patient have questions or concerns.  Patient reminded to call 911 if situation is

## 2025-05-19 ENCOUNTER — ANTI-COAG VISIT (OUTPATIENT)
Age: 70
End: 2025-05-19
Payer: MEDICARE

## 2025-05-19 ENCOUNTER — LAB (OUTPATIENT)
Facility: CLINIC | Age: 70
End: 2025-05-19

## 2025-05-19 ENCOUNTER — TELEPHONE (OUTPATIENT)
Facility: CLINIC | Age: 70
End: 2025-05-19

## 2025-05-19 DIAGNOSIS — Z13.29 SCREENING FOR ENDOCRINE, METABOLIC AND IMMUNITY DISORDER: ICD-10-CM

## 2025-05-19 DIAGNOSIS — Z13.0 SCREENING FOR ENDOCRINE, METABOLIC AND IMMUNITY DISORDER: ICD-10-CM

## 2025-05-19 DIAGNOSIS — I48.91 ATRIAL FIBRILLATION (HCC): Primary | ICD-10-CM

## 2025-05-19 DIAGNOSIS — E55.9 VITAMIN D DEFICIENCY: ICD-10-CM

## 2025-05-19 DIAGNOSIS — Z13.228 SCREENING FOR ENDOCRINE, METABOLIC AND IMMUNITY DISORDER: ICD-10-CM

## 2025-05-19 DIAGNOSIS — E55.9 VITAMIN D DEFICIENCY: Primary | ICD-10-CM

## 2025-05-19 LAB
POC INR: 2
PROTHROMBIN TIME, POC: NORMAL

## 2025-05-19 PROCEDURE — 85610 PROTHROMBIN TIME: CPT | Performed by: NURSE PRACTITIONER

## 2025-05-19 NOTE — TELEPHONE ENCOUNTER
Please inform the patient to get the labs done a week before upcoming office visit on 6/2.  Also patient needs to follow-up with cardiology clinic regarding PT/INR management.

## 2025-05-19 NOTE — PATIENT INSTRUCTIONS
Mr. Ishan Camacho is here today for anticoagulation monitoring for the diagnosis of atrial fibrillation.  His INR goal is 2.0-3.0 and his current Coumadin dose is 5 mg daily.     Today's findings include an INR of 2.0     Considering Mr. Camacho's past history, todays findings, and per the coumadin policy/protocol, Mr. Camacho was instructed to take Coumadin as follows,  5 mg daily.  He was also instructed to come back in 4 weeks for an INR check.    A full discussion of the nature of anticoagulants has been carried out.  A full discussion of the need for frequent and regular monitoring, precise dosage adjustment and compliance was stressed.  Side effects of potential bleeding were discussed and Mr. Camacho was instructed to call [phone] if there are any signs of abnormal bleeding.  Mr. Camacho was instructed to avoid any OTC items containing aspirin or ibuprofen and prior to starting any new OTC products to consult with his physician or pharmacist to ensure no drug interactions are present.  Mr. Camacho was instructed to avoid any major changes in his general diet and to avoid alcohol consumption.  .      Mr. Camacho verbalized his understanding of all instructions and will call the office with any questions, concerns, or signs of abnormal bleeding or blood clot.

## 2025-05-19 NOTE — TELEPHONE ENCOUNTER
----- Message from Hortencia ALBERT sent at 5/16/2025 12:23 PM EDT -----  Regarding: ECC Message to Provider  ECC Message to Provider    Relationship to Patient: Guardian     Additional Information: patient already had a blood work done with a nephrologist and he has one appointment for 5/19/2025 for lab visit, they would like to know if they still need the appointment.  --------------------------------------------------------------------------------------------------------------------------    Call Back Information: OK to leave message on voicemail  Preferred Call Back Number: Phone [phone]

## 2025-05-20 LAB
BASOPHILS # BLD AUTO: 0.1 X10E3/UL
BASOPHILS NFR BLD AUTO: 1 %
EOSINOPHIL # BLD AUTO: 0.4 X10E3/UL
EOSINOPHIL NFR BLD AUTO: 10 %
ERYTHROCYTE [DISTWIDTH] IN BLOOD BY AUTOMATED COUNT: 14.8 %
HBA1C MFR BLD: 5.7 % (ref 4.8–5.6)
HCT VFR BLD AUTO: 28.7 %
HGB BLD-MCNC: 9.2 G/DL
IMM GRANULOCYTES # BLD AUTO: 0 X10E3/UL
IMM GRANULOCYTES NFR BLD AUTO: 0 %
LYMPHOCYTES # BLD AUTO: 1.5 X10E3/UL
LYMPHOCYTES NFR BLD AUTO: 36 %
MCH RBC QN AUTO: 30.4 PG
MCHC RBC AUTO-ENTMCNC: 32.1 G/DL
MCV RBC AUTO: 95 FL
MONOCYTES # BLD AUTO: 0.6 X10E3/UL
MONOCYTES NFR BLD AUTO: 13 %
NEUTROPHILS # BLD AUTO: 1.7 X10E3/UL
NEUTROPHILS NFR BLD AUTO: 40 %
PLATELET # BLD AUTO: 304 X10E3/UL (ref 150–450)
RBC # BLD AUTO: 3.03 X10E6/UL
SPECIMEN STATUS REPORT: NORMAL
WBC # BLD AUTO: 4.2 X10E3/UL (ref 3.4–10.8)

## 2025-05-20 NOTE — TELEPHONE ENCOUNTER
Pt wife states he had labs done yesterday and has a f/u appointment June 2nd  with kidney specialist.

## 2025-05-21 ENCOUNTER — TELEPHONE (OUTPATIENT)
Facility: CLINIC | Age: 70
End: 2025-05-21

## 2025-05-21 LAB
25(OH)D3+25(OH)D2 SERPL-MCNC: 60.7 NG/ML (ref 30–100)
ALBUMIN SERPL-MCNC: 3.2 G/DL (ref 3.8–4.8)
ALP SERPL-CCNC: 42 IU/L (ref 44–121)
ALT SERPL-CCNC: 11 IU/L
AST SERPL-CCNC: 24 IU/L (ref 0–40)
BILIRUB SERPL-MCNC: 0.7 MG/DL (ref 0–1.2)
BUN SERPL-MCNC: 29 MG/DL
BUN/CREAT SERPL: 20
CALCIUM SERPL-MCNC: 9.6 MG/DL
CHLORIDE SERPL-SCNC: 99 MMOL/L (ref 96–106)
CHOLEST SERPL-MCNC: 111 MG/DL
CO2 SERPL-SCNC: 26 MMOL/L (ref 20–29)
CREAT SERPL-MCNC: 1.43 MG/DL
EGFRCR SERPLBLD CKD-EPI 2021: ABNORMAL ML/MIN/1.73
GLOBULIN SER CALC-MCNC: 4.6 G/DL (ref 1.5–4.5)
GLUCOSE SERPL-MCNC: 100 MG/DL (ref 70–99)
HDLC SERPL-MCNC: 31 MG/DL
LDLC SERPL CALC-MCNC: 67 MG/DL
POTASSIUM SERPL-SCNC: 4.3 MMOL/L (ref 3.5–5.2)
PROT SERPL-MCNC: 7.8 G/DL (ref 6–8.5)
SODIUM SERPL-SCNC: 140 MMOL/L (ref 134–144)
TRIGL SERPL-MCNC: 57 MG/DL
VLDLC SERPL CALC-MCNC: 13 MG/DL (ref 5–40)

## 2025-05-21 NOTE — TELEPHONE ENCOUNTER
One of the appointments were cancelled it shows an X in front that was 6/2/25  He was rescheduled for 6/3/25    I see patient has 2 appointments coming up within same week.  Please clarify the next appointment date and cancel the other 1.

## 2025-05-23 ENCOUNTER — CARE COORDINATION (OUTPATIENT)
Facility: CLINIC | Age: 70
End: 2025-05-23

## 2025-05-23 NOTE — CARE COORDINATION
Care Transitions Note    Follow Up Call     Patient Current Location:  Home: [address] [place] [address]   [place] [address]    LPN Care Coordinator contacted the patient by telephone. Verified name and  as identifiers.    Additional needs identified to be addressed with provider   No needs identified                 Method of communication with provider: none.    Care Summary Note:   Spoke with patient.  Patient states he is doing pretty good.  Patient has no questions or concerns.    Advance Care Planning:   Does patient have an Advance Directive: deferred at this time, will discuss on future follow up. .    Medication Review:  No changes since last call.     Assessments:  No changes since last call    Follow Up Appointment:   Reviewed upcoming appointment(s).  Future Appointments         Provider Specialty Dept Phone    6/3/2025 11:20 AM Delmer Hernandez MD Internal Medicine [phone]    2025 1:00 PM Tatum Adams MD Vascular Surgery [phone]    7/10/2025 8:30 AM Mary Sal MD Cardiology [phone]            LPN Care Coordinator provided contact information.  Plan for follow-up call in 6-10 days based on severity of symptoms and risk factors.  Plan for next call:  ACP- Final Call      Radha Cardona LPN

## 2025-05-28 ENCOUNTER — CARE COORDINATION (OUTPATIENT)
Facility: CLINIC | Age: 70
End: 2025-05-28

## 2025-05-28 NOTE — CARE COORDINATION
Care Transitions Note    Final Call     Attempted to reach patient for transitions of care follow up.  Unable to reach patient.      Outreach Attempts:   Unable to leave message.     Patient graduated from the Care Transitions program on 5/28/28.  Patient/family has the ability to self-manage at this time..      Handoff:   Patient was not referred to the ACM team due to unable to contact patient.      Care Summary Note: No changes noted.    Assessments:  Care Transitions Subsequent and Final Call    Subsequent and Final Calls  Are you currently active with any services?: Home Health  Care Transitions Interventions  No Identified Needs  Other Interventions:              Upcoming Appointments:    Future Appointments         Provider Specialty Dept Phone    6/3/2025 11:20 AM Delmer Hernandez MD Internal Medicine [phone]    6/11/2025 1:00 PM Tatum Adams MD Vascular Surgery [phone]    7/10/2025 8:30 AM Mary Sal MD Cardiology [phone]            Radha Cardona LPN

## 2025-06-02 ENCOUNTER — ANTI-COAG VISIT (OUTPATIENT)
Age: 70
End: 2025-06-02
Payer: MEDICARE

## 2025-06-02 DIAGNOSIS — I48.91 ATRIAL FIBRILLATION (HCC): Primary | Chronic | ICD-10-CM

## 2025-06-02 LAB
POC INR: 2.7
PROTHROMBIN TIME, POC: NORMAL

## 2025-06-02 PROCEDURE — 85610 PROTHROMBIN TIME: CPT | Performed by: NURSE PRACTITIONER

## 2025-06-02 NOTE — PATIENT INSTRUCTIONS
Mr. Ishan Camacho is here today for anticoagulation monitoring for the diagnosis of atrial fibrillation.  His INR goal is 2.0-3.0 and his current Coumadin dose is 5mg.     Today's findings include an INR of 2.7     Considering Mr. Camacho's past history, todays findings, and per the coumadin policy/protocol, Mr. Camacho was instructed to take Coumadin as follows,  5mg.  He was also instructed to come back in 2 weeks for an INR check.    A full discussion of the nature of anticoagulants has been carried out.  A full discussion of the need for frequent and regular monitoring, precise dosage adjustment and compliance was stressed.  Side effects of potential bleeding were discussed and Mr. Camacho was instructed to call [phone] if there are any signs of abnormal bleeding.  Mr. Camacho was instructed to avoid any OTC items containing aspirin or ibuprofen and prior to starting any new OTC products to consult with his physician or pharmacist to ensure no drug interactions are present.  Mr. Camacho was instructed to avoid any major changes in his general diet and to avoid alcohol consumption.  .      Mr. Camacho verbalized his understanding of all instructions and will call the office with any questions, concerns, or signs of abnormal bleeding or blood clot.

## 2025-06-10 ENCOUNTER — TELEPHONE (OUTPATIENT)
Facility: CLINIC | Age: 70
End: 2025-06-10

## 2025-06-10 NOTE — TELEPHONE ENCOUNTER
The patients wife I(on PHI) is requesting a phone call to her phone  to discuss lab results. She said her  doesn't know how to use the virtual visit.    Please advise.

## 2025-06-11 ENCOUNTER — PATIENT MESSAGE (OUTPATIENT)
Facility: CLINIC | Age: 70
End: 2025-06-11

## 2025-06-11 ENCOUNTER — OFFICE VISIT (OUTPATIENT)
Age: 70
End: 2025-06-11
Payer: MEDICARE

## 2025-06-11 VITALS
BODY MASS INDEX: 27.85 KG/M2 | HEART RATE: 68 BPM | DIASTOLIC BLOOD PRESSURE: 74 MMHG | HEIGHT: 75 IN | WEIGHT: 224 LBS | SYSTOLIC BLOOD PRESSURE: 100 MMHG | OXYGEN SATURATION: 100 %

## 2025-06-11 DIAGNOSIS — R60.0 BILATERAL LOWER EXTREMITY EDEMA: Primary | ICD-10-CM

## 2025-06-11 PROCEDURE — 3078F DIAST BP <80 MM HG: CPT | Performed by: SURGERY

## 2025-06-11 PROCEDURE — G8427 DOCREV CUR MEDS BY ELIG CLIN: HCPCS | Performed by: SURGERY

## 2025-06-11 PROCEDURE — 1036F TOBACCO NON-USER: CPT | Performed by: SURGERY

## 2025-06-11 PROCEDURE — G8417 CALC BMI ABV UP PARAM F/U: HCPCS | Performed by: SURGERY

## 2025-06-11 PROCEDURE — 1124F ACP DISCUSS-NO DSCNMKR DOCD: CPT | Performed by: SURGERY

## 2025-06-11 PROCEDURE — 99204 OFFICE O/P NEW MOD 45 MIN: CPT | Performed by: SURGERY

## 2025-06-11 PROCEDURE — 3074F SYST BP LT 130 MM HG: CPT | Performed by: SURGERY

## 2025-06-11 PROCEDURE — 3017F COLORECTAL CA SCREEN DOC REV: CPT | Performed by: SURGERY

## 2025-06-11 NOTE — PROGRESS NOTES
05/19/2025    HGB 9.2 05/19/2025    HCT 28.7 05/19/2025    MCV 95 05/19/2025     05/19/2025       Hemoglobin A1C   Date Value Ref Range Status   05/19/2025 5.7 (H) 4.8 - 5.6 % Final     Comment:                 Prediabetes: 5.7 - 6.4           Diabetes: >6.4           Glycemic control for adults with diabetes: <7.0           Past Medical History:   Diagnosis Date    Atrial fibrillation (HCC)     Chronic diastolic heart failure (HCC)     Hypertension     Mitral valve disorders(424.0)     Prediabetes      Past Surgical History:   Procedure Laterality Date    ADENOIDECTOMY      CARDIAC PROCEDURE N/A 2/10/2025    Right heart cath performed by Usama Levine MD at [facility] CARDIAC CATH LAB    COLONOSCOPY N/A 1/24/2020    COLONOSCOPY performed by Braulio Jim MD at [facility] ENDOSCOPY    OTHER SURGICAL HISTORY  1965    infection drained from under chin    TONSILLECTOMY       Patient Active Problem List   Diagnosis    Moderate pulmonary hypertension (HCC)    Atrial fibrillation (HCC)    Hypertension    Current use of long term anticoagulation    Obesity    Chronic diastolic heart failure (HCC)    Morbid obesity with BMI of 40.0-44.9, adult (HCC)    Ex-smoker    At high risk for falls    Chronic atrial fibrillation, unspecified (HCC)    Prediabetes    Secondary hypercoagulable state    Other hyperlipidemia    Low serum albumin    Anemia    ERICA (acute kidney injury)    Acute on chronic heart failure with preserved ejection fraction (HCC)    Nonrheumatic mitral valve regurgitation    Anasarca    Supratherapeutic INR    Hypokalemia    Hypervolemia    SIRS (systemic inflammatory response syndrome) (HCC)    Acute pain of right lower extremity    Lymphedema    CKD stage 3b, GFR 30-44 ml/min ([facility])    COVID-19     Current Outpatient Medications   Medication Sig Dispense Refill    traZODone (DESYREL) 50 MG tablet Take 1 tablet by mouth nightly      ACETAMINOPHEN PO Take 650 mg by mouth 2 times daily 1 tablet by mouth

## 2025-06-11 NOTE — PATIENT INSTRUCTIONS
Please start doing the following:     - elevate your legs at all times when sitting down  - elevate legs steeply 3x/day for 20 minutes \"toes above your nose\"  - wear compression stockings all day every day. You may take them off at night  - Moisturize legs daily (Eucerin or Aquaphor for extremely dry skin)  - Walk 3x a day at least to your mailbox and back     Compression Stocking Resources      Online resources to purchase compression stockings:     www[employer]  www.[employer]  www.walkby  www.compressionstockings.MedPlasts    When you purchase compression stockings online, please ensure that you search for the correct height of stocking (\"knee high\" or \"thigh high\") as well as the correct compression strength (20-30mmHg).     Stockings can also be purchased at [employer], drug [employer], [employer] or similar stores but they tend to be very light compression and are not considered \"medical grade\".      Stockings may be purchased at medical supply stores but tend to be more expensive there.     It is important to buy the correct size and strength of compression stockings in order for them to be effective. Some brands will be sized by shoe size. However, especially if you have a lot of swelling, it may be more accurate to select a size based on individualized measurements (usually circumference at the ankle, calf and knee).       If you have any questions, please feel free to contact us for help.  In preparation for your venous ultrasound (reflux study), please do the following:   Do not consume caffeine, including coffee, tea, soda or other caffeine containing soft drink, for 24 hours before the ultrasound  Please make sure that you are well hydrated when you present for the ultrasound. In the context of keeping required fluid restrictions.     This is a complex and detailed study and will require about 1.5 hours of your time. You need to be able to lie on a bed for the majority of that time.

## 2025-06-16 ENCOUNTER — ANTI-COAG VISIT (OUTPATIENT)
Age: 70
End: 2025-06-16
Payer: MEDICARE

## 2025-06-16 DIAGNOSIS — I48.91 ATRIAL FIBRILLATION (HCC): Primary | ICD-10-CM

## 2025-06-16 LAB
POC INR: 3.5
PROTHROMBIN TIME, POC: NORMAL

## 2025-06-16 PROCEDURE — 85610 PROTHROMBIN TIME: CPT | Performed by: NURSE PRACTITIONER

## 2025-06-16 NOTE — PATIENT INSTRUCTIONS
Mr. Ishan Camacho is here today for anticoagulation monitoring for the diagnosis of atrial fibrillation.  His INR goal is 2.0-3.0 and his current Coumadin dose is 5 mg daily.     Today's findings include an INR of 3.5     Considering Mr. Camacho's past history, todays findings, and per the coumadin policy/protocol, Mr. Camacho was instructed to take Coumadin as follows,  hold tonight;resume 5 mg daily.  He was also instructed to come back in 1 weeks for an INR check.    A full discussion of the nature of anticoagulants has been carried out.  A full discussion of the need for frequent and regular monitoring, precise dosage adjustment and compliance was stressed.  Side effects of potential bleeding were discussed and Mr. Camacho was instructed to call [phone] if there are any signs of abnormal bleeding.  Mr. Camacho was instructed to avoid any OTC items containing aspirin or ibuprofen and prior to starting any new OTC products to consult with his physician or pharmacist to ensure no drug interactions are present.  Mr. Camacho was instructed to avoid any major changes in his general diet and to avoid alcohol consumption.  .      Mr. Camacho verbalized his understanding of all instructions and will call the office with any questions, concerns, or signs of abnormal bleeding or blood clot.

## 2025-06-17 ENCOUNTER — TELEMEDICINE (OUTPATIENT)
Facility: CLINIC | Age: 70
End: 2025-06-17
Payer: MEDICARE

## 2025-06-17 DIAGNOSIS — Z13.0 SCREENING FOR ENDOCRINE, METABOLIC AND IMMUNITY DISORDER: Primary | ICD-10-CM

## 2025-06-17 DIAGNOSIS — I10 PRIMARY HYPERTENSION: ICD-10-CM

## 2025-06-17 DIAGNOSIS — I48.20 CHRONIC ATRIAL FIBRILLATION, UNSPECIFIED (HCC): ICD-10-CM

## 2025-06-17 DIAGNOSIS — Z13.228 SCREENING FOR ENDOCRINE, METABOLIC AND IMMUNITY DISORDER: Primary | ICD-10-CM

## 2025-06-17 DIAGNOSIS — D64.9 ANEMIA, UNSPECIFIED TYPE: ICD-10-CM

## 2025-06-17 DIAGNOSIS — K59.00 CONSTIPATION, UNSPECIFIED CONSTIPATION TYPE: ICD-10-CM

## 2025-06-17 DIAGNOSIS — E55.9 VITAMIN D DEFICIENCY: ICD-10-CM

## 2025-06-17 DIAGNOSIS — I50.32 CHRONIC DIASTOLIC HEART FAILURE (HCC): ICD-10-CM

## 2025-06-17 DIAGNOSIS — Z13.29 SCREENING FOR ENDOCRINE, METABOLIC AND IMMUNITY DISORDER: Primary | ICD-10-CM

## 2025-06-17 PROCEDURE — 3017F COLORECTAL CA SCREEN DOC REV: CPT | Performed by: INTERNAL MEDICINE

## 2025-06-17 PROCEDURE — 1159F MED LIST DOCD IN RCRD: CPT | Performed by: INTERNAL MEDICINE

## 2025-06-17 PROCEDURE — 99214 OFFICE O/P EST MOD 30 MIN: CPT | Performed by: INTERNAL MEDICINE

## 2025-06-17 PROCEDURE — G8427 DOCREV CUR MEDS BY ELIG CLIN: HCPCS | Performed by: INTERNAL MEDICINE

## 2025-06-17 PROCEDURE — 1160F RVW MEDS BY RX/DR IN RCRD: CPT | Performed by: INTERNAL MEDICINE

## 2025-06-17 PROCEDURE — 1124F ACP DISCUSS-NO DSCNMKR DOCD: CPT | Performed by: INTERNAL MEDICINE

## 2025-06-17 RX ORDER — DOCUSATE SODIUM 100 MG/1
100 CAPSULE, LIQUID FILLED ORAL 2 TIMES DAILY
Qty: 60 CAPSULE | Refills: 0 | Status: SHIPPED | OUTPATIENT
Start: 2025-06-17 | End: 2025-07-17

## 2025-06-17 NOTE — PROGRESS NOTES
Ishan Camacho Sr., was evaluated through a synchronous (real-time) audio-video encounter. The patient (or guardian if applicable) is aware that this is a billable service, which includes applicable co-pays. This Virtual Visit was conducted with patient's (and/or legal guardian's) consent. Patient identification was verified, and a caregiver was present when appropriate.   The patient was located at Home: [address] [place] [address]   [address]   [place] [facility] [address]  Provider was located at Facility (Appt Dept): [address] [place] [address], [address]  [place] [address]  Confirm you are appropriately licensed, registered, or certified to deliver care in the state where the patient is located as indicated above. If you are not or unsure, please re-schedule the visit: Yes, I confirm.     Ishan Camacho Sr. (:  1955) is a Established patient, presenting virtually for evaluation of the following:      Below is the assessment and plan developed based on review of pertinent history, physical exam, labs, studies, and medications.     Assessment & Plan  Screening for endocrine, metabolic and immunity disorder       Orders:    Lipid Panel; Future    Hemoglobin A1C; Future    Comprehensive Metabolic Panel; Future    CBC with Auto Differential; Future    Vitamin D deficiency  Stable on vitamin D supplements.    Orders:    Vitamin D 25 Hydroxy; Future    Constipation, unspecified constipation type       Orders:    docusate sodium (COLACE) 100 MG capsule; Take 1 capsule by mouth 2 times daily    Anemia, unspecified type  Patient is encouraged to take iron supplements regularly    Orders:    Iron and TIBC; Future    Chronic atrial fibrillation, unspecified (HCC)   Heart rate under control on carvedilol.  Patient is on Coumadin for stroke prophylaxis.  Patient follows with cardiology for PT/INR management.         Chronic diastolic heart failure (HCC)   Stable on current medications.  Patient follows with cardiology.

## 2025-06-17 NOTE — PROGRESS NOTES
Ishan Camacho Sr. presents today for   Chief Complaint   Patient presents with    Discuss Labs           \"Have you been to the ER, urgent care clinic since your last visit?  Hospitalized since your last visit?\"    NO    “Have you seen or consulted any other health care providers outside of [facility] since your last visit?”    NO           Absent peripheral pulses

## 2025-06-18 ENCOUNTER — CLINICAL DOCUMENTATION (OUTPATIENT)
Facility: CLINIC | Age: 70
End: 2025-06-18

## 2025-06-18 PROBLEM — E55.9 VITAMIN D DEFICIENCY: Status: ACTIVE | Noted: 2025-06-18

## 2025-06-18 NOTE — ASSESSMENT & PLAN NOTE
Heart rate under control on carvedilol.  Patient is on Coumadin for stroke prophylaxis.  Patient follows with cardiology for PT/INR management.

## 2025-06-18 NOTE — PROGRESS NOTES
Patient wife called about the \"no show\" letter they received. She says the patient was in the hospital that's why he missed his appointment.

## 2025-06-23 ENCOUNTER — ANTI-COAG VISIT (OUTPATIENT)
Age: 70
End: 2025-06-23
Payer: MEDICARE

## 2025-06-23 DIAGNOSIS — I48.91 ATRIAL FIBRILLATION (HCC): Primary | ICD-10-CM

## 2025-06-23 LAB
POC INR: 3.3
PROTHROMBIN TIME, POC: NORMAL

## 2025-06-23 PROCEDURE — 85610 PROTHROMBIN TIME: CPT | Performed by: NURSE PRACTITIONER

## 2025-06-23 NOTE — PATIENT INSTRUCTIONS
Mr. Ishan Camacho is here today for anticoagulation monitoring for the diagnosis of atrial fibrillation.  His INR goal is 2.0-3.0 and his current Coumadin dose is  hold tonight;resume 5 mg daily. .     Today's findings include an INR of 3.3     Considering Mr. Camacho's past history, todays findings, and per the coumadin policy/protocol, Mr. Camacho was instructed to take Coumadin as follows,  hold tonight; 2.5 mg every Mon, Thu; 5 mg all other days .  He was also instructed to come back in 1 weeks for an INR check.    A full discussion of the nature of anticoagulants has been carried out.  A full discussion of the need for frequent and regular monitoring, precise dosage adjustment and compliance was stressed.  Side effects of potential bleeding were discussed and Mr. Camacho was instructed to call [phone] if there are any signs of abnormal bleeding.  Mr. Camacho was instructed to avoid any OTC items containing aspirin or ibuprofen and prior to starting any new OTC products to consult with his physician or pharmacist to ensure no drug interactions are present.  Mr. Camacho was instructed to avoid any major changes in his general diet and to avoid alcohol consumption.  .      Mr. Camacho verbalized his understanding of all instructions and will call the office with any questions, concerns, or signs of abnormal bleeding or blood clot.

## 2025-06-30 ENCOUNTER — ANTI-COAG VISIT (OUTPATIENT)
Age: 70
End: 2025-06-30
Payer: MEDICARE

## 2025-06-30 DIAGNOSIS — I48.91 ATRIAL FIBRILLATION (HCC): Primary | Chronic | ICD-10-CM

## 2025-06-30 LAB
POC INR: 3
PROTHROMBIN TIME, POC: NORMAL

## 2025-06-30 PROCEDURE — 85610 PROTHROMBIN TIME: CPT

## 2025-06-30 NOTE — PATIENT INSTRUCTIONS
Mr. Ishan Camacho is here today for anticoagulation monitoring for the diagnosis of atrial fibrillation.  His INR goal is 2.0-3.0 and his current Coumadin dose is .     Today's findings include an INR of 3.0     Considering Mr. Camacho's past history, todays findings, and per the coumadin policy/protocol, Mr. Camacho was instructed to take Coumadin as follows,  2.5MG MON. TH. SAT OTHERWISE; 5MG A.O.D.  He was also instructed to come back in 1 weeks for an INR check.    A full discussion of the nature of anticoagulants has been carried out.  A full discussion of the need for frequent and regular monitoring, precise dosage adjustment and compliance was stressed.  Side effects of potential bleeding were discussed and Mr. Camacho was instructed to call [phone] if there are any signs of abnormal bleeding.  Mr. Camacho was instructed to avoid any OTC items containing aspirin or ibuprofen and prior to starting any new OTC products to consult with his physician or pharmacist to ensure no drug interactions are present.  Mr. Camacho was instructed to avoid any major changes in his general diet and to avoid alcohol consumption.  .      Mr. Camacho verbalized his understanding of all instructions and will call the office with any questions, concerns, or signs of abnormal bleeding or blood clot.

## 2025-07-02 ENCOUNTER — OFFICE VISIT (OUTPATIENT)
Age: 70
End: 2025-07-02
Payer: MEDICARE

## 2025-07-02 VITALS
BODY MASS INDEX: 27.15 KG/M2 | OXYGEN SATURATION: 99 % | SYSTOLIC BLOOD PRESSURE: 136 MMHG | HEART RATE: 82 BPM | HEIGHT: 75 IN | WEIGHT: 218.4 LBS | DIASTOLIC BLOOD PRESSURE: 84 MMHG

## 2025-07-02 DIAGNOSIS — I48.20 CHRONIC ATRIAL FIBRILLATION (HCC): ICD-10-CM

## 2025-07-02 DIAGNOSIS — I10 HYPERTENSION, UNSPECIFIED TYPE: ICD-10-CM

## 2025-07-02 DIAGNOSIS — I50.32 CHRONIC DIASTOLIC HEART FAILURE (HCC): Primary | ICD-10-CM

## 2025-07-02 PROCEDURE — 3017F COLORECTAL CA SCREEN DOC REV: CPT | Performed by: INTERNAL MEDICINE

## 2025-07-02 PROCEDURE — 1126F AMNT PAIN NOTED NONE PRSNT: CPT | Performed by: INTERNAL MEDICINE

## 2025-07-02 PROCEDURE — 3075F SYST BP GE 130 - 139MM HG: CPT | Performed by: INTERNAL MEDICINE

## 2025-07-02 PROCEDURE — 1124F ACP DISCUSS-NO DSCNMKR DOCD: CPT | Performed by: INTERNAL MEDICINE

## 2025-07-02 PROCEDURE — 99214 OFFICE O/P EST MOD 30 MIN: CPT | Performed by: INTERNAL MEDICINE

## 2025-07-02 PROCEDURE — 1159F MED LIST DOCD IN RCRD: CPT | Performed by: INTERNAL MEDICINE

## 2025-07-02 PROCEDURE — 1036F TOBACCO NON-USER: CPT | Performed by: INTERNAL MEDICINE

## 2025-07-02 PROCEDURE — G8427 DOCREV CUR MEDS BY ELIG CLIN: HCPCS | Performed by: INTERNAL MEDICINE

## 2025-07-02 PROCEDURE — 3079F DIAST BP 80-89 MM HG: CPT | Performed by: INTERNAL MEDICINE

## 2025-07-02 PROCEDURE — G8417 CALC BMI ABV UP PARAM F/U: HCPCS | Performed by: INTERNAL MEDICINE

## 2025-07-02 RX ORDER — ATORVASTATIN CALCIUM 40 MG/1
40 TABLET, FILM COATED ORAL DAILY
Qty: 90 TABLET | Refills: 1 | Status: SHIPPED | OUTPATIENT
Start: 2025-07-02

## 2025-07-02 ASSESSMENT — ENCOUNTER SYMPTOMS
WHEEZING: 0
CHEST TIGHTNESS: 0
COLOR CHANGE: 0
BLOOD IN STOOL: 0
CONSTIPATION: 0
ABDOMINAL PAIN: 0
DIARRHEA: 0
COUGH: 0
NAUSEA: 0
APNEA: 0
SHORTNESS OF BREATH: 0

## 2025-07-02 ASSESSMENT — PATIENT HEALTH QUESTIONNAIRE - PHQ9
SUM OF ALL RESPONSES TO PHQ QUESTIONS 1-9: 0
SUM OF ALL RESPONSES TO PHQ QUESTIONS 1-9: 0
2. FEELING DOWN, DEPRESSED OR HOPELESS: NOT AT ALL
1. LITTLE INTEREST OR PLEASURE IN DOING THINGS: NOT AT ALL
SUM OF ALL RESPONSES TO PHQ QUESTIONS 1-9: 0
SUM OF ALL RESPONSES TO PHQ QUESTIONS 1-9: 0

## 2025-07-02 NOTE — PROGRESS NOTES
HISTORY OF PRESENT ILLNESS  Ishan Camacho Sr. is a 70 y.o. male.    PMH Atrial fibrillation, Chronic heart failure preserved ejection fraction, HTN,   ----  CARDIAC STUDIES  ----  Vascular duplex lower extremity right 2/12/2025    No evidence of deep vein thrombosis in the right lower extremity.    For comparison purposes, the left common femoral vein was briefly interrogated. The vein demonstrates normal color filling and compressibility. Doppler flow was phasic and spontaneous.    Right posterior tibial artery Doppler waveforms are multiphasic.  ----  RHC 2/2025  Right heart catheterization  Mildly elevated left sided pressures with PCWP 16mmHg  Mild Pulmonary HTN with PA mean 33mmHg without change from adenosine challenge up to 150mcg/kg/min when mecame symptomatic and required theophylline antidote.    Cardiac output 8.6L/min with cardiac index 3.2L/min/meters squared  No shunt detected from RA/PA saturations       Plan: Continue with diuresis, mildly elevated left sided pressures  ----  2d echo 10/2024    Left Ventricle: Normal left ventricular systolic function with a visually estimated EF of 55 - 60%. Left ventricle size is normal. Normal wall thickness. No obvious regional wall motion abnormalities identified.    Right Ventricle: Right ventricle is severely dilated. Low normal systolic function. TAPSE is normal. TAPSE is 1.9 cm. RV Peak S' is 10.9 cm/s. TDI systolic excursion is normal.    Mitral Valve: Mild annular calcification. Mildly thickened, at the anterior leaflet. Mild regurgitation. MV EROA by PISA is 0.0 cm2.    Tricuspid Valve: Severe regurgitation. RVSP may be underestimated in the setting of severe TR. The estimated RVSP is 55 mmHg.    Right Atrium: Right atrium is severely dilated.    Pulmonary Arteries: Moderate pulmonary hypertension present.    Aorta: Normal sized aortic root. Dilated ascending aorta. Ao ascending diameter is 3.5 cm.    IVC/SVC: IVC diameter is greater than 21 mm

## 2025-07-02 NOTE — PATIENT INSTRUCTIONS
Wife will call back to set up echo and follow up.  Learning About the Mediterranean Diet  What is the Mediterranean diet?     The Mediterranean diet is a style of eating rather than a diet plan. It features foods eaten in [place], [place], southern [place], and other countries along the [place] Sea. It emphasizes eating foods like fish, fruits, vegetables, beans, high-fiber breads and whole grains, nuts, and olive oil. This style of eating includes limited red meat, cheese, and sweets.  Why choose the Mediterranean diet?  A Mediterranean-style diet may improve heart health. It contains more fat than other heart-healthy diets. But the fats are mainly from nuts, unsaturated oils (such as fish oils and olive oil), and certain nut or seed oils (such as canola, soybean, or flaxseed oil). These fats may help protect the heart and blood vessels.  How can you get started on the Mediterranean diet?  Here are some things you can do to switch to a more Mediterranean way of eating.  What to eat  Eat a variety of fruits and vegetables each day, such as grapes, blueberries, tomatoes, broccoli, peppers, figs, olives, spinach, eggplant, beans, lentils, and chickpeas.  Eat a variety of whole-grain foods each day, such as oats, brown rice, and whole wheat bread, pasta, and couscous.  Eat fish at least 2 times a week. Try tuna, salmon, mackerel, lake trout, herring, or sardines.  Eat moderate amounts of low-fat dairy products, such as milk, cheese, or yogurt.  Eat moderate amounts of poultry and eggs.  Choose healthy (unsaturated) fats, such as nuts, olive oil, and certain nut or seed oils like canola, soybean, and flaxseed.  Limit unhealthy (saturated) fats, such as butter, palm oil, and coconut oil. And limit fats found in animal products, such as meat and dairy products made with whole milk. Try to eat red meat only a few times a month in very small amounts.  Limit sweets and desserts to only a few times a week. This

## 2025-07-02 NOTE — PROGRESS NOTES
Have you had fatigue?  No  2.   Have you had chest pain? No  3.   Have you had dyspnea (SOB)?  No  4.   Have you had orthopnea? No  5.   Have you had PND?  No  6.   Have you had leg swelling?  No   7.   Have you had any weight gain?  No  8.   Have you had any palpitations?  No  9.   Have you had any syncope? No  10. Do you have any wounds on legs?  No

## 2025-08-04 RX ORDER — WARFARIN SODIUM 5 MG/1
5 TABLET ORAL 2 TIMES DAILY
COMMUNITY
End: 2025-08-04 | Stop reason: SDUPTHER

## 2025-08-05 RX ORDER — WARFARIN SODIUM 5 MG/1
5 TABLET ORAL 2 TIMES DAILY
Qty: 180 TABLET | Refills: 1 | Status: SHIPPED | OUTPATIENT
Start: 2025-08-05

## 2025-08-20 ENCOUNTER — OFFICE VISIT (OUTPATIENT)
Facility: CLINIC | Age: 70
End: 2025-08-20
Payer: MEDICARE

## 2025-08-20 VITALS
WEIGHT: 250 LBS | OXYGEN SATURATION: 98 % | HEART RATE: 82 BPM | RESPIRATION RATE: 18 BRPM | DIASTOLIC BLOOD PRESSURE: 80 MMHG | BODY MASS INDEX: 31.08 KG/M2 | HEIGHT: 75 IN | SYSTOLIC BLOOD PRESSURE: 120 MMHG | TEMPERATURE: 98 F

## 2025-08-20 DIAGNOSIS — I10 PRIMARY HYPERTENSION: ICD-10-CM

## 2025-08-20 DIAGNOSIS — Z13.0 SCREENING FOR ENDOCRINE, METABOLIC AND IMMUNITY DISORDER: Primary | ICD-10-CM

## 2025-08-20 DIAGNOSIS — K74.60 HEPATIC CIRRHOSIS, UNSPECIFIED HEPATIC CIRRHOSIS TYPE, UNSPECIFIED WHETHER ASCITES PRESENT (HCC): ICD-10-CM

## 2025-08-20 DIAGNOSIS — Z12.5 SCREENING FOR PROSTATE CANCER: ICD-10-CM

## 2025-08-20 DIAGNOSIS — I48.20 CHRONIC ATRIAL FIBRILLATION, UNSPECIFIED (HCC): ICD-10-CM

## 2025-08-20 DIAGNOSIS — E78.49 OTHER HYPERLIPIDEMIA: ICD-10-CM

## 2025-08-20 DIAGNOSIS — E55.9 VITAMIN D DEFICIENCY: ICD-10-CM

## 2025-08-20 DIAGNOSIS — K59.00 CONSTIPATION, UNSPECIFIED CONSTIPATION TYPE: ICD-10-CM

## 2025-08-20 DIAGNOSIS — R77.0 LOW SERUM ALBUMIN: ICD-10-CM

## 2025-08-20 DIAGNOSIS — I50.32 CHRONIC DIASTOLIC HEART FAILURE (HCC): ICD-10-CM

## 2025-08-20 DIAGNOSIS — N18.31 STAGE 3A CHRONIC KIDNEY DISEASE (HCC): ICD-10-CM

## 2025-08-20 DIAGNOSIS — I89.0 LYMPHEDEMA: ICD-10-CM

## 2025-08-20 DIAGNOSIS — Z13.228 SCREENING FOR ENDOCRINE, METABOLIC AND IMMUNITY DISORDER: Primary | ICD-10-CM

## 2025-08-20 DIAGNOSIS — Z13.29 SCREENING FOR ENDOCRINE, METABOLIC AND IMMUNITY DISORDER: Primary | ICD-10-CM

## 2025-08-20 DIAGNOSIS — G47.00 INSOMNIA, UNSPECIFIED TYPE: ICD-10-CM

## 2025-08-20 DIAGNOSIS — D64.9 ANEMIA, UNSPECIFIED TYPE: ICD-10-CM

## 2025-08-20 PROCEDURE — G8417 CALC BMI ABV UP PARAM F/U: HCPCS | Performed by: INTERNAL MEDICINE

## 2025-08-20 PROCEDURE — 3078F DIAST BP <80 MM HG: CPT | Performed by: INTERNAL MEDICINE

## 2025-08-20 PROCEDURE — 1124F ACP DISCUSS-NO DSCNMKR DOCD: CPT | Performed by: INTERNAL MEDICINE

## 2025-08-20 PROCEDURE — 3017F COLORECTAL CA SCREEN DOC REV: CPT | Performed by: INTERNAL MEDICINE

## 2025-08-20 PROCEDURE — G8427 DOCREV CUR MEDS BY ELIG CLIN: HCPCS | Performed by: INTERNAL MEDICINE

## 2025-08-20 PROCEDURE — 1036F TOBACCO NON-USER: CPT | Performed by: INTERNAL MEDICINE

## 2025-08-20 PROCEDURE — 1159F MED LIST DOCD IN RCRD: CPT | Performed by: INTERNAL MEDICINE

## 2025-08-20 PROCEDURE — 3074F SYST BP LT 130 MM HG: CPT | Performed by: INTERNAL MEDICINE

## 2025-08-20 PROCEDURE — 99215 OFFICE O/P EST HI 40 MIN: CPT | Performed by: INTERNAL MEDICINE

## 2025-08-20 RX ORDER — LACTULOSE 10 G/15ML
10 SOLUTION ORAL 2 TIMES DAILY
Qty: 946 ML | Refills: 1 | Status: SHIPPED | OUTPATIENT
Start: 2025-08-20

## 2025-08-22 ENCOUNTER — TELEPHONE (OUTPATIENT)
Facility: CLINIC | Age: 70
End: 2025-08-22

## 2025-08-26 ENCOUNTER — TELEPHONE (OUTPATIENT)
Facility: CLINIC | Age: 70
End: 2025-08-26

## 2025-08-26 DIAGNOSIS — K74.60 HEPATIC CIRRHOSIS, UNSPECIFIED HEPATIC CIRRHOSIS TYPE, UNSPECIFIED WHETHER ASCITES PRESENT (HCC): Primary | ICD-10-CM

## (undated) DEVICE — MEDI-VAC SUCTION HIGH CAPACITY: Brand: CARDINAL HEALTH

## (undated) DEVICE — GAUZE,SPONGE,4"X4",16PLY,STRL,LF,10/TRAY: Brand: MEDLINE

## (undated) DEVICE — CATHETER THOR 36FR DIA10.7MM POLYVI CHL TRCR TIP STR SFT

## (undated) DEVICE — SYR 20ML LL STRL LF --

## (undated) DEVICE — PROCEDURE KIT FLUID MGMT 10 FR CUST MAINFOLD

## (undated) DEVICE — AIRLIFE™ NASAL OXYGEN CANNULA CURVED, NONFLARED TIP WITH 14 FOOT (4.3 M) CRUSH-RESISTANT TUBING, OVER-THE-EAR STYLE: Brand: AIRLIFE™

## (undated) DEVICE — 4FR MICROPUNCTURE KIT STIFFEN

## (undated) DEVICE — SYR 10ML LUER LOK 1/5ML GRAD --

## (undated) DEVICE — GOWN ISOL IMPERV UNIV, DISP, OPEN BACK, BLUE --

## (undated) DEVICE — ENDOSCOPY PUMP TUBING/ CAP SET: Brand: ERBE

## (undated) DEVICE — COVER US PRB W15XL120CM W/ GEL RUBBERBAND TAPE STRP FLD GEN

## (undated) DEVICE — PINNACLE INTRODUCER SHEATH: Brand: PINNACLE

## (undated) DEVICE — Device

## (undated) DEVICE — SOLUTION IRRIG 1000ML H2O STRL BLT

## (undated) DEVICE — BAG WST COLL CLR DISP PVC W/ ROTICULATING LUER L BOR TBNG

## (undated) DEVICE — CANNULA ORIG TL CLR W FOAM CUSHIONS AND 14FT SUPL TB 3 CHN

## (undated) DEVICE — FLUFF AND POLYMER UNDERPAD,EXTRA HEAVY: Brand: WINGS

## (undated) DEVICE — DRAPE,ANGIO,BRACH,STERILE,38X44: Brand: MEDLINE

## (undated) DEVICE — TOWEL,OR,DSP,ST,BLUE,STD,4/PK,20PK/CS: Brand: MEDLINE

## (undated) DEVICE — GUIDEWIRE VASC L260CM DIA0.035IN RAD 3MM J TIP L7CM PTFE

## (undated) DEVICE — CATHETER SUCT TR FL TIP 14FR W/ O CTRL

## (undated) DEVICE — GLIDESHEATH SLENDER STAINLESS STEEL KIT: Brand: GLIDESHEATH SLENDER

## (undated) DEVICE — DECANTER BAG 9": Brand: MEDLINE INDUSTRIES, INC.

## (undated) DEVICE — FLEX ADVANTAGE 3000CC: Brand: FLEX ADVANTAGE

## (undated) DEVICE — SET FLD ADMIN 3 W STPCOCK FIX FEM L BOR 1IN

## (undated) DEVICE — PRESSURE MONITORING SET: Brand: TRUWAVE

## (undated) DEVICE — MEDI-VAC NON-CONDUCTIVE SUCTION TUBING: Brand: CARDINAL HEALTH

## (undated) DEVICE — CATHETER ART THERMODILUTION 6 FRX110 CM 4 LUMEN SWAN

## (undated) DEVICE — SYRINGE MED 25GA 3ML L5/8IN SUBQ PLAS W/ DETACH NDL SFTY

## (undated) DEVICE — SYR 50ML SLIP TIP NSAF LF STRL --